# Patient Record
Sex: FEMALE | Race: WHITE | NOT HISPANIC OR LATINO | Employment: OTHER | URBAN - METROPOLITAN AREA
[De-identification: names, ages, dates, MRNs, and addresses within clinical notes are randomized per-mention and may not be internally consistent; named-entity substitution may affect disease eponyms.]

---

## 2017-01-18 RX ORDER — GLIMEPIRIDE 2 MG/1
TABLET ORAL
Qty: 90 TABLET | Refills: 0 | Status: SHIPPED | OUTPATIENT
Start: 2017-01-18 | End: 2017-04-21 | Stop reason: SDUPTHER

## 2017-01-19 ENCOUNTER — OFFICE VISIT (OUTPATIENT)
Dept: INTERNAL MEDICINE | Facility: CLINIC | Age: 67
End: 2017-01-19

## 2017-01-19 VITALS
OXYGEN SATURATION: 97 % | DIASTOLIC BLOOD PRESSURE: 72 MMHG | SYSTOLIC BLOOD PRESSURE: 122 MMHG | HEIGHT: 65 IN | HEART RATE: 95 BPM | BODY MASS INDEX: 35.65 KG/M2 | WEIGHT: 214 LBS

## 2017-01-19 DIAGNOSIS — G47.33 OBSTRUCTIVE SLEEP APNEA: Chronic | ICD-10-CM

## 2017-01-19 DIAGNOSIS — E11.9 TYPE 2 DIABETES MELLITUS WITHOUT COMPLICATION, WITHOUT LONG-TERM CURRENT USE OF INSULIN (HCC): Primary | Chronic | ICD-10-CM

## 2017-01-19 DIAGNOSIS — F41.8 DEPRESSION WITH ANXIETY: Chronic | ICD-10-CM

## 2017-01-19 DIAGNOSIS — Z87.39 HISTORY OF GOUT: Chronic | ICD-10-CM

## 2017-01-19 DIAGNOSIS — I10 BENIGN ESSENTIAL HYPERTENSION: Chronic | ICD-10-CM

## 2017-01-19 DIAGNOSIS — Z23 NEED FOR IMMUNIZATION AGAINST INFLUENZA: ICD-10-CM

## 2017-01-19 DIAGNOSIS — E55.9 VITAMIN D DEFICIENCY: Chronic | ICD-10-CM

## 2017-01-19 DIAGNOSIS — N26.1 RENAL ATROPHY, LEFT: Chronic | ICD-10-CM

## 2017-01-19 DIAGNOSIS — F41.1 GENERALIZED ANXIETY DISORDER: Chronic | ICD-10-CM

## 2017-01-19 DIAGNOSIS — E66.9 NON MORBID OBESITY, UNSPECIFIED OBESITY TYPE: ICD-10-CM

## 2017-01-19 DIAGNOSIS — K75.81 NASH (NONALCOHOLIC STEATOHEPATITIS): Chronic | ICD-10-CM

## 2017-01-19 DIAGNOSIS — Z51.81 THERAPEUTIC DRUG MONITORING: ICD-10-CM

## 2017-01-19 DIAGNOSIS — E78.5 HYPERLIPIDEMIA, UNSPECIFIED HYPERLIPIDEMIA TYPE: Chronic | ICD-10-CM

## 2017-01-19 DIAGNOSIS — F51.04 CHRONIC INSOMNIA: Chronic | ICD-10-CM

## 2017-01-19 DIAGNOSIS — R80.9 MICROALBUMINURIA: Chronic | ICD-10-CM

## 2017-01-19 DIAGNOSIS — Z80.41 FAMILY HISTORY OF OVARIAN CANCER: Chronic | ICD-10-CM

## 2017-01-19 DIAGNOSIS — R60.0 PEDAL EDEMA: Chronic | ICD-10-CM

## 2017-01-19 DIAGNOSIS — Z80.3 FAMILY HISTORY OF BREAST CANCER: Chronic | ICD-10-CM

## 2017-01-19 PROBLEM — Z01.419 ENCOUNTER FOR ROUTINE GYNECOLOGICAL EXAMINATION: Status: RESOLVED | Noted: 2017-01-19 | Resolved: 2017-01-19

## 2017-01-19 PROBLEM — Z01.419 ENCOUNTER FOR ROUTINE GYNECOLOGICAL EXAMINATION: Status: ACTIVE | Noted: 2017-01-19

## 2017-01-19 PROCEDURE — G0008 ADMIN INFLUENZA VIRUS VAC: HCPCS | Performed by: INTERNAL MEDICINE

## 2017-01-19 PROCEDURE — 90656 IIV3 VACC NO PRSV 0.5 ML IM: CPT | Performed by: INTERNAL MEDICINE

## 2017-01-19 PROCEDURE — 99214 OFFICE O/P EST MOD 30 MIN: CPT | Performed by: INTERNAL MEDICINE

## 2017-01-19 NOTE — MR AVS SNAPSHOT
Reema Easley   1/19/2017 11:35 AM   Office Visit    Dept Phone:  [phone]   Encounter #:  [id number]    Provider:  Marc Rasheed MD   Department:  [facility]                Your Full Care Plan              Your Updated Medication List          This list is accurate as of: 1/19/17  1:09 PM.  Always use your most recent med list.                allopurinol 300 MG tablet   Commonly known as:  ZYLOPRIM   One by mouth daily for gout       ALPRAZolam 0.5 MG tablet   Commonly known as:  XANAX   One by mouth twice a day when necessary anxiety.       amitriptyline 10 MG tablet   Commonly known as:  ELAVIL       amLODIPine 5 MG tablet   Commonly known as:  NORVASC   TAKE ONE TABLET BY MOUTH EVERY MORNING       aspirin 81 MG EC tablet       benazepril 40 MG tablet   Commonly known as:  LOTENSIN   TAKE ONE TABLET BY MOUTH DAILY       furosemide 20 MG tablet   Commonly known as:  LASIX   TAKE ONE TABLET BY MOUTH DAILY       glimepiride 2 MG tablet   Commonly known as:  AMARYL   TAKE ONE TABLET BY MOUTH DAILY FOR DIABETES       JANUMET XR  MG tablet sustained-release 24 hour   Generic drug:  SITagliptin-MetFORMIN HCl ER   TAKE TWO TABLETS BY MOUTH DAILY       OXcarbazepine 150 MG tablet   Commonly known as:  TRILEPTAL   TAKE ONE TABLET BY MOUTH EVERY MORNING AND TAKE TWO TABLETS BY MOUTH EVERY NIGHT AT BEDTIME       simvastatin 40 MG tablet   Commonly known as:  ZOCOR   TAKE ONE TABLET BY MOUTH EVERY NIGHT AT BEDTIME FOR CHOLESTEROL       vitamin D3 5000 UNITS capsule capsule               We Performed the Following     Flu Vaccine Tri (FLUVIRIN)       You Were Diagnosed With        Codes Comments    Type 2 diabetes mellitus without complication, without long-term current use of insulin    -  Primary ICD-10-CM: E11.9  ICD-9-CM: 250.00     Hyperlipidemia, unspecified hyperlipidemia type     ICD-10-CM: E78.5  ICD-9-CM: 272.4     History of gout      ICD-10-CM: Z87.39  ICD-9-CM: V12.29     Benign essential hypertension     ICD-10-CM: I10  ICD-9-CM: 401.1     Microalbuminuria     ICD-10-CM: R80.9  ICD-9-CM: 791.0     GEORGE (nonalcoholic steatohepatitis)     ICD-10-CM: K75.81  ICD-9-CM: 571.8     Obstructive sleep apnea     ICD-10-CM: G47.33  ICD-9-CM: 327.23     Pedal edema     ICD-10-CM: R60.0  ICD-9-CM: 782.3     Renal atrophy, left     ICD-10-CM: N26.1  ICD-9-CM: 587     Vitamin D deficiency     ICD-10-CM: E55.9  ICD-9-CM: 268.9     Depression with anxiety     ICD-10-CM: F41.8  ICD-9-CM: 300.4     Chronic insomnia     ICD-10-CM: F51.04  ICD-9-CM: 780.52     Generalized anxiety disorder     ICD-10-CM: F41.1  ICD-9-CM: 300.02     Non morbid obesity, unspecified obesity type     ICD-10-CM: E66.9  ICD-9-CM: 278.00     Therapeutic drug monitoring     ICD-10-CM: Z51.81  ICD-9-CM: V58.83     Need for immunization against influenza     ICD-10-CM: Z23  ICD-9-CM: V04.81     Family history of ovarian cancer     ICD-10-CM: Z80.41  ICD-9-CM: V16.41     Family history of breast cancer     ICD-10-CM: Z80.3  ICD-9-CM: V16.3       Instructions     None    Patient Instructions History      Upcoming Appointments     Visit Type Date Time Department    OFFICE VISIT 2017 11:35 AM [facility]      Vesti[employer] Signup     [facility] [employer] allows you to send messages to your doctor, view your test results, renew your prescriptions, schedule appointments, and more. To sign up, go to [email or web address] and click on the Sign Up Now link in the New User? box. Enter your [employer] Activation Code exactly as it appears below along with the last four digits of your Social Security Number and your Date of Birth () to complete the sign-up process. If you do not sign up before the expiration date, you must request a new code.    V[employer] Activation Code: [email or web address]  Expires: 2017  9:25 AM    If you have questions, you can email [email or web address] or call  "[phone] to talk to our MyChart staff. Remember, MyChart is NOT to be used for urgent needs. For medical emergencies, dial 911.               Other Info from Your Visit           Other Notes About Your Plan       Please DO NOT MOVE diagnosis from the problem list to past medical history!  As the primary care physician, I CAN NOT assess problems in the past.  Those problems are resolved, not active.  You are causing unnecessary work for me and my staff.  If you didn't author it, leave it alone.  Marc Rasheed M.D. Internal medicine.             Allergies     No Known Allergies      Vital Signs     Blood Pressure Pulse Height Weight Oxygen Saturation Body Mass Index    122/72 95 65\" (165.1 cm) 214 lb (97.1 kg) 97% 35.61 kg/m2    Smoking Status                   Never Smoker           Problems and Diagnoses Noted     Seasonal allergic rhinitis due to pollen    Benign essential hypertension    Chronic insomnia    Depression with anxiety    Diverticulosis of large intestine without hemorrhage    Family history of breast cancer    Family history of ovarian cancer    Generalized anxiety disorder    History of gout    High cholesterol or triglycerides    Microalbuminuria    GEORGE (nonalcoholic steatohepatitis)    Non morbid obesity    Sleep apnea    Osteoarthritis (arthritis due to wear and tear of joints)    Pedal edema    Renal atrophy, left    Encounter for therapeutic drug monitoring    Type 2 diabetes    Vitamin D deficiency    Needs flu shot          No Longer an Issue     Encounter for routine gynecological examination        "
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
## 2017-01-20 ENCOUNTER — RESULTS ENCOUNTER (OUTPATIENT)
Dept: INTERNAL MEDICINE | Facility: CLINIC | Age: 67
End: 2017-01-20

## 2017-01-20 DIAGNOSIS — E78.5 HYPERLIPIDEMIA, UNSPECIFIED HYPERLIPIDEMIA TYPE: Chronic | ICD-10-CM

## 2017-01-20 DIAGNOSIS — E11.9 TYPE 2 DIABETES MELLITUS WITHOUT COMPLICATION, WITHOUT LONG-TERM CURRENT USE OF INSULIN (HCC): Chronic | ICD-10-CM

## 2017-01-20 DIAGNOSIS — E55.9 VITAMIN D DEFICIENCY: Chronic | ICD-10-CM

## 2017-01-20 DIAGNOSIS — Z51.81 THERAPEUTIC DRUG MONITORING: ICD-10-CM

## 2017-01-24 RX ORDER — AMLODIPINE BESYLATE 5 MG/1
TABLET ORAL
Qty: 90 TABLET | Refills: 1 | Status: SHIPPED | OUTPATIENT
Start: 2017-01-24 | End: 2017-07-20 | Stop reason: SDUPTHER

## 2017-01-27 RX ORDER — SIMVASTATIN 40 MG
TABLET ORAL
Qty: 30 TABLET | Refills: 6 | Status: SHIPPED | OUTPATIENT
Start: 2017-01-27 | End: 2017-05-18 | Stop reason: SDUPTHER

## 2017-01-29 RX ORDER — SITAGLIPTIN AND METFORMIN HYDROCHLORIDE 1000; 50 MG/1; MG/1
TABLET, FILM COATED, EXTENDED RELEASE ORAL
Qty: 60 TABLET | Refills: 5 | Status: SHIPPED | OUTPATIENT
Start: 2017-01-29 | End: 2017-05-18 | Stop reason: SDUPTHER

## 2017-01-29 RX ORDER — BENAZEPRIL HYDROCHLORIDE 40 MG/1
TABLET, FILM COATED ORAL
Qty: 30 TABLET | Refills: 5 | Status: SHIPPED | OUTPATIENT
Start: 2017-01-29 | End: 2017-05-18 | Stop reason: SDUPTHER

## 2017-03-21 RX ORDER — FUROSEMIDE 20 MG/1
TABLET ORAL
Qty: 90 TABLET | Refills: 1 | Status: SHIPPED | OUTPATIENT
Start: 2017-03-21 | End: 2017-09-11 | Stop reason: SDUPTHER

## 2017-04-20 DIAGNOSIS — F41.1 GENERALIZED ANXIETY DISORDER: ICD-10-CM

## 2017-04-20 RX ORDER — ALPRAZOLAM 0.5 MG/1
TABLET ORAL
Qty: 60 TABLET | Refills: 3 | OUTPATIENT
Start: 2017-04-20 | End: 2017-08-22 | Stop reason: SDUPTHER

## 2017-04-21 RX ORDER — GLIMEPIRIDE 2 MG/1
TABLET ORAL
Qty: 90 TABLET | Refills: 0 | Status: SHIPPED | OUTPATIENT
Start: 2017-04-21 | End: 2017-07-20 | Stop reason: SDUPTHER

## 2017-05-18 RX ORDER — BENAZEPRIL HYDROCHLORIDE 40 MG/1
40 TABLET, FILM COATED ORAL DAILY
Qty: 90 TABLET | Refills: 0 | Status: SHIPPED | OUTPATIENT
Start: 2017-05-18 | End: 2017-09-19 | Stop reason: SDUPTHER

## 2017-05-18 RX ORDER — SIMVASTATIN 40 MG
40 TABLET ORAL NIGHTLY
Qty: 90 TABLET | Refills: 0 | Status: SHIPPED | OUTPATIENT
Start: 2017-05-18 | End: 2017-09-19 | Stop reason: SDUPTHER

## 2017-06-09 ENCOUNTER — APPOINTMENT (OUTPATIENT)
Dept: WOMENS IMAGING | Facility: HOSPITAL | Age: 67
End: 2017-06-09

## 2017-06-09 PROCEDURE — G0202 SCR MAMMO BI INCL CAD: HCPCS | Performed by: RADIOLOGY

## 2017-06-20 RX ORDER — OXCARBAZEPINE 150 MG/1
TABLET, FILM COATED ORAL
Qty: 30 TABLET | Refills: 0 | Status: SHIPPED | OUTPATIENT
Start: 2017-06-20 | End: 2017-06-21 | Stop reason: SDUPTHER

## 2017-06-21 RX ORDER — OXCARBAZEPINE 150 MG/1
TABLET, FILM COATED ORAL
Qty: 90 TABLET | Refills: 0 | Status: SHIPPED | OUTPATIENT
Start: 2017-06-21 | End: 2017-08-23 | Stop reason: SDUPTHER

## 2017-07-18 DIAGNOSIS — Z11.59 NEED FOR HEPATITIS C SCREENING TEST: Primary | ICD-10-CM

## 2017-07-20 LAB
25(OH)D3+25(OH)D2 SERPL-MCNC: 52.6 NG/ML (ref 30–100)
ALBUMIN SERPL-MCNC: 4.4 G/DL (ref 3.5–5.2)
ALBUMIN/GLOB SERPL: 1.7 G/DL
ALP SERPL-CCNC: 91 U/L (ref 39–117)
ALT SERPL-CCNC: 16 U/L (ref 1–33)
AST SERPL-CCNC: 17 U/L (ref 1–32)
BILIRUB SERPL-MCNC: 0.6 MG/DL (ref 0.1–1.2)
BUN SERPL-MCNC: 10 MG/DL (ref 8–23)
BUN/CREAT SERPL: 12 (ref 7–25)
CALCIUM SERPL-MCNC: 10.4 MG/DL (ref 8.6–10.5)
CHLORIDE SERPL-SCNC: 95 MMOL/L (ref 98–107)
CHOLEST SERPL-MCNC: 113 MG/DL (ref 100–199)
CK SERPL-CCNC: 113 U/L (ref 20–180)
CO2 SERPL-SCNC: 27 MMOL/L (ref 22–29)
CREAT SERPL-MCNC: 0.83 MG/DL (ref 0.57–1)
ERYTHROCYTE [DISTWIDTH] IN BLOOD BY AUTOMATED COUNT: 13.8 % (ref 11.7–13)
GLOBULIN SER CALC-MCNC: 2.6 GM/DL
GLUCOSE SERPL-MCNC: 129 MG/DL (ref 65–99)
HBA1C MFR BLD: 6.1 % (ref 4.8–5.6)
HCT VFR BLD AUTO: 42.7 % (ref 35.6–45.5)
HCV AB S/CO SERPL IA: <0.1 S/CO RATIO (ref 0–0.9)
HDL SERPL-SCNC: 40.3 UMOL/L
HDLC SERPL-MCNC: 57 MG/DL
HGB BLD-MCNC: 14 G/DL (ref 11.9–15.5)
LDL SERPL QN: 19.7 NM
LDL SERPL-SCNC: 642 NMOL/L
LDL SMALL SERPL-SCNC: 437 NMOL/L
LDLC SERPL CALC-MCNC: 41 MG/DL (ref 0–99)
MCH RBC QN AUTO: 30.2 PG (ref 26.9–32)
MCHC RBC AUTO-ENTMCNC: 32.8 G/DL (ref 32.4–36.3)
MCV RBC AUTO: 92 FL (ref 80.5–98.2)
PLATELET # BLD AUTO: 369 10*3/MM3 (ref 140–500)
POTASSIUM SERPL-SCNC: 4.8 MMOL/L (ref 3.5–5.2)
PROT SERPL-MCNC: 7 G/DL (ref 6–8.5)
RBC # BLD AUTO: 4.64 10*6/MM3 (ref 3.9–5.2)
SODIUM SERPL-SCNC: 138 MMOL/L (ref 136–145)
T3FREE SERPL-MCNC: 2.5 PG/ML (ref 2–4.4)
T4 FREE SERPL-MCNC: 1.27 NG/DL (ref 0.93–1.7)
TRIGL SERPL-MCNC: 73 MG/DL (ref 0–149)
TSH SERPL DL<=0.005 MIU/L-ACNC: 1.89 MIU/ML (ref 0.27–4.2)
WBC # BLD AUTO: 8.65 10*3/MM3 (ref 4.5–10.7)

## 2017-07-20 RX ORDER — GLIMEPIRIDE 2 MG/1
TABLET ORAL
Qty: 90 TABLET | Refills: 0 | Status: SHIPPED | OUTPATIENT
Start: 2017-07-20 | End: 2017-07-26

## 2017-07-20 RX ORDER — AMLODIPINE BESYLATE 5 MG/1
TABLET ORAL
Qty: 90 TABLET | Refills: 0 | Status: SHIPPED | OUTPATIENT
Start: 2017-07-20 | End: 2017-07-26

## 2017-07-26 ENCOUNTER — OFFICE VISIT (OUTPATIENT)
Dept: INTERNAL MEDICINE | Facility: CLINIC | Age: 67
End: 2017-07-26

## 2017-07-26 VITALS
BODY MASS INDEX: 32.15 KG/M2 | WEIGHT: 193 LBS | OXYGEN SATURATION: 98 % | DIASTOLIC BLOOD PRESSURE: 60 MMHG | SYSTOLIC BLOOD PRESSURE: 98 MMHG | HEIGHT: 65 IN | HEART RATE: 92 BPM

## 2017-07-26 DIAGNOSIS — R80.9 MICROALBUMINURIA: Chronic | ICD-10-CM

## 2017-07-26 DIAGNOSIS — F41.1 GENERALIZED ANXIETY DISORDER: Chronic | ICD-10-CM

## 2017-07-26 DIAGNOSIS — R60.0 PEDAL EDEMA: Chronic | ICD-10-CM

## 2017-07-26 DIAGNOSIS — E78.5 HYPERLIPIDEMIA, UNSPECIFIED HYPERLIPIDEMIA TYPE: Chronic | ICD-10-CM

## 2017-07-26 DIAGNOSIS — E66.9 NON MORBID OBESITY, UNSPECIFIED OBESITY TYPE: Chronic | ICD-10-CM

## 2017-07-26 DIAGNOSIS — K75.81 NASH (NONALCOHOLIC STEATOHEPATITIS): Chronic | ICD-10-CM

## 2017-07-26 DIAGNOSIS — Z51.81 THERAPEUTIC DRUG MONITORING: ICD-10-CM

## 2017-07-26 DIAGNOSIS — E55.9 VITAMIN D DEFICIENCY: Chronic | ICD-10-CM

## 2017-07-26 DIAGNOSIS — I10 BENIGN ESSENTIAL HYPERTENSION: Chronic | ICD-10-CM

## 2017-07-26 DIAGNOSIS — E11.9 TYPE 2 DIABETES MELLITUS WITHOUT COMPLICATION, WITHOUT LONG-TERM CURRENT USE OF INSULIN (HCC): Primary | Chronic | ICD-10-CM

## 2017-07-26 DIAGNOSIS — F41.8 DEPRESSION WITH ANXIETY: Chronic | ICD-10-CM

## 2017-07-26 DIAGNOSIS — Z87.39 HISTORY OF GOUT: Chronic | ICD-10-CM

## 2017-07-26 DIAGNOSIS — Z80.3 FAMILY HISTORY OF BREAST CANCER: Chronic | ICD-10-CM

## 2017-07-26 DIAGNOSIS — G47.33 OBSTRUCTIVE SLEEP APNEA: Chronic | ICD-10-CM

## 2017-07-26 DIAGNOSIS — E11.9 ENCOUNTER FOR DIABETIC FOOT EXAM (HCC): Chronic | ICD-10-CM

## 2017-07-26 DIAGNOSIS — Z23 NEED FOR PNEUMOCOCCAL VACCINATION: ICD-10-CM

## 2017-07-26 PROBLEM — Z01.00 DIABETIC EYE EXAM (HCC): Chronic | Status: ACTIVE | Noted: 2017-07-26

## 2017-07-26 PROBLEM — Z01.00 DIABETIC EYE EXAM (HCC): Status: ACTIVE | Noted: 2017-07-26

## 2017-07-26 PROCEDURE — 99214 OFFICE O/P EST MOD 30 MIN: CPT | Performed by: INTERNAL MEDICINE

## 2017-07-26 PROCEDURE — 90471 IMMUNIZATION ADMIN: CPT | Performed by: INTERNAL MEDICINE

## 2017-07-26 PROCEDURE — 90670 PCV13 VACCINE IM: CPT | Performed by: INTERNAL MEDICINE

## 2017-07-26 RX ORDER — LAMOTRIGINE 25 MG/1
25 TABLET ORAL 2 TIMES DAILY
COMMUNITY
Start: 2017-06-22 | End: 2017-09-06
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
## 2017-08-22 ENCOUNTER — OFFICE VISIT (OUTPATIENT)
Dept: INTERNAL MEDICINE | Facility: CLINIC | Age: 67
End: 2017-08-22

## 2017-08-22 VITALS
OXYGEN SATURATION: 97 % | SYSTOLIC BLOOD PRESSURE: 114 MMHG | BODY MASS INDEX: 31.99 KG/M2 | DIASTOLIC BLOOD PRESSURE: 76 MMHG | HEIGHT: 65 IN | WEIGHT: 192 LBS | HEART RATE: 81 BPM

## 2017-08-22 DIAGNOSIS — F41.1 GENERALIZED ANXIETY DISORDER: ICD-10-CM

## 2017-08-22 DIAGNOSIS — E11.9 TYPE 2 DIABETES MELLITUS WITHOUT COMPLICATION, WITHOUT LONG-TERM CURRENT USE OF INSULIN (HCC): Chronic | ICD-10-CM

## 2017-08-22 DIAGNOSIS — K75.81 NASH (NONALCOHOLIC STEATOHEPATITIS): Chronic | ICD-10-CM

## 2017-08-22 DIAGNOSIS — R10.11 RIGHT UPPER QUADRANT ABDOMINAL PAIN: Primary | ICD-10-CM

## 2017-08-22 DIAGNOSIS — I10 BENIGN ESSENTIAL HYPERTENSION: Chronic | ICD-10-CM

## 2017-08-22 DIAGNOSIS — E78.5 HYPERLIPIDEMIA, UNSPECIFIED HYPERLIPIDEMIA TYPE: Chronic | ICD-10-CM

## 2017-08-22 PROCEDURE — 99214 OFFICE O/P EST MOD 30 MIN: CPT | Performed by: INTERNAL MEDICINE

## 2017-08-22 RX ORDER — ALPRAZOLAM 0.5 MG/1
0.5 TABLET ORAL 2 TIMES DAILY
Qty: 60 TABLET | Refills: 3 | Status: SHIPPED | OUTPATIENT
Start: 2017-08-22 | End: 2018-03-16 | Stop reason: SDUPTHER
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
## 2017-08-23 ENCOUNTER — HOSPITAL ENCOUNTER (OUTPATIENT)
Dept: ULTRASOUND IMAGING | Facility: HOSPITAL | Age: 67
Discharge: HOME | End: 2017-08-23
Admitting: INTERNAL MEDICINE

## 2017-08-23 LAB
ALBUMIN SERPL-MCNC: 5 G/DL (ref 3.5–5.2)
ALBUMIN/GLOB SERPL: 2.5 G/DL
ALP SERPL-CCNC: 89 U/L (ref 39–117)
ALT SERPL-CCNC: 15 U/L (ref 1–33)
AMYLASE SERPL-CCNC: 44 U/L (ref 28–100)
AST SERPL-CCNC: 10 U/L (ref 1–32)
BASOPHILS # BLD AUTO: 0.04 10*3/MM3 (ref 0–0.2)
BASOPHILS NFR BLD AUTO: 0.4 % (ref 0–1.5)
BILIRUB SERPL-MCNC: 0.3 MG/DL (ref 0.1–1.2)
BUN SERPL-MCNC: 17 MG/DL (ref 8–23)
BUN/CREAT SERPL: 18.3 (ref 7–25)
CALCIUM SERPL-MCNC: 10.3 MG/DL (ref 8.6–10.5)
CHLORIDE SERPL-SCNC: 97 MMOL/L (ref 98–107)
CO2 SERPL-SCNC: 31 MMOL/L (ref 22–29)
CREAT SERPL-MCNC: 0.93 MG/DL (ref 0.57–1)
EOSINOPHIL # BLD AUTO: 0.76 10*3/MM3 (ref 0–0.7)
EOSINOPHIL NFR BLD AUTO: 7.6 % (ref 0.3–6.2)
ERYTHROCYTE [DISTWIDTH] IN BLOOD BY AUTOMATED COUNT: 13.8 % (ref 11.7–13)
GLOBULIN SER CALC-MCNC: 2 GM/DL
GLUCOSE SERPL-MCNC: 133 MG/DL (ref 65–99)
HCT VFR BLD AUTO: 42 % (ref 35.6–45.5)
HGB BLD-MCNC: 13.7 G/DL (ref 11.9–15.5)
IMM GRANULOCYTES # BLD: 0.02 10*3/MM3 (ref 0–0.03)
IMM GRANULOCYTES NFR BLD: 0.2 % (ref 0–0.5)
LIPASE SERPL-CCNC: 53 U/L (ref 13–60)
LYMPHOCYTES # BLD AUTO: 2.85 10*3/MM3 (ref 0.9–4.8)
LYMPHOCYTES NFR BLD AUTO: 28.4 % (ref 19.6–45.3)
MCH RBC QN AUTO: 30 PG (ref 26.9–32)
MCHC RBC AUTO-ENTMCNC: 32.6 G/DL (ref 32.4–36.3)
MCV RBC AUTO: 92.1 FL (ref 80.5–98.2)
MONOCYTES # BLD AUTO: 0.58 10*3/MM3 (ref 0.2–1.2)
MONOCYTES NFR BLD AUTO: 5.8 % (ref 5–12)
NEUTROPHILS # BLD AUTO: 5.79 10*3/MM3 (ref 1.9–8.1)
NEUTROPHILS NFR BLD AUTO: 57.6 % (ref 42.7–76)
PLATELET # BLD AUTO: 381 10*3/MM3 (ref 140–500)
POTASSIUM SERPL-SCNC: 4.8 MMOL/L (ref 3.5–5.2)
PROT SERPL-MCNC: 7 G/DL (ref 6–8.5)
RBC # BLD AUTO: 4.56 10*6/MM3 (ref 3.9–5.2)
SODIUM SERPL-SCNC: 138 MMOL/L (ref 136–145)
WBC # BLD AUTO: 10.04 10*3/MM3 (ref 4.5–10.7)

## 2017-08-23 PROCEDURE — 76705 ECHO EXAM OF ABDOMEN: CPT

## 2017-08-23 RX ORDER — OXCARBAZEPINE 150 MG/1
TABLET, FILM COATED ORAL
Qty: 90 TABLET | Refills: 0 | Status: SHIPPED | OUTPATIENT
Start: 2017-08-23 | End: 2017-09-11 | Stop reason: SDUPTHER

## 2017-08-25 ENCOUNTER — HOSPITAL ENCOUNTER (OUTPATIENT)
Dept: NUCLEAR MEDICINE | Facility: HOSPITAL | Age: 67
Discharge: HOME | End: 2017-08-25

## 2017-08-25 DIAGNOSIS — R10.11 RIGHT UPPER QUADRANT ABDOMINAL PAIN: ICD-10-CM

## 2017-08-25 PROCEDURE — 0 TECHNETIUM TC 99M MEBROFENIN KIT: Performed by: INTERNAL MEDICINE

## 2017-08-25 PROCEDURE — A9537 TC99M MEBROFENIN: HCPCS | Performed by: INTERNAL MEDICINE

## 2017-08-25 PROCEDURE — 25010000002 SINCALIDE PER 5 MCG: Performed by: INTERNAL MEDICINE

## 2017-08-25 PROCEDURE — 78227 HEPATOBIL SYST IMAGE W/DRUG: CPT

## 2017-08-25 RX ORDER — KIT FOR THE PREPARATION OF TECHNETIUM TC 99M MEBROFENIN 45 MG/10ML
1 INJECTION, POWDER, LYOPHILIZED, FOR SOLUTION INTRAVENOUS
Status: COMPLETED | OUTPATIENT
Start: 2017-08-25 | End: 2017-08-25

## 2017-08-25 RX ADMIN — MEBROFENIN 1 DOSE: 45 INJECTION, POWDER, LYOPHILIZED, FOR SOLUTION INTRAVENOUS at 11:45

## 2017-08-25 RX ADMIN — SINCALIDE 1.7 MCG: 5 INJECTION, POWDER, LYOPHILIZED, FOR SOLUTION INTRAVENOUS at 14:00

## 2017-08-31 ENCOUNTER — TELEPHONE (OUTPATIENT)
Dept: INTERNAL MEDICINE | Facility: CLINIC | Age: 67
End: 2017-08-31

## 2017-09-01 DIAGNOSIS — R10.11 RIGHT UPPER QUADRANT ABDOMINAL PAIN: Primary | ICD-10-CM

## 2017-09-06 ENCOUNTER — OFFICE VISIT (OUTPATIENT)
Dept: SURGERY | Facility: CLINIC | Age: 67
End: 2017-09-06

## 2017-09-06 VITALS — BODY MASS INDEX: 31.82 KG/M2 | HEART RATE: 76 BPM | HEIGHT: 65 IN | OXYGEN SATURATION: 100 % | WEIGHT: 191 LBS

## 2017-09-06 DIAGNOSIS — K82.9 GALLBLADDER DISORDER: Primary | ICD-10-CM

## 2017-09-06 DIAGNOSIS — IMO0001 REGURGITATION: ICD-10-CM

## 2017-09-06 PROCEDURE — 99204 OFFICE O/P NEW MOD 45 MIN: CPT | Performed by: SURGERY

## 2017-09-06 RX ORDER — CEFAZOLIN SODIUM 2 G/100ML
2 INJECTION, SOLUTION INTRAVENOUS ONCE
Status: CANCELLED | OUTPATIENT
Start: 2017-09-14 | End: 2017-09-06

## 2017-09-06 RX ORDER — SODIUM CHLORIDE 0.9 % (FLUSH) 0.9 %
1-10 SYRINGE (ML) INJECTION AS NEEDED
Status: CANCELLED | OUTPATIENT
Start: 2017-09-14
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
## 2017-09-11 ENCOUNTER — APPOINTMENT (OUTPATIENT)
Dept: PREADMISSION TESTING | Facility: HOSPITAL | Age: 67
End: 2017-09-11

## 2017-09-11 VITALS
TEMPERATURE: 97.2 F | HEART RATE: 65 BPM | BODY MASS INDEX: 32.11 KG/M2 | SYSTOLIC BLOOD PRESSURE: 123 MMHG | HEIGHT: 65 IN | OXYGEN SATURATION: 100 % | WEIGHT: 192.7 LBS | DIASTOLIC BLOOD PRESSURE: 78 MMHG | RESPIRATION RATE: 20 BRPM

## 2017-09-11 LAB
ALBUMIN SERPL-MCNC: 3.8 G/DL (ref 3.5–5.2)
ALBUMIN/GLOB SERPL: 1.3 G/DL
ALP SERPL-CCNC: 82 U/L (ref 39–117)
ALT SERPL W P-5'-P-CCNC: 15 U/L (ref 1–33)
ANION GAP SERPL CALCULATED.3IONS-SCNC: 10.5 MMOL/L
AST SERPL-CCNC: 13 U/L (ref 1–32)
BILIRUB SERPL-MCNC: 0.6 MG/DL (ref 0.1–1.2)
BUN BLD-MCNC: 12 MG/DL (ref 8–23)
BUN/CREAT SERPL: 16.4 (ref 7–25)
CALCIUM SPEC-SCNC: 9.5 MG/DL (ref 8.6–10.5)
CHLORIDE SERPL-SCNC: 96 MMOL/L (ref 98–107)
CO2 SERPL-SCNC: 27.5 MMOL/L (ref 22–29)
CREAT BLD-MCNC: 0.73 MG/DL (ref 0.57–1)
DEPRECATED RDW RBC AUTO: 44.1 FL (ref 37–54)
ERYTHROCYTE [DISTWIDTH] IN BLOOD BY AUTOMATED COUNT: 13.2 % (ref 11.7–13)
GFR SERPL CREATININE-BSD FRML MDRD: 80 ML/MIN/1.73
GLOBULIN UR ELPH-MCNC: 2.9 GM/DL
GLUCOSE BLD-MCNC: 143 MG/DL (ref 65–99)
HCT VFR BLD AUTO: 40.6 % (ref 35.6–45.5)
HGB BLD-MCNC: 13.5 G/DL (ref 11.9–15.5)
MCH RBC QN AUTO: 30.3 PG (ref 26.9–32)
MCHC RBC AUTO-ENTMCNC: 33.3 G/DL (ref 32.4–36.3)
MCV RBC AUTO: 91 FL (ref 80.5–98.2)
PLATELET # BLD AUTO: 317 10*3/MM3 (ref 140–500)
PMV BLD AUTO: 10.3 FL (ref 6–12)
POTASSIUM BLD-SCNC: 4.7 MMOL/L (ref 3.5–5.2)
PROT SERPL-MCNC: 6.7 G/DL (ref 6–8.5)
RBC # BLD AUTO: 4.46 10*6/MM3 (ref 3.9–5.2)
SODIUM BLD-SCNC: 134 MMOL/L (ref 136–145)
WBC NRBC COR # BLD: 9.24 10*3/MM3 (ref 4.5–10.7)

## 2017-09-11 PROCEDURE — 93010 ELECTROCARDIOGRAM REPORT: CPT | Performed by: INTERNAL MEDICINE

## 2017-09-11 PROCEDURE — 93005 ELECTROCARDIOGRAM TRACING: CPT

## 2017-09-11 PROCEDURE — 36415 COLL VENOUS BLD VENIPUNCTURE: CPT | Performed by: SURGERY

## 2017-09-11 PROCEDURE — 85027 COMPLETE CBC AUTOMATED: CPT | Performed by: SURGERY

## 2017-09-11 PROCEDURE — 80053 COMPREHEN METABOLIC PANEL: CPT | Performed by: SURGERY

## 2017-09-11 RX ORDER — FUROSEMIDE 20 MG/1
20 TABLET ORAL DAILY
COMMUNITY
End: 2017-09-18 | Stop reason: SDUPTHER

## 2017-09-11 RX ORDER — SITAGLIPTIN AND METFORMIN HYDROCHLORIDE 1000; 50 MG/1; MG/1
2 TABLET, FILM COATED, EXTENDED RELEASE ORAL DAILY
COMMUNITY
Start: 2017-09-04 | End: 2018-01-25 | Stop reason: SDUPTHER

## 2017-09-11 RX ORDER — OXCARBAZEPINE 150 MG/1
150 TABLET, FILM COATED ORAL 2 TIMES DAILY
COMMUNITY
End: 2017-09-18 | Stop reason: SDUPTHER

## 2017-09-11 NOTE — DISCHARGE INSTRUCTIONS
Take the following medications the morning of surgery with a small sip of water:  NONE    Arrive to hospital on your day of surgery at 10:30 AM.      General Instructions:  • Do not eat solid food after midnight the night before surgery.  • You may drink clear liquids day of surgery but must stop at least one hour before your hospital arrival time 9:30 AM.  • It is beneficial for you to have a clear drink that contains carbohydrates the day of surgery.  We suggest a 20 ounce bottle of Gatorade or Powerade for non-diabetic patients or a 20 ounce bottle of G2 or Powerade Zero for diabetic patients. (Pediatric patients, are not advised to drink a 20 ounce carbohydrate drink)    Clear liquids are liquids you can see through.  Nothing red in color.     Plain water                               Sports drinks  Sodas                                   Gelatin (Jell-O)  Fruit juices without pulp such as white grape juice and apple juice  Popsicles that contain no fruit or yogurt  Tea or coffee (no cream or milk added)  Gatorade / Powerade  G2 / Powerade Zero    • Infants may have breast milk up to four hours before surgery.  • Infants drinking formula may drink formula up to six hours before surgery.   • Patients who avoid smoking, chewing tobacco and alcohol for 4 weeks prior to surgery have a reduced risk of post-operative complications.  Quit smoking as many days before surgery as you can.  • Do not smoke, use chewing tobacco or drink alcohol the day of surgery.   • If applicable bring your C-PAP/ BI-PAP machine.  • Bring any papers given to you in the doctor’s office.  • Wear clean comfortable clothes and socks.  • Do not wear contact lenses or make-up.  Bring a case for your glasses.   • Bring crutches or walker if applicable.  • Leave all other valuables and jewelry at home.  • The Pre-Admission Testing nurse will instruct you to bring medications if unable to obtain an accurate list in Pre-Admission Testing.        If  you were given a blood bank ID arm band remember to bring it with you the day of surgery.    Preventing a Surgical Site Infection:  • For 2 to 3 days before surgery, avoid shaving with a razor because the razor can irritate skin and make it easier to develop an infection.  • The night prior to surgery sleep in a clean bed with clean clothing.  Do not allow pets to sleep with you.  • Shower on the morning of surgery using a fresh bar of anti-bacterial soap (such as Dial) and clean washcloth.  Dry with a clean towel and dress in clean clothing.  • Ask your surgeon if you will be receiving antibiotics prior to surgery.  • Make sure you, your family, and all healthcare providers clean their hands with soap and water or an alcohol based hand  before caring for you or your wound.    Day of surgery:  Upon arrival, a Pre-op nurse and Anesthesiologist will review your health history, obtain vital signs, and answer questions you may have.  The only belongings needed at this time will be your home medications and if applicable your C-PAP/BI-PAP machine.  If you are staying overnight your family can leave the rest of your belongings in the car and bring them to your room later.  A Pre-op nurse will start an IV and you may receive medication in preparation for surgery, including something to help you relax.  Your family will be able to see you in the Pre-op area.  While you are in surgery your family should notify the waiting room  if they leave the waiting room area and provide a contact phone number.    Please be aware that surgery does come with discomfort.  We want to make every effort to control your discomfort so please discuss any uncontrolled symptoms with your nurse.   Your doctor will most likely have prescribed pain medications.      If you are going home after surgery you will receive individualized written care instructions before being discharged.  A responsible adult must drive you to and from  the hospital on the day of your surgery and stay with you for 24 hours.    If you are staying overnight following surgery, you will be transported to your hospital room following the recovery period.  [facility] has all private rooms.    If you have any questions please call Pre-Admission Testing at [phone].  Deductibles and co-payments are collected on the day of service. Please be prepared to pay the required co-pay, deductible or deposit on the day of service as defined by your plan.

## 2017-09-14 ENCOUNTER — HOSPITAL ENCOUNTER (OUTPATIENT)
Facility: HOSPITAL | Age: 67
Setting detail: HOSPITAL OUTPATIENT SURGERY
Discharge: HOME | End: 2017-09-14
Attending: SURGERY | Admitting: SURGERY

## 2017-09-14 ENCOUNTER — ANESTHESIA (OUTPATIENT)
Dept: PERIOP | Facility: HOSPITAL | Age: 67
End: 2017-09-14

## 2017-09-14 ENCOUNTER — APPOINTMENT (OUTPATIENT)
Dept: GENERAL RADIOLOGY | Facility: HOSPITAL | Age: 67
End: 2017-09-14

## 2017-09-14 ENCOUNTER — ANESTHESIA EVENT (OUTPATIENT)
Dept: PERIOP | Facility: HOSPITAL | Age: 67
End: 2017-09-14

## 2017-09-14 VITALS
RESPIRATION RATE: 16 BRPM | SYSTOLIC BLOOD PRESSURE: 131 MMHG | TEMPERATURE: 97.6 F | WEIGHT: 191 LBS | HEART RATE: 51 BPM | OXYGEN SATURATION: 96 % | HEIGHT: 64 IN | BODY MASS INDEX: 32.61 KG/M2 | DIASTOLIC BLOOD PRESSURE: 66 MMHG

## 2017-09-14 DIAGNOSIS — IMO0001 REGURGITATION: ICD-10-CM

## 2017-09-14 DIAGNOSIS — K82.9 GALLBLADDER DISORDER: ICD-10-CM

## 2017-09-14 LAB — GLUCOSE BLDC GLUCOMTR-MCNC: 119 MG/DL (ref 70–130)

## 2017-09-14 PROCEDURE — 25010000002 NEOSTIGMINE PER 0.5 MG: Performed by: NURSE ANESTHETIST, CERTIFIED REGISTERED

## 2017-09-14 PROCEDURE — 88304 TISSUE EXAM BY PATHOLOGIST: CPT | Performed by: SURGERY

## 2017-09-14 PROCEDURE — 25010000003 CEFAZOLIN IN DEXTROSE 2-4 GM/100ML-% SOLUTION: Performed by: SURGERY

## 2017-09-14 PROCEDURE — 25010000002 ONDANSETRON PER 1 MG: Performed by: NURSE ANESTHETIST, CERTIFIED REGISTERED

## 2017-09-14 PROCEDURE — 25010000002 HYDROMORPHONE PER 4 MG: Performed by: NURSE ANESTHETIST, CERTIFIED REGISTERED

## 2017-09-14 PROCEDURE — 0 IOPAMIDOL PER 1 ML: Performed by: SURGERY

## 2017-09-14 PROCEDURE — 88305 TISSUE EXAM BY PATHOLOGIST: CPT | Performed by: SURGERY

## 2017-09-14 PROCEDURE — 25010000002 MIDAZOLAM PER 1 MG: Performed by: ANESTHESIOLOGY

## 2017-09-14 PROCEDURE — 25010000002 FENTANYL CITRATE (PF) 100 MCG/2ML SOLUTION: Performed by: NURSE ANESTHETIST, CERTIFIED REGISTERED

## 2017-09-14 PROCEDURE — 82962 GLUCOSE BLOOD TEST: CPT

## 2017-09-14 PROCEDURE — 25010000002 PROPOFOL 10 MG/ML EMULSION: Performed by: NURSE ANESTHETIST, CERTIFIED REGISTERED

## 2017-09-14 PROCEDURE — 25010000002 PROMETHAZINE PER 50 MG: Performed by: NURSE ANESTHETIST, CERTIFIED REGISTERED

## 2017-09-14 PROCEDURE — 47563 LAPARO CHOLECYSTECTOMY/GRAPH: CPT | Performed by: SURGERY

## 2017-09-14 PROCEDURE — 43239 EGD BIOPSY SINGLE/MULTIPLE: CPT | Performed by: SURGERY

## 2017-09-14 PROCEDURE — 88312 SPECIAL STAINS GROUP 1: CPT | Performed by: SURGERY

## 2017-09-14 PROCEDURE — 74300 X-RAY BILE DUCTS/PANCREAS: CPT

## 2017-09-14 RX ORDER — CEFAZOLIN SODIUM 2 G/100ML
2 INJECTION, SOLUTION INTRAVENOUS ONCE
Status: COMPLETED | OUTPATIENT
Start: 2017-09-14 | End: 2017-09-14

## 2017-09-14 RX ORDER — PROMETHAZINE HYDROCHLORIDE 25 MG/ML
12.5 INJECTION, SOLUTION INTRAMUSCULAR; INTRAVENOUS ONCE AS NEEDED
Status: COMPLETED | OUTPATIENT
Start: 2017-09-14 | End: 2017-09-14

## 2017-09-14 RX ORDER — HYDRALAZINE HYDROCHLORIDE 20 MG/ML
5 INJECTION INTRAMUSCULAR; INTRAVENOUS
Status: DISCONTINUED | OUTPATIENT
Start: 2017-09-14 | End: 2017-09-14 | Stop reason: HOSPADM

## 2017-09-14 RX ORDER — DOCUSATE SODIUM 100 MG/1
100 CAPSULE, LIQUID FILLED ORAL 2 TIMES DAILY
Qty: 60 CAPSULE | Refills: 1 | Status: SHIPPED | OUTPATIENT
Start: 2017-09-14 | End: 2017-12-26 | Stop reason: SDUPTHER

## 2017-09-14 RX ORDER — DIPHENHYDRAMINE HYDROCHLORIDE 50 MG/ML
12.5 INJECTION INTRAMUSCULAR; INTRAVENOUS
Status: DISCONTINUED | OUTPATIENT
Start: 2017-09-14 | End: 2017-09-14 | Stop reason: HOSPADM

## 2017-09-14 RX ORDER — PROMETHAZINE HYDROCHLORIDE 12.5 MG/1
12.5 TABLET ORAL EVERY 8 HOURS PRN
Qty: 10 TABLET | Refills: 0 | Status: SHIPPED | OUTPATIENT
Start: 2017-09-14 | End: 2017-10-04

## 2017-09-14 RX ORDER — PROPOFOL 10 MG/ML
VIAL (ML) INTRAVENOUS AS NEEDED
Status: DISCONTINUED | OUTPATIENT
Start: 2017-09-14 | End: 2017-09-14 | Stop reason: SURG

## 2017-09-14 RX ORDER — FLUMAZENIL 0.1 MG/ML
0.2 INJECTION INTRAVENOUS AS NEEDED
Status: DISCONTINUED | OUTPATIENT
Start: 2017-09-14 | End: 2017-09-14 | Stop reason: HOSPADM

## 2017-09-14 RX ORDER — LIDOCAINE HYDROCHLORIDE 20 MG/ML
INJECTION, SOLUTION INFILTRATION; PERINEURAL AS NEEDED
Status: DISCONTINUED | OUTPATIENT
Start: 2017-09-14 | End: 2017-09-14 | Stop reason: SURG

## 2017-09-14 RX ORDER — ONDANSETRON 2 MG/ML
4 INJECTION INTRAMUSCULAR; INTRAVENOUS ONCE AS NEEDED
Status: COMPLETED | OUTPATIENT
Start: 2017-09-14 | End: 2017-09-14

## 2017-09-14 RX ORDER — HYDROCODONE BITARTRATE AND ACETAMINOPHEN 5; 325 MG/1; MG/1
1 TABLET ORAL EVERY 4 HOURS PRN
Qty: 30 TABLET | Refills: 0 | Status: SHIPPED | OUTPATIENT
Start: 2017-09-14 | End: 2017-10-04

## 2017-09-14 RX ORDER — MIDAZOLAM HYDROCHLORIDE 1 MG/ML
2 INJECTION INTRAMUSCULAR; INTRAVENOUS
Status: DISCONTINUED | OUTPATIENT
Start: 2017-09-14 | End: 2017-09-14 | Stop reason: HOSPADM

## 2017-09-14 RX ORDER — SODIUM CHLORIDE 0.9 % (FLUSH) 0.9 %
1-10 SYRINGE (ML) INJECTION AS NEEDED
Status: DISCONTINUED | OUTPATIENT
Start: 2017-09-14 | End: 2017-09-14 | Stop reason: HOSPADM

## 2017-09-14 RX ORDER — ROCURONIUM BROMIDE 10 MG/ML
INJECTION, SOLUTION INTRAVENOUS AS NEEDED
Status: DISCONTINUED | OUTPATIENT
Start: 2017-09-14 | End: 2017-09-14 | Stop reason: SURG

## 2017-09-14 RX ORDER — GLYCOPYRROLATE 0.2 MG/ML
INJECTION INTRAMUSCULAR; INTRAVENOUS AS NEEDED
Status: DISCONTINUED | OUTPATIENT
Start: 2017-09-14 | End: 2017-09-14 | Stop reason: SURG

## 2017-09-14 RX ORDER — HYDROMORPHONE HYDROCHLORIDE 1 MG/ML
0.5 INJECTION, SOLUTION INTRAMUSCULAR; INTRAVENOUS; SUBCUTANEOUS
Status: DISCONTINUED | OUTPATIENT
Start: 2017-09-14 | End: 2017-09-14 | Stop reason: HOSPADM

## 2017-09-14 RX ORDER — ONDANSETRON 2 MG/ML
INJECTION INTRAMUSCULAR; INTRAVENOUS AS NEEDED
Status: DISCONTINUED | OUTPATIENT
Start: 2017-09-14 | End: 2017-09-14 | Stop reason: SURG

## 2017-09-14 RX ORDER — EPHEDRINE SULFATE 50 MG/ML
5 INJECTION, SOLUTION INTRAVENOUS ONCE AS NEEDED
Status: DISCONTINUED | OUTPATIENT
Start: 2017-09-14 | End: 2017-09-14 | Stop reason: HOSPADM

## 2017-09-14 RX ORDER — FENTANYL CITRATE 50 UG/ML
50 INJECTION, SOLUTION INTRAMUSCULAR; INTRAVENOUS
Status: DISCONTINUED | OUTPATIENT
Start: 2017-09-14 | End: 2017-09-14 | Stop reason: HOSPADM

## 2017-09-14 RX ORDER — SODIUM CHLORIDE, SODIUM LACTATE, POTASSIUM CHLORIDE, CALCIUM CHLORIDE 600; 310; 30; 20 MG/100ML; MG/100ML; MG/100ML; MG/100ML
9 INJECTION, SOLUTION INTRAVENOUS CONTINUOUS
Status: DISCONTINUED | OUTPATIENT
Start: 2017-09-14 | End: 2017-09-14 | Stop reason: HOSPADM

## 2017-09-14 RX ORDER — HYDROCODONE BITARTRATE AND ACETAMINOPHEN 7.5; 325 MG/1; MG/1
1 TABLET ORAL ONCE AS NEEDED
Status: DISCONTINUED | OUTPATIENT
Start: 2017-09-14 | End: 2017-09-14 | Stop reason: HOSPADM

## 2017-09-14 RX ORDER — PROMETHAZINE HYDROCHLORIDE 25 MG/1
25 TABLET ORAL ONCE AS NEEDED
Status: COMPLETED | OUTPATIENT
Start: 2017-09-14 | End: 2017-09-14

## 2017-09-14 RX ORDER — PROMETHAZINE HYDROCHLORIDE 25 MG/1
12.5 TABLET ORAL ONCE AS NEEDED
Status: DISCONTINUED | OUTPATIENT
Start: 2017-09-14 | End: 2017-09-14 | Stop reason: HOSPADM

## 2017-09-14 RX ORDER — OMEPRAZOLE 20 MG/1
20 CAPSULE, DELAYED RELEASE ORAL 2 TIMES DAILY
Qty: 60 CAPSULE | Refills: 1 | Status: SHIPPED | OUTPATIENT
Start: 2017-09-14 | End: 2017-11-06

## 2017-09-14 RX ORDER — NALOXONE HCL 0.4 MG/ML
0.2 VIAL (ML) INJECTION AS NEEDED
Status: DISCONTINUED | OUTPATIENT
Start: 2017-09-14 | End: 2017-09-14 | Stop reason: HOSPADM

## 2017-09-14 RX ORDER — FAMOTIDINE 10 MG/ML
20 INJECTION, SOLUTION INTRAVENOUS ONCE
Status: COMPLETED | OUTPATIENT
Start: 2017-09-14 | End: 2017-09-14

## 2017-09-14 RX ORDER — DOCUSATE SODIUM 100 MG/1
100 CAPSULE, LIQUID FILLED ORAL 2 TIMES DAILY PRN
Status: DISCONTINUED | OUTPATIENT
Start: 2017-09-14 | End: 2017-09-14 | Stop reason: HOSPADM

## 2017-09-14 RX ORDER — LABETALOL HYDROCHLORIDE 5 MG/ML
5 INJECTION, SOLUTION INTRAVENOUS
Status: DISCONTINUED | OUTPATIENT
Start: 2017-09-14 | End: 2017-09-14 | Stop reason: HOSPADM

## 2017-09-14 RX ORDER — FENTANYL CITRATE 50 UG/ML
INJECTION, SOLUTION INTRAMUSCULAR; INTRAVENOUS AS NEEDED
Status: DISCONTINUED | OUTPATIENT
Start: 2017-09-14 | End: 2017-09-14 | Stop reason: SURG

## 2017-09-14 RX ORDER — BUPIVACAINE HYDROCHLORIDE AND EPINEPHRINE 5; 5 MG/ML; UG/ML
INJECTION, SOLUTION PERINEURAL AS NEEDED
Status: DISCONTINUED | OUTPATIENT
Start: 2017-09-14 | End: 2017-09-14 | Stop reason: HOSPADM

## 2017-09-14 RX ORDER — MIDAZOLAM HYDROCHLORIDE 1 MG/ML
1 INJECTION INTRAMUSCULAR; INTRAVENOUS
Status: DISCONTINUED | OUTPATIENT
Start: 2017-09-14 | End: 2017-09-14 | Stop reason: HOSPADM

## 2017-09-14 RX ORDER — PROMETHAZINE HYDROCHLORIDE 25 MG/1
25 SUPPOSITORY RECTAL ONCE AS NEEDED
Status: COMPLETED | OUTPATIENT
Start: 2017-09-14 | End: 2017-09-14

## 2017-09-14 RX ADMIN — GLYCOPYRROLATE 0.4 MG: 0.2 INJECTION INTRAMUSCULAR; INTRAVENOUS at 14:01

## 2017-09-14 RX ADMIN — FENTANYL CITRATE 100 MCG: 50 INJECTION INTRAMUSCULAR; INTRAVENOUS at 12:46

## 2017-09-14 RX ADMIN — ONDANSETRON 4 MG: 2 INJECTION INTRAMUSCULAR; INTRAVENOUS at 16:15

## 2017-09-14 RX ADMIN — LIDOCAINE HYDROCHLORIDE 100 MG: 20 INJECTION, SOLUTION INFILTRATION; PERINEURAL at 12:46

## 2017-09-14 RX ADMIN — ROCURONIUM BROMIDE 40 MG: 10 INJECTION INTRAVENOUS at 12:46

## 2017-09-14 RX ADMIN — ONDANSETRON 4 MG: 2 INJECTION INTRAMUSCULAR; INTRAVENOUS at 13:43

## 2017-09-14 RX ADMIN — MIDAZOLAM 2 MG: 1 INJECTION INTRAMUSCULAR; INTRAVENOUS at 11:23

## 2017-09-14 RX ADMIN — FENTANYL CITRATE 50 MCG: 50 INJECTION INTRAMUSCULAR; INTRAVENOUS at 14:41

## 2017-09-14 RX ADMIN — PROMETHAZINE HYDROCHLORIDE 12.5 MG: 25 INJECTION INTRAMUSCULAR; INTRAVENOUS at 17:30

## 2017-09-14 RX ADMIN — PROPOFOL 150 MG: 10 INJECTION, EMULSION INTRAVENOUS at 12:46

## 2017-09-14 RX ADMIN — SODIUM CHLORIDE, POTASSIUM CHLORIDE, SODIUM LACTATE AND CALCIUM CHLORIDE: 600; 310; 30; 20 INJECTION, SOLUTION INTRAVENOUS at 14:01

## 2017-09-14 RX ADMIN — HYDROMORPHONE HYDROCHLORIDE 0.5 MG: 1 INJECTION, SOLUTION INTRAMUSCULAR; INTRAVENOUS; SUBCUTANEOUS at 16:02

## 2017-09-14 RX ADMIN — FAMOTIDINE 20 MG: 10 INJECTION, SOLUTION INTRAVENOUS at 11:23

## 2017-09-14 RX ADMIN — NEOSTIGMINE METHYLSULFATE 2.5 MG: 1 INJECTION INTRAMUSCULAR; INTRAVENOUS; SUBCUTANEOUS at 14:01

## 2017-09-14 RX ADMIN — CEFAZOLIN SODIUM 2 G: 2 INJECTION, SOLUTION INTRAVENOUS at 12:54

## 2017-09-14 RX ADMIN — HYDROMORPHONE HYDROCHLORIDE 0.5 MG: 1 INJECTION, SOLUTION INTRAMUSCULAR; INTRAVENOUS; SUBCUTANEOUS at 15:40

## 2017-09-14 RX ADMIN — SODIUM CHLORIDE, POTASSIUM CHLORIDE, SODIUM LACTATE AND CALCIUM CHLORIDE 9 ML/HR: 600; 310; 30; 20 INJECTION, SOLUTION INTRAVENOUS at 11:23

## 2017-09-14 RX ADMIN — HYDROCODONE BITARTRATE AND ACETAMINOPHEN 1 TABLET: 7.5; 325 TABLET ORAL at 14:40

## 2017-09-14 RX ADMIN — FENTANYL CITRATE 25 MCG: 50 INJECTION INTRAMUSCULAR; INTRAVENOUS at 14:04

## 2017-09-14 NOTE — PLAN OF CARE
Problem: Patient Care Overview (Adult)  Goal: Adult Individualization and Mutuality  Outcome: Outcome(s) achieved Date Met:  09/14/17

## 2017-09-14 NOTE — ANESTHESIA PROCEDURE NOTES
Airway  Urgency: elective    Date/Time: 9/14/2017 12:51 PM  Airway not difficult    General Information and Staff    Patient location during procedure: OR  Anesthesiologist: BONNIE PEACOCK  CRNA: JACKI BERMAN    Indications and Patient Condition  Indications for airway management: airway protection    Preoxygenated: yes  MILS maintained throughout  Mask difficulty assessment: 1 - vent by mask    Final Airway Details  Final airway type: endotracheal airway      Successful airway: ETT  Cuffed: yes   Successful intubation technique: direct laryngoscopy  Endotracheal tube insertion site: oral  Blade: Pineda  Blade size: #2  ETT size: 7.0 mm  Cormack-Lehane Classification: grade I - full view of glottis  Placement verified by: chest auscultation and capnometry   Measured from: lips  ETT to lips (cm): 22  Number of attempts at approach: 1

## 2017-09-14 NOTE — ANESTHESIA PREPROCEDURE EVALUATION
Anesthesia Evaluation     Patient summary reviewed and Nursing notes reviewed   history of anesthetic complications: prolonged sedation  NPO Solid Status: > 8 hours       Airway   Mallampati: II  TM distance: >3 FB  Neck ROM: full  Dental - normal exam     Pulmonary - normal exam    breath sounds clear to auscultation  (+) sleep apnea,   Cardiovascular - normal exam    ECG reviewed  Rhythm: regular  Rate: normal    (+) hypertension, hyperlipidemia  (-) angina, orthopnea, PND, VIVAS      Neuro/Psych  (+) psychiatric history Anxiety and Depression,    GI/Hepatic/Renal/Endo    (+) obesity,  hepatitis (GEORGE (nonalcoholic steatohepatitis)), liver disease, renal disease (Renal atrophy, left), diabetes mellitus type 2 well controlled,     Musculoskeletal (-) negative ROS    Abdominal    Substance History - negative use     OB/GYN negative ob/gyn ROS         Other - negative ROS                                     Anesthesia Plan    ASA 3     general     intravenous induction   Anesthetic plan and risks discussed with patient.
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
## 2017-09-14 NOTE — PLAN OF CARE
Problem: Patient Care Overview (Adult)  Goal: Discharge Needs Assessment  Outcome: Outcome(s) achieved Date Met:  09/14/17 09/14/17 1829   Discharge Needs Assessment   Concerns To Be Addressed no discharge needs identified

## 2017-09-14 NOTE — OP NOTE
PREOPERATIVE DIAGNOSIS: Gallbladder dyskinesia, abdominal pain and vomiting  POSTOPERATIVE DIAGNOSIS: Same + gastritis + duodenitis   PROCEDURE: Laparoscopic cholecystectomy with Intraoperative cholangiogram, upper endoscopy with bipsies  SURGEON/STAFF: KURTIS Carranza    ASST: RAHUL Tipton APC  SPECIMENS: Gallbladder , stomach and duodenum biopsies  INTRAOPERATIVE COMPLICATIONS: None.  ANESTHESIA: General.  BLOOD LOSS:  Minimal  COUNTS: Needle and sponge counts correct.   FINDINGS: adhesions to the midline with colon adhered to the abdominal wall at the epigastric area. Otherwise normal biliary anatomy. Gastritis and duodenitis    INDICATIONS: This is a pleasant patient who presented with nausea and vomiting, she was found to have gallbladder dyskinesia..I offered her lap jelly with IOC and upper endoscopy with biopsies  Risks and benefits of laparoscopic, open, and conservative management were discussed at length and in detail with the patient. This included detail drawings of the anatomy and possible postoperative complications including but not limited to bile leak, retained stone, bleeding and common bile duct injury. She agreed was was consented for operative management without duress. She also agreed to upper endoscopy with biopsies.     PROCEDURE: The patient was brought to the operating room in stable condition. Perioperative antibiotics were given and sequential compression devices applied. She was then laid supine on the operating room table. General anesthesia was induced by the Anesthesia Service without difficulty.     At this time, the patient's abdomen was accessed at the level of the umbilicus in open cutdown technique and insertion of a 5-mm trocar. The abdomen was then insufflated. Brief survey of the abdominal cavity revealed no evidence of injury from insertion of the trocar, or no other intraabdominal pathology.  She had large amount of adhesions from the omentum to the anterior abdominal wall.  I  continued procedure by placing another 5 mm trocar in the right lower quadrant under direct laparoscopic vision.  After this adhesions were taken down with scissors from the supraumbilical area to the epigastric area.  The level of the pediatric area there was evidence of adhesions from the omentum connected to the transverse colon to the abdominal wall.  These were taken down preserving the colon.  At this time the patient was placed in steep reverse Trendelenburg and a slightly left-side down position. Three additional 5-mm trocars were placed, all in the standard position. This was under direct vision.  The initial 5 mm trocar was switched by a 12 mm trocar in the umbilical position      The peritoneal attachment of the gallbladder at the level of the infundibulum was scored from laterally to medially, terminating at the level of the hepatic edge. The peritoneum was gently stripped down, exposing the underlying cystic artery and cystic duct. This was also done on the lateral side of the gallbladder by reflecting the gallbladder medially. The peritoneal attachment here was again gently dissected inferiorly. After completely exposing the cystic duct as well as cystic artery, a retro-cystic window was created. These 2 structures, the cystic duct and cystic artery, were further delineated. A firm critical view was obtained, visualizing healthy-appearing hepatic tissue behind the 2 structures, with no evidence of looping, bridging or tenting of the common bile duct.     A Hemo-Lock clip was placed distally at the cystic duct and a cystic duct incision with laparoscopic scissors was performed. A 16 Fr sheet was placed through the patient abdominal wall and a Cholangio-catheter was inserted into the patient abdomen. The catheter was secured inside the cystic duct with a metallic clip. Cholangiogram was performed that showed filling of the cystic duct as well as the common bile duct and the right and left hepatic ducts.  There’s prompt emptying of the contrast into the duodenum and there’s no evidence of filling defects. Next, the metallic clip was removed and the cystic duct was once again visualized and after confirming that it was indeed the cystic duct, it was clipped twice proximally. It was then transected. Next, the cystic artery was clipped twice proximally and once distally. It was inspected and seen to be in intact. The gallbladder was then carefully dissected off the hepatic bed using hook electrocautery. It was firmly adherent to the underlying bed, and an effort careful and meticulous hemostasis was undertaken. The gallbladder, after being removed from the hepatic bed, was placed in an EndoCatch bag. It was removed through the umbilical trocar without difficulty.    A final complete survey of the abdominal cavity was undertaken, and there was no evidence of bleeding or intrabdominal injury.  At this time all 5-mm trocars in the upper abdomen were then removed under direct vision while desufflating the abdomen.Next, the umbilical trocar was removed. The umbilical fascia was closed with interrupted 0 Vicryl suture.  The skin incisions were closed with 4-0 Monocryl and surgical glue. At the end of the case, all needle and sponge counts were correct. I, the attending surgeon, was scrubbed, present and persisted in the entire operation.    I then perform an upper endoscopy.  The gastroscope was inserted through the patient mouth and advanced under direct vision down to the esophagus, stomach and second portion of the duodenum.  Biopsies were taken in the second portion of the duodenum.  Mucosa at the level was normal.  There was evidence of duodenitis at the duodenal bulb.  Biopsies were taken with cold biopsy forceps.  I then explored the prepyloric area of the stomach were there was evidence of gastritis.  This was biopsied with cold forceps biopsy.  Retroflexion was performed.  There was evidence of complete and well  positioned Nissen fundoplication wrap.  There was no evidence of hiatal hernia.  The scope was slowly withdrawn into the esophagus.  There was no evidence of esophagitis.  Biopsies were taken with cold forceps biopsy at the GE junction.  The remaining of the esophagus was normal.  Oropharynx was normal.  Scope was completely withdrawn.      The patient was extubated and taken to the recovery room in stable condition.    Benoit Carranza MD

## 2017-09-14 NOTE — PLAN OF CARE
Problem: Patient Care Overview (Adult)  Goal: Plan of Care Review  Outcome: Ongoing (interventions implemented as appropriate)    09/14/17 1102   Coping/Psychosocial Response Interventions   Plan Of Care Reviewed With patient   Patient Care Overview   Progress no change       Goal: Adult Individualization and Mutuality  Outcome: Ongoing (interventions implemented as appropriate)    09/14/17 1102   Individualization   Patient Specific Preferences none       Goal: Discharge Needs Assessment  Outcome: Ongoing (interventions implemented as appropriate)    09/14/17 1102   Discharge Needs Assessment   Concerns To Be Addressed no discharge needs identified;denies needs/concerns at this time   Equipment Needed After Discharge none   Self-Care   Equipment Currently Used at Home none         Problem: Perioperative Period (Adult)  Goal: Signs and Symptoms of Listed Potential Problems Will be Absent or Manageable (Perioperative Period)  Outcome: Ongoing (interventions implemented as appropriate)    09/14/17 1102   Perioperative Period   Problems Assessed (Perioperative Period) pain;infection;physiologic stress response   Problems Present (Perioperative Period) physiologic stress response

## 2017-09-14 NOTE — PLAN OF CARE
Problem: Patient Care Overview (Adult)  Goal: Plan of Care Review  Outcome: Ongoing (interventions implemented as appropriate)    09/14/17 7137   Coping/Psychosocial Response Interventions   Plan Of Care Reviewed With patient   Patient Care Overview   Progress progress toward functional goals as expected   Outcome Evaluation   Outcome Summary/Follow up Plan patient vitals WNL. pain level tolerable per patient. uneventful stay in pacu

## 2017-09-14 NOTE — PLAN OF CARE
Problem: Patient Care Overview (Adult)  Goal: Plan of Care Review  Outcome: Outcome(s) achieved Date Met:  09/14/17 09/14/17 5978   Coping/Psychosocial Response Interventions   Plan Of Care Reviewed With patient;daughter   Patient Care Overview   Progress improving   Outcome Evaluation   Outcome Summary/Follow up Plan ready for d/c

## 2017-09-14 NOTE — BRIEF OP NOTE
CHOLECYSTECTOMY LAPAROSCOPIC INTRAOPERATIVE CHOLANGIOGRAM, ESOPHAGOGASTRODUODENOSCOPY  Procedure Note    Reema Easley  9/14/2017    Pre-op Diagnosis:   Gallbladder disorder [K82.9]  Regurgitation [R11.10]    Post-op Diagnosis:     Post-Op Diagnosis Codes:     * Gallbladder disorder [K82.9]     * Regurgitation [R11.10]    Procedure/CPT® Codes:      Procedure(s):  LAPAROSCOPIC CHOLECYSTECTOMY WITH  INTRAOPERATIVE CHOLANGIOGRAM  ESOPHAGOGASTRODUODENOSCOPY WITH BIOPSIES    Surgeon(s):  Benoit Carranza MD    Anesthesia: General    Staff:   Circulator: Emily Brown RN; Chana Cook RN  Scrub Person: Aminta Sher  Endo Technician: Caro Urrutia  Assistant: Bibi Tipton CSA  Endo Nurse: Nina Tariq RN    Estimated Blood Loss: Minimal  Urine Voided: * No values recorded between 9/14/2017 12:34 PM and 9/14/2017  1:59 PM *    Specimens:                  ID Type Source Tests Collected by Time Destination   A : gallbladder Tissue Gallbladder TISSUE EXAM Benoit Carranza MD 9/14/2017 1337    B : DUODENUM BX Tissue Small Intestine TISSUE EXAM Benoit Carranza MD 9/14/2017 1353    C : DUODENAL BULB Tissue Small Intestine TISSUE EXAM Benoit Carranza MD 9/14/2017 1355    D : P PYLORI BX Tissue Stomach TISSUE EXAM Benoit Carranza MD 9/14/2017 1356    E : GE JUNCTION BX Tissue Esophagus TISSUE EXAM Benoit Carranza MD 9/14/2017 1358          Drains: none          Findings: Intraabdominal ahdesions, gastritis, duodenitis, evidence of prior nissen fundoplication    Complications: None      Benoit Carranza MD     Date: 9/14/2017  Time: 2:04 PM

## 2017-09-14 NOTE — ANESTHESIA POSTPROCEDURE EVALUATION
"Patient: Reema Easley    Procedure Summary     Date Anesthesia Start Anesthesia Stop Room / Location    09/14/17 1244 1416  [facility] / [facility]       Procedure Diagnosis Surgeon Provider    LAPAROSCOPIC CHOLECYSTECTOMY WITH  INTRAOPERATIVE CHOLANGIOGRAM (N/A Abdomen); ESOPHAGOGASTRODUODENOSCOPY WITH BIOPSIES (N/A Esophagus) Gallbladder disorder; Regurgitation  (Gallbladder disorder [K82.9]; Regurgitation [R11.10]) MD Jeremi Olson MD          Anesthesia Type: general  Last vitals  BP   132/80 (09/14/17 1636)    Temp        Pulse   (!) 46 (09/14/17 1636)   Resp   14 (09/14/17 1636)    SpO2   92 % (09/14/17 1636)      Post Anesthesia Care and Evaluation    Patient location during evaluation: bedside  Patient participation: complete - patient participated  Level of consciousness: awake and alert  Pain management: adequate  Airway patency: patent  Anesthetic complications: No anesthetic complications    Cardiovascular status: acceptable  Respiratory status: acceptable  Hydration status: acceptable    Comments: /80 (BP Location: Left arm, Patient Position: Lying)  Pulse (!) 46  Temp 36.4 °C (97.6 °F) (Oral)   Resp 14  Ht 64\" (162.6 cm)  Wt 191 lb (86.6 kg)  SpO2 92%  BMI 32.79 kg/m2      "

## 2017-09-15 LAB
CYTO UR: NORMAL
LAB AP CASE REPORT: NORMAL
Lab: NORMAL
PATH REPORT.FINAL DX SPEC: NORMAL
PATH REPORT.GROSS SPEC: NORMAL

## 2017-09-18 RX ORDER — FUROSEMIDE 20 MG/1
TABLET ORAL
Qty: 90 TABLET | Refills: 0 | Status: SHIPPED | OUTPATIENT
Start: 2017-09-18 | End: 2017-12-17 | Stop reason: SDUPTHER

## 2017-09-18 RX ORDER — OXCARBAZEPINE 150 MG/1
TABLET, FILM COATED ORAL
Qty: 90 TABLET | Refills: 0 | Status: SHIPPED | OUTPATIENT
Start: 2017-09-18 | End: 2017-10-22 | Stop reason: SDUPTHER

## 2017-09-19 RX ORDER — SIMVASTATIN 40 MG
40 TABLET ORAL NIGHTLY
Qty: 90 TABLET | Refills: 0 | Status: SHIPPED | OUTPATIENT
Start: 2017-09-19 | End: 2018-03-01 | Stop reason: SDUPTHER

## 2017-09-19 RX ORDER — BENAZEPRIL HYDROCHLORIDE 40 MG/1
40 TABLET, FILM COATED ORAL DAILY
Qty: 90 TABLET | Refills: 0 | Status: SHIPPED | OUTPATIENT
Start: 2017-09-19 | End: 2018-01-16 | Stop reason: SDUPTHER

## 2017-10-04 ENCOUNTER — OFFICE VISIT (OUTPATIENT)
Dept: SURGERY | Facility: CLINIC | Age: 67
End: 2017-10-04

## 2017-10-04 DIAGNOSIS — Z09 POSTOP CHECK: Primary | ICD-10-CM

## 2017-10-04 PROCEDURE — 99024 POSTOP FOLLOW-UP VISIT: CPT | Performed by: SURGERY

## 2017-10-23 RX ORDER — OXCARBAZEPINE 150 MG/1
TABLET, FILM COATED ORAL
Qty: 90 TABLET | Refills: 5 | Status: SHIPPED | OUTPATIENT
Start: 2017-10-23 | End: 2019-09-30

## 2017-10-30 DIAGNOSIS — R80.9 MICROALBUMINURIA: Chronic | ICD-10-CM

## 2017-10-30 DIAGNOSIS — E78.5 HYPERLIPIDEMIA, UNSPECIFIED HYPERLIPIDEMIA TYPE: Chronic | ICD-10-CM

## 2017-10-30 DIAGNOSIS — Z87.39 HISTORY OF GOUT: Chronic | ICD-10-CM

## 2017-10-30 DIAGNOSIS — E11.9 TYPE 2 DIABETES MELLITUS WITHOUT COMPLICATION, WITHOUT LONG-TERM CURRENT USE OF INSULIN (HCC): Chronic | ICD-10-CM

## 2017-10-30 DIAGNOSIS — Z51.81 THERAPEUTIC DRUG MONITORING: ICD-10-CM

## 2017-10-30 DIAGNOSIS — E55.9 VITAMIN D DEFICIENCY: Chronic | ICD-10-CM

## 2017-10-31 LAB
25(OH)D3+25(OH)D2 SERPL-MCNC: 48.7 NG/ML (ref 30–100)
ALBUMIN SERPL-MCNC: 4.5 G/DL (ref 3.5–5.2)
ALBUMIN/CREAT UR: <12.7 MG/G CREAT (ref 0–30)
ALBUMIN/GLOB SERPL: 2 G/DL
ALP SERPL-CCNC: 95 U/L (ref 39–117)
ALT SERPL-CCNC: 14 U/L (ref 1–33)
AST SERPL-CCNC: 16 U/L (ref 1–32)
BILIRUB SERPL-MCNC: 0.5 MG/DL (ref 0.1–1.2)
BUN SERPL-MCNC: 12 MG/DL (ref 8–23)
BUN/CREAT SERPL: 18.8 (ref 7–25)
CALCIUM SERPL-MCNC: 9.8 MG/DL (ref 8.6–10.5)
CHLORIDE SERPL-SCNC: 95 MMOL/L (ref 98–107)
CHOLEST SERPL-MCNC: 123 MG/DL (ref 100–199)
CK SERPL-CCNC: 96 U/L (ref 20–180)
CO2 SERPL-SCNC: 28.2 MMOL/L (ref 22–29)
CREAT SERPL-MCNC: 0.64 MG/DL (ref 0.57–1)
CREAT UR-MCNC: 23.6 MG/DL
ERYTHROCYTE [DISTWIDTH] IN BLOOD BY AUTOMATED COUNT: 13.4 % (ref 11.7–13)
GFR SERPLBLD CREATININE-BSD FMLA CKD-EPI: 113 ML/MIN/1.73
GFR SERPLBLD CREATININE-BSD FMLA CKD-EPI: 93 ML/MIN/1.73
GLOBULIN SER CALC-MCNC: 2.3 GM/DL
GLUCOSE SERPL-MCNC: 119 MG/DL (ref 65–99)
HBA1C MFR BLD: 6.39 % (ref 4.8–5.6)
HCT VFR BLD AUTO: 41.1 % (ref 35.6–45.5)
HDL SERPL-SCNC: 35 UMOL/L
HDLC SERPL-MCNC: 52 MG/DL
HGB BLD-MCNC: 13.4 G/DL (ref 11.9–15.5)
LDL SERPL QN: 19.8 NM
LDL SERPL-SCNC: 793 NMOL/L
LDL SMALL SERPL-SCNC: 512 NMOL/L
LDLC SERPL CALC-MCNC: 53 MG/DL (ref 0–99)
MCH RBC QN AUTO: 29.5 PG (ref 26.9–32)
MCHC RBC AUTO-ENTMCNC: 32.6 G/DL (ref 32.4–36.3)
MCV RBC AUTO: 90.3 FL (ref 80.5–98.2)
MICROALBUMIN UR-MCNC: <3 UG/ML
PLATELET # BLD AUTO: 373 10*3/MM3 (ref 140–500)
POTASSIUM SERPL-SCNC: 4.9 MMOL/L (ref 3.5–5.2)
PROT SERPL-MCNC: 6.8 G/DL (ref 6–8.5)
RBC # BLD AUTO: 4.55 10*6/MM3 (ref 3.9–5.2)
SODIUM SERPL-SCNC: 136 MMOL/L (ref 136–145)
T3FREE SERPL-MCNC: 2.3 PG/ML (ref 2–4.4)
T4 FREE SERPL-MCNC: 1.17 NG/DL (ref 0.93–1.7)
TRIGL SERPL-MCNC: 92 MG/DL (ref 0–149)
TSH SERPL DL<=0.005 MIU/L-ACNC: 1.32 MIU/ML (ref 0.27–4.2)
URATE SERPL-MCNC: 5.8 MG/DL (ref 2.4–5.7)
WBC # BLD AUTO: 9.38 10*3/MM3 (ref 4.5–10.7)

## 2017-11-06 ENCOUNTER — OFFICE VISIT (OUTPATIENT)
Dept: INTERNAL MEDICINE | Facility: CLINIC | Age: 67
End: 2017-11-06

## 2017-11-06 VITALS
SYSTOLIC BLOOD PRESSURE: 116 MMHG | HEIGHT: 64 IN | BODY MASS INDEX: 31.76 KG/M2 | HEART RATE: 75 BPM | OXYGEN SATURATION: 98 % | DIASTOLIC BLOOD PRESSURE: 70 MMHG | WEIGHT: 186 LBS

## 2017-11-06 DIAGNOSIS — R80.9 MICROALBUMINURIA: Chronic | ICD-10-CM

## 2017-11-06 DIAGNOSIS — E11.9 TYPE 2 DIABETES MELLITUS WITHOUT COMPLICATION, WITHOUT LONG-TERM CURRENT USE OF INSULIN (HCC): Primary | Chronic | ICD-10-CM

## 2017-11-06 DIAGNOSIS — Z80.3 FAMILY HISTORY OF BREAST CANCER: Chronic | ICD-10-CM

## 2017-11-06 DIAGNOSIS — Z87.39 HISTORY OF GOUT: Chronic | ICD-10-CM

## 2017-11-06 DIAGNOSIS — R60.0 PEDAL EDEMA: Chronic | ICD-10-CM

## 2017-11-06 DIAGNOSIS — E66.9 NON MORBID OBESITY: Chronic | ICD-10-CM

## 2017-11-06 DIAGNOSIS — K75.81 NASH (NONALCOHOLIC STEATOHEPATITIS): Chronic | ICD-10-CM

## 2017-11-06 DIAGNOSIS — E55.9 VITAMIN D DEFICIENCY: Chronic | ICD-10-CM

## 2017-11-06 DIAGNOSIS — Z51.81 THERAPEUTIC DRUG MONITORING: ICD-10-CM

## 2017-11-06 DIAGNOSIS — G47.33 OBSTRUCTIVE SLEEP APNEA: Chronic | ICD-10-CM

## 2017-11-06 DIAGNOSIS — E78.5 HYPERLIPIDEMIA, UNSPECIFIED HYPERLIPIDEMIA TYPE: Chronic | ICD-10-CM

## 2017-11-06 DIAGNOSIS — I10 BENIGN ESSENTIAL HYPERTENSION: Chronic | ICD-10-CM

## 2017-11-06 PROBLEM — R10.11 RIGHT UPPER QUADRANT ABDOMINAL PAIN: Status: RESOLVED | Noted: 2017-08-22 | Resolved: 2017-11-06

## 2017-11-06 PROBLEM — IMO0001 REGURGITATION: Status: RESOLVED | Noted: 2017-09-06 | Resolved: 2017-11-06

## 2017-11-06 PROCEDURE — 99214 OFFICE O/P EST MOD 30 MIN: CPT | Performed by: INTERNAL MEDICINE
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
## 2017-11-09 RX ORDER — OMEPRAZOLE 20 MG/1
CAPSULE, DELAYED RELEASE ORAL
Qty: 60 CAPSULE | Refills: 0 | Status: SHIPPED | OUTPATIENT
Start: 2017-11-09 | End: 2017-12-18 | Stop reason: SDUPTHER

## 2017-12-06 ENCOUNTER — OFFICE VISIT (OUTPATIENT)
Dept: INTERNAL MEDICINE | Facility: CLINIC | Age: 67
End: 2017-12-06

## 2017-12-06 VITALS
HEART RATE: 70 BPM | DIASTOLIC BLOOD PRESSURE: 72 MMHG | WEIGHT: 183 LBS | HEIGHT: 64 IN | OXYGEN SATURATION: 98 % | BODY MASS INDEX: 31.24 KG/M2 | SYSTOLIC BLOOD PRESSURE: 120 MMHG

## 2017-12-06 DIAGNOSIS — E66.9 NON MORBID OBESITY: Chronic | ICD-10-CM

## 2017-12-06 DIAGNOSIS — G47.33 OBSTRUCTIVE SLEEP APNEA: Chronic | ICD-10-CM

## 2017-12-06 DIAGNOSIS — R80.9 MICROALBUMINURIA: Chronic | ICD-10-CM

## 2017-12-06 DIAGNOSIS — E11.9 ENCOUNTER FOR DIABETIC FOOT EXAM (HCC): Chronic | ICD-10-CM

## 2017-12-06 DIAGNOSIS — E78.5 HYPERLIPIDEMIA, UNSPECIFIED HYPERLIPIDEMIA TYPE: Chronic | ICD-10-CM

## 2017-12-06 DIAGNOSIS — Z00.00 ROUTINE PHYSICAL EXAMINATION: Primary | ICD-10-CM

## 2017-12-06 DIAGNOSIS — I10 BENIGN ESSENTIAL HYPERTENSION: Chronic | ICD-10-CM

## 2017-12-06 DIAGNOSIS — N26.1 RENAL ATROPHY, LEFT: Chronic | ICD-10-CM

## 2017-12-06 DIAGNOSIS — F41.1 GENERALIZED ANXIETY DISORDER: Chronic | ICD-10-CM

## 2017-12-06 DIAGNOSIS — F51.04 CHRONIC INSOMNIA: Chronic | ICD-10-CM

## 2017-12-06 DIAGNOSIS — F41.8 DEPRESSION WITH ANXIETY: Chronic | ICD-10-CM

## 2017-12-06 DIAGNOSIS — E11.9 TYPE 2 DIABETES MELLITUS WITHOUT COMPLICATION, WITHOUT LONG-TERM CURRENT USE OF INSULIN (HCC): Chronic | ICD-10-CM

## 2017-12-06 DIAGNOSIS — E55.9 VITAMIN D DEFICIENCY: Chronic | ICD-10-CM

## 2017-12-06 DIAGNOSIS — K75.81 NASH (NONALCOHOLIC STEATOHEPATITIS): Chronic | ICD-10-CM

## 2017-12-06 DIAGNOSIS — Z80.3 FAMILY HISTORY OF BREAST CANCER: Chronic | ICD-10-CM

## 2017-12-06 DIAGNOSIS — K29.80 DUODENITIS: ICD-10-CM

## 2017-12-06 DIAGNOSIS — Z51.81 THERAPEUTIC DRUG MONITORING: ICD-10-CM

## 2017-12-06 DIAGNOSIS — R60.0 PEDAL EDEMA: Chronic | ICD-10-CM

## 2017-12-06 DIAGNOSIS — Z87.39 HISTORY OF GOUT: Chronic | ICD-10-CM

## 2017-12-06 DIAGNOSIS — K29.50 CHRONIC GASTRITIS WITHOUT BLEEDING, UNSPECIFIED GASTRITIS TYPE: ICD-10-CM

## 2017-12-06 DIAGNOSIS — Z00.00 WELCOME TO MEDICARE PREVENTIVE VISIT: ICD-10-CM

## 2017-12-06 PROCEDURE — 99214 OFFICE O/P EST MOD 30 MIN: CPT | Performed by: INTERNAL MEDICINE

## 2017-12-06 PROCEDURE — G0438 PPPS, INITIAL VISIT: HCPCS | Performed by: INTERNAL MEDICINE

## 2017-12-06 PROCEDURE — 96160 PT-FOCUSED HLTH RISK ASSMT: CPT | Performed by: INTERNAL MEDICINE

## 2017-12-06 RX ORDER — LAMOTRIGINE 25 MG/1
1 TABLET ORAL DAILY
COMMUNITY
Start: 2017-11-06 | End: 2018-12-26

## 2017-12-06 NOTE — PROGRESS NOTES
QUICK REFERENCE INFORMATION:  The ABCs of the Annual Wellness Visit    Welcome to Medicare Visit    HEALTH RISK ASSESSMENT    1950    Recent Hospitalizations:  Recently treated at the following:  [facility].      Current Medical Providers:  Patient Care Team:  Marc Rasheed MD as PCP - General (Internal Medicine)  Valarie Limon MD as Consulting Physician (Obstetrics and Gynecology)      Smoking Status:  History   Smoking Status   • Never Smoker   Smokeless Tobacco   • Never Used       Alcohol Consumption:  History   Alcohol Use   • Yes     Comment: SOCIALLY IN PAST, NONE SINCE JANUARY       Depression Screen:   PHQ-2/PHQ-9 Depression Screening 12/6/2017   Little interest or pleasure in doing things 0   Feeling down, depressed, or hopeless 0   Total Score 0       Health Habits and Functional and Cognitive Screening:  Functional & Cognitive Status 12/6/2017   Do you have difficulty preparing food and eating? No   Do you have difficulty bathing yourself, getting dressed or grooming yourself? No   Do you have difficulty using the toilet? No   Do you have difficulty moving around from place to place? No   Do you have trouble with steps or getting out of a bed or a chair? No   In the past year have you fallen or experienced a near fall? No   Current Diet Well Balanced Diet   Dental Exam Not up to date   Eye Exam Not up to date   Exercise (times per week) 4 times per week   Current Exercise Activities Include Walking   Do you need help using the phone?  No   Are you deaf or do you have serious difficulty hearing?  Yes   Do you need help with transportation? No   Do you need help shopping? No   Do you need help preparing meals?  No   Do you need help with housework?  No   Do you need help with laundry? No   Do you need help taking your medications? No   Do you need help managing money? Yes   Have you felt unusual stress, anger or loneliness in the last month? No   Who do you live with? Other    If you need help, do you have trouble finding someone available to you? No   Have you been bothered in the last four weeks by sexual problems? No   Do you have difficulty concentrating, remembering or making decisions? No           Does the patient have evidence of cognitive impairment? No    Aspirin use counseling? Taking ASA appropriately as indicated      Recent Lab Results:  CMP:  Lab Results   Component Value Date     (H) 10/30/2017    BUN 12 10/30/2017    CREATININE 0.64 10/30/2017    EGFRIFNONA 93 10/30/2017    EGFRIFAFRI 113 10/30/2017    BCR 18.8 10/30/2017     10/30/2017    K 4.9 10/30/2017    CO2 28.2 10/30/2017    CALCIUM 9.8 10/30/2017    PROTENTOTREF 6.8 10/30/2017    ALBUMIN 4.50 10/30/2017    LABGLOBREF 2.3 10/30/2017    LABIL2 2.0 10/30/2017    BILITOT 0.5 10/30/2017    ALKPHOS 95 10/30/2017    AST 16 10/30/2017    ALT 14 10/30/2017     Lipid Panel:  Lab Results   Component Value Date    TRIG 92 10/30/2017     HbA1c:  Lab Results   Component Value Date    HGBA1C 6.39 (H) 10/30/2017       Visual Acuity:   Visual Acuity Screening    Right eye Left eye Both eyes   Without correction: 20/25 20/25 20/25   With correction: 20/15 20/15 20/15       Age-appropriate Screening Schedule:  Refer to the list below for future screening recommendations based on patient's age, sex and/or medical conditions. Orders for these recommended tests are listed in the plan section. The patient has been provided with a written plan.    Health Maintenance   Topic Date Due   • DIABETIC EYE EXAM  04/07/2016   • HEMOGLOBIN A1C  04/30/2018   • PNEUMOCOCCAL VACCINES (65+ LOW/MEDIUM RISK) (2 of 2 - PPSV23) 07/26/2018   • DIABETIC FOOT EXAM  07/26/2018   • LIPID PANEL  10/30/2018   • URINE MICROALBUMIN  10/30/2018   • MAMMOGRAM  06/09/2019   • PAP SMEAR  06/01/2020   • COLONOSCOPY  12/22/2024   • TDAP/TD VACCINES (2 - Td) 10/18/2025   • INFLUENZA VACCINE  Completed   • ZOSTER VACCINE  Completed        Subjective    History of Present Illness    Reema Easley is a 66 y.o. female an established patient presenting for a Welcome to Medicare Visit.     The following portions of the patient's history were reviewed and updated as appropriate: allergies, current medications, past family history, past medical history, past social history, past surgical history and problem list.    Outpatient Medications Prior to Visit   Medication Sig Dispense Refill   • ALPRAZolam (XANAX) 0.5 MG tablet Take 1 tablet by mouth 2 (Two) Times a Day. 60 tablet 3   • aspirin 81 MG EC tablet Take 1 tablet by mouth Daily. PT TO HOLD MED PRIOR TO OR     • benazepril (LOTENSIN) 40 MG tablet Take 1 tablet by mouth Daily. 90 tablet 0   • Cholecalciferol (VITAMIN D3) 5000 UNITS capsule capsule Take 5,000 Units by mouth Daily.     • docusate sodium (COLACE) 100 MG capsule Take 1 capsule by mouth 2 (Two) Times a Day. 60 capsule 1   • furosemide (LASIX) 20 MG tablet TAKE ONE TABLET BY MOUTH DAILY 90 tablet 0   • JANUMET XR  MG tablet sustained-release 24 hour Take 2 tablets by mouth Daily.     • omeprazole (priLOSEC) 20 MG capsule TAKE ONE CAPSULE BY MOUTH TWICE A DAY 60 capsule 0   • OXcarbazepine (TRILEPTAL) 150 MG tablet TAKE ONE TABLET BY MOUTH EVERY MORNING AND TAKE TWO TABLETS BY MOUTH EVERY NIGHT AT BEDTIME 90 tablet 5   • simvastatin (ZOCOR) 40 MG tablet Take 1 tablet by mouth Every Night. 90 tablet 0     No facility-administered medications prior to visit.        Patient Active Problem List   Diagnosis   • Benign essential hypertension   • Chronic insomnia   • Depression with anxiety   • Diverticulosis of colon   • Generalized anxiety disorder   • Gout   • Hyperlipidemia   • Microalbuminuria   • GEORGE (nonalcoholic steatohepatitis)   • Obstructive sleep apnea, 12/17/2015--AHI 14.6.  RDI 48.9 with REM sleep.  O2 sat 77%.  Cannot tolerate CPAP.   • Primary osteoarthritis of right knee   • Pedal edema   • Renal atrophy, left   • Type 2 diabetes  mellitus   • Vitamin D deficiency   • Family history of ovarian cancer   • Family history of breast cancer   • Therapeutic drug monitoring   • Allergic rhinitis   • [employer] Medicare replacement physical exam   • Non morbid obesity   • Diabetic foot exam   • Diabetic eye exam   • Chronic duodenitis   • Chronic gastritis       Advance Care Planning:  has NO advance directive - information provided to the patient today    Identification of Risk Factors:  Risk factors include: weight , cardiovascular risk and depression.    Review of Systems   Constitutional:        Weight gain   All other systems reviewed and are negative.      Compared to one year ago, the patient feels her physical health is better.  Compared to one year ago, the patient feels her mental health is better.    Objective      Physical Exam    General: Alert and oriented x 3, with appropriate affect; no acute distress. Obese.  HEENT: pupils equal, round, and reactive to light; extraocular movements intact; sclera nonicteric; nasal mucosa normal; pharynx normal; tympanic membranes and ear canals normal.  Neck: without JVD, thyromegaly, bruit, or adenopathy.  Lungs: clear to auscultation in all fields.  Heart: auscultation reveals regular rate and rhythm without murmur, rub, gallop, or click.  Abdomen: is soft and nontender, without hepatosplenomegaly, mass or hernia. Normal bowel sounds.  GYN, Digital rectal exam, Breast; deferred to gynecologist.  Extremities: are without clubbing, cyanosis, or edema.  Vascular: no signs of peripheral arterial disease or venous insufficiency/varicosities.  Neurological: intact without focal deficit, including cranial and peripheral nerves.  Station and gait observed to be normal during ingress and egress from the examination area.  Sensation and deep tendon reflexes tested if clinically indicated and are normal.  Musculoskeletal: exam is normal, without signs of synovitis, significant degeneration or deformity. Skin  "examination: without rash or significant lesions.  Vitals:    12/06/17 1034   BP: 120/72   BP Location: Right arm   Patient Position: Sitting   Cuff Size: Large Adult   Pulse: 70   SpO2: 98%   Weight: 83 kg (183 lb)   Height: 162.6 cm (64.02\")   PainSc: 0-No pain       Body mass index is 31.4 kg/(m^2).  Discussed the patient's BMI with her. BMI is above normal parameters. Follow-up plan includes:  exercise counseling and nutrition counseling.    Procedure   Procedures       Assessment/Plan   Patient Self-Management and Personalized Health Advice  The patient has been provided with information about: diet, exercise, weight management, fall prevention and designing advance directives and preventive services including:   · Advance directive, Diabetes screening, see lab orders, Exercise counseling provided, Fall Risk assessment done, Glaucoma screening recommended, Nutrition counseling provided.    Visit Diagnoses:    ICD-10-CM ICD-9-CM   1. [employer] Medicare replacement physical exam Z00.00 V70.0   2. Welcome to Medicare preventive visit Z00.00 V70.0   3. Type 2 diabetes mellitus without complication, without long-term current use of insulin E11.9 250.00   4. Hyperlipidemia, unspecified hyperlipidemia type E78.5 272.4   5. Gout Z87.39 V12.29   6. Microalbuminuria R80.9 791.0   7. GEORGE (nonalcoholic steatohepatitis) K75.81 571.8   8. Obstructive sleep apnea, 12/17/2015--AHI 14.6.  RDI 48.9 with REM sleep.  O2 sat 77%.  Cannot tolerate CPAP. G47.33 327.23   9. Pedal edema R60.0 782.3   10. Benign essential hypertension I10 401.1   11. Chronic insomnia F51.04 780.52   12. Depression with anxiety F41.8 300.4   13. Generalized anxiety disorder F41.1 300.02   14. Renal atrophy, left N26.1 587   15. Vitamin D deficiency E55.9 268.9   16. Family history of breast cancer Z80.3 V16.3   17. Non morbid obesity E66.9 278.00   18. Diabetic foot exam E11.9 250.00   19. Chronic duodenitis K29.80 535.60   20. Chronic gastritis without " bleeding, unspecified gastritis type K29.50 535.10   21. Therapeutic drug monitoring Z51.81 V58.83       Orders Placed This Encounter   Procedures   • CBC (No Diff)     Standing Status:   Future     Standing Expiration Date:   12/7/2019   • CK     Standing Status:   Future     Standing Expiration Date:   12/7/2019   • Comprehensive Metabolic Panel     Standing Status:   Future     Standing Expiration Date:   12/7/2019   • Hemoglobin A1c     Standing Status:   Future     Standing Expiration Date:   12/7/2019   • NMR LipoProfile     Standing Status:   Future     Standing Expiration Date:   12/7/2019   • Vitamin D 25 Hydroxy     Standing Status:   Future     Standing Expiration Date:   12/7/2019   • TSH     Standing Status:   Future     Standing Expiration Date:   12/7/2019   • T4, Free     Standing Status:   Future     Standing Expiration Date:   12/7/2019   • T3, Free     Standing Status:   Future     Standing Expiration Date:   12/7/2019   • Microalbumin / Creatinine Urine Ratio - Urine, Clean Catch     Standing Status:   Future     Standing Expiration Date:   12/7/2019       Outpatient Encounter Prescriptions as of 12/6/2017   Medication Sig Dispense Refill   • ALPRAZolam (XANAX) 0.5 MG tablet Take 1 tablet by mouth 2 (Two) Times a Day. 60 tablet 3   • aspirin 81 MG EC tablet Take 1 tablet by mouth Daily. PT TO HOLD MED PRIOR TO OR     • benazepril (LOTENSIN) 40 MG tablet Take 1 tablet by mouth Daily. 90 tablet 0   • Cholecalciferol (VITAMIN D3) 5000 UNITS capsule capsule Take 5,000 Units by mouth Daily.     • docusate sodium (COLACE) 100 MG capsule Take 1 capsule by mouth 2 (Two) Times a Day. 60 capsule 1   • furosemide (LASIX) 20 MG tablet TAKE ONE TABLET BY MOUTH DAILY 90 tablet 0   • JANUMET XR  MG tablet sustained-release 24 hour Take 2 tablets by mouth Daily.     • lamoTRIgine (LaMICtal) 25 MG tablet 1 tablet Daily.     • omeprazole (priLOSEC) 20 MG capsule TAKE ONE CAPSULE BY MOUTH TWICE A DAY 60  capsule 0   • OXcarbazepine (TRILEPTAL) 150 MG tablet TAKE ONE TABLET BY MOUTH EVERY MORNING AND TAKE TWO TABLETS BY MOUTH EVERY NIGHT AT BEDTIME 90 tablet 5   • simvastatin (ZOCOR) 40 MG tablet Take 1 tablet by mouth Every Night. 90 tablet 0     No facility-administered encounter medications on file as of 12/6/2017.        Reviewed use of high risk medication in the elderly: yes  Reviewed for potential of harmful drug interactions in the elderly: yes    Follow Up:  Return in about 6 months (around 6/6/2018) for Next scheduled follow up with lab prior.     An After Visit Summary and PPPS with all of these plans were given to the patient.
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
## 2017-12-18 RX ORDER — OMEPRAZOLE 20 MG/1
CAPSULE, DELAYED RELEASE ORAL
Qty: 60 CAPSULE | Refills: 0 | Status: SHIPPED | OUTPATIENT
Start: 2017-12-18 | End: 2018-07-05 | Stop reason: HOSPADM

## 2017-12-18 RX ORDER — FUROSEMIDE 20 MG/1
TABLET ORAL
Qty: 90 TABLET | Refills: 1 | Status: SHIPPED | OUTPATIENT
Start: 2017-12-18 | End: 2018-06-17 | Stop reason: SDUPTHER

## 2017-12-26 RX ORDER — DOCUSATE SODIUM 100 MG/1
CAPSULE, LIQUID FILLED ORAL
Qty: 60 CAPSULE | Refills: 0 | Status: SHIPPED | OUTPATIENT
Start: 2017-12-26 | End: 2018-02-16 | Stop reason: SDUPTHER

## 2018-01-25 RX ORDER — SITAGLIPTIN AND METFORMIN HYDROCHLORIDE 1000; 50 MG/1; MG/1
2 TABLET, FILM COATED, EXTENDED RELEASE ORAL DAILY
Qty: 30 TABLET | Refills: 5 | Status: SHIPPED | OUTPATIENT
Start: 2018-01-25 | End: 2018-07-05

## 2018-02-19 RX ORDER — DOCUSATE SODIUM 100 MG
CAPSULE ORAL
Qty: 60 CAPSULE | Refills: 0 | Status: SHIPPED | OUTPATIENT
Start: 2018-02-19 | End: 2021-02-03

## 2018-03-01 RX ORDER — SIMVASTATIN 40 MG
TABLET ORAL
Qty: 90 TABLET | Refills: 0 | Status: SHIPPED | OUTPATIENT
Start: 2018-03-01 | End: 2018-05-30 | Stop reason: SDUPTHER

## 2018-03-16 DIAGNOSIS — F41.1 GENERALIZED ANXIETY DISORDER: ICD-10-CM

## 2018-03-16 RX ORDER — ALPRAZOLAM 0.5 MG/1
TABLET ORAL
Qty: 60 TABLET | Refills: 4 | Status: SHIPPED | OUTPATIENT
Start: 2018-03-16 | End: 2018-06-18 | Stop reason: SDUPTHER

## 2018-03-25 ENCOUNTER — HOSPITAL ENCOUNTER (EMERGENCY)
Facility: HOSPITAL | Age: 68
Discharge: HOME | End: 2018-03-25
Attending: EMERGENCY MEDICINE | Admitting: EMERGENCY MEDICINE

## 2018-03-25 ENCOUNTER — APPOINTMENT (OUTPATIENT)
Dept: GENERAL RADIOLOGY | Facility: HOSPITAL | Age: 68
End: 2018-03-25

## 2018-03-25 VITALS
RESPIRATION RATE: 18 BRPM | BODY MASS INDEX: 31.58 KG/M2 | WEIGHT: 185 LBS | HEART RATE: 69 BPM | TEMPERATURE: 98.2 F | SYSTOLIC BLOOD PRESSURE: 131 MMHG | HEIGHT: 64 IN | DIASTOLIC BLOOD PRESSURE: 81 MMHG | OXYGEN SATURATION: 100 %

## 2018-03-25 DIAGNOSIS — M25.461 EFFUSION OF RIGHT KNEE: Primary | ICD-10-CM

## 2018-03-25 PROCEDURE — 99283 EMERGENCY DEPT VISIT LOW MDM: CPT

## 2018-03-25 PROCEDURE — 73562 X-RAY EXAM OF KNEE 3: CPT

## 2018-03-25 RX ORDER — HYDROCODONE BITARTRATE AND ACETAMINOPHEN 7.5; 325 MG/1; MG/1
1 TABLET ORAL EVERY 4 HOURS PRN
Qty: 20 TABLET | Refills: 0 | Status: SHIPPED | OUTPATIENT
Start: 2018-03-25 | End: 2018-07-05

## 2018-03-25 RX ORDER — HYDROCODONE BITARTRATE AND ACETAMINOPHEN 7.5; 325 MG/1; MG/1
1 TABLET ORAL ONCE
Status: COMPLETED | OUTPATIENT
Start: 2018-03-25 | End: 2018-03-25

## 2018-03-25 RX ADMIN — HYDROCODONE BITARTRATE AND ACETAMINOPHEN 1 TABLET: 7.5; 325 TABLET ORAL at 18:04

## 2018-03-25 NOTE — ED TRIAGE NOTES
"Patient fell today on right knee, patient states, \"I have a torn meniscus in that knee and my knee was not hurting until I fell on it today.\"  "
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
## 2018-03-26 ENCOUNTER — TELEPHONE (OUTPATIENT)
Dept: SOCIAL WORK | Facility: HOSPITAL | Age: 68
End: 2018-03-26

## 2018-03-26 NOTE — TELEPHONE ENCOUNTER
ED f/u phone call: states she is wearing brace and getting fairly good relief w/ prescribed pain med. [place] f/[place]/ [place]opedist on 3/30. No questions/concerns

## 2018-04-08 DIAGNOSIS — Z87.39 HISTORY OF GOUT: ICD-10-CM

## 2018-04-09 RX ORDER — ALLOPURINOL 300 MG/1
TABLET ORAL
Qty: 90 TABLET | Refills: 2 | OUTPATIENT
Start: 2018-04-09

## 2018-04-25 ENCOUNTER — TRANSCRIBE ORDERS (OUTPATIENT)
Dept: PHYSICAL THERAPY | Facility: HOSPITAL | Age: 68
End: 2018-04-25

## 2018-04-25 DIAGNOSIS — Z87.81 HISTORY OF FRACTURE OF PATELLA: Primary | ICD-10-CM

## 2018-05-01 ENCOUNTER — HOSPITAL ENCOUNTER (OUTPATIENT)
Dept: PHYSICAL THERAPY | Facility: HOSPITAL | Age: 68
Setting detail: THERAPIES SERIES
Discharge: HOME | End: 2018-05-01

## 2018-05-01 DIAGNOSIS — Z87.81 HISTORY OF PATELLAR FRACTURE: ICD-10-CM

## 2018-05-01 DIAGNOSIS — M25.561 ACUTE PAIN OF RIGHT KNEE: Primary | ICD-10-CM

## 2018-05-01 PROCEDURE — 97110 THERAPEUTIC EXERCISES: CPT | Performed by: PHYSICAL THERAPIST

## 2018-05-01 PROCEDURE — 97161 PT EVAL LOW COMPLEX 20 MIN: CPT | Performed by: PHYSICAL THERAPIST

## 2018-05-01 PROCEDURE — G8978 MOBILITY CURRENT STATUS: HCPCS | Performed by: PHYSICAL THERAPIST

## 2018-05-01 PROCEDURE — G8979 MOBILITY GOAL STATUS: HCPCS | Performed by: PHYSICAL THERAPIST
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
## 2018-05-04 ENCOUNTER — HOSPITAL ENCOUNTER (OUTPATIENT)
Dept: PHYSICAL THERAPY | Facility: HOSPITAL | Age: 68
Setting detail: THERAPIES SERIES
End: 2018-05-04

## 2018-05-07 ENCOUNTER — HOSPITAL ENCOUNTER (OUTPATIENT)
Dept: PHYSICAL THERAPY | Facility: HOSPITAL | Age: 68
Setting detail: THERAPIES SERIES
Discharge: HOME | End: 2018-05-07

## 2018-05-07 DIAGNOSIS — M25.561 ACUTE PAIN OF RIGHT KNEE: Primary | ICD-10-CM

## 2018-05-07 PROCEDURE — 97110 THERAPEUTIC EXERCISES: CPT | Performed by: PHYSICAL THERAPIST
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
## 2018-05-09 ENCOUNTER — HOSPITAL ENCOUNTER (OUTPATIENT)
Dept: PHYSICAL THERAPY | Facility: HOSPITAL | Age: 68
Setting detail: THERAPIES SERIES
Discharge: HOME | End: 2018-05-09

## 2018-05-09 DIAGNOSIS — M25.561 ACUTE PAIN OF RIGHT KNEE: Primary | ICD-10-CM

## 2018-05-09 DIAGNOSIS — Z87.81 HISTORY OF PATELLAR FRACTURE: ICD-10-CM

## 2018-05-09 PROCEDURE — 97110 THERAPEUTIC EXERCISES: CPT
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
## 2018-05-16 ENCOUNTER — APPOINTMENT (OUTPATIENT)
Dept: PHYSICAL THERAPY | Facility: HOSPITAL | Age: 68
End: 2018-05-16

## 2018-05-18 ENCOUNTER — HOSPITAL ENCOUNTER (OUTPATIENT)
Dept: PHYSICAL THERAPY | Facility: HOSPITAL | Age: 68
Setting detail: THERAPIES SERIES
Discharge: HOME | End: 2018-05-18

## 2018-05-18 DIAGNOSIS — M25.561 ACUTE PAIN OF RIGHT KNEE: Primary | ICD-10-CM

## 2018-05-18 DIAGNOSIS — Z87.81 HISTORY OF PATELLAR FRACTURE: ICD-10-CM

## 2018-05-18 PROCEDURE — 97110 THERAPEUTIC EXERCISES: CPT
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
## 2018-05-30 ENCOUNTER — APPOINTMENT (OUTPATIENT)
Dept: PHYSICAL THERAPY | Facility: HOSPITAL | Age: 68
End: 2018-05-30

## 2018-05-30 RX ORDER — SIMVASTATIN 40 MG
40 TABLET ORAL NIGHTLY
Qty: 90 TABLET | Refills: 0 | Status: SHIPPED | OUTPATIENT
Start: 2018-05-30 | End: 2018-09-04 | Stop reason: SDUPTHER

## 2018-06-12 ENCOUNTER — APPOINTMENT (OUTPATIENT)
Dept: WOMENS IMAGING | Facility: HOSPITAL | Age: 68
End: 2018-06-12

## 2018-06-12 PROCEDURE — 77067 SCR MAMMO BI INCL CAD: CPT | Performed by: RADIOLOGY

## 2018-06-12 PROCEDURE — 77063 BREAST TOMOSYNTHESIS BI: CPT | Performed by: RADIOLOGY

## 2018-06-15 DIAGNOSIS — Z51.81 THERAPEUTIC DRUG MONITORING: ICD-10-CM

## 2018-06-15 DIAGNOSIS — E11.9 TYPE 2 DIABETES MELLITUS WITHOUT COMPLICATION, WITHOUT LONG-TERM CURRENT USE OF INSULIN (HCC): Chronic | ICD-10-CM

## 2018-06-15 DIAGNOSIS — E78.5 HYPERLIPIDEMIA, UNSPECIFIED HYPERLIPIDEMIA TYPE: Chronic | ICD-10-CM

## 2018-06-15 DIAGNOSIS — E55.9 VITAMIN D DEFICIENCY: Chronic | ICD-10-CM

## 2018-06-18 DIAGNOSIS — F41.1 GENERALIZED ANXIETY DISORDER: ICD-10-CM

## 2018-06-18 RX ORDER — ALPRAZOLAM 0.5 MG/1
TABLET ORAL
Qty: 60 TABLET | Refills: 2 | OUTPATIENT
Start: 2018-06-18 | End: 2018-09-19 | Stop reason: SDUPTHER

## 2018-06-18 RX ORDER — FUROSEMIDE 20 MG/1
TABLET ORAL
Qty: 90 TABLET | Refills: 0 | Status: SHIPPED | OUTPATIENT
Start: 2018-06-18 | End: 2018-09-13 | Stop reason: SDUPTHER

## 2018-06-20 LAB
25(OH)D3+25(OH)D2 SERPL-MCNC: 63.8 NG/ML (ref 30–100)
ALBUMIN SERPL-MCNC: 4.7 G/DL (ref 3.5–5.2)
ALBUMIN/GLOB SERPL: 1.9 G/DL
ALP SERPL-CCNC: 100 U/L (ref 39–117)
ALT SERPL-CCNC: 17 U/L (ref 1–33)
AST SERPL-CCNC: 22 U/L (ref 1–32)
BILIRUB SERPL-MCNC: 0.6 MG/DL (ref 0.1–1.2)
BUN SERPL-MCNC: 13 MG/DL (ref 8–23)
BUN/CREAT SERPL: 17.6 (ref 7–25)
CALCIUM SERPL-MCNC: 10.7 MG/DL (ref 8.6–10.5)
CHLORIDE SERPL-SCNC: 94 MMOL/L (ref 98–107)
CHOLEST SERPL-MCNC: 140 MG/DL (ref 100–199)
CK SERPL-CCNC: 120 U/L (ref 20–180)
CO2 SERPL-SCNC: 29 MMOL/L (ref 22–29)
CREAT SERPL-MCNC: 0.74 MG/DL (ref 0.57–1)
ERYTHROCYTE [DISTWIDTH] IN BLOOD BY AUTOMATED COUNT: 14.4 % (ref 11.7–13)
GFR SERPLBLD CREATININE-BSD FMLA CKD-EPI: 79 ML/MIN/1.73
GFR SERPLBLD CREATININE-BSD FMLA CKD-EPI: 95 ML/MIN/1.73
GLOBULIN SER CALC-MCNC: 2.5 GM/DL
GLUCOSE SERPL-MCNC: 128 MG/DL (ref 65–99)
HBA1C MFR BLD: 6.4 % (ref 4.8–5.6)
HCT VFR BLD AUTO: 44.3 % (ref 35.6–45.5)
HDL SERPL-SCNC: 45 UMOL/L
HDLC SERPL-MCNC: 63 MG/DL
HGB BLD-MCNC: 14.8 G/DL (ref 11.9–15.5)
LDL SERPL QN: 20.5 NM
LDL SERPL-SCNC: 884 NMOL/L
LDL SMALL SERPL-SCNC: 591 NMOL/L
LDLC SERPL CALC-MCNC: 56 MG/DL (ref 0–99)
MCH RBC QN AUTO: 30.1 PG (ref 26.9–32)
MCHC RBC AUTO-ENTMCNC: 33.4 G/DL (ref 32.4–36.3)
MCV RBC AUTO: 90 FL (ref 80.5–98.2)
PLATELET # BLD AUTO: 361 10*3/MM3 (ref 140–500)
POTASSIUM SERPL-SCNC: 4.7 MMOL/L (ref 3.5–5.2)
PROT SERPL-MCNC: 7.2 G/DL (ref 6–8.5)
RBC # BLD AUTO: 4.92 10*6/MM3 (ref 3.9–5.2)
SODIUM SERPL-SCNC: 136 MMOL/L (ref 136–145)
T3FREE SERPL-MCNC: 2.6 PG/ML (ref 2–4.4)
T4 FREE SERPL-MCNC: 1.27 NG/DL (ref 0.93–1.7)
TRIGL SERPL-MCNC: 103 MG/DL (ref 0–149)
TSH SERPL DL<=0.005 MIU/L-ACNC: 2.07 MIU/ML (ref 0.27–4.2)
WBC # BLD AUTO: 8 10*3/MM3 (ref 4.5–10.7)

## 2018-07-03 ENCOUNTER — DOCUMENTATION (OUTPATIENT)
Dept: PHYSICAL THERAPY | Facility: HOSPITAL | Age: 68
End: 2018-07-03

## 2018-07-03 DIAGNOSIS — M25.561 ACUTE PAIN OF RIGHT KNEE: Primary | ICD-10-CM

## 2018-07-03 DIAGNOSIS — Z87.81 HISTORY OF PATELLAR FRACTURE: ICD-10-CM

## 2018-07-03 NOTE — THERAPY DISCHARGE NOTE
Outpatient Physical Therapy Discharge Summary         Patient Name: Reema Easley  : 1950  MRN: [id number]    Today's Date: 7/3/2018    Visit Dx:    ICD-10-CM ICD-9-CM   1. Acute pain of right knee M25.561 719.46   2. History of patellar fracture Z87.81 V15.51             PT OP Goals     Row Name 18 1500          PT Short Term Goals    STG 1 Patient will be independent with education for symptom management, joint protection and strategies to minimize stress on affected tissues  -SI     STG 1 Progress Met  -SI     STG 2 Pt able to get in/out of the car without having to assist the R LE  -SI     STG 2 Progress Met  -SI     STG 3 Pt to improve knee ROM to 5-100 deg for improved gait pattern  -SI     STG 3 Progress Met  -SI        Long Term Goals    LTG 1 Pt to improve knee ROM to 0-110 deg for improved gait pattern  -SI     LTG 1 Progress Met  -SI     LTG 2 Patient will increase knee strength to 4+/5 to improve functional mobility  -SI     LTG 2 Progress Partially Met  -SI     LTG 3 Pt to improve score on Knee Outcome Survey by 50% for overall functional improvement  -SI     LTG 3 Progress Not Met   not repeated as pt did not return for tx after 18 appt  -SI     LTG 4 Patient will ambulate without assistive device community distances with near normal gait  -SI     LTG 4 Progress Partially Met   normal level surfaces  -SI     LTG 5 Patient will demonstrate an independent HEP for core and knee strength and flexibility/ROM.  -SI     LTG 5 Progress Met  -SI       User Key  (r) = Recorded By, (t) = Taken By, (c) = Cosigned By    Initials Name Provider Type    PJ Mckinney PTA Physical Therapy Assistant          OP PT Discharge Summary  Date of Discharge: 18  Reason for Discharge: Independent  Discharge Destination: Home with home program  Discharge Instructions/Additional Comments: HEP for ROM and strength.  Follow MD instructions      Time Calculation:        Therapy Suggested Charges      Code   Minutes Charges    None                       Valeria Mckinney, PTA  7/3/2018

## 2018-07-05 ENCOUNTER — OFFICE VISIT (OUTPATIENT)
Dept: INTERNAL MEDICINE | Facility: CLINIC | Age: 68
End: 2018-07-05

## 2018-07-05 VITALS
BODY MASS INDEX: 32.1 KG/M2 | WEIGHT: 188 LBS | HEIGHT: 64 IN | OXYGEN SATURATION: 98 % | DIASTOLIC BLOOD PRESSURE: 74 MMHG | SYSTOLIC BLOOD PRESSURE: 122 MMHG | HEART RATE: 82 BPM

## 2018-07-05 DIAGNOSIS — F41.8 DEPRESSION WITH ANXIETY: Chronic | ICD-10-CM

## 2018-07-05 DIAGNOSIS — E83.52 HYPERCALCEMIA: ICD-10-CM

## 2018-07-05 DIAGNOSIS — K75.81 NASH (NONALCOHOLIC STEATOHEPATITIS): Chronic | ICD-10-CM

## 2018-07-05 DIAGNOSIS — E11.9 TYPE 2 DIABETES MELLITUS WITHOUT COMPLICATION, WITHOUT LONG-TERM CURRENT USE OF INSULIN (HCC): Primary | Chronic | ICD-10-CM

## 2018-07-05 DIAGNOSIS — R80.9 MICROALBUMINURIA: Chronic | ICD-10-CM

## 2018-07-05 DIAGNOSIS — F41.1 GENERALIZED ANXIETY DISORDER: Chronic | ICD-10-CM

## 2018-07-05 DIAGNOSIS — L23.9 ALLERGIC DERMATITIS: ICD-10-CM

## 2018-07-05 DIAGNOSIS — E78.5 HYPERLIPIDEMIA, UNSPECIFIED HYPERLIPIDEMIA TYPE: Chronic | ICD-10-CM

## 2018-07-05 DIAGNOSIS — Z00.00 ROUTINE PHYSICAL EXAMINATION: ICD-10-CM

## 2018-07-05 DIAGNOSIS — Z87.39 HISTORY OF GOUT: Chronic | ICD-10-CM

## 2018-07-05 DIAGNOSIS — S82.014D: ICD-10-CM

## 2018-07-05 DIAGNOSIS — Z01.00 DIABETIC EYE EXAM (HCC): Chronic | ICD-10-CM

## 2018-07-05 DIAGNOSIS — E11.9 DIABETIC EYE EXAM (HCC): Chronic | ICD-10-CM

## 2018-07-05 DIAGNOSIS — Z51.81 THERAPEUTIC DRUG MONITORING: ICD-10-CM

## 2018-07-05 DIAGNOSIS — G47.33 OBSTRUCTIVE SLEEP APNEA: Chronic | ICD-10-CM

## 2018-07-05 DIAGNOSIS — I10 BENIGN ESSENTIAL HYPERTENSION: Chronic | ICD-10-CM

## 2018-07-05 DIAGNOSIS — F51.04 CHRONIC INSOMNIA: Chronic | ICD-10-CM

## 2018-07-05 DIAGNOSIS — E11.9 ENCOUNTER FOR DIABETIC FOOT EXAM (HCC): Chronic | ICD-10-CM

## 2018-07-05 DIAGNOSIS — E55.9 VITAMIN D DEFICIENCY: Chronic | ICD-10-CM

## 2018-07-05 DIAGNOSIS — R60.0 PEDAL EDEMA: Chronic | ICD-10-CM

## 2018-07-05 PROBLEM — K29.50 CHRONIC GASTRITIS: Status: RESOLVED | Noted: 2017-12-06 | Resolved: 2018-07-05

## 2018-07-05 PROBLEM — K29.80 DUODENITIS: Status: RESOLVED | Noted: 2017-12-06 | Resolved: 2018-07-05

## 2018-07-05 PROCEDURE — 99214 OFFICE O/P EST MOD 30 MIN: CPT | Performed by: INTERNAL MEDICINE
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
## 2018-07-06 LAB
CA-I SERPL ISE-MCNC: 5.6 MG/DL (ref 4.5–5.6)
PTH-INTACT SERPL-MCNC: 29 PG/ML (ref 15–65)

## 2018-07-12 RX ORDER — BENAZEPRIL HYDROCHLORIDE 40 MG/1
TABLET, FILM COATED ORAL
Qty: 90 TABLET | Refills: 1 | Status: SHIPPED | OUTPATIENT
Start: 2018-07-12 | End: 2019-01-07 | Stop reason: SDUPTHER

## 2018-09-04 RX ORDER — SIMVASTATIN 40 MG
40 TABLET ORAL NIGHTLY
Qty: 90 TABLET | Refills: 0 | Status: SHIPPED | OUTPATIENT
Start: 2018-09-04 | End: 2018-12-06 | Stop reason: SDUPTHER

## 2018-09-13 RX ORDER — FUROSEMIDE 20 MG/1
20 TABLET ORAL DAILY
Qty: 90 TABLET | Refills: 0 | Status: SHIPPED | OUTPATIENT
Start: 2018-09-13 | End: 2018-12-10 | Stop reason: SDUPTHER

## 2018-09-19 DIAGNOSIS — F41.1 GENERALIZED ANXIETY DISORDER: ICD-10-CM

## 2018-09-24 RX ORDER — ALPRAZOLAM 0.5 MG/1
TABLET ORAL
Qty: 60 TABLET | Refills: 1 | OUTPATIENT
Start: 2018-09-24 | End: 2018-11-27 | Stop reason: SDUPTHER

## 2018-11-27 DIAGNOSIS — F41.1 GENERALIZED ANXIETY DISORDER: ICD-10-CM

## 2018-11-27 RX ORDER — ALPRAZOLAM 0.5 MG/1
TABLET ORAL
Qty: 60 TABLET | Refills: 0 | OUTPATIENT
Start: 2018-11-27 | End: 2018-12-26 | Stop reason: SDUPTHER

## 2018-11-29 DIAGNOSIS — E78.5 HYPERLIPIDEMIA, UNSPECIFIED HYPERLIPIDEMIA TYPE: Chronic | ICD-10-CM

## 2018-11-29 DIAGNOSIS — E55.9 VITAMIN D DEFICIENCY: Chronic | ICD-10-CM

## 2018-11-29 DIAGNOSIS — E83.52 HYPERCALCEMIA: ICD-10-CM

## 2018-11-29 DIAGNOSIS — Z87.39 HISTORY OF GOUT: Chronic | ICD-10-CM

## 2018-11-29 DIAGNOSIS — R80.9 MICROALBUMINURIA: Chronic | ICD-10-CM

## 2018-11-29 DIAGNOSIS — E11.9 TYPE 2 DIABETES MELLITUS WITHOUT COMPLICATION, WITHOUT LONG-TERM CURRENT USE OF INSULIN (HCC): Chronic | ICD-10-CM

## 2018-11-29 DIAGNOSIS — Z51.81 THERAPEUTIC DRUG MONITORING: ICD-10-CM

## 2018-12-02 LAB
25(OH)D3+25(OH)D2 SERPL-MCNC: 54.3 NG/ML (ref 30–100)
ALBUMIN SERPL-MCNC: 4.6 G/DL (ref 3.5–5.2)
ALBUMIN/CREAT UR: 10.6 MG/G CREAT (ref 0–30)
ALBUMIN/GLOB SERPL: 1.8 G/DL
ALP SERPL-CCNC: 111 U/L (ref 39–117)
ALT SERPL-CCNC: 18 U/L (ref 1–33)
AST SERPL-CCNC: 20 U/L (ref 1–32)
BILIRUB SERPL-MCNC: 0.6 MG/DL (ref 0.1–1.2)
BUN SERPL-MCNC: 17 MG/DL (ref 8–23)
BUN/CREAT SERPL: 22.7 (ref 7–25)
CA-I SERPL ISE-MCNC: 5.6 MG/DL (ref 4.5–5.6)
CALCIUM SERPL-MCNC: 10.6 MG/DL (ref 8.6–10.5)
CHLORIDE SERPL-SCNC: 100 MMOL/L (ref 98–107)
CHOLEST SERPL-MCNC: 145 MG/DL (ref 100–199)
CK SERPL-CCNC: 105 U/L (ref 20–180)
CO2 SERPL-SCNC: 26.5 MMOL/L (ref 22–29)
CREAT SERPL-MCNC: 0.75 MG/DL (ref 0.57–1)
CREAT UR-MCNC: 43.4 MG/DL
ERYTHROCYTE [DISTWIDTH] IN BLOOD BY AUTOMATED COUNT: 13.6 % (ref 11.7–13)
GLOBULIN SER CALC-MCNC: 2.5 GM/DL
GLUCOSE SERPL-MCNC: 160 MG/DL (ref 65–99)
HBA1C MFR BLD: 7.51 % (ref 4.8–5.6)
HCT VFR BLD AUTO: 41 % (ref 35.6–45.5)
HDL SERPL-SCNC: 43.7 UMOL/L
HDLC SERPL-MCNC: 60 MG/DL
HGB BLD-MCNC: 13.5 G/DL (ref 11.9–15.5)
LDL SERPL QN: 20.3 NM
LDL SERPL-SCNC: 915 NMOL/L
LDL SMALL SERPL-SCNC: 680 NMOL/L
LDLC SERPL CALC-MCNC: 57 MG/DL (ref 0–99)
MCH RBC QN AUTO: 29.8 PG (ref 26.9–32)
MCHC RBC AUTO-ENTMCNC: 32.9 G/DL (ref 32.4–36.3)
MCV RBC AUTO: 90.5 FL (ref 80.5–98.2)
MICROALBUMIN UR-MCNC: 4.6 UG/ML
PLATELET # BLD AUTO: 342 10*3/MM3 (ref 140–500)
POTASSIUM SERPL-SCNC: 4 MMOL/L (ref 3.5–5.2)
PROT SERPL-MCNC: 7.1 G/DL (ref 6–8.5)
PTH-INTACT SERPL-MCNC: 29 PG/ML (ref 15–65)
RBC # BLD AUTO: 4.53 10*6/MM3 (ref 3.9–5.2)
SODIUM SERPL-SCNC: 142 MMOL/L (ref 136–145)
T3FREE SERPL-MCNC: 2.3 PG/ML (ref 2–4.4)
T4 FREE SERPL-MCNC: 1.22 NG/DL (ref 0.93–1.7)
TRIGL SERPL-MCNC: 142 MG/DL (ref 0–149)
TSH SERPL DL<=0.005 MIU/L-ACNC: 1.68 MIU/ML (ref 0.27–4.2)
URATE SERPL-MCNC: 6.7 MG/DL (ref 2.4–5.7)
WBC # BLD AUTO: 10.39 10*3/MM3 (ref 4.5–10.7)

## 2018-12-06 ENCOUNTER — RESULTS ENCOUNTER (OUTPATIENT)
Dept: INTERNAL MEDICINE | Facility: CLINIC | Age: 68
End: 2018-12-06

## 2018-12-06 DIAGNOSIS — E11.9 TYPE 2 DIABETES MELLITUS WITHOUT COMPLICATION, WITHOUT LONG-TERM CURRENT USE OF INSULIN (HCC): Chronic | ICD-10-CM

## 2018-12-06 DIAGNOSIS — E55.9 VITAMIN D DEFICIENCY: Chronic | ICD-10-CM

## 2018-12-06 DIAGNOSIS — E78.5 HYPERLIPIDEMIA, UNSPECIFIED HYPERLIPIDEMIA TYPE: Chronic | ICD-10-CM

## 2018-12-06 DIAGNOSIS — Z51.81 THERAPEUTIC DRUG MONITORING: ICD-10-CM

## 2018-12-06 DIAGNOSIS — R80.9 MICROALBUMINURIA: Chronic | ICD-10-CM

## 2018-12-06 RX ORDER — SIMVASTATIN 40 MG
40 TABLET ORAL NIGHTLY
Qty: 90 TABLET | Refills: 0 | Status: SHIPPED | OUTPATIENT
Start: 2018-12-06 | End: 2019-03-03 | Stop reason: SDUPTHER

## 2018-12-10 RX ORDER — FUROSEMIDE 20 MG/1
20 TABLET ORAL DAILY
Qty: 90 TABLET | Refills: 0 | Status: SHIPPED | OUTPATIENT
Start: 2018-12-10 | End: 2019-03-14 | Stop reason: SDUPTHER

## 2018-12-26 ENCOUNTER — OFFICE VISIT (OUTPATIENT)
Dept: INTERNAL MEDICINE | Facility: CLINIC | Age: 68
End: 2018-12-26

## 2018-12-26 VITALS
DIASTOLIC BLOOD PRESSURE: 68 MMHG | BODY MASS INDEX: 31.41 KG/M2 | HEIGHT: 64 IN | OXYGEN SATURATION: 99 % | HEART RATE: 69 BPM | WEIGHT: 184 LBS | SYSTOLIC BLOOD PRESSURE: 116 MMHG

## 2018-12-26 DIAGNOSIS — Z00.00 ROUTINE PHYSICAL EXAMINATION: ICD-10-CM

## 2018-12-26 DIAGNOSIS — J30.1 CHRONIC SEASONAL ALLERGIC RHINITIS DUE TO POLLEN: Chronic | ICD-10-CM

## 2018-12-26 DIAGNOSIS — Z87.39 HISTORY OF GOUT: Chronic | ICD-10-CM

## 2018-12-26 DIAGNOSIS — F51.04 CHRONIC INSOMNIA: Chronic | ICD-10-CM

## 2018-12-26 DIAGNOSIS — F41.1 GENERALIZED ANXIETY DISORDER: Chronic | ICD-10-CM

## 2018-12-26 DIAGNOSIS — E83.52 HYPERCALCEMIA: ICD-10-CM

## 2018-12-26 DIAGNOSIS — E11.9 DIABETIC EYE EXAM (HCC): Chronic | ICD-10-CM

## 2018-12-26 DIAGNOSIS — N26.1 RENAL ATROPHY, LEFT: Chronic | ICD-10-CM

## 2018-12-26 DIAGNOSIS — F41.8 DEPRESSION WITH ANXIETY: Chronic | ICD-10-CM

## 2018-12-26 DIAGNOSIS — R60.0 PEDAL EDEMA: Chronic | ICD-10-CM

## 2018-12-26 DIAGNOSIS — Z80.3 FAMILY HISTORY OF BREAST CANCER: Chronic | ICD-10-CM

## 2018-12-26 DIAGNOSIS — Z01.00 DIABETIC EYE EXAM (HCC): Chronic | ICD-10-CM

## 2018-12-26 DIAGNOSIS — Z00.00 MEDICARE ANNUAL WELLNESS VISIT, SUBSEQUENT: Primary | ICD-10-CM

## 2018-12-26 DIAGNOSIS — G47.33 OBSTRUCTIVE SLEEP APNEA: Chronic | ICD-10-CM

## 2018-12-26 DIAGNOSIS — E11.9 ENCOUNTER FOR DIABETIC FOOT EXAM (HCC): Chronic | ICD-10-CM

## 2018-12-26 DIAGNOSIS — R80.9 MICROALBUMINURIA: Chronic | ICD-10-CM

## 2018-12-26 DIAGNOSIS — E55.9 VITAMIN D DEFICIENCY: Chronic | ICD-10-CM

## 2018-12-26 DIAGNOSIS — E78.5 HYPERLIPIDEMIA, UNSPECIFIED HYPERLIPIDEMIA TYPE: Chronic | ICD-10-CM

## 2018-12-26 DIAGNOSIS — K75.81 NASH (NONALCOHOLIC STEATOHEPATITIS): Chronic | ICD-10-CM

## 2018-12-26 DIAGNOSIS — I10 BENIGN ESSENTIAL HYPERTENSION: Chronic | ICD-10-CM

## 2018-12-26 DIAGNOSIS — E11.9 TYPE 2 DIABETES MELLITUS WITHOUT COMPLICATION, WITHOUT LONG-TERM CURRENT USE OF INSULIN (HCC): Chronic | ICD-10-CM

## 2018-12-26 PROBLEM — L23.9 ALLERGIC DERMATITIS: Status: RESOLVED | Noted: 2018-07-05 | Resolved: 2018-12-26

## 2018-12-26 PROCEDURE — G0439 PPPS, SUBSEQ VISIT: HCPCS | Performed by: INTERNAL MEDICINE

## 2018-12-26 PROCEDURE — 96160 PT-FOCUSED HLTH RISK ASSMT: CPT | Performed by: INTERNAL MEDICINE

## 2018-12-26 PROCEDURE — 99214 OFFICE O/P EST MOD 30 MIN: CPT | Performed by: INTERNAL MEDICINE

## 2018-12-26 RX ORDER — ALPRAZOLAM 0.5 MG/1
TABLET ORAL
Qty: 60 TABLET | Refills: 5 | Status: SHIPPED | OUTPATIENT
Start: 2018-12-26 | End: 2019-04-08 | Stop reason: SDUPTHER
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
## 2018-12-26 NOTE — PROGRESS NOTES
QUICK REFERENCE INFORMATION:  The ABCs of the Annual Wellness Visit    Subsequent Medicare Wellness Visit    HEALTH RISK ASSESSMENT    1950    Recent Hospitalizations:  No hospitalization(s) within the last year..        Current Medical Providers:  Patient Care Team:  Marc Rasheed MD as PCP - General (Internal Medicine)  Valarie Limon MD as Consulting Physician (Obstetrics and Gynecology)        Smoking Status:  Social History     Tobacco Use   Smoking Status Never Smoker   Smokeless Tobacco Never Used       Alcohol Consumption:  Social History     Substance and Sexual Activity   Alcohol Use No   • Frequency: Never    Comment: SOCIALLY IN PAST, NONE SINCE JANUARY       Depression Screen:   PHQ-2/PHQ-9 Depression Screening 12/26/2018   Little interest or pleasure in doing things 0   Feeling down, depressed, or hopeless 0   Total Score 0       Health Habits and Functional and Cognitive Screening:  Functional & Cognitive Status 12/26/2018   Do you have difficulty preparing food and eating? No   Do you have difficulty bathing yourself, getting dressed or grooming yourself? No   Do you have difficulty using the toilet? No   Do you have difficulty moving around from place to place? No   Do you have trouble with steps or getting out of a bed or a chair? No   In the past year have you fallen or experienced a near fall? No   Current Diet Well Balanced Diet   Dental Exam Not up to date   Eye Exam Not up to date   Exercise (times per week) 1 times per week   Current Exercise Activities Include Walking   Do you need help using the phone?  No   Are you deaf or do you have serious difficulty hearing?  Yes   Do you need help with transportation? No   Do you need help shopping? No   Do you need help preparing meals?  No   Do you need help with housework?  No   Do you need help with laundry? No   Do you need help taking your medications? No   Do you need help managing money? Yes   Do you ever drive or ride in a car  without wearing a seat belt? No   Have you felt unusual stress, anger or loneliness in the last month? No   Who do you live with? Other   If you need help, do you have trouble finding someone available to you? No   Have you been bothered in the last four weeks by sexual problems? No   Do you have difficulty concentrating, remembering or making decisions? Yes           Does the patient have evidence of cognitive impairment? No    Aspirin use counseling: Taking ASA appropriately as indicated      Recent Lab Results:  CMP:  Lab Results   Component Value Date     (H) 11/30/2018    BUN 17 11/30/2018    CREATININE 0.75 11/30/2018    EGFRIFNONA 77 11/30/2018    EGFRIFAFRI 94 11/30/2018    BCR 22.7 11/30/2018     11/30/2018    K 4.0 11/30/2018    CO2 26.5 11/30/2018    CALCIUM 10.6 (H) 11/30/2018    PROTENTOTREF 7.1 11/30/2018    ALBUMIN 4.60 11/30/2018    LABGLOBREF 2.5 11/30/2018    LABIL2 1.8 11/30/2018    BILITOT 0.6 11/30/2018    ALKPHOS 111 11/30/2018    AST 20 11/30/2018    ALT 18 11/30/2018     Lipid Panel:  Lab Results   Component Value Date    TRIG 142 11/30/2018     HbA1c:  Lab Results   Component Value Date    HGBA1C 7.51 (H) 11/30/2018       Visual Acuity:  No exam data present    Age-appropriate Screening Schedule:  Refer to the list below for future screening recommendations based on patient's age, sex and/or medical conditions. Orders for these recommended tests are listed in the plan section. The patient has been provided with a written plan.    Health Maintenance   Topic Date Due   • DIABETIC EYE EXAM  06/23/2016   • HEMOGLOBIN A1C  05/30/2019   • DIABETIC FOOT EXAM  07/05/2019   • LIPID PANEL  11/30/2019   • URINE MICROALBUMIN  11/30/2019   • MAMMOGRAM  06/12/2020   • PNEUMOCOCCAL VACCINES (65+ LOW/MEDIUM RISK) (2 of 2 - PPSV23) 07/21/2020   • COLONOSCOPY  12/22/2024   • TDAP/TD VACCINES (2 - Td) 10/18/2025   • INFLUENZA VACCINE  Completed   • ZOSTER VACCINE  Discontinued        Subjective    History of Present Illness    Reema Easley is a 68 y.o. female who presents for an Subsequent Wellness Visit.    The following portions of the patient's history were reviewed and updated as appropriate: allergies, current medications, past family history, past medical history, past social history, past surgical history and problem list.    Outpatient Medications Prior to Visit   Medication Sig Dispense Refill   • aspirin 81 MG EC tablet Take 1 tablet by mouth Daily. PT TO HOLD MED PRIOR TO OR     • benazepril (LOTENSIN) 40 MG tablet TAKE ONE TABLET BY MOUTH DAILY 90 tablet 1   • Cholecalciferol (VITAMIN D3) 5000 UNITS capsule capsule Take 5,000 Units by mouth Daily.     • furosemide (LASIX) 20 MG tablet Take 1 tablet by mouth Daily. 90 tablet 0   • metFORMIN (GLUCOPHAGE) 1000 MG tablet Take 1 by mouth twice a day with food 180 tablet 3   • nystatin-triamcinolone (MYCOLOG II) 677408-1.1 UNIT/GM-% cream Apply  topically 2 (Two) Times a Day. 30 g 0   • OXcarbazepine (TRILEPTAL) 150 MG tablet TAKE ONE TABLET BY MOUTH EVERY MORNING AND TAKE TWO TABLETS BY MOUTH EVERY NIGHT AT BEDTIME 90 tablet 5   • simvastatin (ZOCOR) 40 MG tablet Take 1 tablet by mouth Every Night. 90 tablet 0   • STOOL SOFTENER 100 MG capsule TAKE ONE CAPSULE BY MOUTH TWICE A DAY 60 capsule 0   • ALPRAZolam (XANAX) 0.5 MG tablet TAKE ONE TABLET BY MOUTH TWICE A DAY 60 tablet 0   • lamoTRIgine (LaMICtal) 25 MG tablet 1 tablet Daily.       No facility-administered medications prior to visit.        Patient Active Problem List   Diagnosis   • Benign essential hypertension   • Chronic insomnia   • Depression with anxiety   • Diverticulosis of colon   • Generalized anxiety disorder   • Gout   • Hyperlipidemia   • Microalbuminuria   • GEORGE (nonalcoholic steatohepatitis)   • Obstructive sleep apnea, 12/17/2015--AHI 14.6.  RDI 48.9 with REM sleep.  O2 sat 77%.  Cannot tolerate CPAP.   • Primary osteoarthritis of right knee   • Pedal edema   • Renal  "atrophy, left   • Type 2 diabetes mellitus (CMS/[facility])   • Vitamin D deficiency   • Family history of ovarian cancer   • Family history of breast cancer   • Therapeutic drug monitoring   • Allergic rhinitis   • [employer] Medicare replacement physical exam   • Non morbid obesity   • Diabetic foot exam   • Diabetic eye exam (CMS/[facility])   • Hypercalcemia       Advance Care Planning:  has NO advance directive - information provided to the patient today    Identification of Risk Factors:  Risk factors include: weight  and cardiovascular risk.    Review of Systems   Musculoskeletal: Positive for arthralgias.   All other systems reviewed and are negative.      Compared to one year ago, the patient feels her physical health is better.  Compared to one year ago, the patient feels her mental health is better.    Objective       Physical Exam     General: Alert and oriented x 3.  No acute distress.  Normal affect.  HEENT: Pupils equal, round, reactive to light; extraocular movements intact; sclerae nonicteric; pharynx, ear canals and TMs normal.  Neck: Without JVD, thyromegaly, bruit, or adenopathy.  Lungs: Clear to auscultation in all fields.  Heart: Regular rate and rhythm without murmur, rub, gallop, or click.  Abdomen: Soft, nontender, without hepatosplenomegaly or hernia.  Bowel sounds normal.  : Deferred.  Rectal: Deferred.  Extremities: Without clubbing, cyanosis, edema, or pulse deficit.  Neurologic: Intact without focal deficit.  Normal station and gait observed during ingress and egress from the examination room.  Skin: Without significant lesion.  Musculoskeletal: Unremarkable.    12/26/2018--routine diabetic foot exam reveals no evidence of diabetic foot ulcer or pre-ulcerative callus.  Pulses are palpable and sensation intact.  Vitals:    12/26/18 1025   BP: 116/68   Pulse: 69   SpO2: 99%   Weight: 83.5 kg (184 lb)   Height: 162.6 cm (64.02\")       Patient's Body mass index is 31.57 kg/m². BMI is above normal " parameters. Recommendations include: exercise counseling, nutrition counseling and referral to primary care.      Assessment/Plan   Patient Self-Management and Personalized Health Advice  The patient has been provided with information about: diet, exercise, weight management, prevention of cardiac or vascular disease and fall prevention and preventive services including:   · Counseling for cardiovascular disease risk reduction, Diabetes screening, see lab orders, Exercise counseling provided, Fall Risk assessment done, Glaucoma screening recommended, Nutrition counseling provided, Zostavax vaccine (Herpes Zoster).    Visit Diagnoses:    ICD-10-CM ICD-9-CM   1. Medicare annual wellness visit, subsequent Z00.00 V70.0   2. [employer] Medicare replacement physical exam Z00.00 V70.0   3. Type 2 diabetes mellitus without complication, without long-term current use of insulin (CMS/[facility]) E11.9 250.00   4. Gout Z87.39 V12.29   5. Generalized anxiety disorder F41.1 300.02   6. Microalbuminuria R80.9 791.0   7. Hyperlipidemia, unspecified hyperlipidemia type E78.5 272.4   8. GEORGE (nonalcoholic steatohepatitis) K75.81 571.8   9. Obstructive sleep apnea, 12/17/2015--AHI 14.6.  RDI 48.9 with REM sleep.  O2 sat 77%.  Cannot tolerate CPAP. G47.33 327.23   10. Depression with anxiety F41.8 300.4   11. Benign essential hypertension I10 401.1   12. Chronic insomnia F51.04 780.52   13. Pedal edema R60.0 782.3   14. Renal atrophy, left N26.1 587   15. Vitamin D deficiency E55.9 268.9   16. Allergic rhinitis J30.1 477.0   17. Diabetic foot exam E11.9 250.00   18. Diabetic eye exam (CMS/[facility]) Z01.00 V72.0    E11.9 250.00   19. Family history of breast cancer Z80.3 V16.3   20. Hypercalcemia E83.52 275.42       Orders Placed This Encounter   Procedures   • CK     Standing Status:   Future     Standing Expiration Date:   12/26/2020   • Comprehensive Metabolic Panel     Standing Status:   Future     Standing Expiration Date:   12/26/2020   •  Hemoglobin A1c     Standing Status:   Future     Standing Expiration Date:   12/26/2020   • NMR LipoProfile     Standing Status:   Future     Standing Expiration Date:   12/26/2020   • Vitamin D 25 Hydroxy     Standing Status:   Future     Standing Expiration Date:   12/26/2020   • Uric Acid     Standing Status:   Future     Standing Expiration Date:   12/26/2020   • TSH     Standing Status:   Future     Standing Expiration Date:   12/26/2020   • T4, Free     Standing Status:   Future     Standing Expiration Date:   12/26/2020   • T3, Free     Standing Status:   Future     Standing Expiration Date:   12/26/2020       Outpatient Encounter Medications as of 12/26/2018   Medication Sig Dispense Refill   • ALPRAZolam (XANAX) 0.5 MG tablet 1 by mouth twice a day when necessary anxiety 60 tablet 5   • aspirin 81 MG EC tablet Take 1 tablet by mouth Daily. PT TO HOLD MED PRIOR TO OR     • benazepril (LOTENSIN) 40 MG tablet TAKE ONE TABLET BY MOUTH DAILY 90 tablet 1   • Cholecalciferol (VITAMIN D3) 5000 UNITS capsule capsule Take 5,000 Units by mouth Daily.     • furosemide (LASIX) 20 MG tablet Take 1 tablet by mouth Daily. 90 tablet 0   • metFORMIN (GLUCOPHAGE) 1000 MG tablet Take 1 by mouth twice a day with food 180 tablet 3   • nystatin-triamcinolone (MYCOLOG II) 323917-1.1 UNIT/GM-% cream Apply  topically 2 (Two) Times a Day. 30 g 0   • OXcarbazepine (TRILEPTAL) 150 MG tablet TAKE ONE TABLET BY MOUTH EVERY MORNING AND TAKE TWO TABLETS BY MOUTH EVERY NIGHT AT BEDTIME 90 tablet 5   • simvastatin (ZOCOR) 40 MG tablet Take 1 tablet by mouth Every Night. 90 tablet 0   • STOOL SOFTENER 100 MG capsule TAKE ONE CAPSULE BY MOUTH TWICE A DAY 60 capsule 0   • [DISCONTINUED] ALPRAZolam (XANAX) 0.5 MG tablet TAKE ONE TABLET BY MOUTH TWICE A DAY 60 tablet 0   • [DISCONTINUED] lamoTRIgine (LaMICtal) 25 MG tablet 1 tablet Daily.       No facility-administered encounter medications on file as of 12/26/2018.        Reviewed use of high  risk medication in the elderly: yes  Reviewed for potential of harmful drug interactions in the elderly: yes    Follow Up:  Return in about 6 months (around 6/26/2019) for Next scheduled follow up with lab prior.     An After Visit Summary and PPPS with all of these plans were given to the patient.

## 2019-01-07 RX ORDER — BENAZEPRIL HYDROCHLORIDE 40 MG/1
40 TABLET, FILM COATED ORAL DAILY
Qty: 90 TABLET | Refills: 1 | Status: SHIPPED | OUTPATIENT
Start: 2019-01-07 | End: 2019-07-07 | Stop reason: SDUPTHER

## 2019-03-04 RX ORDER — SIMVASTATIN 40 MG
TABLET ORAL
Qty: 90 TABLET | Refills: 0 | Status: SHIPPED | OUTPATIENT
Start: 2019-03-04 | End: 2019-06-06 | Stop reason: SDUPTHER

## 2019-03-14 RX ORDER — FUROSEMIDE 20 MG/1
TABLET ORAL
Qty: 90 TABLET | Refills: 0 | Status: SHIPPED | OUTPATIENT
Start: 2019-03-14 | End: 2019-06-15 | Stop reason: SDUPTHER

## 2019-04-08 DIAGNOSIS — F41.1 GENERALIZED ANXIETY DISORDER: Chronic | ICD-10-CM

## 2019-04-08 RX ORDER — ALPRAZOLAM 0.5 MG/1
TABLET ORAL
Qty: 60 TABLET | Refills: 2 | OUTPATIENT
Start: 2019-04-08 | End: 2019-08-29 | Stop reason: SDUPTHER

## 2019-06-02 DIAGNOSIS — Z87.39 HISTORY OF GOUT: ICD-10-CM

## 2019-06-03 ENCOUNTER — TELEPHONE (OUTPATIENT)
Dept: INTERNAL MEDICINE | Facility: CLINIC | Age: 69
End: 2019-06-03

## 2019-06-03 RX ORDER — ALLOPURINOL 300 MG/1
TABLET ORAL
Qty: 90 TABLET | Refills: 2 | OUTPATIENT
Start: 2019-06-03

## 2019-06-03 NOTE — TELEPHONE ENCOUNTER
Pt is requesting a rf on her allopurinol for gout.  She is having a flare up.  It is not on her med list anymore.  Last rf was 8/2016

## 2019-06-04 NOTE — TELEPHONE ENCOUNTER
Allopurinol is not a medication that you take for a flareup of gout.  That is a medication that you take regularly to prevent gout.  If patient is having a flareup, I need to see her to assess the situation.

## 2019-06-06 RX ORDER — SIMVASTATIN 40 MG
TABLET ORAL
Qty: 30 TABLET | Refills: 0 | Status: SHIPPED | OUTPATIENT
Start: 2019-06-06 | End: 2019-07-03 | Stop reason: SDUPTHER

## 2019-06-10 DIAGNOSIS — E11.9 TYPE 2 DIABETES MELLITUS WITHOUT COMPLICATION, WITHOUT LONG-TERM CURRENT USE OF INSULIN (HCC): Chronic | ICD-10-CM

## 2019-06-10 DIAGNOSIS — E78.5 HYPERLIPIDEMIA, UNSPECIFIED HYPERLIPIDEMIA TYPE: Chronic | ICD-10-CM

## 2019-06-10 DIAGNOSIS — E55.9 VITAMIN D DEFICIENCY: Chronic | ICD-10-CM

## 2019-06-10 DIAGNOSIS — Z87.39 HISTORY OF GOUT: Chronic | ICD-10-CM

## 2019-06-12 LAB
25(OH)D3+25(OH)D2 SERPL-MCNC: 44.5 NG/ML (ref 30–100)
ALBUMIN SERPL-MCNC: 4.1 G/DL (ref 3.5–5.2)
ALBUMIN/GLOB SERPL: 1.5 G/DL
ALP SERPL-CCNC: 92 U/L (ref 39–117)
ALT SERPL-CCNC: 15 U/L (ref 1–33)
AST SERPL-CCNC: 17 U/L (ref 1–32)
BILIRUB SERPL-MCNC: 0.5 MG/DL (ref 0.2–1.2)
BUN SERPL-MCNC: 15 MG/DL (ref 8–23)
BUN/CREAT SERPL: 21.7 (ref 7–25)
CALCIUM SERPL-MCNC: 9.9 MG/DL (ref 8.6–10.5)
CHLORIDE SERPL-SCNC: 101 MMOL/L (ref 98–107)
CHOLEST SERPL-MCNC: 166 MG/DL (ref 100–199)
CK SERPL-CCNC: 106 U/L (ref 20–180)
CO2 SERPL-SCNC: 27.7 MMOL/L (ref 22–29)
CREAT SERPL-MCNC: 0.69 MG/DL (ref 0.57–1)
GLOBULIN SER CALC-MCNC: 2.7 GM/DL
GLUCOSE SERPL-MCNC: 145 MG/DL (ref 65–99)
HBA1C MFR BLD: 8.4 % (ref 4.8–5.6)
HDL SERPL-SCNC: 41.9 UMOL/L
HDLC SERPL-MCNC: 60 MG/DL
LDL SERPL QN: 20.6 NM
LDL SERPL-SCNC: 1192 NMOL/L
LDL SMALL SERPL-SCNC: 668 NMOL/L
LDLC SERPL CALC-MCNC: 79 MG/DL (ref 0–99)
POTASSIUM SERPL-SCNC: 4.6 MMOL/L (ref 3.5–5.2)
PROT SERPL-MCNC: 6.8 G/DL (ref 6–8.5)
SODIUM SERPL-SCNC: 141 MMOL/L (ref 136–145)
T3FREE SERPL-MCNC: 2.4 PG/ML (ref 2–4.4)
T4 FREE SERPL-MCNC: 1.19 NG/DL (ref 0.93–1.7)
TRIGL SERPL-MCNC: 134 MG/DL (ref 0–149)
TSH SERPL DL<=0.005 MIU/L-ACNC: 2.29 MIU/ML (ref 0.27–4.2)
URATE SERPL-MCNC: 6.5 MG/DL (ref 2.4–5.7)

## 2019-06-17 RX ORDER — FUROSEMIDE 20 MG/1
TABLET ORAL
Qty: 90 TABLET | Refills: 0 | Status: SHIPPED | OUTPATIENT
Start: 2019-06-17 | End: 2020-03-13

## 2019-06-18 ENCOUNTER — OFFICE VISIT (OUTPATIENT)
Dept: INTERNAL MEDICINE | Facility: CLINIC | Age: 69
End: 2019-06-18

## 2019-06-18 ENCOUNTER — APPOINTMENT (OUTPATIENT)
Dept: WOMENS IMAGING | Facility: HOSPITAL | Age: 69
End: 2019-06-18

## 2019-06-18 VITALS
SYSTOLIC BLOOD PRESSURE: 120 MMHG | OXYGEN SATURATION: 100 % | BODY MASS INDEX: 33.05 KG/M2 | HEART RATE: 79 BPM | DIASTOLIC BLOOD PRESSURE: 70 MMHG | WEIGHT: 193.6 LBS | HEIGHT: 64 IN

## 2019-06-18 DIAGNOSIS — E83.52 HYPERCALCEMIA: ICD-10-CM

## 2019-06-18 DIAGNOSIS — E11.9 TYPE 2 DIABETES MELLITUS WITHOUT COMPLICATION, WITHOUT LONG-TERM CURRENT USE OF INSULIN (HCC): Chronic | ICD-10-CM

## 2019-06-18 DIAGNOSIS — Z51.81 THERAPEUTIC DRUG MONITORING: ICD-10-CM

## 2019-06-18 DIAGNOSIS — Z23 NEED FOR PNEUMOCOCCAL VACCINATION: ICD-10-CM

## 2019-06-18 DIAGNOSIS — Z00.00 ROUTINE PHYSICAL EXAMINATION: Primary | ICD-10-CM

## 2019-06-18 DIAGNOSIS — G47.33 OBSTRUCTIVE SLEEP APNEA: Chronic | ICD-10-CM

## 2019-06-18 DIAGNOSIS — F41.8 DEPRESSION WITH ANXIETY: Chronic | ICD-10-CM

## 2019-06-18 DIAGNOSIS — Z87.39 HISTORY OF GOUT: Chronic | ICD-10-CM

## 2019-06-18 DIAGNOSIS — E78.5 HYPERLIPIDEMIA, UNSPECIFIED HYPERLIPIDEMIA TYPE: Chronic | ICD-10-CM

## 2019-06-18 DIAGNOSIS — R80.9 MICROALBUMINURIA: Chronic | ICD-10-CM

## 2019-06-18 DIAGNOSIS — E66.9 NON MORBID OBESITY: Chronic | ICD-10-CM

## 2019-06-18 DIAGNOSIS — F51.04 CHRONIC INSOMNIA: Chronic | ICD-10-CM

## 2019-06-18 DIAGNOSIS — N26.1 RENAL ATROPHY, LEFT: Chronic | ICD-10-CM

## 2019-06-18 DIAGNOSIS — R60.0 PEDAL EDEMA: Chronic | ICD-10-CM

## 2019-06-18 DIAGNOSIS — I10 BENIGN ESSENTIAL HYPERTENSION: Chronic | ICD-10-CM

## 2019-06-18 DIAGNOSIS — Z80.3 FAMILY HISTORY OF BREAST CANCER: Chronic | ICD-10-CM

## 2019-06-18 DIAGNOSIS — F41.1 GENERALIZED ANXIETY DISORDER: Chronic | ICD-10-CM

## 2019-06-18 DIAGNOSIS — K75.81 NASH (NONALCOHOLIC STEATOHEPATITIS): Chronic | ICD-10-CM

## 2019-06-18 DIAGNOSIS — E55.9 VITAMIN D DEFICIENCY: Chronic | ICD-10-CM

## 2019-06-18 DIAGNOSIS — E11.9 ENCOUNTER FOR DIABETIC FOOT EXAM (HCC): Chronic | ICD-10-CM

## 2019-06-18 PROCEDURE — 77067 SCR MAMMO BI INCL CAD: CPT | Performed by: RADIOLOGY

## 2019-06-18 PROCEDURE — G0009 ADMIN PNEUMOCOCCAL VACCINE: HCPCS | Performed by: INTERNAL MEDICINE

## 2019-06-18 PROCEDURE — 90732 PPSV23 VACC 2 YRS+ SUBQ/IM: CPT | Performed by: INTERNAL MEDICINE

## 2019-06-18 PROCEDURE — 99397 PER PM REEVAL EST PAT 65+ YR: CPT | Performed by: INTERNAL MEDICINE

## 2019-06-18 PROCEDURE — 77063 BREAST TOMOSYNTHESIS BI: CPT | Performed by: RADIOLOGY

## 2019-06-18 RX ORDER — ALLOPURINOL 300 MG/1
300 TABLET ORAL DAILY
Qty: 90 TABLET | Refills: 3 | Status: SHIPPED | OUTPATIENT
Start: 2019-06-18 | End: 2020-06-23

## 2019-06-18 RX ORDER — ALLOPURINOL 100 MG/1
TABLET ORAL
Qty: 30 TABLET | Refills: 0 | Status: SHIPPED | OUTPATIENT
Start: 2019-06-18 | End: 2019-06-18
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
## 2019-06-19 RX ORDER — FUROSEMIDE 20 MG/1
TABLET ORAL
Qty: 90 TABLET | Refills: 1 | Status: SHIPPED | OUTPATIENT
Start: 2019-06-19 | End: 2019-09-30 | Stop reason: SDUPTHER

## 2019-07-03 RX ORDER — SIMVASTATIN 40 MG
TABLET ORAL
Qty: 30 TABLET | Refills: 5 | Status: SHIPPED | OUTPATIENT
Start: 2019-07-03 | End: 2019-10-06 | Stop reason: SDUPTHER

## 2019-07-08 RX ORDER — BENAZEPRIL HYDROCHLORIDE 40 MG/1
TABLET, FILM COATED ORAL
Qty: 90 TABLET | Refills: 1 | Status: SHIPPED | OUTPATIENT
Start: 2019-07-08 | End: 2020-01-20

## 2019-08-29 DIAGNOSIS — F41.1 GENERALIZED ANXIETY DISORDER: Chronic | ICD-10-CM

## 2019-08-30 RX ORDER — ALPRAZOLAM 0.5 MG/1
TABLET ORAL
Qty: 60 TABLET | Refills: 0 | Status: SHIPPED | OUTPATIENT
Start: 2019-08-30 | End: 2019-09-03 | Stop reason: SDUPTHER

## 2019-09-03 DIAGNOSIS — F41.1 GENERALIZED ANXIETY DISORDER: Chronic | ICD-10-CM

## 2019-09-03 RX ORDER — ALPRAZOLAM 0.5 MG/1
TABLET ORAL
Qty: 60 TABLET | Refills: 0 | Status: SHIPPED | OUTPATIENT
Start: 2019-09-03 | End: 2019-09-30 | Stop reason: SDUPTHER

## 2019-09-11 DIAGNOSIS — E11.9 TYPE 2 DIABETES MELLITUS WITHOUT COMPLICATION, WITHOUT LONG-TERM CURRENT USE OF INSULIN (HCC): Chronic | ICD-10-CM

## 2019-09-12 LAB
ALBUMIN SERPL-MCNC: 4.4 G/DL (ref 3.5–5.2)
ALBUMIN/CREAT UR: 11.2 MG/G CREAT (ref 0–30)
ALBUMIN/GLOB SERPL: 1.7 G/DL
ALP SERPL-CCNC: 95 U/L (ref 39–117)
ALT SERPL-CCNC: 18 U/L (ref 1–33)
AST SERPL-CCNC: 20 U/L (ref 1–32)
BILIRUB SERPL-MCNC: 0.5 MG/DL (ref 0.2–1.2)
BUN SERPL-MCNC: 16 MG/DL (ref 8–23)
BUN/CREAT SERPL: 18.8 (ref 7–25)
CALCIUM SERPL-MCNC: 10.2 MG/DL (ref 8.6–10.5)
CHLORIDE SERPL-SCNC: 99 MMOL/L (ref 98–107)
CO2 SERPL-SCNC: 29.3 MMOL/L (ref 22–29)
CREAT SERPL-MCNC: 0.85 MG/DL (ref 0.57–1)
CREAT UR-MCNC: 32.1 MG/DL
GLOBULIN SER CALC-MCNC: 2.6 GM/DL
GLUCOSE SERPL-MCNC: 160 MG/DL (ref 65–99)
HBA1C MFR BLD: 8 % (ref 4.8–5.6)
MICROALBUMIN UR-MCNC: 3.6 UG/ML
POTASSIUM SERPL-SCNC: 4.7 MMOL/L (ref 3.5–5.2)
PROT SERPL-MCNC: 7 G/DL (ref 6–8.5)
SODIUM SERPL-SCNC: 142 MMOL/L (ref 136–145)

## 2019-09-30 ENCOUNTER — OFFICE VISIT (OUTPATIENT)
Dept: INTERNAL MEDICINE | Facility: CLINIC | Age: 69
End: 2019-09-30

## 2019-09-30 VITALS
WEIGHT: 199 LBS | OXYGEN SATURATION: 98 % | HEART RATE: 85 BPM | DIASTOLIC BLOOD PRESSURE: 70 MMHG | SYSTOLIC BLOOD PRESSURE: 100 MMHG | BODY MASS INDEX: 33.97 KG/M2 | HEIGHT: 64 IN

## 2019-09-30 DIAGNOSIS — R60.0 PEDAL EDEMA: Chronic | ICD-10-CM

## 2019-09-30 DIAGNOSIS — E83.52 HYPERCALCEMIA: ICD-10-CM

## 2019-09-30 DIAGNOSIS — G47.33 OBSTRUCTIVE SLEEP APNEA: Chronic | ICD-10-CM

## 2019-09-30 DIAGNOSIS — E55.9 VITAMIN D DEFICIENCY: Chronic | ICD-10-CM

## 2019-09-30 DIAGNOSIS — K75.81 NASH (NONALCOHOLIC STEATOHEPATITIS): Chronic | ICD-10-CM

## 2019-09-30 DIAGNOSIS — Z51.81 THERAPEUTIC DRUG MONITORING: ICD-10-CM

## 2019-09-30 DIAGNOSIS — E11.9 TYPE 2 DIABETES MELLITUS WITHOUT COMPLICATION, WITHOUT LONG-TERM CURRENT USE OF INSULIN (HCC): Primary | Chronic | ICD-10-CM

## 2019-09-30 DIAGNOSIS — Z87.39 HISTORY OF GOUT: Chronic | ICD-10-CM

## 2019-09-30 DIAGNOSIS — E78.5 HYPERLIPIDEMIA, UNSPECIFIED HYPERLIPIDEMIA TYPE: Chronic | ICD-10-CM

## 2019-09-30 DIAGNOSIS — F41.1 GENERALIZED ANXIETY DISORDER: Chronic | ICD-10-CM

## 2019-09-30 DIAGNOSIS — I10 BENIGN ESSENTIAL HYPERTENSION: Chronic | ICD-10-CM

## 2019-09-30 DIAGNOSIS — R80.9 MICROALBUMINURIA: Chronic | ICD-10-CM

## 2019-09-30 PROCEDURE — 99214 OFFICE O/P EST MOD 30 MIN: CPT | Performed by: INTERNAL MEDICINE

## 2019-09-30 RX ORDER — ALPRAZOLAM 0.5 MG/1
0.5 TABLET ORAL 2 TIMES DAILY
Qty: 60 TABLET | Refills: 0 | Status: SHIPPED | OUTPATIENT
Start: 2019-09-30 | End: 2019-12-12 | Stop reason: SDUPTHER
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
## 2019-10-07 RX ORDER — SIMVASTATIN 40 MG
TABLET ORAL
Qty: 30 TABLET | Refills: 4 | Status: SHIPPED | OUTPATIENT
Start: 2019-10-07 | End: 2020-03-11

## 2019-12-12 DIAGNOSIS — F41.1 GENERALIZED ANXIETY DISORDER: Chronic | ICD-10-CM

## 2019-12-13 ENCOUNTER — TELEPHONE (OUTPATIENT)
Dept: INTERNAL MEDICINE | Facility: CLINIC | Age: 69
End: 2019-12-13

## 2019-12-13 RX ORDER — ALPRAZOLAM 0.5 MG/1
TABLET ORAL
Qty: 60 TABLET | Refills: 0 | Status: SHIPPED | OUTPATIENT
Start: 2019-12-13 | End: 2020-06-09

## 2019-12-18 DIAGNOSIS — E11.9 TYPE 2 DIABETES MELLITUS WITHOUT COMPLICATION, WITHOUT LONG-TERM CURRENT USE OF INSULIN (HCC): Chronic | ICD-10-CM

## 2019-12-18 DIAGNOSIS — Z51.81 THERAPEUTIC DRUG MONITORING: ICD-10-CM

## 2019-12-18 DIAGNOSIS — E55.9 VITAMIN D DEFICIENCY: Chronic | ICD-10-CM

## 2019-12-18 DIAGNOSIS — Z87.39 HISTORY OF GOUT: Chronic | ICD-10-CM

## 2019-12-18 DIAGNOSIS — E78.5 HYPERLIPIDEMIA, UNSPECIFIED HYPERLIPIDEMIA TYPE: Chronic | ICD-10-CM

## 2019-12-20 LAB
25(OH)D3+25(OH)D2 SERPL-MCNC: 31.6 NG/ML (ref 30–100)
ALBUMIN SERPL-MCNC: 4.4 G/DL (ref 3.5–5.2)
ALBUMIN/GLOB SERPL: 1.9 G/DL
ALP SERPL-CCNC: 92 U/L (ref 39–117)
ALT SERPL-CCNC: 16 U/L (ref 1–33)
AST SERPL-CCNC: 17 U/L (ref 1–32)
BILIRUB SERPL-MCNC: 0.5 MG/DL (ref 0.2–1.2)
BUN SERPL-MCNC: 17 MG/DL (ref 8–23)
BUN/CREAT SERPL: 22.4 (ref 7–25)
CALCIUM SERPL-MCNC: 9.9 MG/DL (ref 8.6–10.5)
CHLORIDE SERPL-SCNC: 101 MMOL/L (ref 98–107)
CHOLEST SERPL-MCNC: 178 MG/DL (ref 100–199)
CK SERPL-CCNC: 115 U/L (ref 20–180)
CO2 SERPL-SCNC: 27.6 MMOL/L (ref 22–29)
CREAT SERPL-MCNC: 0.76 MG/DL (ref 0.57–1)
ERYTHROCYTE [DISTWIDTH] IN BLOOD BY AUTOMATED COUNT: 12.6 % (ref 12.3–15.4)
GLOBULIN SER CALC-MCNC: 2.3 GM/DL
GLUCOSE SERPL-MCNC: 220 MG/DL (ref 65–99)
HBA1C MFR BLD: 9.2 % (ref 4.8–5.6)
HCT VFR BLD AUTO: 41.4 % (ref 34–46.6)
HDL SERPL-SCNC: 41.9 UMOL/L
HDLC SERPL-MCNC: 55 MG/DL
HGB BLD-MCNC: 13.9 G/DL (ref 12–15.9)
LDL SERPL QN: 20.5 NM
LDL SERPL-SCNC: 1185 NMOL/L
LDL SMALL SERPL-SCNC: 762 NMOL/L
LDLC SERPL CALC-MCNC: 85 MG/DL (ref 0–99)
MCH RBC QN AUTO: 30.3 PG (ref 26.6–33)
MCHC RBC AUTO-ENTMCNC: 33.6 G/DL (ref 31.5–35.7)
MCV RBC AUTO: 90.2 FL (ref 79–97)
PLATELET # BLD AUTO: 277 10*3/MM3 (ref 140–450)
POTASSIUM SERPL-SCNC: 4.6 MMOL/L (ref 3.5–5.2)
PROT SERPL-MCNC: 6.7 G/DL (ref 6–8.5)
RBC # BLD AUTO: 4.59 10*6/MM3 (ref 3.77–5.28)
SODIUM SERPL-SCNC: 141 MMOL/L (ref 136–145)
T3FREE SERPL-MCNC: 3 PG/ML (ref 2–4.4)
T4 FREE SERPL-MCNC: 1.26 NG/DL (ref 0.93–1.7)
TRIGL SERPL-MCNC: 190 MG/DL (ref 0–149)
TSH SERPL DL<=0.005 MIU/L-ACNC: 2.29 UIU/ML (ref 0.27–4.2)
URATE SERPL-MCNC: 4.6 MG/DL (ref 2.4–5.7)
WBC # BLD AUTO: 7.13 10*3/MM3 (ref 3.4–10.8)

## 2020-01-08 ENCOUNTER — RESULTS ENCOUNTER (OUTPATIENT)
Dept: INTERNAL MEDICINE | Facility: CLINIC | Age: 70
End: 2020-01-08

## 2020-01-08 ENCOUNTER — OFFICE VISIT (OUTPATIENT)
Dept: INTERNAL MEDICINE | Facility: CLINIC | Age: 70
End: 2020-01-08

## 2020-01-08 VITALS
WEIGHT: 204 LBS | HEART RATE: 97 BPM | BODY MASS INDEX: 34.83 KG/M2 | SYSTOLIC BLOOD PRESSURE: 122 MMHG | DIASTOLIC BLOOD PRESSURE: 78 MMHG | OXYGEN SATURATION: 98 % | HEIGHT: 64 IN

## 2020-01-08 DIAGNOSIS — R80.9 MICROALBUMINURIA: Chronic | ICD-10-CM

## 2020-01-08 DIAGNOSIS — Z00.00 MEDICARE ANNUAL WELLNESS VISIT, SUBSEQUENT: Primary | ICD-10-CM

## 2020-01-08 DIAGNOSIS — E55.9 VITAMIN D DEFICIENCY: Chronic | ICD-10-CM

## 2020-01-08 DIAGNOSIS — Z80.3 FAMILY HISTORY OF BREAST CANCER: Chronic | ICD-10-CM

## 2020-01-08 DIAGNOSIS — G47.33 OBSTRUCTIVE SLEEP APNEA: Chronic | ICD-10-CM

## 2020-01-08 DIAGNOSIS — R80.9 TYPE 2 DIABETES MELLITUS WITH MICROALBUMINURIA, WITHOUT LONG-TERM CURRENT USE OF INSULIN (HCC): ICD-10-CM

## 2020-01-08 DIAGNOSIS — Z12.11 COLON CANCER SCREENING: ICD-10-CM

## 2020-01-08 DIAGNOSIS — Z51.81 THERAPEUTIC DRUG MONITORING: ICD-10-CM

## 2020-01-08 DIAGNOSIS — E78.2 MIXED HYPERLIPIDEMIA: ICD-10-CM

## 2020-01-08 DIAGNOSIS — K75.81 NASH (NONALCOHOLIC STEATOHEPATITIS): Chronic | ICD-10-CM

## 2020-01-08 DIAGNOSIS — E11.29 TYPE 2 DIABETES MELLITUS WITH MICROALBUMINURIA, WITHOUT LONG-TERM CURRENT USE OF INSULIN (HCC): ICD-10-CM

## 2020-01-08 DIAGNOSIS — F41.1 GENERALIZED ANXIETY DISORDER: Chronic | ICD-10-CM

## 2020-01-08 DIAGNOSIS — Z87.39 HISTORY OF GOUT: Chronic | ICD-10-CM

## 2020-01-08 DIAGNOSIS — F41.8 DEPRESSION WITH ANXIETY: Chronic | ICD-10-CM

## 2020-01-08 DIAGNOSIS — E11.9 ENCOUNTER FOR DIABETIC FOOT EXAM (HCC): Chronic | ICD-10-CM

## 2020-01-08 DIAGNOSIS — R60.0 PEDAL EDEMA: Chronic | ICD-10-CM

## 2020-01-08 DIAGNOSIS — I10 BENIGN ESSENTIAL HYPERTENSION: Chronic | ICD-10-CM

## 2020-01-08 DIAGNOSIS — E66.9 NON MORBID OBESITY: Chronic | ICD-10-CM

## 2020-01-08 PROBLEM — E83.52 HYPERCALCEMIA: Status: RESOLVED | Noted: 2018-07-05 | Resolved: 2020-01-08

## 2020-01-08 PROCEDURE — 99214 OFFICE O/P EST MOD 30 MIN: CPT | Performed by: INTERNAL MEDICINE

## 2020-01-08 PROCEDURE — G0439 PPPS, SUBSEQ VISIT: HCPCS | Performed by: INTERNAL MEDICINE

## 2020-01-08 PROCEDURE — 96160 PT-FOCUSED HLTH RISK ASSMT: CPT | Performed by: INTERNAL MEDICINE
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
## 2020-01-08 NOTE — PROGRESS NOTES
The ABCs of the Annual Wellness Visit  Subsequent Medicare Wellness Visit    No chief complaint on file.      Subjective   History of Present Illness:  Reema Easley is a 69 y.o. female who presents for a Subsequent Medicare Wellness Visit.    HEALTH RISK ASSESSMENT    Recent Hospitalizations:  No hospitalization(s) within the last year.    Current Medical Providers:  Patient Care Team:  Marc Rasheed MD as PCP - General (Internal Medicine)  Valarie Limon MD as Consulting Physician (Obstetrics and Gynecology)    Smoking Status:  Social History     Tobacco Use   Smoking Status Never Smoker   Smokeless Tobacco Never Used       Alcohol Consumption:  Social History     Substance and Sexual Activity   Alcohol Use No   • Frequency: Never   • Binge frequency: Never       Depression Screen:   PHQ-2/PHQ-9 Depression Screening 1/8/2020   Little interest or pleasure in doing things 0   Feeling down, depressed, or hopeless 0   Total Score 0       Fall Risk Screen:  CITLALI Fall Risk Assessment was completed, and patient is at LOW risk for falls.Assessment completed on:1/8/2020    Health Habits and Functional and Cognitive Screening:  Functional & Cognitive Status 1/8/2020   Do you have difficulty preparing food and eating? No   Do you have difficulty bathing yourself, getting dressed or grooming yourself? No   Do you have difficulty using the toilet? No   Do you have difficulty moving around from place to place? No   Do you have trouble with steps or getting out of a bed or a chair? No   Current Diet Well Balanced Diet   Dental Exam Up to date   Eye Exam Up to date   Exercise (times per week) 0 times per week   Current Exercise Activities Include None   Do you need help using the phone?  No   Are you deaf or do you have serious difficulty hearing?  No   Do you need help with transportation? No   Do you need help shopping? No   Do you need help preparing meals?  No   Do you need help with housework?  No   Do you need  help with laundry? No   Do you need help taking your medications? No   Do you need help managing money? No   Do you ever drive or ride in a car without wearing a seat belt? No   Have you felt unusual stress, anger or loneliness in the last month? No   Who do you live with? -   If you need help, do you have trouble finding someone available to you? No   Have you been bothered in the last four weeks by sexual problems? No   Do you have difficulty concentrating, remembering or making decisions? No         Does the patient have evidence of cognitive impairment? No    Asprin use counseling:Taking ASA appropriately as indicated    Age-appropriate Screening Schedule:  Refer to the list below for future screening recommendations based on patient's age, sex and/or medical conditions. Orders for these recommended tests are listed in the plan section. The patient has been provided with a written plan.    Health Maintenance   Topic Date Due   • DIABETIC FOOT EXAM  06/18/2020   • HEMOGLOBIN A1C  06/19/2020   • DIABETIC EYE EXAM  08/14/2020   • URINE MICROALBUMIN  09/11/2020   • LIPID PANEL  12/19/2020   • MAMMOGRAM  06/21/2021   • TDAP/TD VACCINES (2 - Td) 10/18/2025   • PNEUMOCOCCAL VACCINE (65+ HIGH RISK)  Completed   • INFLUENZA VACCINE  Completed   • ZOSTER VACCINE  Discontinued          The following portions of the patient's history were reviewed and updated as appropriate: allergies, current medications, past family history, past medical history, past social history, past surgical history and problem list.    Outpatient Medications Prior to Visit   Medication Sig Dispense Refill   • allopurinol (ZYLOPRIM) 300 MG tablet Take 1 tablet by mouth Daily. 90 tablet 3   • ALPRAZolam (XANAX) 0.5 MG tablet TAKE ONE TABLET BY MOUTH TWICE A DAY 60 tablet 0   • benazepril (LOTENSIN) 40 MG tablet TAKE ONE TABLET BY MOUTH DAILY 90 tablet 1   • Cholecalciferol (VITAMIN D3) 5000 UNITS capsule capsule Take 5,000 Units by mouth Daily.     •  furosemide (LASIX) 20 MG tablet TAKE ONE TABLET BY MOUTH DAILY 90 tablet 0   • simvastatin (ZOCOR) 40 MG tablet TAKE ONE TABLET BY MOUTH ONCE NIGHTLY 30 tablet 4   • STOOL SOFTENER 100 MG capsule TAKE ONE CAPSULE BY MOUTH TWICE A DAY 60 capsule 0   • metFORMIN (GLUCOPHAGE) 1000 MG tablet Take 1 p.o. every morning for diabetes.  Take with Januvia. 90 tablet 3   • aspirin 81 MG EC tablet Take 1 tablet by mouth Daily. PT TO HOLD MED PRIOR TO OR     • SITagliptin (JANUVIA) 100 MG tablet Take 1 p.o. daily for diabetes 30 tablet 6     No facility-administered medications prior to visit.        Patient Active Problem List   Diagnosis   • Benign essential hypertension   • Chronic insomnia   • Depression with anxiety   • Diverticulosis of colon   • Generalized anxiety disorder   • Gout   • Hyperlipidemia   • Microalbuminuria   • GEORGE (nonalcoholic steatohepatitis)   • Obstructive sleep apnea, 12/17/2015--AHI 14.6.  RDI 48.9 with REM sleep.  O2 sat 77%.  Cannot tolerate CPAP.   • Primary osteoarthritis of right knee   • Pedal edema   • Renal atrophy, left   • Type 2 diabetes mellitus with microalbuminuria, without long-term current use of insulin (CMS/[facility])   • Vitamin D deficiency   • Family history of ovarian cancer   • Family history of breast cancer   • Therapeutic drug monitoring   • Allergic rhinitis   • Non morbid obesity   • Diabetic foot exam   • Diabetic eye exam (CMS/[facility])       Advanced Care Planning:  Patient does not have an advance directive - information provided to the patient today    Review of Systems   Musculoskeletal: Positive for arthralgias.   All other systems reviewed and are negative.      Compared to one year ago, the patient feels her physical health is worse.  Compared to one year ago, the patient feels her mental health is the same.    Reviewed chart for potential of high risk medication in the elderly: yes  Reviewed chart for potential of harmful drug interactions in the elderly:yes    Objective   "       Vitals:    01/08/20 1238   BP: 122/78   BP Location: Left arm   Pulse: 97   SpO2: 98%   Weight: 92.5 kg (204 lb)   Height: 162.6 cm (64.02\")       Body mass index is 35 kg/m².  Discussed the patient's BMI with her. The BMI is above average; BMI management plan is completed.    Physical Exam    General: Alert and oriented x 3.  No acute distress.  Normal affect. Obese. HEENT: Pupils equal, round, reactive to light; extraocular movements intact; sclerae nonicteric; pharynx, ear canals and TMs normal.  Neck: Without JVD, thyromegaly, bruit, or adenopathy.  Lungs: Clear to auscultation in all fields.  Heart: Regular rate and rhythm without murmur, rub, gallop, or click.  Abdomen: Soft, nontender, without hepatosplenomegaly or hernia.  Bowel sounds normal.  : Deferred.  Rectal: Deferred.  Extremities: Without clubbing, cyanosis, edema, or pulse deficit.  Neurologic: Intact without focal deficit.  Normal station and gait observed during ingress and egress from the examination room.  Skin: Without significant lesion.  Musculoskeletal: Unremarkable.    January 8, 2020--routine diabetic foot exam reveals no evidence of diabetic foot ulcer or pre-ulcerative callus.  Distal pulses palpable and sensation intact.    Lab Results   Component Value Date     (H) 12/19/2019    CHLPL 178 12/19/2019    TRIG 190 (H) 12/19/2019    HGBA1C 9.20 (H) 12/19/2019        Assessment/Plan   Medicare Risks and Personalized Health Plan  CMS Preventative Services Quick Reference  Advance Directive Discussion  Cardiovascular risk  Colon Cancer Screening  Diabetic Lab Screening   Obesity/Overweight     The above risks/problems have been discussed with the patient.  Pertinent information has been shared with the patient in the After Visit Summary.  Follow up plans and orders are seen below in the Assessment/Plan Section.    Diagnoses and all orders for this visit:    1. Medicare annual wellness visit, subsequent (Primary)    2. Type 2 " diabetes mellitus with microalbuminuria, without long-term current use of insulin (CMS/[facility])  -     Semaglutide,0.25 or 0.5MG/DOS, (OZEMPIC) 2 MG/1.5ML solution pen-injector; Inject 0.25 mg subcutaneously once weekly for 4 weeks and then inject 0.5 mg subcutaneously once weekly thereafter.  Dispense: 2 pen; Refill: 6  -     Comprehensive Metabolic Panel; Future  -     Hemoglobin A1c; Future  -     Microalbumin / Creatinine Urine Ratio - Urine, Clean Catch; Future    3. Hyperlipidemia  -     CK; Future  -     NMR LipoProfile; Future    4. Benign essential hypertension    5. Depression with anxiety    6. Generalized anxiety disorder    7. Gout    8. Microalbuminuria  -     Microalbumin / Creatinine Urine Ratio - Urine, Clean Catch; Future    9. GEORGE (nonalcoholic steatohepatitis)    10. Obstructive sleep apnea, 12/17/2015--AHI 14.6.  RDI 48.9 with REM sleep.  O2 sat 77%.  Cannot tolerate CPAP.    11. Pedal edema    12. Vitamin D deficiency    13. Family history of breast cancer    14. Non morbid obesity    15. Diabetic foot exam    16. Therapeutic drug monitoring    17. Colon cancer screening  -     Cologuard - Stool, Per Rectum; Future      Follow Up:  No follow-ups on file.     An After Visit Summary and PPPS were given to the patient.

## 2020-01-20 RX ORDER — BENAZEPRIL HYDROCHLORIDE 40 MG/1
TABLET, FILM COATED ORAL
Qty: 90 TABLET | Refills: 0 | Status: SHIPPED | OUTPATIENT
Start: 2020-01-20 | End: 2020-04-20

## 2020-03-11 RX ORDER — SIMVASTATIN 40 MG
TABLET ORAL
Qty: 30 TABLET | Refills: 4 | Status: SHIPPED | OUTPATIENT
Start: 2020-03-11 | End: 2020-03-26 | Stop reason: SDUPTHER

## 2020-03-13 RX ORDER — FUROSEMIDE 20 MG/1
TABLET ORAL
Qty: 90 TABLET | Refills: 1 | Status: SHIPPED | OUTPATIENT
Start: 2020-03-13 | End: 2020-09-02

## 2020-03-26 RX ORDER — SIMVASTATIN 40 MG
40 TABLET ORAL NIGHTLY
Qty: 30 TABLET | Refills: 4 | Status: SHIPPED | OUTPATIENT
Start: 2020-03-26 | End: 2020-08-28 | Stop reason: SDUPTHER

## 2020-04-14 ENCOUNTER — TELEPHONE (OUTPATIENT)
Dept: INTERNAL MEDICINE | Facility: CLINIC | Age: 70
End: 2020-04-14

## 2020-04-14 NOTE — TELEPHONE ENCOUNTER
PT CALLED STATING SHE IS OUT OF THE OZEMPIC PIN FOR HER DIABETES AND WANTS TO COME TO THE OFFICE TOMORROW TO PICK SOME UP.     PLEASE ADVISE     PT CALL BACK   [phone]

## 2020-04-20 RX ORDER — BENAZEPRIL HYDROCHLORIDE 40 MG/1
TABLET, FILM COATED ORAL
Qty: 90 TABLET | Refills: 0 | Status: SHIPPED | OUTPATIENT
Start: 2020-04-20 | End: 2020-05-15 | Stop reason: SDUPTHER

## 2020-05-07 ENCOUNTER — TELEPHONE (OUTPATIENT)
Dept: INTERNAL MEDICINE | Facility: CLINIC | Age: 70
End: 2020-05-07

## 2020-05-07 DIAGNOSIS — R80.9 TYPE 2 DIABETES MELLITUS WITH MICROALBUMINURIA, WITHOUT LONG-TERM CURRENT USE OF INSULIN (HCC): ICD-10-CM

## 2020-05-07 DIAGNOSIS — E11.29 TYPE 2 DIABETES MELLITUS WITH MICROALBUMINURIA, WITHOUT LONG-TERM CURRENT USE OF INSULIN (HCC): ICD-10-CM

## 2020-05-07 NOTE — TELEPHONE ENCOUNTER
PATIENT CALLED IN AND AND STATED SHE NEEDS MORE ON THE ZYMPIC PIN AND WANTED TO KNOW IF  THE URGENT CARE NEAR OUR OFFICE IF SHE COULD GO INTO URGENT CARE AND GET TESTED FOR COVID -19 . PLEASE CALL AND ADVISE AT  [phone].

## 2020-05-07 NOTE — TELEPHONE ENCOUNTER
Left pt a voicemail letting her know we don't have any samples of ozempic in the office right now.  Also told her if she's not having any symptoms it would be best not to go to the urgent care, she could be putting herself at more risk.  Advised pt to return call if she would like a rx for ozempic sent to her pharmacy

## 2020-05-18 RX ORDER — BENAZEPRIL HYDROCHLORIDE 40 MG/1
40 TABLET, FILM COATED ORAL DAILY
Qty: 90 TABLET | Refills: 0 | Status: SHIPPED | OUTPATIENT
Start: 2020-05-18 | End: 2020-06-22

## 2020-05-19 ENCOUNTER — TELEPHONE (OUTPATIENT)
Dept: INTERNAL MEDICINE | Facility: CLINIC | Age: 70
End: 2020-05-19

## 2020-05-19 NOTE — TELEPHONE ENCOUNTER
PT CALLED IN AGAIN STATING THAT SHE NEEDS A PRESCRIPTION FOR THE ZYMPIC PIN SENT TO HER PHARMACY BUT SHE NEEDS A COUPON BECAUSE SHE IS UNABLE TO PAY FULL PRICE FOR THE MEDICATION.    PHARMACY CONFIRMED  PT CALL BACK

## 2020-05-20 ENCOUNTER — TELEPHONE (OUTPATIENT)
Dept: INTERNAL MEDICINE | Facility: CLINIC | Age: 70
End: 2020-05-20

## 2020-05-20 NOTE — TELEPHONE ENCOUNTER
PT CALLED TO SEE IF THERE IS A DIFFERENT RX OTHER THAN Semaglutide,0.25 or 0.5MG/DOS, (OZEMPIC) 2 MG/1.5ML solution pen-injector SHE CAN USE OR IF SHE CAN GET SAMPLES? THE RX IS TOO EXPENSIVE FOR HER TO GET. SHE SAID THAT SHE WAS TAKEN OFF THE METFORMIN AS SHE DID NOT LIKE IT AND DOES NOT WANT TO GO BACK ON IT.    [address] [place], [place]    CALL BACK # [phone]

## 2020-05-20 NOTE — TELEPHONE ENCOUNTER
Left pt a voicemail letting her know we don't have ozempic samples right now and the best thing would be to find out which medication her insurance does cover and let us know so she can get a rx.

## 2020-05-20 NOTE — TELEPHONE ENCOUNTER
Pt called the hub and said she has stopped taking her metformin, she says she doesn't like it and doesn't want to go back on it

## 2020-06-03 ENCOUNTER — TELEPHONE (OUTPATIENT)
Dept: INTERNAL MEDICINE | Facility: CLINIC | Age: 70
End: 2020-06-03

## 2020-06-03 NOTE — TELEPHONE ENCOUNTER
PATIENT CALLED IN AND STATED Semaglutide,0.25 or 0.5MG/DOS, (OZEMPIC) 2 MG/1.5ML solution pen-injector  PATIENT STATED SHE CAN'T AFFORD $60.00 FOR IT . PATIENT HAS BEEN TAKING TWO OF SITagliptin (JANUVIA) 100 MG tablet  EVERYDAY . PATIENT DOESN'T EVEN KNOW WHAT HER BLOOD SUGAR IS BECAUSE  SHE CANT AFFORD THE STRIPS AND WANTS TO KNOW A DIFFERENT AVENUE . PATIENT CALL BACK [phone].

## 2020-06-03 NOTE — TELEPHONE ENCOUNTER
Tell patient she cannot take 2 of the Januvia.  Just stay off the Ozempic and do the best you can with your diet.

## 2020-06-08 ENCOUNTER — LAB (OUTPATIENT)
Dept: LAB | Facility: HOSPITAL | Age: 70
End: 2020-06-08

## 2020-06-08 ENCOUNTER — TELEPHONE (OUTPATIENT)
Dept: INTERNAL MEDICINE | Facility: CLINIC | Age: 70
End: 2020-06-08

## 2020-06-08 ENCOUNTER — OFFICE VISIT (OUTPATIENT)
Dept: INTERNAL MEDICINE | Facility: CLINIC | Age: 70
End: 2020-06-08

## 2020-06-08 VITALS
BODY MASS INDEX: 34.15 KG/M2 | WEIGHT: 200 LBS | SYSTOLIC BLOOD PRESSURE: 100 MMHG | HEART RATE: 86 BPM | HEIGHT: 64 IN | OXYGEN SATURATION: 98 % | DIASTOLIC BLOOD PRESSURE: 70 MMHG

## 2020-06-08 DIAGNOSIS — K75.81 NASH (NONALCOHOLIC STEATOHEPATITIS): Chronic | ICD-10-CM

## 2020-06-08 DIAGNOSIS — I10 BENIGN ESSENTIAL HYPERTENSION: Chronic | ICD-10-CM

## 2020-06-08 DIAGNOSIS — E11.29 TYPE 2 DIABETES MELLITUS WITH MICROALBUMINURIA, WITHOUT LONG-TERM CURRENT USE OF INSULIN (HCC): Chronic | ICD-10-CM

## 2020-06-08 DIAGNOSIS — R80.9 TYPE 2 DIABETES MELLITUS WITH MICROALBUMINURIA, WITHOUT LONG-TERM CURRENT USE OF INSULIN (HCC): Chronic | ICD-10-CM

## 2020-06-08 DIAGNOSIS — R60.0 BILATERAL LOWER EXTREMITY EDEMA: Chronic | ICD-10-CM

## 2020-06-08 DIAGNOSIS — E11.65 POORLY CONTROLLED TYPE 2 DIABETES MELLITUS (HCC): ICD-10-CM

## 2020-06-08 DIAGNOSIS — E78.2 MIXED HYPERLIPIDEMIA: Chronic | ICD-10-CM

## 2020-06-08 DIAGNOSIS — R80.9 MICROALBUMINURIA: Chronic | ICD-10-CM

## 2020-06-08 DIAGNOSIS — Z51.81 THERAPEUTIC DRUG MONITORING: ICD-10-CM

## 2020-06-08 DIAGNOSIS — R55 SYNCOPE AND COLLAPSE: Primary | ICD-10-CM

## 2020-06-08 DIAGNOSIS — E66.9 NON MORBID OBESITY: Chronic | ICD-10-CM

## 2020-06-08 DIAGNOSIS — E55.9 VITAMIN D DEFICIENCY: Chronic | ICD-10-CM

## 2020-06-08 DIAGNOSIS — G47.33 OBSTRUCTIVE SLEEP APNEA: Chronic | ICD-10-CM

## 2020-06-08 DIAGNOSIS — Z87.39 HISTORY OF GOUT: Chronic | ICD-10-CM

## 2020-06-08 LAB
ALBUMIN SERPL-MCNC: 4.5 G/DL (ref 3.5–5.2)
ALBUMIN/GLOB SERPL: 1.7 G/DL
ALP SERPL-CCNC: 89 U/L (ref 39–117)
ALT SERPL W P-5'-P-CCNC: 23 U/L (ref 1–33)
ANION GAP SERPL CALCULATED.3IONS-SCNC: 9.5 MMOL/L (ref 5–15)
AST SERPL-CCNC: 22 U/L (ref 1–32)
BILIRUB SERPL-MCNC: 0.4 MG/DL (ref 0.2–1.2)
BUN BLD-MCNC: 22 MG/DL (ref 8–23)
BUN/CREAT SERPL: 23.2 (ref 7–25)
CALCIUM SPEC-SCNC: 10 MG/DL (ref 8.6–10.5)
CHLORIDE SERPL-SCNC: 102 MMOL/L (ref 98–107)
CO2 SERPL-SCNC: 29.5 MMOL/L (ref 22–29)
CREAT BLD-MCNC: 0.95 MG/DL (ref 0.57–1)
DEPRECATED RDW RBC AUTO: 45.9 FL (ref 37–54)
ERYTHROCYTE [DISTWIDTH] IN BLOOD BY AUTOMATED COUNT: 13.4 % (ref 12.3–15.4)
GFR SERPL CREATININE-BSD FRML MDRD: 58 ML/MIN/1.73
GLOBULIN UR ELPH-MCNC: 2.7 GM/DL
GLUCOSE BLD-MCNC: 281 MG/DL (ref 65–99)
HBA1C MFR BLD: 8.9 % (ref 4.8–5.6)
HCT VFR BLD AUTO: 42.3 % (ref 34–46.6)
HGB BLD-MCNC: 13.8 G/DL (ref 12–15.9)
MCH RBC QN AUTO: 30.2 PG (ref 26.6–33)
MCHC RBC AUTO-ENTMCNC: 32.6 G/DL (ref 31.5–35.7)
MCV RBC AUTO: 92.6 FL (ref 79–97)
PLATELET # BLD AUTO: 319 10*3/MM3 (ref 140–450)
PMV BLD AUTO: 11 FL (ref 6–12)
POTASSIUM BLD-SCNC: 4.8 MMOL/L (ref 3.5–5.2)
PROT SERPL-MCNC: 7.2 G/DL (ref 6–8.5)
RBC # BLD AUTO: 4.57 10*6/MM3 (ref 3.77–5.28)
SODIUM BLD-SCNC: 141 MMOL/L (ref 136–145)
WBC NRBC COR # BLD: 9.02 10*3/MM3 (ref 3.4–10.8)

## 2020-06-08 PROCEDURE — 99214 OFFICE O/P EST MOD 30 MIN: CPT | Performed by: INTERNAL MEDICINE

## 2020-06-08 PROCEDURE — 36415 COLL VENOUS BLD VENIPUNCTURE: CPT | Performed by: INTERNAL MEDICINE

## 2020-06-08 PROCEDURE — 83036 HEMOGLOBIN GLYCOSYLATED A1C: CPT | Performed by: INTERNAL MEDICINE

## 2020-06-08 PROCEDURE — 85027 COMPLETE CBC AUTOMATED: CPT | Performed by: INTERNAL MEDICINE

## 2020-06-08 PROCEDURE — 80053 COMPREHEN METABOLIC PANEL: CPT | Performed by: INTERNAL MEDICINE
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
## 2020-06-09 DIAGNOSIS — F41.1 GENERALIZED ANXIETY DISORDER: Chronic | ICD-10-CM

## 2020-06-09 RX ORDER — ALPRAZOLAM 0.5 MG/1
TABLET ORAL
Qty: 60 TABLET | Refills: 0 | Status: SHIPPED | OUTPATIENT
Start: 2020-06-09 | End: 2020-07-14

## 2020-06-22 RX ORDER — BENAZEPRIL HYDROCHLORIDE 40 MG/1
40 TABLET, FILM COATED ORAL DAILY
Qty: 65 TABLET | Refills: 0 | Status: SHIPPED | OUTPATIENT
Start: 2020-06-22 | End: 2020-06-23

## 2020-06-23 DIAGNOSIS — Z87.39 HISTORY OF GOUT: Chronic | ICD-10-CM

## 2020-06-23 RX ORDER — ALLOPURINOL 300 MG/1
TABLET ORAL
Qty: 90 TABLET | Refills: 2 | Status: SHIPPED | OUTPATIENT
Start: 2020-06-23 | End: 2020-06-23

## 2020-06-23 RX ORDER — ALLOPURINOL 300 MG/1
TABLET ORAL
Qty: 90 TABLET | Refills: 2 | Status: SHIPPED | OUTPATIENT
Start: 2020-06-23 | End: 2021-07-30

## 2020-06-23 RX ORDER — BENAZEPRIL HYDROCHLORIDE 40 MG/1
40 TABLET, FILM COATED ORAL DAILY
Qty: 65 TABLET | Refills: 0 | Status: SHIPPED | OUTPATIENT
Start: 2020-06-23 | End: 2020-07-07 | Stop reason: SDUPTHER

## 2020-06-30 ENCOUNTER — TELEPHONE (OUTPATIENT)
Dept: INTERNAL MEDICINE | Facility: CLINIC | Age: 70
End: 2020-06-30

## 2020-06-30 NOTE — TELEPHONE ENCOUNTER
DELETE AFTER REVIEWING: Telephone encounter to be sent to the clinical pool     Caller: Reema Easley    Relationship: Self    Best call back number: [phone]    What medication are you requesting: Needles for Rx, Liraglutide (Victoza) 18 MG/3ML solution pen-injector injection    If a prescription is needed, what is your preferred pharmacy and phone number: [facility] [address] [place] [address] AT [facility] [phone] [facility] [phone] FX     Additional notes: She ran out today, 06/30/2020

## 2020-07-01 ENCOUNTER — TELEPHONE (OUTPATIENT)
Dept: INTERNAL MEDICINE | Facility: CLINIC | Age: 70
End: 2020-07-01

## 2020-07-01 DIAGNOSIS — R80.9 TYPE 2 DIABETES MELLITUS WITH MICROALBUMINURIA, WITHOUT LONG-TERM CURRENT USE OF INSULIN (HCC): Chronic | ICD-10-CM

## 2020-07-01 DIAGNOSIS — E11.29 TYPE 2 DIABETES MELLITUS WITH MICROALBUMINURIA, WITHOUT LONG-TERM CURRENT USE OF INSULIN (HCC): Chronic | ICD-10-CM

## 2020-07-01 NOTE — TELEPHONE ENCOUNTER
PATIENT IS REQUESTING A REFILL OF THE VICTOZA 18MG 3ML SOLUTION PEN INJECTOR NEEDLES     SHE STATES SHE IS COMPLETELY OUT         GOOD CONTACT NUMBER   [phone] (T)          [facility]IED PHARMACY   [address] [place] [address] AT [facility] [phone] [facility] [phone] FX

## 2020-07-02 NOTE — TELEPHONE ENCOUNTER
Patient called and stated that she is going out of town soon and is out of her Insulin Pen Needle (B-D UF III MINI PEN NEEDLES) 31G X 5 MM misc. She states she is out of her needles and has been using the same one just wiping it down with alcohol after each use. The patient would like to know when these can be called in. She states this is the third time she has called regarding these. Please advise.     Pharmacy is [facility] [address]     Patient call back [phone]

## 2020-07-06 ENCOUNTER — TELEPHONE (OUTPATIENT)
Dept: INTERNAL MEDICINE | Facility: CLINIC | Age: 70
End: 2020-07-06

## 2020-07-06 NOTE — TELEPHONE ENCOUNTER
Called and spoke with [facility] to get pen needles. They just needed sig. Gave directions. Called pt and informed her it was taken care of. She said she would not need the samples from [place] if everything went well.

## 2020-07-06 NOTE — TELEPHONE ENCOUNTER
PATIENT CALLED AND WHEN SHE WENT TO  HER NEEDLES FOR HER INSULIN PEN THE PHARMACY TOLD HER THE INSURANCE COMPANY NEEDED MORE INFORMATION ON WHY SHE NEEDED THE NEEDLES.     [employer] IS HER INSURANCE.     PATIENT HAS BEEN USING THE SAME NEEDLE FOR HER INSULIN FOR TWO WEEKS NOW.     [facility] [address] [place] [address] AT [facility] [phone] [facility] [phone] FX     PATIENT CALLBACK [phone]    PLEASE CALL PATIENT WHEN THIS HAS BEEN CHECKED ON AND/OR TAKEN CARE OF.

## 2020-07-06 NOTE — TELEPHONE ENCOUNTER
Spoke with pt and she is stopping by to  samples and checking to see what needles they will cover.

## 2020-07-07 RX ORDER — BENAZEPRIL HYDROCHLORIDE 40 MG/1
40 TABLET, FILM COATED ORAL DAILY
Qty: 90 TABLET | Refills: 1 | Status: SHIPPED | OUTPATIENT
Start: 2020-07-07 | End: 2020-12-28

## 2020-07-13 DIAGNOSIS — F41.1 GENERALIZED ANXIETY DISORDER: Chronic | ICD-10-CM

## 2020-07-14 RX ORDER — ALPRAZOLAM 0.5 MG/1
TABLET ORAL
Qty: 60 TABLET | Refills: 5 | Status: SHIPPED | OUTPATIENT
Start: 2020-07-14 | End: 2020-09-03 | Stop reason: SDUPTHER

## 2020-07-20 ENCOUNTER — LAB (OUTPATIENT)
Dept: LAB | Facility: HOSPITAL | Age: 70
End: 2020-07-20

## 2020-07-20 DIAGNOSIS — E11.29 TYPE 2 DIABETES MELLITUS WITH MICROALBUMINURIA, WITHOUT LONG-TERM CURRENT USE OF INSULIN (HCC): Chronic | ICD-10-CM

## 2020-07-20 DIAGNOSIS — R80.9 TYPE 2 DIABETES MELLITUS WITH MICROALBUMINURIA, WITHOUT LONG-TERM CURRENT USE OF INSULIN (HCC): Chronic | ICD-10-CM

## 2020-07-20 DIAGNOSIS — E55.9 VITAMIN D DEFICIENCY: Chronic | ICD-10-CM

## 2020-07-20 DIAGNOSIS — Z87.39 HISTORY OF GOUT: Chronic | ICD-10-CM

## 2020-07-20 DIAGNOSIS — E78.2 MIXED HYPERLIPIDEMIA: Chronic | ICD-10-CM

## 2020-07-20 LAB
25(OH)D3 SERPL-MCNC: 37.7 NG/ML (ref 30–100)
ALBUMIN UR-MCNC: <1.2 MG/DL
CK SERPL-CCNC: 110 U/L (ref 20–180)
CREAT UR-MCNC: 35.4 MG/DL
HBA1C MFR BLD: 8.7 % (ref 4.8–5.6)
MICROALBUMIN/CREAT UR: NORMAL MG/G{CREAT}
T3FREE SERPL-MCNC: 2.74 PG/ML (ref 2–4.4)
T4 FREE SERPL-MCNC: 1.15 NG/DL (ref 0.93–1.7)
TSH SERPL DL<=0.05 MIU/L-ACNC: 1.62 UIU/ML (ref 0.27–4.2)
URATE SERPL-MCNC: 5.3 MG/DL (ref 2.4–5.7)

## 2020-07-20 PROCEDURE — 36415 COLL VENOUS BLD VENIPUNCTURE: CPT

## 2020-07-20 PROCEDURE — 84481 FREE ASSAY (FT-3): CPT | Performed by: INTERNAL MEDICINE

## 2020-07-20 PROCEDURE — 83036 HEMOGLOBIN GLYCOSYLATED A1C: CPT | Performed by: INTERNAL MEDICINE

## 2020-07-20 PROCEDURE — 84550 ASSAY OF BLOOD/URIC ACID: CPT | Performed by: INTERNAL MEDICINE

## 2020-07-20 PROCEDURE — 84439 ASSAY OF FREE THYROXINE: CPT | Performed by: INTERNAL MEDICINE

## 2020-07-20 PROCEDURE — 83704 LIPOPROTEIN BLD QUAN PART: CPT | Performed by: INTERNAL MEDICINE

## 2020-07-20 PROCEDURE — 82043 UR ALBUMIN QUANTITATIVE: CPT | Performed by: INTERNAL MEDICINE

## 2020-07-20 PROCEDURE — 84443 ASSAY THYROID STIM HORMONE: CPT | Performed by: INTERNAL MEDICINE

## 2020-07-20 PROCEDURE — 80061 LIPID PANEL: CPT | Performed by: INTERNAL MEDICINE

## 2020-07-20 PROCEDURE — 82570 ASSAY OF URINE CREATININE: CPT | Performed by: INTERNAL MEDICINE

## 2020-07-20 PROCEDURE — 82306 VITAMIN D 25 HYDROXY: CPT | Performed by: INTERNAL MEDICINE

## 2020-07-20 PROCEDURE — 82550 ASSAY OF CK (CPK): CPT | Performed by: INTERNAL MEDICINE

## 2020-07-22 ENCOUNTER — TELEPHONE (OUTPATIENT)
Dept: INTERNAL MEDICINE | Facility: CLINIC | Age: 70
End: 2020-07-22

## 2020-07-22 LAB
CHOLEST SERPL-MCNC: 168 MG/DL (ref 100–199)
HDL SERPL-SCNC: 37.2 UMOL/L
HDLC SERPL-MCNC: 55 MG/DL
LDL-P: 1243 NMOL/L
LDLC REAL SIZE PAT SERPL: 20.7 NM
LDLC SERPL CALC-MCNC: 82 MG/DL (ref 0–99)
SMALL LDL-P: 811 NMOL/L
TRIGL SERPL-MCNC: 154 MG/DL (ref 0–149)

## 2020-08-12 ENCOUNTER — TELEPHONE (OUTPATIENT)
Dept: INTERNAL MEDICINE | Facility: CLINIC | Age: 70
End: 2020-08-12

## 2020-08-12 NOTE — TELEPHONE ENCOUNTER
Patient called and wanted to let you know that she has COVID-19. She received her results last night.

## 2020-08-17 NOTE — TELEPHONE ENCOUNTER
PATIENT IS CALLING IN SHE HAS BEEN QUARANTINING SINCE 08/11/2020 AND SHE IS WANTING TO KNOW WHEN SHE CAN GO BACK AND BE RETESTED TO SEE IF SHE COMES UP NEGATIVE NOW.      SILVERIO ADVISED HER TO GET IN CONTACT WITH HER PRIMARY TO GET ADVISED WHEN AND WHERE TO GO TO GET RETESTED.    PLEASE ADVISED    CALLBACK NUMBER IS:  [phone]

## 2020-08-20 NOTE — TELEPHONE ENCOUNTER
Patient can be re-tested at any of the current facilities that offer testing. Likely the [facility] will not retest patient without symptoms.

## 2020-08-28 RX ORDER — SIMVASTATIN 40 MG
40 TABLET ORAL NIGHTLY
Qty: 30 TABLET | Refills: 4 | Status: SHIPPED | OUTPATIENT
Start: 2020-08-28 | End: 2020-10-27 | Stop reason: SDUPTHER

## 2020-09-02 RX ORDER — FUROSEMIDE 20 MG/1
TABLET ORAL
Qty: 90 TABLET | Refills: 2 | Status: SHIPPED | OUTPATIENT
Start: 2020-09-02 | End: 2020-09-03 | Stop reason: SDUPTHER

## 2020-09-03 DIAGNOSIS — F41.1 GENERALIZED ANXIETY DISORDER: Chronic | ICD-10-CM

## 2020-09-03 RX ORDER — ALPRAZOLAM 0.5 MG/1
0.5 TABLET ORAL 2 TIMES DAILY
Qty: 60 TABLET | Refills: 3 | Status: SHIPPED | OUTPATIENT
Start: 2020-09-03 | End: 2021-03-03

## 2020-09-03 RX ORDER — FUROSEMIDE 20 MG/1
20 TABLET ORAL DAILY
Qty: 90 TABLET | Refills: 0 | Status: SHIPPED | OUTPATIENT
Start: 2020-09-03 | End: 2021-02-03

## 2020-09-04 ENCOUNTER — TELEPHONE (OUTPATIENT)
Dept: INTERNAL MEDICINE | Facility: CLINIC | Age: 70
End: 2020-09-04

## 2020-09-04 NOTE — TELEPHONE ENCOUNTER
PATIENT A POSITIVE COVID RESULT FROM TWO TESTS.FIRST TEST 8/4, RECEIVED TEST RESULT 8/10  PATIENT WENT 8/25 AND RESULTS BACK ON THE 27TH. BOTH POSITIVE.    PATIENT WAS RECENTLY TESTED AGAIN, AND IS AWAITING HER TEST RESULTS.      PATIENT IS HAVING SHORTNESS OF BREATH, LACK OF APPETITE, NO TASTE, NO FEVER, NAUSEA, SOME CHILLS, AND FATIGUE AND ACHES    PATIENT IS BEING TOLD DIFFERENT THINGS REGARDING HOW LONG SHE IS CONTAGIOUS. HEALTH DEPARTMENT STATED SHE WAS NOT CONTAGIOUS, AND SAYWER'S STATES SHE IS STILL CONSIDERED CONTAGIOUS. PATIENT WAS WANTING TO KNOW IF THIS EFFECTS HER DIABETES, AS HER SUGAR HAS BEEN FLUCTUATING.    PATIENT IS WANTING TO KNOW WHICH IS CORRECT, IS SHE CONTAGIOUS OR IS SHE ABLE TO BE AROUND OTHERS. PATIENT IS WANTING TO KNOW HOW LONG COVID CAN TYPICALLY LAST OR IF THAT IS KNOW. PLEASE CALL [facility]  [phone]

## 2020-09-04 NOTE — TELEPHONE ENCOUNTER
I spoke to pt and her question was more related to her SOB. She states that her symptoms feel manageable but she is SOB and asked how would she know that she should go to ER, I let her know only she knows her body and how the SOB makes her feel, but that she should be checked at the ER sooner rather than later especially being COVID+.  She is going to go get a CXR today and get evaluated because she said the SOB is bothering her.

## 2020-09-15 ENCOUNTER — OFFICE VISIT (OUTPATIENT)
Dept: INTERNAL MEDICINE | Facility: CLINIC | Age: 70
End: 2020-09-15

## 2020-09-15 VITALS
SYSTOLIC BLOOD PRESSURE: 136 MMHG | DIASTOLIC BLOOD PRESSURE: 78 MMHG | HEIGHT: 64 IN | WEIGHT: 204 LBS | HEART RATE: 77 BPM | OXYGEN SATURATION: 98 % | BODY MASS INDEX: 34.83 KG/M2

## 2020-09-15 DIAGNOSIS — R55 SYNCOPE AND COLLAPSE: ICD-10-CM

## 2020-09-15 DIAGNOSIS — E78.2 MIXED HYPERLIPIDEMIA: Chronic | ICD-10-CM

## 2020-09-15 DIAGNOSIS — H57.11 OCULAR PAIN, RIGHT EYE: ICD-10-CM

## 2020-09-15 DIAGNOSIS — E11.29 TYPE 2 DIABETES MELLITUS WITH MICROALBUMINURIA, WITHOUT LONG-TERM CURRENT USE OF INSULIN (HCC): Chronic | ICD-10-CM

## 2020-09-15 DIAGNOSIS — E11.65 POORLY CONTROLLED TYPE 2 DIABETES MELLITUS (HCC): ICD-10-CM

## 2020-09-15 DIAGNOSIS — R46.89 NON-COMPLIANT BEHAVIOR: ICD-10-CM

## 2020-09-15 DIAGNOSIS — Z23 NEED FOR INFLUENZA VACCINATION: ICD-10-CM

## 2020-09-15 DIAGNOSIS — I10 BENIGN ESSENTIAL HYPERTENSION: Chronic | ICD-10-CM

## 2020-09-15 DIAGNOSIS — G44.329 CHRONIC POST-TRAUMATIC HEADACHE, NOT INTRACTABLE: Primary | ICD-10-CM

## 2020-09-15 DIAGNOSIS — R80.9 TYPE 2 DIABETES MELLITUS WITH MICROALBUMINURIA, WITHOUT LONG-TERM CURRENT USE OF INSULIN (HCC): Chronic | ICD-10-CM

## 2020-09-15 PROCEDURE — 90694 VACC AIIV4 NO PRSRV 0.5ML IM: CPT | Performed by: INTERNAL MEDICINE

## 2020-09-15 PROCEDURE — G0008 ADMIN INFLUENZA VIRUS VAC: HCPCS | Performed by: INTERNAL MEDICINE

## 2020-09-15 PROCEDURE — 99214 OFFICE O/P EST MOD 30 MIN: CPT | Performed by: INTERNAL MEDICINE
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
## 2020-09-29 ENCOUNTER — HOSPITAL ENCOUNTER (OUTPATIENT)
Dept: GENERAL RADIOLOGY | Facility: HOSPITAL | Age: 70
Discharge: HOME | End: 2020-09-29
Admitting: INTERNAL MEDICINE

## 2020-09-29 DIAGNOSIS — S10.95XA FOREIGN BODY OF NECK: Primary | ICD-10-CM

## 2020-09-29 DIAGNOSIS — G44.329 CHRONIC POST-TRAUMATIC HEADACHE, NOT INTRACTABLE: Primary | ICD-10-CM

## 2020-09-29 DIAGNOSIS — S10.95XA FOREIGN BODY OF NECK: ICD-10-CM

## 2020-09-29 PROCEDURE — 70360 X-RAY EXAM OF NECK: CPT

## 2020-09-30 ENCOUNTER — APPOINTMENT (OUTPATIENT)
Dept: MRI IMAGING | Facility: HOSPITAL | Age: 70
End: 2020-09-30

## 2020-10-05 ENCOUNTER — HOSPITAL ENCOUNTER (OUTPATIENT)
Dept: CT IMAGING | Facility: HOSPITAL | Age: 70
Discharge: HOME | End: 2020-10-05
Admitting: INTERNAL MEDICINE

## 2020-10-05 DIAGNOSIS — G44.329 CHRONIC POST-TRAUMATIC HEADACHE, NOT INTRACTABLE: ICD-10-CM

## 2020-10-05 PROCEDURE — 70450 CT HEAD/BRAIN W/O DYE: CPT

## 2020-10-09 ENCOUNTER — OFFICE VISIT (OUTPATIENT)
Dept: INTERNAL MEDICINE | Facility: CLINIC | Age: 70
End: 2020-10-09

## 2020-10-09 VITALS
WEIGHT: 203.4 LBS | HEART RATE: 86 BPM | DIASTOLIC BLOOD PRESSURE: 80 MMHG | BODY MASS INDEX: 34.72 KG/M2 | HEIGHT: 64 IN | SYSTOLIC BLOOD PRESSURE: 134 MMHG | OXYGEN SATURATION: 98 %

## 2020-10-09 DIAGNOSIS — K75.81 NASH (NONALCOHOLIC STEATOHEPATITIS): Chronic | ICD-10-CM

## 2020-10-09 DIAGNOSIS — Z51.81 THERAPEUTIC DRUG MONITORING: ICD-10-CM

## 2020-10-09 DIAGNOSIS — G44.329 CHRONIC POST-TRAUMATIC HEADACHE, NOT INTRACTABLE: Primary | ICD-10-CM

## 2020-10-09 DIAGNOSIS — J32.3 CHRONIC SPHENOIDAL SINUSITIS: ICD-10-CM

## 2020-10-09 DIAGNOSIS — I10 BENIGN ESSENTIAL HYPERTENSION: Chronic | ICD-10-CM

## 2020-10-09 DIAGNOSIS — R80.9 TYPE 2 DIABETES MELLITUS WITH MICROALBUMINURIA, WITHOUT LONG-TERM CURRENT USE OF INSULIN (HCC): Chronic | ICD-10-CM

## 2020-10-09 DIAGNOSIS — Z87.39 HISTORY OF GOUT: Chronic | ICD-10-CM

## 2020-10-09 DIAGNOSIS — R80.9 MICROALBUMINURIA: Chronic | ICD-10-CM

## 2020-10-09 DIAGNOSIS — E11.65 POORLY CONTROLLED TYPE 2 DIABETES MELLITUS (HCC): ICD-10-CM

## 2020-10-09 DIAGNOSIS — R55 SYNCOPE AND COLLAPSE: ICD-10-CM

## 2020-10-09 DIAGNOSIS — E11.29 TYPE 2 DIABETES MELLITUS WITH MICROALBUMINURIA, WITHOUT LONG-TERM CURRENT USE OF INSULIN (HCC): Chronic | ICD-10-CM

## 2020-10-09 DIAGNOSIS — R46.89 NON-COMPLIANT BEHAVIOR: ICD-10-CM

## 2020-10-09 DIAGNOSIS — E55.9 VITAMIN D DEFICIENCY: Chronic | ICD-10-CM

## 2020-10-09 DIAGNOSIS — E78.2 MIXED HYPERLIPIDEMIA: Chronic | ICD-10-CM

## 2020-10-09 PROCEDURE — 99214 OFFICE O/P EST MOD 30 MIN: CPT | Performed by: INTERNAL MEDICINE

## 2020-10-09 RX ORDER — AMOXICILLIN AND CLAVULANATE POTASSIUM 875; 125 MG/1; MG/1
TABLET, FILM COATED ORAL
Qty: 24 TABLET | Refills: 0 | Status: SHIPPED | OUTPATIENT
Start: 2020-10-09 | End: 2021-02-03
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
## 2020-10-27 DIAGNOSIS — E78.2 MIXED HYPERLIPIDEMIA: Primary | Chronic | ICD-10-CM

## 2020-10-27 RX ORDER — SIMVASTATIN 40 MG
40 TABLET ORAL NIGHTLY
Qty: 30 TABLET | Refills: 4 | Status: CANCELLED | OUTPATIENT
Start: 2020-10-27

## 2020-10-27 RX ORDER — SIMVASTATIN 40 MG
40 TABLET ORAL NIGHTLY
Qty: 30 TABLET | Refills: 4 | Status: SHIPPED | OUTPATIENT
Start: 2020-10-27 | End: 2021-09-23

## 2020-10-30 DIAGNOSIS — E11.29 TYPE 2 DIABETES MELLITUS WITH MICROALBUMINURIA, WITHOUT LONG-TERM CURRENT USE OF INSULIN (HCC): Chronic | ICD-10-CM

## 2020-10-30 DIAGNOSIS — R80.9 TYPE 2 DIABETES MELLITUS WITH MICROALBUMINURIA, WITHOUT LONG-TERM CURRENT USE OF INSULIN (HCC): Chronic | ICD-10-CM

## 2020-10-30 RX ORDER — LIRAGLUTIDE 6 MG/ML
INJECTION SUBCUTANEOUS
Qty: 3 PEN | Refills: 6 | Status: SHIPPED | OUTPATIENT
Start: 2020-10-30 | End: 2021-01-25

## 2020-11-18 ENCOUNTER — APPOINTMENT (OUTPATIENT)
Dept: WOMENS IMAGING | Facility: HOSPITAL | Age: 70
End: 2020-11-18

## 2020-11-18 PROCEDURE — 77063 BREAST TOMOSYNTHESIS BI: CPT | Performed by: RADIOLOGY

## 2020-11-18 PROCEDURE — 77067 SCR MAMMO BI INCL CAD: CPT | Performed by: RADIOLOGY

## 2020-12-18 ENCOUNTER — TELEPHONE (OUTPATIENT)
Dept: INTERNAL MEDICINE | Facility: CLINIC | Age: 70
End: 2020-12-18

## 2020-12-18 DIAGNOSIS — E11.9 TYPE 2 DIABETES MELLITUS WITHOUT COMPLICATION, WITHOUT LONG-TERM CURRENT USE OF INSULIN: Chronic | ICD-10-CM

## 2020-12-18 NOTE — TELEPHONE ENCOUNTER
Caller: Reema Easley    Relationship to patient: Self    Best call back number:[phone]     Concerns or Questions if Applicable: Patient is calling to request samples Januvia 100 MG.  If not , patient will need an RX.    Please advise.    SADE [address] [place] [address] AT [facility] - [phone] [facility] [phone]   [phone]

## 2020-12-28 DIAGNOSIS — I10 BENIGN ESSENTIAL HYPERTENSION: Primary | Chronic | ICD-10-CM

## 2020-12-29 RX ORDER — BENAZEPRIL HYDROCHLORIDE 40 MG/1
TABLET, FILM COATED ORAL
Qty: 12 TABLET | Refills: 2 | Status: SHIPPED | OUTPATIENT
Start: 2020-12-29 | End: 2021-03-29

## 2021-01-23 DIAGNOSIS — E11.29 TYPE 2 DIABETES MELLITUS WITH MICROALBUMINURIA, WITHOUT LONG-TERM CURRENT USE OF INSULIN (HCC): Chronic | ICD-10-CM

## 2021-01-23 DIAGNOSIS — R80.9 TYPE 2 DIABETES MELLITUS WITH MICROALBUMINURIA, WITHOUT LONG-TERM CURRENT USE OF INSULIN (HCC): Chronic | ICD-10-CM

## 2021-01-25 RX ORDER — LIRAGLUTIDE 6 MG/ML
INJECTION SUBCUTANEOUS
Qty: 3 PEN | Refills: 5 | Status: SHIPPED | OUTPATIENT
Start: 2021-01-25 | End: 2021-02-25

## 2021-01-27 ENCOUNTER — LAB (OUTPATIENT)
Dept: LAB | Facility: HOSPITAL | Age: 71
End: 2021-01-27

## 2021-01-27 DIAGNOSIS — E78.2 MIXED HYPERLIPIDEMIA: Chronic | ICD-10-CM

## 2021-01-27 DIAGNOSIS — E55.9 VITAMIN D DEFICIENCY: Chronic | ICD-10-CM

## 2021-01-27 DIAGNOSIS — R80.9 TYPE 2 DIABETES MELLITUS WITH MICROALBUMINURIA, WITHOUT LONG-TERM CURRENT USE OF INSULIN (HCC): Chronic | ICD-10-CM

## 2021-01-27 DIAGNOSIS — R80.9 MICROALBUMINURIA: Chronic | ICD-10-CM

## 2021-01-27 DIAGNOSIS — Z87.39 HISTORY OF GOUT: Chronic | ICD-10-CM

## 2021-01-27 DIAGNOSIS — I10 BENIGN ESSENTIAL HYPERTENSION: Chronic | ICD-10-CM

## 2021-01-27 DIAGNOSIS — E11.29 TYPE 2 DIABETES MELLITUS WITH MICROALBUMINURIA, WITHOUT LONG-TERM CURRENT USE OF INSULIN (HCC): Chronic | ICD-10-CM

## 2021-01-27 DIAGNOSIS — Z51.81 THERAPEUTIC DRUG MONITORING: ICD-10-CM

## 2021-01-27 LAB
25(OH)D3 SERPL-MCNC: 46.9 NG/ML (ref 30–100)
ALBUMIN SERPL-MCNC: 4.4 G/DL (ref 3.5–5.2)
ALBUMIN UR-MCNC: 1.9 MG/DL
ALBUMIN/GLOB SERPL: 1.8 G/DL
ALP SERPL-CCNC: 94 U/L (ref 39–117)
ALT SERPL W P-5'-P-CCNC: 16 U/L (ref 1–33)
ANION GAP SERPL CALCULATED.3IONS-SCNC: 12.3 MMOL/L (ref 5–15)
AST SERPL-CCNC: 20 U/L (ref 1–32)
BILIRUB SERPL-MCNC: 0.4 MG/DL (ref 0–1.2)
BUN SERPL-MCNC: 18 MG/DL (ref 8–23)
BUN/CREAT SERPL: 24.3 (ref 7–25)
CALCIUM SPEC-SCNC: 9.7 MG/DL (ref 8.6–10.5)
CHLORIDE SERPL-SCNC: 99 MMOL/L (ref 98–107)
CK SERPL-CCNC: 89 U/L (ref 20–180)
CO2 SERPL-SCNC: 26.7 MMOL/L (ref 22–29)
CREAT SERPL-MCNC: 0.74 MG/DL (ref 0.57–1)
CREAT UR-MCNC: 98.8 MG/DL
DEPRECATED RDW RBC AUTO: 42.7 FL (ref 37–54)
ERYTHROCYTE [DISTWIDTH] IN BLOOD BY AUTOMATED COUNT: 13.1 % (ref 12.3–15.4)
GFR SERPL CREATININE-BSD FRML MDRD: 78 ML/MIN/1.73
GLOBULIN UR ELPH-MCNC: 2.5 GM/DL
GLUCOSE SERPL-MCNC: 218 MG/DL (ref 65–99)
HBA1C MFR BLD: 8.91 % (ref 4.8–5.6)
HCT VFR BLD AUTO: 44.9 % (ref 34–46.6)
HGB BLD-MCNC: 14.4 G/DL (ref 12–15.9)
MCH RBC QN AUTO: 28.9 PG (ref 26.6–33)
MCHC RBC AUTO-ENTMCNC: 32.1 G/DL (ref 31.5–35.7)
MCV RBC AUTO: 90.2 FL (ref 79–97)
MICROALBUMIN/CREAT UR: 19.2 MG/G
PLATELET # BLD AUTO: 380 10*3/MM3 (ref 140–450)
PMV BLD AUTO: 10.1 FL (ref 6–12)
POTASSIUM SERPL-SCNC: 3.8 MMOL/L (ref 3.5–5.2)
PROT SERPL-MCNC: 6.9 G/DL (ref 6–8.5)
RBC # BLD AUTO: 4.98 10*6/MM3 (ref 3.77–5.28)
SODIUM SERPL-SCNC: 138 MMOL/L (ref 136–145)
T3FREE SERPL-MCNC: 2.92 PG/ML (ref 2–4.4)
T4 FREE SERPL-MCNC: 1.24 NG/DL (ref 0.93–1.7)
TSH SERPL DL<=0.05 MIU/L-ACNC: 2.61 UIU/ML (ref 0.27–4.2)
URATE SERPL-MCNC: 4.4 MG/DL (ref 2.4–5.7)
WBC # BLD AUTO: 9.95 10*3/MM3 (ref 3.4–10.8)

## 2021-01-27 PROCEDURE — 84550 ASSAY OF BLOOD/URIC ACID: CPT

## 2021-01-27 PROCEDURE — 85027 COMPLETE CBC AUTOMATED: CPT

## 2021-01-27 PROCEDURE — 82043 UR ALBUMIN QUANTITATIVE: CPT

## 2021-01-27 PROCEDURE — 82306 VITAMIN D 25 HYDROXY: CPT

## 2021-01-27 PROCEDURE — 36415 COLL VENOUS BLD VENIPUNCTURE: CPT

## 2021-01-27 PROCEDURE — 82570 ASSAY OF URINE CREATININE: CPT

## 2021-01-27 PROCEDURE — 83704 LIPOPROTEIN BLD QUAN PART: CPT

## 2021-01-27 PROCEDURE — 84439 ASSAY OF FREE THYROXINE: CPT

## 2021-01-27 PROCEDURE — 84443 ASSAY THYROID STIM HORMONE: CPT

## 2021-01-27 PROCEDURE — 80053 COMPREHEN METABOLIC PANEL: CPT

## 2021-01-27 PROCEDURE — 84481 FREE ASSAY (FT-3): CPT

## 2021-01-27 PROCEDURE — 83036 HEMOGLOBIN GLYCOSYLATED A1C: CPT

## 2021-01-27 PROCEDURE — 82550 ASSAY OF CK (CPK): CPT

## 2021-01-27 PROCEDURE — 80061 LIPID PANEL: CPT

## 2021-01-29 LAB
CHOLEST SERPL-MCNC: 160 MG/DL (ref 100–199)
HDL SERPL-SCNC: 44.4 UMOL/L
HDLC SERPL-MCNC: 56 MG/DL
LDL SERPL QN: 20.3 NM
LDL SERPL-SCNC: 944 NMOL/L
LDL SMALL SERPL-SCNC: 720 NMOL/L
LDLC SERPL CALC-MCNC: 72 MG/DL (ref 0–99)
TRIGL SERPL-MCNC: 192 MG/DL (ref 0–149)

## 2021-02-03 ENCOUNTER — OFFICE VISIT (OUTPATIENT)
Dept: INTERNAL MEDICINE | Facility: CLINIC | Age: 71
End: 2021-02-03

## 2021-02-03 VITALS
SYSTOLIC BLOOD PRESSURE: 114 MMHG | BODY MASS INDEX: 34.72 KG/M2 | DIASTOLIC BLOOD PRESSURE: 70 MMHG | WEIGHT: 203.4 LBS | HEART RATE: 91 BPM | HEIGHT: 64 IN | OXYGEN SATURATION: 98 %

## 2021-02-03 DIAGNOSIS — E11.9 ENCOUNTER FOR DIABETIC FOOT EXAM (HCC): Chronic | ICD-10-CM

## 2021-02-03 DIAGNOSIS — N26.1 RENAL ATROPHY, LEFT: Chronic | ICD-10-CM

## 2021-02-03 DIAGNOSIS — E55.9 VITAMIN D DEFICIENCY: Chronic | ICD-10-CM

## 2021-02-03 DIAGNOSIS — R80.9 MICROALBUMINURIA: Chronic | ICD-10-CM

## 2021-02-03 DIAGNOSIS — K75.81 NASH (NONALCOHOLIC STEATOHEPATITIS): Chronic | ICD-10-CM

## 2021-02-03 DIAGNOSIS — E78.2 MIXED HYPERLIPIDEMIA: Chronic | ICD-10-CM

## 2021-02-03 DIAGNOSIS — I10 BENIGN ESSENTIAL HYPERTENSION: Chronic | ICD-10-CM

## 2021-02-03 DIAGNOSIS — F51.04 CHRONIC INSOMNIA: Chronic | ICD-10-CM

## 2021-02-03 DIAGNOSIS — F41.8 DEPRESSION WITH ANXIETY: Chronic | ICD-10-CM

## 2021-02-03 DIAGNOSIS — R60.0 BILATERAL LOWER EXTREMITY EDEMA: Chronic | ICD-10-CM

## 2021-02-03 DIAGNOSIS — G47.33 OBSTRUCTIVE SLEEP APNEA: Chronic | ICD-10-CM

## 2021-02-03 DIAGNOSIS — R80.9 TYPE 2 DIABETES MELLITUS WITH MICROALBUMINURIA, WITHOUT LONG-TERM CURRENT USE OF INSULIN (HCC): Chronic | ICD-10-CM

## 2021-02-03 DIAGNOSIS — E66.9 NON MORBID OBESITY: Chronic | ICD-10-CM

## 2021-02-03 DIAGNOSIS — Z00.00 ENCOUNTER FOR SUBSEQUENT ANNUAL WELLNESS VISIT (AWV) IN MEDICARE PATIENT: Primary | ICD-10-CM

## 2021-02-03 DIAGNOSIS — Z51.81 THERAPEUTIC DRUG MONITORING: ICD-10-CM

## 2021-02-03 DIAGNOSIS — Z80.3 FAMILY HISTORY OF BREAST CANCER: Chronic | ICD-10-CM

## 2021-02-03 DIAGNOSIS — Z87.39 HISTORY OF GOUT: Chronic | ICD-10-CM

## 2021-02-03 DIAGNOSIS — E11.29 TYPE 2 DIABETES MELLITUS WITH MICROALBUMINURIA, WITHOUT LONG-TERM CURRENT USE OF INSULIN (HCC): Chronic | ICD-10-CM

## 2021-02-03 DIAGNOSIS — F41.1 GENERALIZED ANXIETY DISORDER: Chronic | ICD-10-CM

## 2021-02-03 DIAGNOSIS — R46.89 NON-COMPLIANT BEHAVIOR: ICD-10-CM

## 2021-02-03 PROBLEM — G44.329 CHRONIC POST-TRAUMATIC HEADACHE, NOT INTRACTABLE: Status: RESOLVED | Noted: 2020-09-15 | Resolved: 2021-02-03

## 2021-02-03 PROBLEM — J32.3 CHRONIC SPHENOIDAL SINUSITIS: Status: RESOLVED | Noted: 2020-10-09 | Resolved: 2021-02-03

## 2021-02-03 PROBLEM — R55 SYNCOPE AND COLLAPSE: Status: RESOLVED | Noted: 2020-06-08 | Resolved: 2021-02-03

## 2021-02-03 PROCEDURE — 99214 OFFICE O/P EST MOD 30 MIN: CPT | Performed by: INTERNAL MEDICINE

## 2021-02-03 PROCEDURE — G0439 PPPS, SUBSEQ VISIT: HCPCS | Performed by: INTERNAL MEDICINE

## 2021-02-03 RX ORDER — DAPAGLIFLOZIN 10 MG/1
TABLET, FILM COATED ORAL
Qty: 30 TABLET | Refills: 6 | Status: SHIPPED | OUTPATIENT
Start: 2021-02-03 | End: 2022-06-07 | Stop reason: SDUPTHER
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
## 2021-02-24 ENCOUNTER — LAB (OUTPATIENT)
Dept: LAB | Facility: HOSPITAL | Age: 71
End: 2021-02-24

## 2021-02-24 ENCOUNTER — OFFICE VISIT (OUTPATIENT)
Dept: INTERNAL MEDICINE | Facility: CLINIC | Age: 71
End: 2021-02-24

## 2021-02-24 VITALS
OXYGEN SATURATION: 98 % | SYSTOLIC BLOOD PRESSURE: 108 MMHG | HEIGHT: 64 IN | DIASTOLIC BLOOD PRESSURE: 70 MMHG | HEART RATE: 80 BPM | WEIGHT: 199.8 LBS | BODY MASS INDEX: 34.11 KG/M2

## 2021-02-24 DIAGNOSIS — R60.0 BILATERAL LOWER EXTREMITY EDEMA: Chronic | ICD-10-CM

## 2021-02-24 DIAGNOSIS — E55.9 VITAMIN D DEFICIENCY: Chronic | ICD-10-CM

## 2021-02-24 DIAGNOSIS — N26.1 RENAL ATROPHY, LEFT: Chronic | ICD-10-CM

## 2021-02-24 DIAGNOSIS — Z51.81 THERAPEUTIC DRUG MONITORING: ICD-10-CM

## 2021-02-24 DIAGNOSIS — R80.9 TYPE 2 DIABETES MELLITUS WITH MICROALBUMINURIA, WITHOUT LONG-TERM CURRENT USE OF INSULIN (HCC): Primary | Chronic | ICD-10-CM

## 2021-02-24 DIAGNOSIS — N95.1 MENOPAUSAL STATE: ICD-10-CM

## 2021-02-24 DIAGNOSIS — Z13.820 OSTEOPOROSIS SCREENING: ICD-10-CM

## 2021-02-24 DIAGNOSIS — E11.29 TYPE 2 DIABETES MELLITUS WITH MICROALBUMINURIA, WITHOUT LONG-TERM CURRENT USE OF INSULIN (HCC): Primary | Chronic | ICD-10-CM

## 2021-02-24 DIAGNOSIS — I10 BENIGN ESSENTIAL HYPERTENSION: Chronic | ICD-10-CM

## 2021-02-24 DIAGNOSIS — E78.2 MIXED HYPERLIPIDEMIA: Chronic | ICD-10-CM

## 2021-02-24 DIAGNOSIS — Z87.39 HISTORY OF GOUT: Chronic | ICD-10-CM

## 2021-02-24 DIAGNOSIS — R80.9 MICROALBUMINURIA: Chronic | ICD-10-CM

## 2021-02-24 DIAGNOSIS — E11.29 TYPE 2 DIABETES MELLITUS WITH MICROALBUMINURIA, WITHOUT LONG-TERM CURRENT USE OF INSULIN (HCC): Chronic | ICD-10-CM

## 2021-02-24 DIAGNOSIS — E11.65 POORLY CONTROLLED TYPE 2 DIABETES MELLITUS (HCC): ICD-10-CM

## 2021-02-24 DIAGNOSIS — R80.9 TYPE 2 DIABETES MELLITUS WITH MICROALBUMINURIA, WITHOUT LONG-TERM CURRENT USE OF INSULIN (HCC): Chronic | ICD-10-CM

## 2021-02-24 DIAGNOSIS — K75.81 NASH (NONALCOHOLIC STEATOHEPATITIS): Chronic | ICD-10-CM

## 2021-02-24 DIAGNOSIS — E66.9 NON MORBID OBESITY: Chronic | ICD-10-CM

## 2021-02-24 LAB
ALBUMIN SERPL-MCNC: 4.4 G/DL (ref 3.5–5.2)
ALBUMIN/GLOB SERPL: 1.7 G/DL
ALP SERPL-CCNC: 91 U/L (ref 39–117)
ALT SERPL W P-5'-P-CCNC: 18 U/L (ref 1–33)
ANION GAP SERPL CALCULATED.3IONS-SCNC: 10.1 MMOL/L (ref 5–15)
AST SERPL-CCNC: 22 U/L (ref 1–32)
BILIRUB SERPL-MCNC: 0.3 MG/DL (ref 0–1.2)
BUN SERPL-MCNC: 18 MG/DL (ref 8–23)
BUN/CREAT SERPL: 25.7 (ref 7–25)
CALCIUM SPEC-SCNC: 9.5 MG/DL (ref 8.6–10.5)
CHLORIDE SERPL-SCNC: 104 MMOL/L (ref 98–107)
CO2 SERPL-SCNC: 26.9 MMOL/L (ref 22–29)
CREAT SERPL-MCNC: 0.7 MG/DL (ref 0.57–1)
GFR SERPL CREATININE-BSD FRML MDRD: 83 ML/MIN/1.73
GLOBULIN UR ELPH-MCNC: 2.6 GM/DL
GLUCOSE SERPL-MCNC: 159 MG/DL (ref 65–99)
POTASSIUM SERPL-SCNC: 5 MMOL/L (ref 3.5–5.2)
PROT SERPL-MCNC: 7 G/DL (ref 6–8.5)
SODIUM SERPL-SCNC: 141 MMOL/L (ref 136–145)

## 2021-02-24 PROCEDURE — 36415 COLL VENOUS BLD VENIPUNCTURE: CPT | Performed by: INTERNAL MEDICINE

## 2021-02-24 PROCEDURE — 99214 OFFICE O/P EST MOD 30 MIN: CPT | Performed by: INTERNAL MEDICINE

## 2021-02-24 PROCEDURE — 80053 COMPREHEN METABOLIC PANEL: CPT | Performed by: INTERNAL MEDICINE
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
## 2021-02-24 NOTE — PROGRESS NOTES
The ABCs of the Annual Wellness Visit  Subsequent Medicare Wellness Visit    No chief complaint on file.      Subjective   History of Present Illness:  Reema Easley is a 70 y.o. female who presents for a Subsequent Medicare Wellness Visit.    HEALTH RISK ASSESSMENT    Recent Hospitalizations:  No hospitalization(s) within the last year.    Current Medical Providers:  Patient Care Team:  Marc Rasheed MD as PCP - General (Internal Medicine)  Valarie Limon MD as Consulting Physician (Obstetrics and Gynecology)    Smoking Status:  Social History     Tobacco Use   Smoking Status Never Smoker   Smokeless Tobacco Never Used       Alcohol Consumption:  Social History     Substance and Sexual Activity   Alcohol Use No   • Frequency: Never   • Binge frequency: Never       Depression Screen:   PHQ-2/PHQ-9 Depression Screening 2/3/2021   Little interest or pleasure in doing things 0   Feeling down, depressed, or hopeless 0   Total Score 0       Fall Risk Screen:  CITLALI Fall Risk Assessment was completed, and patient is at HIGH risk for falls. Assessment completed on:2/3/2021    Health Habits and Functional and Cognitive Screening:  Functional & Cognitive Status 2/3/2021   Do you have difficulty preparing food and eating? No   Do you have difficulty bathing yourself, getting dressed or grooming yourself? No   Do you have difficulty using the toilet? No   Do you have difficulty moving around from place to place? No   Do you have trouble with steps or getting out of a bed or a chair? No   Current Diet Well Balanced Diet   Dental Exam Up to date   Eye Exam Up to date   Exercise (times per week) 7 times per week   Current Exercise Activities Include Walking   Do you need help using the phone?  No   Are you deaf or do you have serious difficulty hearing?  No   Do you need help with transportation? No   Do you need help shopping? No   Do you need help preparing meals?  No   Do you need help with housework?  No   Do you  need help with laundry? No   Do you need help taking your medications? No   Do you need help managing money? No   Do you ever drive or ride in a car without wearing a seat belt? No   Have you felt unusual stress, anger or loneliness in the last month? No   Who do you live with? Child   If you need help, do you have trouble finding someone available to you? No   Have you been bothered in the last four weeks by sexual problems? No   Do you have difficulty concentrating, remembering or making decisions? No         Does the patient have evidence of cognitive impairment? No    Asprin use counseling:Does not need ASA (and currently is not on it)    Age-appropriate Screening Schedule:  Refer to the list below for future screening recommendations based on patient's age, sex and/or medical conditions. Orders for these recommended tests are listed in the plan section. The patient has been provided with a written plan.    Health Maintenance   Topic Date Due   • DXA SCAN  04/15/2013   • HEMOGLOBIN A1C  07/27/2021   • DIABETIC EYE EXAM  10/08/2021   • LIPID PANEL  01/27/2022   • URINE MICROALBUMIN  01/27/2022   • DIABETIC FOOT EXAM  02/03/2022   • MAMMOGRAM  11/18/2022   • TDAP/TD VACCINES (2 - Td) 10/18/2025   • INFLUENZA VACCINE  Completed   • PAP SMEAR  Discontinued   • ZOSTER VACCINE  Discontinued          The following portions of the patient's history were reviewed and updated as appropriate: allergies, current medications, past family history, past medical history, past social history, past surgical history and problem list.    Outpatient Medications Prior to Visit   Medication Sig Dispense Refill   • allopurinol (ZYLOPRIM) 300 MG tablet TAKE ONE TABLET BY MOUTH DAILY 90 tablet 2   • ALPRAZolam (XANAX) 0.5 MG tablet Take 1 tablet by mouth 2 (Two) Times a Day. 60 tablet 3   • benazepril (LOTENSIN) 40 MG tablet TAKE ONE TABLET BY MOUTH DAILY 12 tablet 2   • Chlorcyclizine-Pseudoephed 25-60 MG tablet Take 1 p.o. twice daily  for head congestion/sinus 60 tablet 0   • Insulin Pen Needle (B-D UF III MINI PEN NEEDLES) 31G X 5 MM misc E11.9 30 each 3   • Liraglutide (Victoza) 18 MG/3ML solution pen-injector injection DIAL AND INJECT UNDER THE SKIN 1.8 MG DAILY 3 pen 5   • simvastatin (ZOCOR) 40 MG tablet Take 1 tablet by mouth Every Night. 30 tablet 4   • SITagliptin (JANUVIA) 100 MG tablet Take 1 p.o. daily for diabetes 30 tablet 6   • furosemide (LASIX) 20 MG tablet Take 1 tablet by mouth Daily. 90 tablet 0   • amoxicillin-clavulanate (Augmentin) 875-125 MG per tablet Take 1 p.o. twice daily until gone for sinusitis. 24 tablet 0   • aspirin 81 MG EC tablet Take 1 tablet by mouth Daily. PT TO HOLD MED PRIOR TO OR     • STOOL SOFTENER 100 MG capsule TAKE ONE CAPSULE BY MOUTH TWICE A DAY 60 capsule 0     No facility-administered medications prior to visit.        Patient Active Problem List   Diagnosis   • Benign essential hypertension   • Chronic insomnia   • Depression with anxiety   • Diverticulosis of colon   • Generalized anxiety disorder   • Gout   • Hyperlipidemia   • Microalbuminuria   • GEORGE (nonalcoholic steatohepatitis)   • Obstructive sleep apnea, 12/17/2015--AHI 14.6.  RDI 48.9 with REM sleep.  O2 sat 77%.  Cannot tolerate CPAP.   • Primary osteoarthritis of right knee   • Bilateral lower extremity edema   • Renal atrophy, left   • Type 2 diabetes mellitus with microalbuminuria, without long-term current use of insulin (CMS/HCC)   • Vitamin D deficiency   • Family history of ovarian cancer   • Family history of breast cancer   • Therapeutic drug monitoring   • Allergic rhinitis   • Non morbid obesity   • Diabetic foot exam   • Diabetic eye exam (CMS/HCC)   • Poorly controlled type 2 diabetes mellitus (CMS/HCC)   • Non-compliant behavior       Advanced Care Planning:  ACP discussion was held with the patient during this visit. Patient does not have an advance directive, information provided.    Review of Systems   Constitutional:  "Negative.    HENT: Negative.    Cardiovascular: Positive for leg swelling.   Gastrointestinal: Negative.    Endocrine: Negative.    Genitourinary: Negative.    Musculoskeletal: Positive for arthralgias.   Skin: Negative.    Allergic/Immunologic: Negative.    Neurological: Negative.    Hematological: Negative.    Psychiatric/Behavioral: Negative.        Compared to one year ago, the patient feels her physical health is the same.  Compared to one year ago, the patient feels her mental health is the same.    Reviewed chart for potential of high risk medication in the elderly: yes  Reviewed chart for potential of harmful drug interactions in the elderly:yes    Objective         Vitals:    02/03/21 1037   BP: 114/70   BP Location: Left arm   Patient Position: Sitting   Cuff Size: Large Adult   Pulse: 91   SpO2: 98%   Weight: 92.3 kg (203 lb 6.4 oz)   Height: 162.6 cm (64.02\")   PainSc: 0-No pain       Body mass index is 34.89 kg/m².  Discussed the patient's BMI with her. The BMI is above average; BMI management plan is completed.    Physical Exam    General: Alert and oriented x 3.  No acute distress.  Normal affect.  Obese.  HEENT: Pupils equal, round, reactive to light; extraocular movements intact; sclerae nonicteric; pharynx, ear canals and TMs normal.  Neck: Without JVD, thyromegaly, bruit, or adenopathy.  Lungs: Clear to auscultation in all fields.  Heart: Regular rate and rhythm without murmur, rub, gallop, or click.  Abdomen: Soft, nontender, without hepatosplenomegaly or hernia.  Bowel sounds normal.  : Deferred.  Rectal: Deferred.  Extremities: Without clubbing, cyanosis, edema, or pulse deficit.  Neurologic: Intact without focal deficit.  Normal station and gait observed during ingress and egress from the examination room.  Skin: Without significant lesion.  Musculoskeletal: Unremarkable.    Lab Results   Component Value Date    CHLPL 160 01/27/2021    TRIG 192 (H) 01/27/2021    HGBA1C 8.91 (H) 01/27/2021    "     Assessment/Plan   Medicare Risks and Personalized Health Plan  CMS Preventative Services Quick Reference  Advance Directive Discussion  Cardiovascular risk  Diabetic Lab Screening   Obesity/Overweight   Osteoprorosis Risk    The above risks/problems have been discussed with the patient.  Pertinent information has been shared with the patient in the After Visit Summary.  Follow up plans and orders are seen below in the Assessment/Plan Section.    Diagnoses and all orders for this visit:    1. Encounter for subsequent annual wellness visit (AWV) in Medicare patient (Primary)    2. Type 2 diabetes mellitus with microalbuminuria, without long-term current use of insulin (CMS/[facility])  -     Dapagliflozin Propanediol (Farxiga) 10 MG tablet; Take 1 p.o. daily before the first meal for diabetes  Dispense: 30 tablet; Refill: 6    3. Microalbuminuria    4. Hyperlipidemia    5. GEORGE (nonalcoholic steatohepatitis)    6. Gout    7. Benign essential hypertension    8. Chronic insomnia    9. Depression with anxiety    10. Generalized anxiety disorder    11. Obstructive sleep apnea, 12/17/2015--AHI 14.6.  RDI 48.9 with REM sleep.  O2 sat 77%.  Cannot tolerate CPAP.    12. Bilateral lower extremity edema    13. Renal atrophy, left    14. Vitamin D deficiency    15. Family history of breast cancer    16. Non morbid obesity    17. Diabetic foot exam    18. Non-compliant behavior    19. Therapeutic drug monitoring      Follow Up:  No follow-ups on file.     An After Visit Summary and PPPS were given to the patient.

## 2021-02-25 DIAGNOSIS — R80.9 TYPE 2 DIABETES MELLITUS WITH MICROALBUMINURIA, WITHOUT LONG-TERM CURRENT USE OF INSULIN (HCC): Primary | Chronic | ICD-10-CM

## 2021-02-25 DIAGNOSIS — E11.29 TYPE 2 DIABETES MELLITUS WITH MICROALBUMINURIA, WITHOUT LONG-TERM CURRENT USE OF INSULIN (HCC): Primary | Chronic | ICD-10-CM

## 2021-02-25 RX ORDER — SEMAGLUTIDE 1.34 MG/ML
INJECTION, SOLUTION SUBCUTANEOUS
Start: 2021-02-25 | End: 2021-06-24

## 2021-03-03 DIAGNOSIS — F41.1 GENERALIZED ANXIETY DISORDER: Chronic | ICD-10-CM

## 2021-03-03 RX ORDER — ALPRAZOLAM 0.5 MG/1
TABLET ORAL
Qty: 60 TABLET | Refills: 4 | Status: SHIPPED | OUTPATIENT
Start: 2021-03-03 | End: 2021-07-30

## 2021-03-04 ENCOUNTER — TELEPHONE (OUTPATIENT)
Dept: INTERNAL MEDICINE | Facility: CLINIC | Age: 71
End: 2021-03-04

## 2021-03-04 NOTE — TELEPHONE ENCOUNTER
PATIENT IS REQUESTING A LIST OF ALL HER APPT'S AND COST OF COPAYS FOR THE YEAR 2020. PLEASE ADVISE.    PATIENT WANTS TO  PAPERWORK TOMORROW 3-5-21 BETWEEN 10AM-12PM    CALL BACK: [phone]

## 2021-03-16 ENCOUNTER — BULK ORDERING (OUTPATIENT)
Dept: CASE MANAGEMENT | Facility: OTHER | Age: 71
End: 2021-03-16

## 2021-03-16 ENCOUNTER — TELEPHONE (OUTPATIENT)
Dept: INTERNAL MEDICINE | Facility: CLINIC | Age: 71
End: 2021-03-16

## 2021-03-16 DIAGNOSIS — B35.6 TINEA CRURIS: Primary | ICD-10-CM

## 2021-03-16 DIAGNOSIS — Z23 IMMUNIZATION DUE: ICD-10-CM

## 2021-03-16 NOTE — TELEPHONE ENCOUNTER
PATIENT CALLED STATING SHE IS SUFFERING FROM THE GENITAL ITCHING SIDE EFFECT FROM THE NEW MEDICATION Dapagliflozin Propanediol (Farxiga) 10 MG tablet. PATIENT NEEDS RECOMMENDATIONS FOR COMFORT OR POSSIBLY A CREAM. PLEASE ADVISE.   Send RX to [facility]

## 2021-03-16 NOTE — TELEPHONE ENCOUNTER
Caller: Reema Easley    Relationship: Self    Best call back number: [phone]    What medication are you requesting: RECOMMENDATIONS/CREAM FOR VAGINAL ITCHING.     What are your current symptoms: VAGINAL ITCHING    How long have you been experiencing symptoms: 3 WEEKS    Have you had these symptoms before:    [] Yes  [x] No    Have you been treated for these symptoms before:   [] Yes  [x] No    If a prescription is needed, what is your preferred pharmacy and phone number: [address] [place] [address] [phone] [facility] [phone]      Additional notes: PATIENT CALLED STATING SHE IS SUFFERING FROM THE GENITAL ITCHING SIDE EFFECT FROM THE NEW MEDICATION Dapagliflozin Propanediol (Farxiga) 10 MG tablet. PATIENT NEEDS RECOMMENDATIONS FOR COMFORT OR POSSIBLY A CREAM. PLEASE ADVISE.

## 2021-03-24 DIAGNOSIS — I10 BENIGN ESSENTIAL HYPERTENSION: Chronic | ICD-10-CM

## 2021-03-25 ENCOUNTER — PATIENT OUTREACH (OUTPATIENT)
Dept: PHARMACY | Facility: HOSPITAL | Age: 71
End: 2021-03-25

## 2021-03-25 NOTE — OUTREACH NOTE
Medication Adherence Call    Patient was called today to discuss medication adherence with simvastatin, as the patient was identified as having care opportunities.     The patient denies issues with adherence and denies any barriers to receiving medications. We discussed the option to receive 90 day supplies of her medications. She will speak to her pharmacy about the pricing of this option and then will consider the transition.    Heidi Khan, PharmD  03/25/21

## 2021-03-29 RX ORDER — BENAZEPRIL HYDROCHLORIDE 40 MG/1
TABLET, FILM COATED ORAL
Qty: 90 TABLET | Refills: 0 | Status: SHIPPED | OUTPATIENT
Start: 2021-03-29 | End: 2021-09-30

## 2021-05-07 ENCOUNTER — TELEPHONE (OUTPATIENT)
Dept: INTERNAL MEDICINE | Facility: CLINIC | Age: 71
End: 2021-05-07

## 2021-05-07 NOTE — TELEPHONE ENCOUNTER
PATIENT WOULD LIKE TO KNOW IF WE HAVE ANY SAMPLES OF JANUMET     PLEASE CALL PATIENT AT: [phone]

## 2021-05-28 ENCOUNTER — HOSPITAL ENCOUNTER (OUTPATIENT)
Dept: BONE DENSITY | Facility: HOSPITAL | Age: 71
Discharge: HOME | End: 2021-05-28
Admitting: INTERNAL MEDICINE

## 2021-05-28 PROCEDURE — 77080 DXA BONE DENSITY AXIAL: CPT

## 2021-06-14 ENCOUNTER — TELEPHONE (OUTPATIENT)
Dept: INTERNAL MEDICINE | Facility: CLINIC | Age: 71
End: 2021-06-14

## 2021-06-14 NOTE — TELEPHONE ENCOUNTER
THE PATIENT IS CALLING IN FOR AN UPDATE ON HER MEDICATION THAT WAS SHIPPED TO THE OFFICE. THE PATIENT WOULD LIKE TO KNOW WHAT SHE NEEDS TO DO FOR HER MEDICATION UNTIL THIS IS FOUND. SHE IS DOWN TO HER LAST 2-3 DOSES OF HER VIAL. PLEASE ADVISE [phone].

## 2021-06-15 DIAGNOSIS — E11.29 TYPE 2 DIABETES MELLITUS WITH MICROALBUMINURIA, WITHOUT LONG-TERM CURRENT USE OF INSULIN (HCC): Chronic | ICD-10-CM

## 2021-06-15 DIAGNOSIS — Z51.81 THERAPEUTIC DRUG MONITORING: ICD-10-CM

## 2021-06-15 DIAGNOSIS — E78.2 MIXED HYPERLIPIDEMIA: Chronic | ICD-10-CM

## 2021-06-15 DIAGNOSIS — E55.9 VITAMIN D DEFICIENCY: Chronic | ICD-10-CM

## 2021-06-15 DIAGNOSIS — R80.9 TYPE 2 DIABETES MELLITUS WITH MICROALBUMINURIA, WITHOUT LONG-TERM CURRENT USE OF INSULIN (HCC): Chronic | ICD-10-CM

## 2021-06-15 DIAGNOSIS — Z87.39 HISTORY OF GOUT: Chronic | ICD-10-CM

## 2021-06-17 ENCOUNTER — LAB (OUTPATIENT)
Dept: LAB | Facility: HOSPITAL | Age: 71
End: 2021-06-17

## 2021-06-17 LAB
25(OH)D3 SERPL-MCNC: 48.7 NG/ML (ref 30–100)
ALBUMIN SERPL-MCNC: 4.2 G/DL (ref 3.5–5.2)
ALBUMIN/GLOB SERPL: 1.6 G/DL
ALP SERPL-CCNC: 85 U/L (ref 39–117)
ALT SERPL W P-5'-P-CCNC: 18 U/L (ref 1–33)
ANION GAP SERPL CALCULATED.3IONS-SCNC: 11.5 MMOL/L (ref 5–15)
AST SERPL-CCNC: 15 U/L (ref 1–32)
BILIRUB SERPL-MCNC: 0.5 MG/DL (ref 0–1.2)
BUN SERPL-MCNC: 17 MG/DL (ref 8–23)
BUN/CREAT SERPL: 19.3 (ref 7–25)
CALCIUM SPEC-SCNC: 10 MG/DL (ref 8.6–10.5)
CHLORIDE SERPL-SCNC: 103 MMOL/L (ref 98–107)
CK SERPL-CCNC: 86 U/L (ref 20–180)
CO2 SERPL-SCNC: 27.5 MMOL/L (ref 22–29)
CREAT SERPL-MCNC: 0.88 MG/DL (ref 0.57–1)
DEPRECATED RDW RBC AUTO: 41.3 FL (ref 37–54)
ERYTHROCYTE [DISTWIDTH] IN BLOOD BY AUTOMATED COUNT: 12.8 % (ref 12.3–15.4)
GFR SERPL CREATININE-BSD FRML MDRD: 64 ML/MIN/1.73
GLOBULIN UR ELPH-MCNC: 2.6 GM/DL
GLUCOSE SERPL-MCNC: 169 MG/DL (ref 65–99)
HBA1C MFR BLD: 8.48 % (ref 4.8–5.6)
HCT VFR BLD AUTO: 44.7 % (ref 34–46.6)
HGB BLD-MCNC: 14.8 G/DL (ref 12–15.9)
MCH RBC QN AUTO: 29.5 PG (ref 26.6–33)
MCHC RBC AUTO-ENTMCNC: 33.1 G/DL (ref 31.5–35.7)
MCV RBC AUTO: 89 FL (ref 79–97)
PLATELET # BLD AUTO: 288 10*3/MM3 (ref 140–450)
PMV BLD AUTO: 10.3 FL (ref 6–12)
POTASSIUM SERPL-SCNC: 4.9 MMOL/L (ref 3.5–5.2)
PROT SERPL-MCNC: 6.8 G/DL (ref 6–8.5)
RBC # BLD AUTO: 5.02 10*6/MM3 (ref 3.77–5.28)
SODIUM SERPL-SCNC: 142 MMOL/L (ref 136–145)
T3FREE SERPL-MCNC: 2.42 PG/ML (ref 2–4.4)
T4 FREE SERPL-MCNC: 1.07 NG/DL (ref 0.93–1.7)
TSH SERPL DL<=0.05 MIU/L-ACNC: 1.93 UIU/ML (ref 0.27–4.2)
URATE SERPL-MCNC: 2.9 MG/DL (ref 2.4–5.7)
WBC # BLD AUTO: 8.84 10*3/MM3 (ref 3.4–10.8)

## 2021-06-17 PROCEDURE — 83036 HEMOGLOBIN GLYCOSYLATED A1C: CPT | Performed by: INTERNAL MEDICINE

## 2021-06-17 PROCEDURE — 84481 FREE ASSAY (FT-3): CPT | Performed by: INTERNAL MEDICINE

## 2021-06-17 PROCEDURE — 85027 COMPLETE CBC AUTOMATED: CPT | Performed by: INTERNAL MEDICINE

## 2021-06-17 PROCEDURE — 84439 ASSAY OF FREE THYROXINE: CPT | Performed by: INTERNAL MEDICINE

## 2021-06-17 PROCEDURE — 83704 LIPOPROTEIN BLD QUAN PART: CPT | Performed by: INTERNAL MEDICINE

## 2021-06-17 PROCEDURE — 80061 LIPID PANEL: CPT | Performed by: INTERNAL MEDICINE

## 2021-06-17 PROCEDURE — 36415 COLL VENOUS BLD VENIPUNCTURE: CPT

## 2021-06-17 PROCEDURE — 82550 ASSAY OF CK (CPK): CPT | Performed by: INTERNAL MEDICINE

## 2021-06-17 PROCEDURE — 84443 ASSAY THYROID STIM HORMONE: CPT | Performed by: INTERNAL MEDICINE

## 2021-06-17 PROCEDURE — 80053 COMPREHEN METABOLIC PANEL: CPT

## 2021-06-17 PROCEDURE — 82306 VITAMIN D 25 HYDROXY: CPT | Performed by: INTERNAL MEDICINE

## 2021-06-17 PROCEDURE — 84550 ASSAY OF BLOOD/URIC ACID: CPT | Performed by: INTERNAL MEDICINE

## 2021-06-18 LAB
CHOLEST SERPL-MCNC: 153 MG/DL (ref 100–199)
HDL SERPL-SCNC: 42.4 UMOL/L
HDLC SERPL-MCNC: 51 MG/DL
LDL SERPL QN: 20.5 NM
LDL SERPL-SCNC: 829 NMOL/L
LDL SMALL SERPL-SCNC: 429 NMOL/L
LDLC SERPL CALC-MCNC: 74 MG/DL (ref 0–99)
TRIGL SERPL-MCNC: 163 MG/DL (ref 0–149)

## 2021-06-24 ENCOUNTER — OFFICE VISIT (OUTPATIENT)
Dept: INTERNAL MEDICINE | Facility: CLINIC | Age: 71
End: 2021-06-24

## 2021-06-24 VITALS
OXYGEN SATURATION: 98 % | BODY MASS INDEX: 32.9 KG/M2 | SYSTOLIC BLOOD PRESSURE: 122 MMHG | WEIGHT: 191.8 LBS | RESPIRATION RATE: 16 BRPM | DIASTOLIC BLOOD PRESSURE: 82 MMHG | HEART RATE: 91 BPM

## 2021-06-24 DIAGNOSIS — G47.33 OBSTRUCTIVE SLEEP APNEA: Chronic | ICD-10-CM

## 2021-06-24 DIAGNOSIS — E11.65 POORLY CONTROLLED TYPE 2 DIABETES MELLITUS (HCC): Primary | ICD-10-CM

## 2021-06-24 DIAGNOSIS — E11.29 TYPE 2 DIABETES MELLITUS WITH MICROALBUMINURIA, WITHOUT LONG-TERM CURRENT USE OF INSULIN (HCC): Chronic | ICD-10-CM

## 2021-06-24 DIAGNOSIS — R80.9 MICROALBUMINURIA: Chronic | ICD-10-CM

## 2021-06-24 DIAGNOSIS — E78.2 MIXED HYPERLIPIDEMIA: Chronic | ICD-10-CM

## 2021-06-24 DIAGNOSIS — M85.89 OSTEOPENIA OF MULTIPLE SITES: ICD-10-CM

## 2021-06-24 DIAGNOSIS — I10 BENIGN ESSENTIAL HYPERTENSION: Chronic | ICD-10-CM

## 2021-06-24 DIAGNOSIS — R80.9 TYPE 2 DIABETES MELLITUS WITH MICROALBUMINURIA, WITHOUT LONG-TERM CURRENT USE OF INSULIN (HCC): Chronic | ICD-10-CM

## 2021-06-24 DIAGNOSIS — F41.8 DEPRESSION WITH ANXIETY: Chronic | ICD-10-CM

## 2021-06-24 DIAGNOSIS — R60.0 BILATERAL LOWER EXTREMITY EDEMA: Chronic | ICD-10-CM

## 2021-06-24 DIAGNOSIS — Z51.81 THERAPEUTIC DRUG MONITORING: ICD-10-CM

## 2021-06-24 DIAGNOSIS — F51.04 CHRONIC INSOMNIA: Chronic | ICD-10-CM

## 2021-06-24 DIAGNOSIS — E55.9 VITAMIN D DEFICIENCY: Chronic | ICD-10-CM

## 2021-06-24 DIAGNOSIS — F41.1 GENERALIZED ANXIETY DISORDER: Chronic | ICD-10-CM

## 2021-06-24 DIAGNOSIS — Z80.3 FAMILY HISTORY OF BREAST CANCER: Chronic | ICD-10-CM

## 2021-06-24 DIAGNOSIS — K75.81 NASH (NONALCOHOLIC STEATOHEPATITIS): Chronic | ICD-10-CM

## 2021-06-24 DIAGNOSIS — E66.9 NON MORBID OBESITY: Chronic | ICD-10-CM

## 2021-06-24 DIAGNOSIS — Z87.39 HISTORY OF GOUT: Chronic | ICD-10-CM

## 2021-06-24 DIAGNOSIS — R46.89 NON-COMPLIANT BEHAVIOR: Chronic | ICD-10-CM

## 2021-06-24 PROBLEM — N95.1 MENOPAUSAL STATE: Chronic | Status: ACTIVE | Noted: 2021-02-24

## 2021-06-24 PROCEDURE — 99214 OFFICE O/P EST MOD 30 MIN: CPT | Performed by: INTERNAL MEDICINE

## 2021-06-24 RX ORDER — GLIMEPIRIDE 4 MG/1
TABLET ORAL
Qty: 90 TABLET | Refills: 1 | Status: SHIPPED | OUTPATIENT
Start: 2021-06-24 | End: 2021-11-15
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
## 2021-07-29 DIAGNOSIS — Z87.39 HISTORY OF GOUT: Chronic | ICD-10-CM

## 2021-07-30 DIAGNOSIS — F41.1 GENERALIZED ANXIETY DISORDER: Chronic | ICD-10-CM

## 2021-07-30 RX ORDER — ALLOPURINOL 300 MG/1
TABLET ORAL
Qty: 90 TABLET | Refills: 2 | Status: SHIPPED | OUTPATIENT
Start: 2021-07-30 | End: 2022-03-07

## 2021-07-30 RX ORDER — ALPRAZOLAM 0.5 MG/1
TABLET ORAL
Qty: 60 TABLET | Refills: 1 | Status: SHIPPED | OUTPATIENT
Start: 2021-07-30 | End: 2021-09-29

## 2021-08-26 ENCOUNTER — OFFICE VISIT (OUTPATIENT)
Dept: INTERNAL MEDICINE | Facility: CLINIC | Age: 71
End: 2021-08-26

## 2021-08-26 ENCOUNTER — HOSPITAL ENCOUNTER (OUTPATIENT)
Dept: GENERAL RADIOLOGY | Facility: HOSPITAL | Age: 71
Discharge: HOME | End: 2021-08-26
Admitting: INTERNAL MEDICINE

## 2021-08-26 VITALS
OXYGEN SATURATION: 99 % | WEIGHT: 197 LBS | HEART RATE: 84 BPM | HEIGHT: 64 IN | SYSTOLIC BLOOD PRESSURE: 122 MMHG | BODY MASS INDEX: 33.63 KG/M2 | RESPIRATION RATE: 15 BRPM | DIASTOLIC BLOOD PRESSURE: 76 MMHG

## 2021-08-26 DIAGNOSIS — M85.89 OSTEOPENIA OF MULTIPLE SITES: Chronic | ICD-10-CM

## 2021-08-26 DIAGNOSIS — M25.562 ACUTE PAIN OF LEFT KNEE: Primary | ICD-10-CM

## 2021-08-26 DIAGNOSIS — M25.562 ACUTE PAIN OF LEFT KNEE: ICD-10-CM

## 2021-08-26 PROCEDURE — 99214 OFFICE O/P EST MOD 30 MIN: CPT | Performed by: INTERNAL MEDICINE

## 2021-08-26 PROCEDURE — 73560 X-RAY EXAM OF KNEE 1 OR 2: CPT

## 2021-08-26 RX ORDER — NAPROXEN 500 MG/1
TABLET ORAL
Qty: 60 TABLET | Refills: 0 | Status: SHIPPED | OUTPATIENT
Start: 2021-08-26 | End: 2021-09-21
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
## 2021-09-05 ENCOUNTER — HOSPITAL ENCOUNTER (OUTPATIENT)
Dept: MRI IMAGING | Facility: HOSPITAL | Age: 71
Discharge: HOME | End: 2021-09-05
Admitting: INTERNAL MEDICINE

## 2021-09-05 DIAGNOSIS — M25.562 ACUTE PAIN OF LEFT KNEE: ICD-10-CM

## 2021-09-05 PROCEDURE — 73721 MRI JNT OF LWR EXTRE W/O DYE: CPT

## 2021-09-08 DIAGNOSIS — S83.242D ACUTE MEDIAL MENISCUS TEAR OF LEFT KNEE, SUBSEQUENT ENCOUNTER: Primary | ICD-10-CM

## 2021-09-08 PROBLEM — S83.242A ACUTE MEDIAL MENISCUS TEAR OF LEFT KNEE: Status: ACTIVE | Noted: 2021-09-08

## 2021-09-10 ENCOUNTER — TELEPHONE (OUTPATIENT)
Dept: INTERNAL MEDICINE | Facility: CLINIC | Age: 71
End: 2021-09-10

## 2021-09-13 ENCOUNTER — TELEPHONE (OUTPATIENT)
Dept: INTERNAL MEDICINE | Facility: CLINIC | Age: 71
End: 2021-09-13

## 2021-09-13 NOTE — TELEPHONE ENCOUNTER
Caller: Reema Easley    Relationship: Self    Best call back number: [phone]*    What is the best time to reach you: ANYTIME    Who are you requesting to speak with (clinical staff, provider,  specific staff member): CLINICAL STAFF MEMBER    What was the call regarding: Liraglutide (VICTOZA) 18 MG/3ML solution pen-injector injection. PATIENT CALLED AND STATED THAT HER VICTOZA IS SHIPPED TO THE OFFICE AND WANTED TO KNOW IF THE SHIPMENT HAS BEEN RECEIVED.    Do you require a callback: YES

## 2021-09-13 NOTE — TELEPHONE ENCOUNTER
Called pt to let her know that we didn't have samples, but would let her know when we had them in.

## 2021-09-21 DIAGNOSIS — M25.562 ACUTE PAIN OF LEFT KNEE: ICD-10-CM

## 2021-09-21 RX ORDER — NAPROXEN 500 MG/1
TABLET ORAL
Qty: 60 TABLET | Refills: 5 | Status: SHIPPED | OUTPATIENT
Start: 2021-09-21 | End: 2021-12-27 | Stop reason: SDUPTHER

## 2021-09-23 DIAGNOSIS — E78.2 MIXED HYPERLIPIDEMIA: Chronic | ICD-10-CM

## 2021-09-23 RX ORDER — SIMVASTATIN 40 MG
TABLET ORAL
Qty: 30 TABLET | Refills: 1 | Status: SHIPPED | OUTPATIENT
Start: 2021-09-23 | End: 2022-04-26 | Stop reason: SDUPTHER

## 2021-09-23 RX ORDER — SIMVASTATIN 40 MG
TABLET ORAL
Qty: 90 TABLET | Refills: 1 | Status: SHIPPED | OUTPATIENT
Start: 2021-09-23 | End: 2021-09-23

## 2021-09-27 ENCOUNTER — TELEPHONE (OUTPATIENT)
Dept: INTERNAL MEDICINE | Facility: CLINIC | Age: 71
End: 2021-09-27

## 2021-09-27 NOTE — TELEPHONE ENCOUNTER
Guidebook provided to patient. Reviewed indications, side effects of pain medication/narcotics and constipation prevention.  Stressed importance of increased fluids/roughage in diet, continued use stool softeners along with laxatives and suppositories as ne PLEASE ADVISE PATIENT IF HER VICTOZA MEDICATION HAS ARRIVED FOR HER TO SET UP AN APPOINTMENT @ [phone].

## 2021-09-28 ENCOUNTER — TELEPHONE (OUTPATIENT)
Dept: INTERNAL MEDICINE | Facility: CLINIC | Age: 71
End: 2021-09-28

## 2021-09-28 DIAGNOSIS — F41.1 GENERALIZED ANXIETY DISORDER: Chronic | ICD-10-CM

## 2021-09-28 NOTE — TELEPHONE ENCOUNTER
PATIENT CALLED AGAIN WANTING TO CHECK AND SEE IF HER   Liraglutide (VICTOZA) 18 MG/3ML solution pen-injector injection    HAS BEEN DELIVERED TO THE OFFICE.   SHE IS DOWN TO HER LAST TUBE.    SHE WILL BE OUT OVER THE WEEKEND. PLEASE CALL IF NOT ARRIVED SO SHE CAN CALL COMPANY    PLEASE CALL [phone]

## 2021-09-28 NOTE — TELEPHONE ENCOUNTER
Patient expressed understanding.  She will call [facility] to find out when it was sent to the office.

## 2021-09-28 NOTE — TELEPHONE ENCOUNTER
Caller: Reema Easley    Relationship: Self    Best call back number: [phone]    What is the best time to reach you: ANYTIME    Who are you requesting to speak with (clinical staff, provider,  specific staff member): CLINICAL OR SOMEONE TO SCHEDULE APPOINTMENT.     What was the call regarding: PATIENT TALKED TO [facility] AND THEY SAID THEY WERE FAXING PATIENT REFILL FORM TODAY THAT NEEDED TO BE FILLED OUT AND SENT BACK. PLEASE CALL PATIENT BACK WHEN SHE CAN MAKE AN APPOINTMENT TO GET HER MEDICATION.     Do you require a callback: YES

## 2021-09-30 DIAGNOSIS — I10 BENIGN ESSENTIAL HYPERTENSION: Chronic | ICD-10-CM

## 2021-09-30 RX ORDER — BENAZEPRIL HYDROCHLORIDE 40 MG/1
TABLET, FILM COATED ORAL
Qty: 90 TABLET | Refills: 0 | Status: SHIPPED | OUTPATIENT
Start: 2021-09-30 | End: 2021-11-29

## 2021-10-04 RX ORDER — ALPRAZOLAM 0.5 MG/1
TABLET ORAL
Qty: 60 TABLET | Refills: 3 | Status: SHIPPED | OUTPATIENT
Start: 2021-10-04 | End: 2022-01-24

## 2021-10-04 NOTE — TELEPHONE ENCOUNTER
Caller: Reema Easley    Relationship: Self      Medication requested (name and dosage): ALPRAZolam (XANAX) 0.5 MG tablet    Pharmacy where request should be sent: [facility] [address] AT [facility] [phone] [facility] [phone]       Additional details provided by patient: PATIENT'S DAUGHTER CALLED TO REQUEST A MEDICATION REFILL ON PATIENT'S MEDICATION. PATIENT'S DAUGHTER STATES THAT HE HAS A 1 DAY SUPPLY LEFT.        Best call back number: [phone] (H)    Does the patient have less than a 3 day supply:  [x] Yes  [] No    Jazmine Martinez Rep   10/04/21 08:37 EDT         THANKS

## 2021-10-19 ENCOUNTER — LAB (OUTPATIENT)
Dept: LAB | Facility: HOSPITAL | Age: 71
End: 2021-10-19

## 2021-10-19 DIAGNOSIS — Z87.39 HISTORY OF GOUT: Chronic | ICD-10-CM

## 2021-10-19 DIAGNOSIS — R80.9 TYPE 2 DIABETES MELLITUS WITH MICROALBUMINURIA, WITHOUT LONG-TERM CURRENT USE OF INSULIN (HCC): Chronic | ICD-10-CM

## 2021-10-19 DIAGNOSIS — R80.9 MICROALBUMINURIA: Chronic | ICD-10-CM

## 2021-10-19 DIAGNOSIS — E55.9 VITAMIN D DEFICIENCY: Chronic | ICD-10-CM

## 2021-10-19 DIAGNOSIS — E11.29 TYPE 2 DIABETES MELLITUS WITH MICROALBUMINURIA, WITHOUT LONG-TERM CURRENT USE OF INSULIN (HCC): Chronic | ICD-10-CM

## 2021-10-19 DIAGNOSIS — Z51.81 THERAPEUTIC DRUG MONITORING: ICD-10-CM

## 2021-10-19 DIAGNOSIS — E78.2 MIXED HYPERLIPIDEMIA: Chronic | ICD-10-CM

## 2021-10-19 LAB
25(OH)D3 SERPL-MCNC: 36.6 NG/ML (ref 30–100)
ALBUMIN SERPL-MCNC: 4.1 G/DL (ref 3.5–5.2)
ALBUMIN UR-MCNC: <1.2 MG/DL
ALBUMIN/GLOB SERPL: 1.6 G/DL
ALP SERPL-CCNC: 87 U/L (ref 39–117)
ALT SERPL W P-5'-P-CCNC: 20 U/L (ref 1–33)
ANION GAP SERPL CALCULATED.3IONS-SCNC: 9.4 MMOL/L (ref 5–15)
AST SERPL-CCNC: 21 U/L (ref 1–32)
BILIRUB SERPL-MCNC: 0.6 MG/DL (ref 0–1.2)
BUN SERPL-MCNC: 20 MG/DL (ref 8–23)
BUN/CREAT SERPL: 23.3 (ref 7–25)
CALCIUM SPEC-SCNC: 9.7 MG/DL (ref 8.6–10.5)
CHLORIDE SERPL-SCNC: 103 MMOL/L (ref 98–107)
CK SERPL-CCNC: 97 U/L (ref 20–180)
CO2 SERPL-SCNC: 26.6 MMOL/L (ref 22–29)
CREAT SERPL-MCNC: 0.86 MG/DL (ref 0.57–1)
CREAT UR-MCNC: 36.9 MG/DL
DEPRECATED RDW RBC AUTO: 43 FL (ref 37–54)
ERYTHROCYTE [DISTWIDTH] IN BLOOD BY AUTOMATED COUNT: 13.1 % (ref 12.3–15.4)
GFR SERPL CREATININE-BSD FRML MDRD: 65 ML/MIN/1.73
GLOBULIN UR ELPH-MCNC: 2.6 GM/DL
GLUCOSE SERPL-MCNC: 124 MG/DL (ref 65–99)
HBA1C MFR BLD: 7.2 % (ref 4.8–5.6)
HCT VFR BLD AUTO: 46.8 % (ref 34–46.6)
HGB BLD-MCNC: 15.3 G/DL (ref 12–15.9)
MCH RBC QN AUTO: 29.1 PG (ref 26.6–33)
MCHC RBC AUTO-ENTMCNC: 32.7 G/DL (ref 31.5–35.7)
MCV RBC AUTO: 89 FL (ref 79–97)
MICROALBUMIN/CREAT UR: NORMAL MG/G{CREAT}
PLATELET # BLD AUTO: 322 10*3/MM3 (ref 140–450)
PMV BLD AUTO: 9.7 FL (ref 6–12)
POTASSIUM SERPL-SCNC: 5.1 MMOL/L (ref 3.5–5.2)
PROT SERPL-MCNC: 6.7 G/DL (ref 6–8.5)
RBC # BLD AUTO: 5.26 10*6/MM3 (ref 3.77–5.28)
SODIUM SERPL-SCNC: 139 MMOL/L (ref 136–145)
T3FREE SERPL-MCNC: 2.61 PG/ML (ref 2–4.4)
T4 FREE SERPL-MCNC: 1.25 NG/DL (ref 0.93–1.7)
TSH SERPL DL<=0.05 MIU/L-ACNC: 2.22 UIU/ML (ref 0.27–4.2)
URATE SERPL-MCNC: 2.5 MG/DL (ref 2.4–5.7)
WBC # BLD AUTO: 9.56 10*3/MM3 (ref 3.4–10.8)

## 2021-10-19 PROCEDURE — 85027 COMPLETE CBC AUTOMATED: CPT

## 2021-10-19 PROCEDURE — 82570 ASSAY OF URINE CREATININE: CPT

## 2021-10-19 PROCEDURE — 36415 COLL VENOUS BLD VENIPUNCTURE: CPT

## 2021-10-19 PROCEDURE — 84443 ASSAY THYROID STIM HORMONE: CPT

## 2021-10-19 PROCEDURE — 82306 VITAMIN D 25 HYDROXY: CPT

## 2021-10-19 PROCEDURE — 84439 ASSAY OF FREE THYROXINE: CPT

## 2021-10-19 PROCEDURE — 82550 ASSAY OF CK (CPK): CPT

## 2021-10-19 PROCEDURE — 80053 COMPREHEN METABOLIC PANEL: CPT

## 2021-10-19 PROCEDURE — 84481 FREE ASSAY (FT-3): CPT

## 2021-10-19 PROCEDURE — 82043 UR ALBUMIN QUANTITATIVE: CPT

## 2021-10-19 PROCEDURE — 80061 LIPID PANEL: CPT

## 2021-10-19 PROCEDURE — 83704 LIPOPROTEIN BLD QUAN PART: CPT

## 2021-10-19 PROCEDURE — 84550 ASSAY OF BLOOD/URIC ACID: CPT

## 2021-10-19 PROCEDURE — 83036 HEMOGLOBIN GLYCOSYLATED A1C: CPT

## 2021-10-21 LAB
CHOLEST SERPL-MCNC: 160 MG/DL (ref 100–199)
HDL SERPL-SCNC: 44.7 UMOL/L
HDLC SERPL-MCNC: 62 MG/DL
LDL SERPL QN: 21 NM
LDL SERPL-SCNC: 1064 NMOL/L
LDL SMALL SERPL-SCNC: 522 NMOL/L
LDLC SERPL CALC-MCNC: 76 MG/DL (ref 0–99)
TRIGL SERPL-MCNC: 125 MG/DL (ref 0–149)

## 2021-10-25 ENCOUNTER — OFFICE VISIT (OUTPATIENT)
Dept: INTERNAL MEDICINE | Facility: CLINIC | Age: 71
End: 2021-10-25

## 2021-10-25 VITALS
OXYGEN SATURATION: 98 % | WEIGHT: 200 LBS | RESPIRATION RATE: 18 BRPM | BODY MASS INDEX: 34.15 KG/M2 | DIASTOLIC BLOOD PRESSURE: 78 MMHG | HEART RATE: 93 BPM | SYSTOLIC BLOOD PRESSURE: 120 MMHG | HEIGHT: 64 IN

## 2021-10-25 DIAGNOSIS — N95.1 MENOPAUSAL STATE: Chronic | ICD-10-CM

## 2021-10-25 DIAGNOSIS — E66.9 NON MORBID OBESITY: Chronic | ICD-10-CM

## 2021-10-25 DIAGNOSIS — K75.81 NASH (NONALCOHOLIC STEATOHEPATITIS): Chronic | ICD-10-CM

## 2021-10-25 DIAGNOSIS — R60.0 BILATERAL LOWER EXTREMITY EDEMA: Chronic | ICD-10-CM

## 2021-10-25 DIAGNOSIS — Z80.3 FAMILY HISTORY OF BREAST CANCER: Chronic | ICD-10-CM

## 2021-10-25 DIAGNOSIS — G47.33 OBSTRUCTIVE SLEEP APNEA: Chronic | ICD-10-CM

## 2021-10-25 DIAGNOSIS — F41.8 DEPRESSION WITH ANXIETY: Chronic | ICD-10-CM

## 2021-10-25 DIAGNOSIS — R80.9 MICROALBUMINURIA: Chronic | ICD-10-CM

## 2021-10-25 DIAGNOSIS — E78.2 MIXED HYPERLIPIDEMIA: Chronic | ICD-10-CM

## 2021-10-25 DIAGNOSIS — E11.29 TYPE 2 DIABETES MELLITUS WITH MICROALBUMINURIA, WITHOUT LONG-TERM CURRENT USE OF INSULIN (HCC): Primary | Chronic | ICD-10-CM

## 2021-10-25 DIAGNOSIS — E55.9 VITAMIN D DEFICIENCY: Chronic | ICD-10-CM

## 2021-10-25 DIAGNOSIS — Z87.39 HISTORY OF GOUT: Chronic | ICD-10-CM

## 2021-10-25 DIAGNOSIS — N26.1 RENAL ATROPHY, LEFT: Chronic | ICD-10-CM

## 2021-10-25 DIAGNOSIS — M25.562 ACUTE PAIN OF LEFT KNEE: ICD-10-CM

## 2021-10-25 DIAGNOSIS — S83.242D ACUTE MEDIAL MENISCUS TEAR OF LEFT KNEE, SUBSEQUENT ENCOUNTER: ICD-10-CM

## 2021-10-25 DIAGNOSIS — F41.1 GENERALIZED ANXIETY DISORDER: Chronic | ICD-10-CM

## 2021-10-25 DIAGNOSIS — R80.9 TYPE 2 DIABETES MELLITUS WITH MICROALBUMINURIA, WITHOUT LONG-TERM CURRENT USE OF INSULIN (HCC): Primary | Chronic | ICD-10-CM

## 2021-10-25 DIAGNOSIS — F51.04 CHRONIC INSOMNIA: Chronic | ICD-10-CM

## 2021-10-25 DIAGNOSIS — Z51.81 THERAPEUTIC DRUG MONITORING: ICD-10-CM

## 2021-10-25 DIAGNOSIS — B07.9 VIRAL WART ON FINGER: ICD-10-CM

## 2021-10-25 DIAGNOSIS — Z23 NEED FOR INFLUENZA VACCINATION: ICD-10-CM

## 2021-10-25 DIAGNOSIS — M85.89 OSTEOPENIA OF MULTIPLE SITES: Chronic | ICD-10-CM

## 2021-10-25 DIAGNOSIS — I10 BENIGN ESSENTIAL HYPERTENSION: Chronic | ICD-10-CM

## 2021-10-25 PROBLEM — E11.65 POORLY CONTROLLED TYPE 2 DIABETES MELLITUS: Status: RESOLVED | Noted: 2020-06-08 | Resolved: 2021-10-25

## 2021-10-25 PROCEDURE — 17110 DESTRUCTION B9 LES UP TO 14: CPT | Performed by: INTERNAL MEDICINE

## 2021-10-25 PROCEDURE — 99214 OFFICE O/P EST MOD 30 MIN: CPT | Performed by: INTERNAL MEDICINE
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
## 2021-11-11 ENCOUNTER — TELEPHONE (OUTPATIENT)
Dept: INTERNAL MEDICINE | Facility: CLINIC | Age: 71
End: 2021-11-11

## 2021-11-11 NOTE — TELEPHONE ENCOUNTER
PATIENT CALLING IN REGARDS TO ASK FOR A REFERRAL TO GO BACK TO SEE DR CRISSY RENE FOR HER KNEES. PLEASE ADVISE THANK YOU!

## 2021-11-15 DIAGNOSIS — E11.29 TYPE 2 DIABETES MELLITUS WITH MICROALBUMINURIA, WITHOUT LONG-TERM CURRENT USE OF INSULIN (HCC): Chronic | ICD-10-CM

## 2021-11-15 DIAGNOSIS — R80.9 TYPE 2 DIABETES MELLITUS WITH MICROALBUMINURIA, WITHOUT LONG-TERM CURRENT USE OF INSULIN (HCC): Chronic | ICD-10-CM

## 2021-11-15 RX ORDER — GLIMEPIRIDE 4 MG/1
TABLET ORAL
Qty: 90 TABLET | Refills: 2 | Status: SHIPPED | OUTPATIENT
Start: 2021-11-15 | End: 2022-04-26 | Stop reason: SDUPTHER

## 2021-11-22 ENCOUNTER — APPOINTMENT (OUTPATIENT)
Dept: WOMENS IMAGING | Facility: HOSPITAL | Age: 71
End: 2021-11-22

## 2021-11-22 PROCEDURE — 77067 SCR MAMMO BI INCL CAD: CPT | Performed by: RADIOLOGY

## 2021-11-22 PROCEDURE — 77063 BREAST TOMOSYNTHESIS BI: CPT | Performed by: RADIOLOGY

## 2021-11-28 DIAGNOSIS — I10 BENIGN ESSENTIAL HYPERTENSION: Chronic | ICD-10-CM

## 2021-11-29 RX ORDER — BENAZEPRIL HYDROCHLORIDE 40 MG/1
TABLET, FILM COATED ORAL
Qty: 90 TABLET | Refills: 1 | Status: SHIPPED | OUTPATIENT
Start: 2021-11-29 | End: 2022-04-26 | Stop reason: SDUPTHER

## 2021-12-13 ENCOUNTER — TELEPHONE (OUTPATIENT)
Dept: INTERNAL MEDICINE | Facility: CLINIC | Age: 71
End: 2021-12-13

## 2021-12-13 DIAGNOSIS — R80.9 TYPE 2 DIABETES MELLITUS WITH MICROALBUMINURIA, WITHOUT LONG-TERM CURRENT USE OF INSULIN (HCC): Primary | ICD-10-CM

## 2021-12-13 DIAGNOSIS — E11.29 TYPE 2 DIABETES MELLITUS WITH MICROALBUMINURIA, WITHOUT LONG-TERM CURRENT USE OF INSULIN (HCC): Primary | ICD-10-CM

## 2021-12-13 RX ORDER — BLOOD-GLUCOSE METER
1 KIT MISCELLANEOUS AS NEEDED
Qty: 1 EACH | Refills: 0 | Status: SHIPPED | OUTPATIENT
Start: 2021-12-13

## 2021-12-13 NOTE — TELEPHONE ENCOUNTER
Caller: [facility] - [place], [place] - [address] - [phone] [facility] [phone] FX    Relationship: Pharmacy    Best call back number: [phone]    What medication are you requesting: HUMANA GLUCOMETER    If a prescription is needed, what is your preferred pharmacy and phone number: [facility] - [place], [place] - [address] - [phone]  - [phone] FX     Additional notes: [facility] IS REQUESTING A PRESCRIPTION FOR A HUMANA GLUCOMETER

## 2021-12-23 ENCOUNTER — HOSPITAL ENCOUNTER (OUTPATIENT)
Dept: GENERAL RADIOLOGY | Facility: HOSPITAL | Age: 71
Discharge: HOME | End: 2021-12-23

## 2021-12-23 ENCOUNTER — PRE-ADMISSION TESTING (OUTPATIENT)
Dept: PREADMISSION TESTING | Facility: HOSPITAL | Age: 71
End: 2021-12-23

## 2021-12-23 VITALS
OXYGEN SATURATION: 97 % | BODY MASS INDEX: 35.17 KG/M2 | TEMPERATURE: 97.9 F | HEIGHT: 64 IN | WEIGHT: 206 LBS | RESPIRATION RATE: 16 BRPM | HEART RATE: 99 BPM

## 2021-12-23 LAB
ALBUMIN SERPL-MCNC: 4 G/DL (ref 3.5–5.2)
ALBUMIN/GLOB SERPL: 1.5 G/DL
ALP SERPL-CCNC: 85 U/L (ref 39–117)
ALT SERPL W P-5'-P-CCNC: 16 U/L (ref 1–33)
ANION GAP SERPL CALCULATED.3IONS-SCNC: 11.8 MMOL/L (ref 5–15)
APTT PPP: 26.1 SECONDS (ref 22.7–35.4)
AST SERPL-CCNC: 16 U/L (ref 1–32)
BACTERIA UR QL AUTO: ABNORMAL /HPF
BASOPHILS # BLD AUTO: 0.09 10*3/MM3 (ref 0–0.2)
BASOPHILS NFR BLD AUTO: 1.1 % (ref 0–1.5)
BILIRUB SERPL-MCNC: 0.2 MG/DL (ref 0–1.2)
BILIRUB UR QL STRIP: NEGATIVE
BUN SERPL-MCNC: 22 MG/DL (ref 8–23)
BUN/CREAT SERPL: 26.8 (ref 7–25)
CALCIUM SPEC-SCNC: 9.5 MG/DL (ref 8.6–10.5)
CHLORIDE SERPL-SCNC: 107 MMOL/L (ref 98–107)
CLARITY UR: CLEAR
CO2 SERPL-SCNC: 24.2 MMOL/L (ref 22–29)
COLOR UR: YELLOW
CREAT SERPL-MCNC: 0.82 MG/DL (ref 0.57–1)
DEPRECATED RDW RBC AUTO: 44.2 FL (ref 37–54)
EOSINOPHIL # BLD AUTO: 0.68 10*3/MM3 (ref 0–0.4)
EOSINOPHIL NFR BLD AUTO: 7.9 % (ref 0.3–6.2)
ERYTHROCYTE [DISTWIDTH] IN BLOOD BY AUTOMATED COUNT: 13.6 % (ref 12.3–15.4)
GFR SERPL CREATININE-BSD FRML MDRD: 69 ML/MIN/1.73
GLOBULIN UR ELPH-MCNC: 2.6 GM/DL
GLUCOSE SERPL-MCNC: 147 MG/DL (ref 65–99)
GLUCOSE UR STRIP-MCNC: ABNORMAL MG/DL
HBA1C MFR BLD: 7.4 % (ref 4.8–5.6)
HCT VFR BLD AUTO: 46.4 % (ref 34–46.6)
HGB BLD-MCNC: 14.9 G/DL (ref 12–15.9)
HGB UR QL STRIP.AUTO: NEGATIVE
HYALINE CASTS UR QL AUTO: ABNORMAL /LPF
IMM GRANULOCYTES # BLD AUTO: 0.02 10*3/MM3 (ref 0–0.05)
IMM GRANULOCYTES NFR BLD AUTO: 0.2 % (ref 0–0.5)
INR PPP: 1.05 (ref 0.9–1.1)
KETONES UR QL STRIP: NEGATIVE
LEUKOCYTE ESTERASE UR QL STRIP.AUTO: NEGATIVE
LYMPHOCYTES # BLD AUTO: 2.6 10*3/MM3 (ref 0.7–3.1)
LYMPHOCYTES NFR BLD AUTO: 30.3 % (ref 19.6–45.3)
MCH RBC QN AUTO: 28.8 PG (ref 26.6–33)
MCHC RBC AUTO-ENTMCNC: 32.1 G/DL (ref 31.5–35.7)
MCV RBC AUTO: 89.7 FL (ref 79–97)
MONOCYTES # BLD AUTO: 0.5 10*3/MM3 (ref 0.1–0.9)
MONOCYTES NFR BLD AUTO: 5.8 % (ref 5–12)
NEUTROPHILS NFR BLD AUTO: 4.68 10*3/MM3 (ref 1.7–7)
NEUTROPHILS NFR BLD AUTO: 54.7 % (ref 42.7–76)
NITRITE UR QL STRIP: NEGATIVE
NRBC BLD AUTO-RTO: 0 /100 WBC (ref 0–0.2)
PH UR STRIP.AUTO: <=5 [PH] (ref 5–8)
PLATELET # BLD AUTO: 324 10*3/MM3 (ref 140–450)
PMV BLD AUTO: 9.4 FL (ref 6–12)
POTASSIUM SERPL-SCNC: 3.8 MMOL/L (ref 3.5–5.2)
PROT SERPL-MCNC: 6.6 G/DL (ref 6–8.5)
PROT UR QL STRIP: NEGATIVE
PROTHROMBIN TIME: 13.5 SECONDS (ref 11.7–14.2)
QT INTERVAL: 370 MS
RBC # BLD AUTO: 5.17 10*6/MM3 (ref 3.77–5.28)
RBC # UR STRIP: ABNORMAL /HPF
REF LAB TEST METHOD: ABNORMAL
SODIUM SERPL-SCNC: 143 MMOL/L (ref 136–145)
SP GR UR STRIP: >=1.03 (ref 1–1.03)
SQUAMOUS #/AREA URNS HPF: ABNORMAL /HPF
UROBILINOGEN UR QL STRIP: ABNORMAL
WBC # UR STRIP: ABNORMAL /HPF
WBC NRBC COR # BLD: 8.57 10*3/MM3 (ref 3.4–10.8)

## 2021-12-23 PROCEDURE — 73560 X-RAY EXAM OF KNEE 1 OR 2: CPT

## 2021-12-23 PROCEDURE — 81001 URINALYSIS AUTO W/SCOPE: CPT

## 2021-12-23 PROCEDURE — 93010 ELECTROCARDIOGRAM REPORT: CPT | Performed by: INTERNAL MEDICINE

## 2021-12-23 PROCEDURE — 80053 COMPREHEN METABOLIC PANEL: CPT

## 2021-12-23 PROCEDURE — 85730 THROMBOPLASTIN TIME PARTIAL: CPT

## 2021-12-23 PROCEDURE — 93005 ELECTROCARDIOGRAM TRACING: CPT

## 2021-12-23 PROCEDURE — 85025 COMPLETE CBC W/AUTO DIFF WBC: CPT

## 2021-12-23 PROCEDURE — 71046 X-RAY EXAM CHEST 2 VIEWS: CPT

## 2021-12-23 PROCEDURE — 36415 COLL VENOUS BLD VENIPUNCTURE: CPT

## 2021-12-23 PROCEDURE — 85610 PROTHROMBIN TIME: CPT

## 2021-12-23 PROCEDURE — 83036 HEMOGLOBIN GLYCOSYLATED A1C: CPT

## 2021-12-23 ASSESSMENT — KOOS JR
KOOS JR SCORE: 18
KOOS JR SCORE: 42.281

## 2021-12-23 NOTE — DISCHARGE INSTRUCTIONS
Take the following medications the morning of surgery:    NONE      ARRIVE AT 9:00    If you are on prescription narcotic pain medication to control your pain you may also take that medication the morning of surgery.    General Instructions:  • Do not eat solid food after midnight the night before surgery.  • You may drink clear liquids day of surgery but must stop at least one hour before your hospital arrival time.  • It is beneficial for you to have a clear drink that contains carbohydrates the day of surgery.  We suggest a 12 to 20 ounce bottle of Gatorade or Powerade for non-diabetic patients or a 12 to 20 ounce bottle of G2 or Powerade Zero for diabetic patients. (Pediatric patients, are not advised to drink a 12 to 20 ounce carbohydrate drink)    Clear liquids are liquids you can see through.  Nothing red in color.     Plain water                               Sports drinks  Sodas                                   Gelatin (Jell-O)  Fruit juices without pulp such as white grape juice and apple juice  Popsicles that contain no fruit or yogurt  Tea or coffee (no cream or milk added)  Gatorade / Powerade  G2 / Powerade Zero    •   • Patients who avoid smoking, chewing tobacco and alcohol for 4 weeks prior to surgery have a reduced risk of post-operative complications.  Quit smoking as many days before surgery as you can.  • Do not smoke, use chewing tobacco or drink alcohol the day of surgery.   • If applicable bring your C-PAP/ BI-PAP machine.  • Bring any papers given to you in the doctor’s office.  • Wear clean comfortable clothes.  • Do not wear contact lenses, false eyelashes or make-up.  Bring a case for your glasses.   • Bring crutches or walker if applicable.  • Remove all piercings.  Leave jewelry and any other valuables at home.  • Hair extensions with metal clips must be removed prior to surgery.  • The Pre-Admission Testing nurse will instruct you to bring medications if unable to obtain an accurate  list in Pre-Admission Testing.            Preventing a Surgical Site Infection:  • For 2 to 3 days before surgery, avoid shaving with a razor because the razor can irritate skin and make it easier to develop an infection.    • Any areas of open skin can increase the risk of a post-operative wound infection by allowing bacteria to enter and travel throughout the body.  Notify your surgeon if you have any skin wounds / rashes even if it is not near the expected surgical site.  The area will need assessed to determine if surgery should be delayed until it is healed.  • The night prior to surgery shower using a fresh bar of anti-bacterial soap (such as Dial) and clean washcloth.  Sleep in a clean bed with clean clothing.  Do not allow pets to sleep with you.  • Shower on the morning of surgery using a fresh bar of anti-bacterial soap (such as Dial) and clean washcloth.  Dry with a clean towel and dress in clean clothing.  • Ask your surgeon if you will be receiving antibiotics prior to surgery.  • Make sure you, your family, and all healthcare providers clean their hands with soap and water or an alcohol based hand  before caring for you or your wound.    Day of surgery:  Your arrival time is approximately two hours before your scheduled surgery time.  Upon arrival, a Pre-op nurse and Anesthesiologist will review your health history, obtain vital signs, and answer questions you may have.  The only belongings needed at this time will be a list of your home medications and if applicable your C-PAP/BI-PAP machine.  A Pre-op nurse will start an IV and you may receive medication in preparation for surgery, including something to help you relax.     Please be aware that surgery does come with discomfort.  We want to make every effort to control your discomfort so please discuss any uncontrolled symptoms with your nurse.   Your doctor will most likely have prescribed pain medications.      If you are going home after  surgery you will receive individualized written care instructions before being discharged.  A responsible adult must drive you to and from the hospital on the day of your surgery and stay with you for 24 hours.  Discharge prescriptions can be filled by the hospital pharmacy during regular pharmacy hours.  If you are having surgery late in the day/evening your prescription may be e-prescribed to your pharmacy.  Please verify your pharmacy hours or chose a 24 hour pharmacy to avoid not having access to your prescription because your pharmacy has closed for the day.    If you are staying overnight following surgery, you will be transported to your hospital room following the recovery period.  [facility] has all private rooms.    If you have any questions please call Pre-Admission Testing at [phone].  Deductibles and co-payments are collected on the day of service. Please be prepared to pay the required co-pay, deductible or deposit on the day of service as defined by your plan.    Patient Education for Self-Quarantine Process    • Following your COVID testing, we strongly recommend that you wear a mask when you are with other people and practice social distancing.   • Limit your activities to only required outings.  • Wash your hands with soap and water frequently for at least 20 seconds.   • Avoid touching your eyes, nose and mouth with unwashed hands.  • Do not share anything - utensils, drinking glasses, food from the same bowl.   • Sanitize household surfaces daily. Include all high touch areas (door handles, light switches, phones, countertops, etc.)    Call your surgeon immediately if you experience any of the following symptoms:  • Sore Throat  • Shortness of Breath or difficulty breathing  • Cough  • Chills  • Body soreness or muscle pain  • Headache  • Fever  • New loss of taste or smell  • Do not arrive for your surgery ill.  Your procedure will need to be rescheduled to another time.   You will need to call your physician before the day of surgery to avoid any unnecessary exposure to hospital staff as well as other patients.    CHLORHEXIDINE CLOTH INSTRUCTIONS  The morning of surgery follow these instructions using the Chlorhexidine cloths you've been given.  These steps reduce bacteria on the body.  Do not use the cloths near your eyes, ears mouth, genitalia or on open wounds.  Throw the cloths away after use but do not try to flush them down a toilet.      • Open and remove one cloth at a time from the package.    • Leave the cloth unfolded and begin the bathing.  • Massage the skin with the cloths using gentle pressure to remove bacteria.  Do not scrub harshly.   • Follow the steps below with one 2% CHG cloth per area (6 total cloths).  • One cloth for neck, shoulders and chest.  • One cloth for both arms, hands, fingers and underarms (do underarms last).  • One cloth for the abdomen followed by groin.  • One cloth for right leg and foot including between the toes.  • One cloth for left leg and foot including between the toes.  • The last cloth is to be used for the back of the neck, back and buttocks.    Allow the CHG to air dry 3 minutes on the skin which will give it time to work and decrease the chance of irritation.  The skin may feel sticky until it is dry.  Do not rinse with water or any other liquid or you will lose the beneficial effects of the CHG.  If mild skin irritation occurs, do rinse the skin to remove the CHG.  Report this to the nurse at time of admission.  Do not apply lotions, creams, ointments, deodorants or perfumes after using the clothes. Dress in clean clothes before coming to the hospital.    BACTROBAN NASAL OINTMENT  There are many germs normally in your nose. Bactroban is an ointment that will help reduce these germs. Please follow these instructions for Bactroban use:      ____The day before surgery in the morning  Date________    ____The day before surgery in the  evening              Date________    ____The day of surgery in the morning    Date________    **Squirt ½ package of Bactroban Ointment onto a cotton applicator and apply to inside of 1st nostril.  Squirt the remaining Bactroban and apply to the inside of the other nostril.

## 2021-12-27 DIAGNOSIS — M25.562 ACUTE PAIN OF LEFT KNEE: ICD-10-CM

## 2021-12-27 NOTE — TELEPHONE ENCOUNTER
Caller: Reema Easley    Relationship: Self    Best call back number: [phone]    Requested Prescriptions:   Requested Prescriptions     Pending Prescriptions Disp Refills   • naproxen (NAPROSYN) 500 MG tablet 60 tablet 5     Sig: TAKE ONE TABLET BY MOUTH TWICE A DAY FOR ARTHRITIS OR KNEE PAIN      Pharmacy where request should be sent: [facility] - [address] - [phone]  - [phone] FX     Additional details provided by patient:     Does the patient have less than a 3 day supply:  [] Yes  [x] No    Jazmine Mandujano Rep   12/27/21 12:01 EST

## 2021-12-28 RX ORDER — NAPROXEN 500 MG/1
TABLET ORAL
Qty: 60 TABLET | Refills: 5 | Status: SHIPPED | OUTPATIENT
Start: 2021-12-28 | End: 2022-04-26 | Stop reason: SDUPTHER

## 2022-01-03 ENCOUNTER — LAB (OUTPATIENT)
Dept: LAB | Facility: HOSPITAL | Age: 72
End: 2022-01-03

## 2022-01-03 DIAGNOSIS — E11.29 TYPE 2 DIABETES MELLITUS WITH MICROALBUMINURIA, WITHOUT LONG-TERM CURRENT USE OF INSULIN: Primary | ICD-10-CM

## 2022-01-03 DIAGNOSIS — R80.9 TYPE 2 DIABETES MELLITUS WITH MICROALBUMINURIA, WITHOUT LONG-TERM CURRENT USE OF INSULIN: Primary | ICD-10-CM

## 2022-01-03 LAB — SARS-COV-2 ORF1AB RESP QL NAA+PROBE: NOT DETECTED

## 2022-01-03 PROCEDURE — U0004 COV-19 TEST NON-CDC HGH THRU: HCPCS

## 2022-01-03 PROCEDURE — C9803 HOPD COVID-19 SPEC COLLECT: HCPCS

## 2022-01-03 NOTE — TELEPHONE ENCOUNTER
PATIENT IS REQUESTING LANCETS AND STRIPS FOR HER NEW GLUCOSE METER AND SHE STATES THAT [facility] ADVISED THAT NEW SCRIPTS WERE NEEDED.     SHE STATES THAT HER NEW METER IS VASILIY-METRIC BRAND.    [facility] - [place], [place] - [address] - [phone]  - [phone]   [phone]

## 2022-01-04 ENCOUNTER — HOSPITAL ENCOUNTER (OUTPATIENT)
Facility: HOSPITAL | Age: 72
Discharge: HOME | End: 2022-01-05
Attending: ORTHOPAEDIC SURGERY | Admitting: ORTHOPAEDIC SURGERY

## 2022-01-04 ENCOUNTER — APPOINTMENT (OUTPATIENT)
Dept: GENERAL RADIOLOGY | Facility: HOSPITAL | Age: 72
End: 2022-01-04

## 2022-01-04 ENCOUNTER — ANESTHESIA EVENT (OUTPATIENT)
Dept: PERIOP | Facility: HOSPITAL | Age: 72
End: 2022-01-04

## 2022-01-04 ENCOUNTER — ANESTHESIA (OUTPATIENT)
Dept: PERIOP | Facility: HOSPITAL | Age: 72
End: 2022-01-04

## 2022-01-04 PROBLEM — R11.0 NAUSEA: Status: ACTIVE | Noted: 2022-01-04

## 2022-01-04 PROBLEM — Z96.659 HISTORY OF KNEE REPLACEMENT: Status: ACTIVE | Noted: 2022-01-04

## 2022-01-04 LAB
ANION GAP SERPL CALCULATED.3IONS-SCNC: 8.2 MMOL/L (ref 5–15)
BUN SERPL-MCNC: 18 MG/DL (ref 8–23)
BUN/CREAT SERPL: 22 (ref 7–25)
CALCIUM SPEC-SCNC: 8.9 MG/DL (ref 8.6–10.5)
CHLORIDE SERPL-SCNC: 103 MMOL/L (ref 98–107)
CO2 SERPL-SCNC: 24.8 MMOL/L (ref 22–29)
CREAT SERPL-MCNC: 0.82 MG/DL (ref 0.57–1)
DEPRECATED RDW RBC AUTO: 43.3 FL (ref 37–54)
ERYTHROCYTE [DISTWIDTH] IN BLOOD BY AUTOMATED COUNT: 13.3 % (ref 12.3–15.4)
GFR SERPL CREATININE-BSD FRML MDRD: 69 ML/MIN/1.73
GLUCOSE BLDC GLUCOMTR-MCNC: 132 MG/DL (ref 70–130)
GLUCOSE BLDC GLUCOMTR-MCNC: 218 MG/DL (ref 70–130)
GLUCOSE SERPL-MCNC: 255 MG/DL (ref 65–99)
HCT VFR BLD AUTO: 43 % (ref 34–46.6)
HGB BLD-MCNC: 14.1 G/DL (ref 12–15.9)
MCH RBC QN AUTO: 29.4 PG (ref 26.6–33)
MCHC RBC AUTO-ENTMCNC: 32.8 G/DL (ref 31.5–35.7)
MCV RBC AUTO: 89.8 FL (ref 79–97)
PLATELET # BLD AUTO: 296 10*3/MM3 (ref 140–450)
PMV BLD AUTO: 9.2 FL (ref 6–12)
POTASSIUM SERPL-SCNC: 4.7 MMOL/L (ref 3.5–5.2)
RBC # BLD AUTO: 4.79 10*6/MM3 (ref 3.77–5.28)
SODIUM SERPL-SCNC: 136 MMOL/L (ref 136–145)
WBC NRBC COR # BLD: 17.55 10*3/MM3 (ref 3.4–10.8)

## 2022-01-04 PROCEDURE — 25010000002 EPINEPHRINE 1 MG/ML SOLUTION 30 ML VIAL: Performed by: ORTHOPAEDIC SURGERY

## 2022-01-04 PROCEDURE — 25010000002 FENTANYL CITRATE (PF) 50 MCG/ML SOLUTION: Performed by: ANESTHESIOLOGY

## 2022-01-04 PROCEDURE — 25010000002 NEOSTIGMINE 5 MG/10ML SOLUTION: Performed by: NURSE ANESTHETIST, CERTIFIED REGISTERED

## 2022-01-04 PROCEDURE — 73560 X-RAY EXAM OF KNEE 1 OR 2: CPT

## 2022-01-04 PROCEDURE — 25010000002 HYDROMORPHONE 1 MG/ML SOLUTION: Performed by: NURSE ANESTHETIST, CERTIFIED REGISTERED

## 2022-01-04 PROCEDURE — 0 CEFAZOLIN IN DEXTROSE 2-4 GM/100ML-% SOLUTION: Performed by: ORTHOPAEDIC SURGERY

## 2022-01-04 PROCEDURE — 25010000002 PROPOFOL 10 MG/ML EMULSION: Performed by: NURSE ANESTHETIST, CERTIFIED REGISTERED

## 2022-01-04 PROCEDURE — 25010000002 CLONIDINE PER 1 MG: Performed by: ORTHOPAEDIC SURGERY

## 2022-01-04 PROCEDURE — 80048 BASIC METABOLIC PNL TOTAL CA: CPT | Performed by: ORTHOPAEDIC SURGERY

## 2022-01-04 PROCEDURE — C1889 IMPLANT/INSERT DEVICE, NOC: HCPCS | Performed by: ORTHOPAEDIC SURGERY

## 2022-01-04 PROCEDURE — 97530 THERAPEUTIC ACTIVITIES: CPT

## 2022-01-04 PROCEDURE — 25010000002 KETOROLAC TROMETHAMINE PER 15 MG: Performed by: ORTHOPAEDIC SURGERY

## 2022-01-04 PROCEDURE — C1713 ANCHOR/SCREW BN/BN,TIS/BN: HCPCS | Performed by: ORTHOPAEDIC SURGERY

## 2022-01-04 PROCEDURE — 97161 PT EVAL LOW COMPLEX 20 MIN: CPT

## 2022-01-04 PROCEDURE — 25010000002 DEXAMETHASONE PER 1 MG: Performed by: NURSE ANESTHETIST, CERTIFIED REGISTERED

## 2022-01-04 PROCEDURE — 25010000002 FENTANYL CITRATE (PF) 50 MCG/ML SOLUTION: Performed by: NURSE ANESTHETIST, CERTIFIED REGISTERED

## 2022-01-04 PROCEDURE — 85027 COMPLETE CBC AUTOMATED: CPT | Performed by: ORTHOPAEDIC SURGERY

## 2022-01-04 PROCEDURE — 25010000002 PHENYLEPHRINE 10 MG/ML SOLUTION: Performed by: NURSE ANESTHETIST, CERTIFIED REGISTERED

## 2022-01-04 PROCEDURE — G0378 HOSPITAL OBSERVATION PER HR: HCPCS

## 2022-01-04 PROCEDURE — C1776 JOINT DEVICE (IMPLANTABLE): HCPCS | Performed by: ORTHOPAEDIC SURGERY

## 2022-01-04 PROCEDURE — 25010000002 ONDANSETRON PER 1 MG: Performed by: ORTHOPAEDIC SURGERY

## 2022-01-04 PROCEDURE — 25010000002 MIDAZOLAM PER 1 MG: Performed by: ANESTHESIOLOGY

## 2022-01-04 PROCEDURE — 25010000002 ROPIVACAINE PER 1 MG: Performed by: ORTHOPAEDIC SURGERY

## 2022-01-04 PROCEDURE — 82962 GLUCOSE BLOOD TEST: CPT

## 2022-01-04 PROCEDURE — 63710000001 INSULIN LISPRO (HUMAN) PER 5 UNITS: Performed by: INTERNAL MEDICINE

## 2022-01-04 PROCEDURE — 25010000002 HYDROMORPHONE PER 4 MG: Performed by: NURSE ANESTHETIST, CERTIFIED REGISTERED

## 2022-01-04 PROCEDURE — 25010000002 ONDANSETRON PER 1 MG: Performed by: NURSE ANESTHETIST, CERTIFIED REGISTERED

## 2022-01-04 DEVICE — JOURNEY 7.5 ROUND RESURF PAT 32MM STANDARD
Type: IMPLANTABLE DEVICE | Site: KNEE | Status: FUNCTIONAL
Brand: JOURNEY

## 2022-01-04 DEVICE — VIOLET ANTIBACTERIAL POLYDIOXANONE, KNOTLESS TISSUE CONTROL DEVICE
Type: IMPLANTABLE DEVICE | Site: KNEE | Status: FUNCTIONAL
Brand: STRATAFIX

## 2022-01-04 DEVICE — IMPLANTABLE DEVICE: Type: IMPLANTABLE DEVICE | Site: KNEE | Status: FUNCTIONAL

## 2022-01-04 DEVICE — JOURNEY II CR INSERT XLPE LEFT SIZE 3-4 9MM
Type: IMPLANTABLE DEVICE | Site: KNEE | Status: FUNCTIONAL
Brand: JOURNEY

## 2022-01-04 DEVICE — JOURNEY II CR FEMORAL OXINIUM NONPOROUS LEFT SIZE 6
Type: IMPLANTABLE DEVICE | Site: KNEE | Status: FUNCTIONAL
Brand: JOURNEY

## 2022-01-04 DEVICE — JOURNEY TIBIAL BASEPLATE NONPOROUS                                    LEFT SIZE 4
Type: IMPLANTABLE DEVICE | Site: KNEE | Status: FUNCTIONAL
Brand: JOURNEY

## 2022-01-04 DEVICE — PALACOS® R IS A FAST-CURING, RADIOPAQUE, POLY(METHYL METHACRYLATE)-BASED BONE CEMENT.PALACOS ® R CONTAINS THE X-RAY CONTRAST MEDIUM ZIRCONIUM DIOXIDE. TO IMPROVE VISIBILITY IN THE SURGICAL FIELD PALACOS ® R HAS BEEN COLOURED WITH CHLOROPHYLL (E141). THE BONE CEMENT IS PREPARED DIRECTLY BEFORE USE BY MIXING A POLYMER POWDER COMPONENT WITH A LIQUID MONOMER COMPONENT. A DUCTILE DOUGH FORMS WHICH CURES WITHIN A FEW MINUTES.
Type: IMPLANTABLE DEVICE | Site: KNEE | Status: FUNCTIONAL
Brand: PALACOS®

## 2022-01-04 DEVICE — DEV CONTRL TISS STRATAFIX SPIRAL PDS PLS CT1 2-0 45CM: Type: IMPLANTABLE DEVICE | Site: KNEE | Status: FUNCTIONAL

## 2022-01-04 RX ORDER — CEFAZOLIN SODIUM 2 G/100ML
2 INJECTION, SOLUTION INTRAVENOUS ONCE
Status: COMPLETED | OUTPATIENT
Start: 2022-01-04 | End: 2022-01-04

## 2022-01-04 RX ORDER — OXYCODONE HYDROCHLORIDE AND ACETAMINOPHEN 5; 325 MG/1; MG/1
1 TABLET ORAL EVERY 4 HOURS PRN
Status: DISCONTINUED | OUTPATIENT
Start: 2022-01-04 | End: 2022-01-05 | Stop reason: HOSPADM

## 2022-01-04 RX ORDER — LISINOPRIL 20 MG/1
10 TABLET ORAL
Status: DISCONTINUED | OUTPATIENT
Start: 2022-01-04 | End: 2022-01-05 | Stop reason: HOSPADM

## 2022-01-04 RX ORDER — NALOXONE HCL 0.4 MG/ML
0.1 VIAL (ML) INJECTION
Status: DISCONTINUED | OUTPATIENT
Start: 2022-01-04 | End: 2022-01-05 | Stop reason: HOSPADM

## 2022-01-04 RX ORDER — ALPRAZOLAM 0.5 MG/1
0.5 TABLET ORAL 3 TIMES DAILY PRN
Status: DISCONTINUED | OUTPATIENT
Start: 2022-01-04 | End: 2022-01-05 | Stop reason: HOSPADM

## 2022-01-04 RX ORDER — ACETAMINOPHEN 500 MG
1000 TABLET ORAL ONCE
Status: COMPLETED | OUTPATIENT
Start: 2022-01-04 | End: 2022-01-04

## 2022-01-04 RX ORDER — NICARDIPINE HYDROCHLORIDE 2.5 MG/ML
INJECTION INTRAVENOUS AS NEEDED
Status: DISCONTINUED | OUTPATIENT
Start: 2022-01-04 | End: 2022-01-04 | Stop reason: SURG

## 2022-01-04 RX ORDER — ONDANSETRON 4 MG/1
4 TABLET, FILM COATED ORAL EVERY 6 HOURS PRN
Status: DISCONTINUED | OUTPATIENT
Start: 2022-01-04 | End: 2022-01-05 | Stop reason: HOSPADM

## 2022-01-04 RX ORDER — UREA 10 %
1 LOTION (ML) TOPICAL NIGHTLY PRN
Status: DISCONTINUED | OUTPATIENT
Start: 2022-01-04 | End: 2022-01-05 | Stop reason: HOSPADM

## 2022-01-04 RX ORDER — CELECOXIB 200 MG/1
200 CAPSULE ORAL ONCE
Status: COMPLETED | OUTPATIENT
Start: 2022-01-04 | End: 2022-01-04

## 2022-01-04 RX ORDER — LIDOCAINE HYDROCHLORIDE 10 MG/ML
0.5 INJECTION, SOLUTION EPIDURAL; INFILTRATION; INTRACAUDAL; PERINEURAL ONCE AS NEEDED
Status: DISCONTINUED | OUTPATIENT
Start: 2022-01-04 | End: 2022-01-04 | Stop reason: HOSPADM

## 2022-01-04 RX ORDER — EPHEDRINE SULFATE 50 MG/ML
5 INJECTION, SOLUTION INTRAVENOUS ONCE AS NEEDED
Status: DISCONTINUED | OUTPATIENT
Start: 2022-01-04 | End: 2022-01-04 | Stop reason: HOSPADM

## 2022-01-04 RX ORDER — ASPIRIN 81 MG/1
81 TABLET ORAL DAILY
Status: DISCONTINUED | OUTPATIENT
Start: 2022-01-04 | End: 2022-01-05 | Stop reason: HOSPADM

## 2022-01-04 RX ORDER — LIDOCAINE HYDROCHLORIDE 20 MG/ML
INJECTION, SOLUTION INFILTRATION; PERINEURAL AS NEEDED
Status: DISCONTINUED | OUTPATIENT
Start: 2022-01-04 | End: 2022-01-04 | Stop reason: SURG

## 2022-01-04 RX ORDER — MAGNESIUM HYDROXIDE 1200 MG/15ML
LIQUID ORAL AS NEEDED
Status: DISCONTINUED | OUTPATIENT
Start: 2022-01-04 | End: 2022-01-04 | Stop reason: HOSPADM

## 2022-01-04 RX ORDER — NALOXONE HCL 0.4 MG/ML
0.2 VIAL (ML) INJECTION AS NEEDED
Status: DISCONTINUED | OUTPATIENT
Start: 2022-01-04 | End: 2022-01-04 | Stop reason: HOSPADM

## 2022-01-04 RX ORDER — TRANEXAMIC ACID 100 MG/ML
INJECTION, SOLUTION INTRAVENOUS AS NEEDED
Status: DISCONTINUED | OUTPATIENT
Start: 2022-01-04 | End: 2022-01-04 | Stop reason: SURG

## 2022-01-04 RX ORDER — OXYCODONE HYDROCHLORIDE 5 MG/1
5 TABLET ORAL ONCE
Status: COMPLETED | OUTPATIENT
Start: 2022-01-04 | End: 2022-01-04

## 2022-01-04 RX ORDER — FENTANYL CITRATE 50 UG/ML
50 INJECTION, SOLUTION INTRAMUSCULAR; INTRAVENOUS
Status: DISCONTINUED | OUTPATIENT
Start: 2022-01-04 | End: 2022-01-04 | Stop reason: HOSPADM

## 2022-01-04 RX ORDER — INSULIN LISPRO 100 [IU]/ML
0-14 INJECTION, SOLUTION INTRAVENOUS; SUBCUTANEOUS
Status: DISCONTINUED | OUTPATIENT
Start: 2022-01-04 | End: 2022-01-05 | Stop reason: HOSPADM

## 2022-01-04 RX ORDER — CEFAZOLIN SODIUM 2 G/100ML
2 INJECTION, SOLUTION INTRAVENOUS EVERY 8 HOURS
Status: COMPLETED | OUTPATIENT
Start: 2022-01-04 | End: 2022-01-05

## 2022-01-04 RX ORDER — NEOSTIGMINE METHYLSULFATE 0.5 MG/ML
INJECTION, SOLUTION INTRAVENOUS AS NEEDED
Status: DISCONTINUED | OUTPATIENT
Start: 2022-01-04 | End: 2022-01-04 | Stop reason: SURG

## 2022-01-04 RX ORDER — GLYCOPYRROLATE 0.2 MG/ML
INJECTION INTRAMUSCULAR; INTRAVENOUS AS NEEDED
Status: DISCONTINUED | OUTPATIENT
Start: 2022-01-04 | End: 2022-01-04 | Stop reason: SURG

## 2022-01-04 RX ORDER — ACETAMINOPHEN 500 MG
1000 TABLET ORAL ONCE
Status: DISCONTINUED | OUTPATIENT
Start: 2022-01-04 | End: 2022-01-04

## 2022-01-04 RX ORDER — LANCETS 28 GAUGE
EACH MISCELLANEOUS
Qty: 100 EACH | Refills: 11 | Status: SHIPPED | OUTPATIENT
Start: 2022-01-04 | End: 2023-01-07

## 2022-01-04 RX ORDER — SODIUM CHLORIDE 0.9 % (FLUSH) 0.9 %
3-10 SYRINGE (ML) INJECTION AS NEEDED
Status: DISCONTINUED | OUTPATIENT
Start: 2022-01-04 | End: 2022-01-04 | Stop reason: HOSPADM

## 2022-01-04 RX ORDER — MIDAZOLAM HYDROCHLORIDE 1 MG/ML
0.5 INJECTION INTRAMUSCULAR; INTRAVENOUS
Status: DISCONTINUED | OUTPATIENT
Start: 2022-01-04 | End: 2022-01-04 | Stop reason: HOSPADM

## 2022-01-04 RX ORDER — PROMETHAZINE HYDROCHLORIDE 25 MG/1
25 TABLET ORAL ONCE AS NEEDED
Status: DISCONTINUED | OUTPATIENT
Start: 2022-01-04 | End: 2022-01-04 | Stop reason: HOSPADM

## 2022-01-04 RX ORDER — DIPHENHYDRAMINE HCL 25 MG
25 CAPSULE ORAL
Status: DISCONTINUED | OUTPATIENT
Start: 2022-01-04 | End: 2022-01-04 | Stop reason: HOSPADM

## 2022-01-04 RX ORDER — MELOXICAM 15 MG/1
15 TABLET ORAL DAILY
Status: DISCONTINUED | OUTPATIENT
Start: 2022-01-04 | End: 2022-01-05 | Stop reason: HOSPADM

## 2022-01-04 RX ORDER — DOCUSATE SODIUM 100 MG/1
100 CAPSULE, LIQUID FILLED ORAL 2 TIMES DAILY PRN
Status: DISCONTINUED | OUTPATIENT
Start: 2022-01-04 | End: 2022-01-05 | Stop reason: HOSPADM

## 2022-01-04 RX ORDER — PHENYLEPHRINE HYDROCHLORIDE 10 MG/ML
INJECTION INTRAVENOUS AS NEEDED
Status: DISCONTINUED | OUTPATIENT
Start: 2022-01-04 | End: 2022-01-04 | Stop reason: SURG

## 2022-01-04 RX ORDER — FAMOTIDINE 10 MG/ML
20 INJECTION, SOLUTION INTRAVENOUS ONCE
Status: COMPLETED | OUTPATIENT
Start: 2022-01-04 | End: 2022-01-04

## 2022-01-04 RX ORDER — NICOTINE POLACRILEX 4 MG
15 LOZENGE BUCCAL
Status: DISCONTINUED | OUTPATIENT
Start: 2022-01-04 | End: 2022-01-05 | Stop reason: HOSPADM

## 2022-01-04 RX ORDER — HYDROMORPHONE HYDROCHLORIDE 1 MG/ML
0.5 INJECTION, SOLUTION INTRAMUSCULAR; INTRAVENOUS; SUBCUTANEOUS
Status: DISCONTINUED | OUTPATIENT
Start: 2022-01-04 | End: 2022-01-04 | Stop reason: HOSPADM

## 2022-01-04 RX ORDER — DEXTROSE MONOHYDRATE 25 G/50ML
25 INJECTION, SOLUTION INTRAVENOUS
Status: DISCONTINUED | OUTPATIENT
Start: 2022-01-04 | End: 2022-01-05 | Stop reason: HOSPADM

## 2022-01-04 RX ORDER — HYDRALAZINE HYDROCHLORIDE 20 MG/ML
5 INJECTION INTRAMUSCULAR; INTRAVENOUS
Status: DISCONTINUED | OUTPATIENT
Start: 2022-01-04 | End: 2022-01-04 | Stop reason: HOSPADM

## 2022-01-04 RX ORDER — SODIUM CHLORIDE 0.9 % (FLUSH) 0.9 %
3 SYRINGE (ML) INJECTION EVERY 12 HOURS SCHEDULED
Status: DISCONTINUED | OUTPATIENT
Start: 2022-01-04 | End: 2022-01-04 | Stop reason: HOSPADM

## 2022-01-04 RX ORDER — SODIUM CHLORIDE, SODIUM LACTATE, POTASSIUM CHLORIDE, CALCIUM CHLORIDE 600; 310; 30; 20 MG/100ML; MG/100ML; MG/100ML; MG/100ML
9 INJECTION, SOLUTION INTRAVENOUS CONTINUOUS
Status: DISCONTINUED | OUTPATIENT
Start: 2022-01-04 | End: 2022-01-05 | Stop reason: HOSPADM

## 2022-01-04 RX ORDER — TRANEXAMIC ACID 100 MG/ML
INJECTION, SOLUTION INTRAVENOUS AS NEEDED
Status: DISCONTINUED | OUTPATIENT
Start: 2022-01-04 | End: 2022-01-04 | Stop reason: HOSPADM

## 2022-01-04 RX ORDER — FAMOTIDINE 20 MG/1
40 TABLET, FILM COATED ORAL DAILY
Status: DISCONTINUED | OUTPATIENT
Start: 2022-01-04 | End: 2022-01-05 | Stop reason: HOSPADM

## 2022-01-04 RX ORDER — DIPHENHYDRAMINE HCL 25 MG
50 CAPSULE ORAL EVERY 6 HOURS PRN
Status: DISCONTINUED | OUTPATIENT
Start: 2022-01-04 | End: 2022-01-05 | Stop reason: HOSPADM

## 2022-01-04 RX ORDER — HYDROCODONE BITARTRATE AND ACETAMINOPHEN 7.5; 325 MG/1; MG/1
2 TABLET ORAL EVERY 4 HOURS PRN
Status: DISCONTINUED | OUTPATIENT
Start: 2022-01-04 | End: 2022-01-04 | Stop reason: HOSPADM

## 2022-01-04 RX ORDER — LABETALOL HYDROCHLORIDE 5 MG/ML
5 INJECTION, SOLUTION INTRAVENOUS
Status: DISCONTINUED | OUTPATIENT
Start: 2022-01-04 | End: 2022-01-04 | Stop reason: HOSPADM

## 2022-01-04 RX ORDER — ACETAMINOPHEN 500 MG
1000 TABLET ORAL EVERY 6 HOURS PRN
COMMUNITY
End: 2023-01-11

## 2022-01-04 RX ORDER — SODIUM CHLORIDE 9 MG/ML
100 INJECTION, SOLUTION INTRAVENOUS CONTINUOUS
Status: DISCONTINUED | OUTPATIENT
Start: 2022-01-04 | End: 2022-01-05 | Stop reason: HOSPADM

## 2022-01-04 RX ORDER — PROMETHAZINE HYDROCHLORIDE 25 MG/1
25 SUPPOSITORY RECTAL ONCE AS NEEDED
Status: DISCONTINUED | OUTPATIENT
Start: 2022-01-04 | End: 2022-01-04 | Stop reason: HOSPADM

## 2022-01-04 RX ORDER — HYDROCODONE BITARTRATE AND ACETAMINOPHEN 5; 325 MG/1; MG/1
1 TABLET ORAL ONCE AS NEEDED
Status: DISCONTINUED | OUTPATIENT
Start: 2022-01-04 | End: 2022-01-04 | Stop reason: HOSPADM

## 2022-01-04 RX ORDER — ONDANSETRON 2 MG/ML
4 INJECTION INTRAMUSCULAR; INTRAVENOUS EVERY 6 HOURS PRN
Status: DISCONTINUED | OUTPATIENT
Start: 2022-01-04 | End: 2022-01-05 | Stop reason: HOSPADM

## 2022-01-04 RX ORDER — ONDANSETRON 2 MG/ML
4 INJECTION INTRAMUSCULAR; INTRAVENOUS ONCE AS NEEDED
Status: DISCONTINUED | OUTPATIENT
Start: 2022-01-04 | End: 2022-01-04 | Stop reason: HOSPADM

## 2022-01-04 RX ORDER — OXYCODONE HYDROCHLORIDE AND ACETAMINOPHEN 5; 325 MG/1; MG/1
2 TABLET ORAL EVERY 4 HOURS PRN
Status: DISCONTINUED | OUTPATIENT
Start: 2022-01-04 | End: 2022-01-05 | Stop reason: HOSPADM

## 2022-01-04 RX ORDER — PROPOFOL 10 MG/ML
VIAL (ML) INTRAVENOUS AS NEEDED
Status: DISCONTINUED | OUTPATIENT
Start: 2022-01-04 | End: 2022-01-04 | Stop reason: SURG

## 2022-01-04 RX ORDER — DEXAMETHASONE SODIUM PHOSPHATE 4 MG/ML
INJECTION, SOLUTION INTRA-ARTICULAR; INTRALESIONAL; INTRAMUSCULAR; INTRAVENOUS; SOFT TISSUE AS NEEDED
Status: DISCONTINUED | OUTPATIENT
Start: 2022-01-04 | End: 2022-01-04 | Stop reason: SURG

## 2022-01-04 RX ORDER — FLUMAZENIL 0.1 MG/ML
0.2 INJECTION INTRAVENOUS AS NEEDED
Status: DISCONTINUED | OUTPATIENT
Start: 2022-01-04 | End: 2022-01-04 | Stop reason: HOSPADM

## 2022-01-04 RX ORDER — ROCURONIUM BROMIDE 10 MG/ML
INJECTION, SOLUTION INTRAVENOUS AS NEEDED
Status: DISCONTINUED | OUTPATIENT
Start: 2022-01-04 | End: 2022-01-04 | Stop reason: SURG

## 2022-01-04 RX ORDER — DIPHENHYDRAMINE HYDROCHLORIDE 50 MG/ML
25 INJECTION INTRAMUSCULAR; INTRAVENOUS EVERY 6 HOURS PRN
Status: DISCONTINUED | OUTPATIENT
Start: 2022-01-04 | End: 2022-01-05 | Stop reason: HOSPADM

## 2022-01-04 RX ORDER — ONDANSETRON 2 MG/ML
INJECTION INTRAMUSCULAR; INTRAVENOUS AS NEEDED
Status: DISCONTINUED | OUTPATIENT
Start: 2022-01-04 | End: 2022-01-04 | Stop reason: SURG

## 2022-01-04 RX ORDER — DIPHENHYDRAMINE HYDROCHLORIDE 50 MG/ML
12.5 INJECTION INTRAMUSCULAR; INTRAVENOUS
Status: DISCONTINUED | OUTPATIENT
Start: 2022-01-04 | End: 2022-01-04 | Stop reason: HOSPADM

## 2022-01-04 RX ADMIN — NICARDIPINE HYDROCHLORIDE 0.1 MG: 2.5 INJECTION, SOLUTION INTRAVENOUS at 11:26

## 2022-01-04 RX ADMIN — ROCURONIUM BROMIDE 40 MG: 50 INJECTION INTRAVENOUS at 10:23

## 2022-01-04 RX ADMIN — GLYCOPYRROLATE 0.3 MG: 0.2 INJECTION INTRAMUSCULAR; INTRAVENOUS at 11:23

## 2022-01-04 RX ADMIN — CELECOXIB 200 MG: 200 CAPSULE ORAL at 09:27

## 2022-01-04 RX ADMIN — ONDANSETRON 4 MG: 2 INJECTION INTRAMUSCULAR; INTRAVENOUS at 11:15

## 2022-01-04 RX ADMIN — NICARDIPINE HYDROCHLORIDE 0.05 MG: 2.5 INJECTION, SOLUTION INTRAVENOUS at 10:52

## 2022-01-04 RX ADMIN — FENTANYL CITRATE 50 MCG: 0.05 INJECTION, SOLUTION INTRAMUSCULAR; INTRAVENOUS at 10:42

## 2022-01-04 RX ADMIN — NEOSTIGMINE METHYLSULFATE 2.5 MG: 0.5 INJECTION INTRAVENOUS at 11:23

## 2022-01-04 RX ADMIN — NICARDIPINE HYDROCHLORIDE 0.1 MG: 2.5 INJECTION, SOLUTION INTRAVENOUS at 11:22

## 2022-01-04 RX ADMIN — DEXAMETHASONE SODIUM PHOSPHATE 8 MG: 4 INJECTION, SOLUTION INTRAMUSCULAR; INTRAVENOUS at 10:34

## 2022-01-04 RX ADMIN — FENTANYL CITRATE 50 MCG: 0.05 INJECTION, SOLUTION INTRAMUSCULAR; INTRAVENOUS at 10:23

## 2022-01-04 RX ADMIN — SODIUM CHLORIDE, POTASSIUM CHLORIDE, SODIUM LACTATE AND CALCIUM CHLORIDE: 600; 310; 30; 20 INJECTION, SOLUTION INTRAVENOUS at 11:29

## 2022-01-04 RX ADMIN — HYDROMORPHONE HYDROCHLORIDE 0.5 MG: 1 INJECTION, SOLUTION INTRAMUSCULAR; INTRAVENOUS; SUBCUTANEOUS at 12:01

## 2022-01-04 RX ADMIN — MELOXICAM 15 MG: 15 TABLET ORAL at 18:37

## 2022-01-04 RX ADMIN — NICARDIPINE HYDROCHLORIDE 0.15 MG: 2.5 INJECTION, SOLUTION INTRAVENOUS at 10:57

## 2022-01-04 RX ADMIN — ONDANSETRON 4 MG: 2 INJECTION INTRAMUSCULAR; INTRAVENOUS at 17:08

## 2022-01-04 RX ADMIN — ACETAMINOPHEN 1000 MG: 500 TABLET ORAL at 09:27

## 2022-01-04 RX ADMIN — FENTANYL CITRATE 50 MCG: 50 INJECTION INTRAMUSCULAR; INTRAVENOUS at 12:00

## 2022-01-04 RX ADMIN — TRANEXAMIC ACID 1000 MG: 1 INJECTION, SOLUTION INTRAVENOUS at 10:37

## 2022-01-04 RX ADMIN — NICARDIPINE HYDROCHLORIDE 0.2 MG: 2.5 INJECTION, SOLUTION INTRAVENOUS at 11:06

## 2022-01-04 RX ADMIN — CEFAZOLIN SODIUM 2 G: 2 INJECTION, SOLUTION INTRAVENOUS at 17:08

## 2022-01-04 RX ADMIN — LIDOCAINE HYDROCHLORIDE 80 MG: 20 INJECTION, SOLUTION INFILTRATION; PERINEURAL at 10:23

## 2022-01-04 RX ADMIN — MIDAZOLAM 0.5 MG: 1 INJECTION INTRAMUSCULAR; INTRAVENOUS at 09:29

## 2022-01-04 RX ADMIN — PROPOFOL 25 MCG/KG/MIN: 10 INJECTION, EMULSION INTRAVENOUS at 10:30

## 2022-01-04 RX ADMIN — NICARDIPINE HYDROCHLORIDE 0.1 MG: 2.5 INJECTION, SOLUTION INTRAVENOUS at 11:01

## 2022-01-04 RX ADMIN — ASPIRIN 81 MG: 81 TABLET, COATED ORAL at 21:01

## 2022-01-04 RX ADMIN — OXYCODONE HYDROCHLORIDE 5 MG: 5 TABLET ORAL at 09:27

## 2022-01-04 RX ADMIN — NICARDIPINE HYDROCHLORIDE 0.1 MG: 2.5 INJECTION, SOLUTION INTRAVENOUS at 11:18

## 2022-01-04 RX ADMIN — SODIUM CHLORIDE, POTASSIUM CHLORIDE, SODIUM LACTATE AND CALCIUM CHLORIDE 9 ML/HR: 600; 310; 30; 20 INJECTION, SOLUTION INTRAVENOUS at 08:54

## 2022-01-04 RX ADMIN — FAMOTIDINE 20 MG: 10 INJECTION INTRAVENOUS at 09:29

## 2022-01-04 RX ADMIN — HYDROMORPHONE HYDROCHLORIDE 0.5 MG: 1 INJECTION, SOLUTION INTRAMUSCULAR; INTRAVENOUS; SUBCUTANEOUS at 10:46

## 2022-01-04 RX ADMIN — NICARDIPINE HYDROCHLORIDE 0.1 MG: 2.5 INJECTION, SOLUTION INTRAVENOUS at 11:10

## 2022-01-04 RX ADMIN — OXYCODONE AND ACETAMINOPHEN 1 TABLET: 5; 325 TABLET ORAL at 18:38

## 2022-01-04 RX ADMIN — INSULIN LISPRO 5 UNITS: 100 INJECTION, SOLUTION INTRAVENOUS; SUBCUTANEOUS at 21:01

## 2022-01-04 RX ADMIN — PROPOFOL 150 MG: 10 INJECTION, EMULSION INTRAVENOUS at 10:23

## 2022-01-04 RX ADMIN — CEFAZOLIN SODIUM 2 G: 2 INJECTION, SOLUTION INTRAVENOUS at 09:59

## 2022-01-04 RX ADMIN — PHENYLEPHRINE HYDROCHLORIDE 100 MCG: 10 INJECTION, SOLUTION INTRAVENOUS at 10:28

## 2022-01-04 RX ADMIN — OXYCODONE AND ACETAMINOPHEN 1 TABLET: 5; 325 TABLET ORAL at 22:52
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
## 2022-01-04 NOTE — PLAN OF CARE
Goal Outcome Evaluation:  Plan of Care Reviewed With: patient        Progress: no change  Outcome Summary: Patient is a 70 yo female who is POD0 L TKA. Pt lives alone and reports daughter to stay [place] her [place]. She is ind at baseline without use of AD but has rwx for discharge. She has no DELANO home and 3 DELANO bedroom. Pt is very lethargic, dizzy, and dry heaving throughout session which limited overall functional mobility. Pt unable to perform SLR. She completed bed mobility with modAx1-2 and STS to rwx with modA. Cues for UE placement. She was able to take 3 sidesteps to HOB with modA using rwx and cues for LE sequencing. Pt will continue to benefit from skilled PT to increase level of independence. Anticipate home with family and HHPT at discharge pending progress.

## 2022-01-04 NOTE — PLAN OF CARE
Goal Outcome Evaluation:  Plan of Care Reviewed With: patient        Progress: improving  Outcome Summary: A&OX4, WAS NOT ABLE TO AMBULATE FROM STRETCHER TO BED D/T DROWSINESS AND NAUSEA, ONLY ABLE TO COMPLETE BED EXERCISES C PT FOR THE SAME REASON, VOIDED PER BSC, PAIN CONTROLLED, IVF, IV ABX, IV ZOFRAN GIVEN, NVI, VSS, DSG CDI- [place] FLASH[place] GREEN

## 2022-01-04 NOTE — OP NOTE
Total Knee Replacement Operative Note  Dr. ANALIA Murillo II  [phone]    PATIENT NAME: Reema Easley  MRN: [id number]  : 1950 AGE: 71 y.o. GENDER: female  DATE OF OPERATION: 2022  PREOPERATIVE DIAGNOSIS: End Stage Arthritis  POSTOPERATIVE DIAGNOSIS: Same  OPERATION PERFORMED: Left Total Knee Arthroplasty  SURGEON: Vlad Murillo MD  Circulator: Victor Hugo Quevedo RN; Alan Maya RN  Scrub Person: Ferny Lanza  Assistant: Antoinette Maciel PA  ANESTHESIA: General  ASSISTANT: JENNIFER Gaffney. This case would not have been possible without another set of skilled surgical hands for retraction, bone reduction, use of instrumentation, assistance with implants, and closure.  This assistance was vital to the success of the case.   ESTIMATED BLOOD LOSS: 50cc  SPONGE AND NEEDLE COUNT: Correct  INDICATIONS:   A discussion of operative versus nonoperative treatment was had with the patient and they failed conservative management. They elected to undergo total knee arthroplasty. The risks of surgery were discussed and included the risk of anesthesia, infection, damage to neurovascular structures, implant loosening/failure, fracture, hardware prominence, continued pain, early failure, the need for further procedures, medical complications, and others. No guarantees were made. The patient wished to proceed with surgery and a surgical consent was signed.    COMPONENTS:   · Journey II CR Oxinium Femoral Component: Size 6  · Journey II Tibial Baseplate: 4   · CR Articular Insert: 9  · Patella: 32mm    PERTINENT FINDINGS: Degenerative Arthritis    DETAILS OF PROCEDURE:  The patient was met in the preoperative area. The site was marked. The consent and H&P were reviewed. The patient was then wheeled back to the operative suite and transferred to the operative table. The patient underwent anesthesia. A tourniquet was placed on the upper thigh. Surgical alcohol was used to thoroughly clean the entire  operative extremity.     The leg was then prepped in the normal sterile fashion. New outer gloves were used by all sterile surgical team members after final draping. After a surgical timeout, the tourniquet was inflated.     In flexion, a midline knee incision was utilized centered on the patella and ending medial to the tibial tubercle. Dissection was carried down to the knee capsule. A midvastus ararthrotomy was completed. The patellar fat pad was excised. The MCL was minimally elevated to gain adequate exposure to the knee.      The patella was subluxed laterally. The patella was held vertical using 2 clamps, and was then cut using a saw. The patella was then sized, and the lug holes were drilled. Excess patellar bone was removed using a saw. The patella was then protected during this case using the metal patella shield.    The femoral canal was breached using the reamer. The canal was thoroughly irrigated. The intramedullary femoral jig was inserted. The distal cutting block was pinned in place and held with a kocher clamp. The cut was made with an oscillating saw. With all saw cuts, the soft tissues were protected with retractors. The sizing jig was placed onto the femur and set to the desired amount of external rotation. Rotation was checked against the epicondylar axis and Whitesides line. The femur was then sized. The size matching block was placed and secured with pins. The cuts were then made with oscillating saw in a routine fashion. All bone cuts were removed.     The tibial canal was then breached using the entry drill. The canal was thoroughly irrigated. Next the intramedullary guide was inserted down the tibial canal. The cutting jig was aligned with the medial third of the tibial tubercle. The height of the cut was measured and the cutting jig was then secured with pins. The slope and varus/valgus positioning was checked with an extramedullary muriel which was attached to the cutting jig.     The cut  was then made using the oscillating saw, again ensuring that the retractors were in proper position to protect the collateral ligaments and the patellar tendon, as well as the neurovascular bundle and PCL posteriorly.     A lamina  was inserted into the notch with the knee in flexion and used to expose the posterior joint. Using a kocher and bovie the remaining medial and lateral menisci were removed. Excess posterior osteophytes were also removed with a osteotome, mallet, and rongeur as necessary.      Next a trial of the knee was performed using trial femoral and tibial trial components. Polyethylene trials were inserted as needed to gain appropriate stability. A drop muriel was once again used to assess the varus/valgus alignment of the knee and the knee was noted to be in excellent alignment. Soft tissue releases were then performed as necessary to fine-tune the balancing of the knee.  After taking the knee through one final range of motion, the tibial rotation of the trial was noted. The femoral lug holes were then drilled and the anterior femoral cut was finalized with a saw.    We next turned our attention back to the tibia to finish the tibial preparation. The tibia was measured and sized. The tibial plate was aligned with the rotation from the trialing process and verified to be positioned near the medial third of the tibial tubercle. The tibial surface was then prepared for the keel .    The knee was thoroughly irrigated with sterile saline using a pulse-lavage system while the final tibial baseplate, femoral component and patellar component were opened. Cement was prepared and mixed using standard techniques. Outer gloves were changed before implant handling to ensure no soft tissue or oily material was exposed to the surfaces of the final implants. The bony knee surfaces were dried and the implants were cemented in place, starting with the tibia, then the femur and finally the patella. Excess cement  "was removed at each step. A trial poly was utilized during cementation for compression. The tourniquet was taken down and adequate hemostasis was achieved. The knee was thoroughly irrigated once again.     The soft tissues about the knee were then injected with an anesthetic cocktail. Care was taken to avoid the peroneal nerve and the neurovascular bundle posteriorly. The cement was allowed to harden. After the cement was fully set, the knee was ranged with various thickness of polyethylene trials to achieve full extension and adequate flexion. The knee was inspected for excess cement, which was removed. The real poly, of corresponding thickness was then opened and inserted into the knee. One final range of motion and stability test showed the knee to be in good condition with a well tracking patellar component.    The knee capsule was then closed with a running barbed suture. The knee and subcutaneous layer were closed in layers.  A sterile dressing was applied.    The patient was awoken from anesthesia, moved to the [facility] and taken to the recovery room in stable condition. Sponge and needle count were correct. There were no complications. Patient tolerated the procedure well.    R \"Moises\" Lidia FREDERICK MD  Orthopaedic Surgery  [facility]  [phone]                  "

## 2022-01-04 NOTE — ANESTHESIA POSTPROCEDURE EVALUATION
Patient: Reema Easley    Procedure Summary     Date: 01/04/22 Room / Location: [facility] OR [address] [place] [address] MAIN OR    Anesthesia Start: 1013 Anesthesia Stop: 1154    Procedure: LEFT TOTAL KNEE ARTHROPLASTY (Left Knee) Diagnosis:     Surgeons: Vlad Murillo II, MD Provider: Raj Mancia MD    Anesthesia Type: general ASA Status: 3          Anesthesia Type: general    Vitals  Vitals Value Taken Time   /77 01/04/22 1216   Temp 36.8 °C (98.2 °F) 01/04/22 1152   Pulse 75 01/04/22 1218   Resp 16 01/04/22 1215   SpO2 99 % 01/04/22 1218   Vitals shown include unvalidated device data.        Post Anesthesia Care and Evaluation    Patient location during evaluation: PACU  Patient participation: complete - patient participated  Level of consciousness: lethargic  Pain management: adequate  Airway patency: patent  Anesthetic complications: No anesthetic complications    Cardiovascular status: acceptable  Respiratory status: acceptable  Hydration status: acceptable    Comments: --------------------            01/04/22               1215     --------------------   BP:       127/77     Pulse:      74       Resp:       16       Temp:                SpO2:      99%      --------------------
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
## 2022-01-04 NOTE — ANESTHESIA PROCEDURE NOTES
Airway  Urgency: elective    Date/Time: 1/4/2022 10:26 AM  Airway not difficult    General Information and Staff    Patient location during procedure: OR  Anesthesiologist: Raj Mancia MD  CRNA: Antoinette Edwards CRNA    Indications and Patient Condition  Indications for airway management: airway protection    Preoxygenated: yes  MILS not maintained throughout  Mask difficulty assessment: 1 - vent by mask    Final Airway Details  Final airway type: endotracheal airway      Successful airway: ETT  Cuffed: yes   Successful intubation technique: direct laryngoscopy  Facilitating devices/methods: intubating stylet  Endotracheal tube insertion site: oral  Blade: Pineda  Blade size: 2  ETT size (mm): 7.0  Cormack-Lehane Classification: grade I - full view of glottis  Placement verified by: chest auscultation and capnometry   Cuff volume (mL): 6  Measured from: teeth  ETT/EBT  to teeth (cm): 21  Number of attempts at approach: 1  Assessment: lips, teeth, and gum same as pre-op and atraumatic intubation    Additional Comments  Airway exam prior to DL, teeth/lips inspected. Preoxygenated with 100% O2; sniffing position, easy mask ventilation. Eyes taped. Atraumatic intubation. Lips and teeth intact, no damage. ETT connected to vent. Confirmed EBBS, +EtCO2.

## 2022-01-05 ENCOUNTER — READMISSION MANAGEMENT (OUTPATIENT)
Dept: CALL CENTER | Facility: HOSPITAL | Age: 72
End: 2022-01-05

## 2022-01-05 VITALS
TEMPERATURE: 96.9 F | RESPIRATION RATE: 16 BRPM | OXYGEN SATURATION: 97 % | WEIGHT: 206.9 LBS | BODY MASS INDEX: 35.32 KG/M2 | SYSTOLIC BLOOD PRESSURE: 113 MMHG | DIASTOLIC BLOOD PRESSURE: 69 MMHG | HEIGHT: 64 IN | HEART RATE: 59 BPM

## 2022-01-05 LAB
ANION GAP SERPL CALCULATED.3IONS-SCNC: 6.8 MMOL/L (ref 5–15)
BUN SERPL-MCNC: 20 MG/DL (ref 8–23)
BUN/CREAT SERPL: 26.7 (ref 7–25)
CALCIUM SPEC-SCNC: 9.2 MG/DL (ref 8.6–10.5)
CHLORIDE SERPL-SCNC: 104 MMOL/L (ref 98–107)
CO2 SERPL-SCNC: 24.2 MMOL/L (ref 22–29)
CREAT SERPL-MCNC: 0.75 MG/DL (ref 0.57–1)
DEPRECATED RDW RBC AUTO: 43.9 FL (ref 37–54)
ERYTHROCYTE [DISTWIDTH] IN BLOOD BY AUTOMATED COUNT: 13.3 % (ref 12.3–15.4)
GFR SERPL CREATININE-BSD FRML MDRD: 76 ML/MIN/1.73
GLUCOSE BLDC GLUCOMTR-MCNC: 181 MG/DL (ref 70–130)
GLUCOSE BLDC GLUCOMTR-MCNC: 263 MG/DL (ref 70–130)
GLUCOSE SERPL-MCNC: 211 MG/DL (ref 65–99)
HBA1C MFR BLD: 7.22 % (ref 4.8–5.6)
HCT VFR BLD AUTO: 40 % (ref 34–46.6)
HGB BLD-MCNC: 13 G/DL (ref 12–15.9)
MCH RBC QN AUTO: 29.3 PG (ref 26.6–33)
MCHC RBC AUTO-ENTMCNC: 32.5 G/DL (ref 31.5–35.7)
MCV RBC AUTO: 90.1 FL (ref 79–97)
PLATELET # BLD AUTO: 270 10*3/MM3 (ref 140–450)
PMV BLD AUTO: 9.9 FL (ref 6–12)
POTASSIUM SERPL-SCNC: 5.4 MMOL/L (ref 3.5–5.2)
RBC # BLD AUTO: 4.44 10*6/MM3 (ref 3.77–5.28)
SODIUM SERPL-SCNC: 135 MMOL/L (ref 136–145)
WBC NRBC COR # BLD: 15.69 10*3/MM3 (ref 3.4–10.8)

## 2022-01-05 PROCEDURE — 97530 THERAPEUTIC ACTIVITIES: CPT

## 2022-01-05 PROCEDURE — 97110 THERAPEUTIC EXERCISES: CPT

## 2022-01-05 PROCEDURE — G0378 HOSPITAL OBSERVATION PER HR: HCPCS

## 2022-01-05 PROCEDURE — 63710000001 DIPHENHYDRAMINE PER 50 MG: Performed by: ORTHOPAEDIC SURGERY

## 2022-01-05 PROCEDURE — 85027 COMPLETE CBC AUTOMATED: CPT | Performed by: INTERNAL MEDICINE

## 2022-01-05 PROCEDURE — 25010000002 ONDANSETRON PER 1 MG: Performed by: ORTHOPAEDIC SURGERY

## 2022-01-05 PROCEDURE — 80048 BASIC METABOLIC PNL TOTAL CA: CPT | Performed by: INTERNAL MEDICINE

## 2022-01-05 PROCEDURE — 83036 HEMOGLOBIN GLYCOSYLATED A1C: CPT | Performed by: INTERNAL MEDICINE

## 2022-01-05 PROCEDURE — 82962 GLUCOSE BLOOD TEST: CPT

## 2022-01-05 PROCEDURE — 0 CEFAZOLIN IN DEXTROSE 2-4 GM/100ML-% SOLUTION: Performed by: ORTHOPAEDIC SURGERY

## 2022-01-05 PROCEDURE — 63710000001 INSULIN LISPRO (HUMAN) PER 5 UNITS: Performed by: INTERNAL MEDICINE

## 2022-01-05 RX ORDER — OXYCODONE HYDROCHLORIDE AND ACETAMINOPHEN 5; 325 MG/1; MG/1
1 TABLET ORAL EVERY 4 HOURS PRN
Qty: 50 TABLET | Refills: 0 | Status: SHIPPED | OUTPATIENT
Start: 2022-01-05 | End: 2022-02-28

## 2022-01-05 RX ORDER — ONDANSETRON 4 MG/1
4 TABLET, FILM COATED ORAL EVERY 6 HOURS PRN
Qty: 30 TABLET | Refills: 0 | Status: SHIPPED | OUTPATIENT
Start: 2022-01-05 | End: 2022-02-28

## 2022-01-05 RX ORDER — ASPIRIN 81 MG/1
81 TABLET ORAL DAILY
Qty: 60 TABLET | Refills: 0 | Status: SHIPPED | OUTPATIENT
Start: 2022-01-05 | End: 2022-02-28

## 2022-01-05 RX ADMIN — INSULIN LISPRO 3 UNITS: 100 INJECTION, SOLUTION INTRAVENOUS; SUBCUTANEOUS at 08:25

## 2022-01-05 RX ADMIN — FAMOTIDINE 40 MG: 20 TABLET, FILM COATED ORAL at 08:25

## 2022-01-05 RX ADMIN — OXYCODONE AND ACETAMINOPHEN 1 TABLET: 5; 325 TABLET ORAL at 12:38

## 2022-01-05 RX ADMIN — ONDANSETRON 4 MG: 2 INJECTION INTRAMUSCULAR; INTRAVENOUS at 02:19

## 2022-01-05 RX ADMIN — LISINOPRIL 10 MG: 20 TABLET ORAL at 08:26

## 2022-01-05 RX ADMIN — ASPIRIN 81 MG: 81 TABLET, COATED ORAL at 08:26

## 2022-01-05 RX ADMIN — INSULIN LISPRO 8 UNITS: 100 INJECTION, SOLUTION INTRAVENOUS; SUBCUTANEOUS at 12:39

## 2022-01-05 RX ADMIN — MELOXICAM 15 MG: 15 TABLET ORAL at 08:25

## 2022-01-05 RX ADMIN — OXYCODONE AND ACETAMINOPHEN 1 TABLET: 5; 325 TABLET ORAL at 08:26

## 2022-01-05 RX ADMIN — DIPHENHYDRAMINE HYDROCHLORIDE 25 MG: 25 CAPSULE ORAL at 02:47

## 2022-01-05 RX ADMIN — ONDANSETRON 4 MG: 2 INJECTION INTRAMUSCULAR; INTRAVENOUS at 08:26

## 2022-01-05 RX ADMIN — OXYCODONE AND ACETAMINOPHEN 1 TABLET: 5; 325 TABLET ORAL at 02:47

## 2022-01-05 RX ADMIN — CEFAZOLIN SODIUM 2 G: 2 INJECTION, SOLUTION INTRAVENOUS at 01:55

## 2022-01-05 NOTE — DISCHARGE INSTRUCTIONS
Total Knee  Discharge Instructions  Dr. ANALIA Hernandez” [place] II  [phone]    • INCISION CARE  o Wash your hands prior to dressing changes  o LA Wound VAC: Postoperatively you had a LA Wound Vac placed on the incision. This was placed under sterile conditions in the operating room. It remains in place for 7 days postoperatively. After 7 days, the entire dressing must be removed, including all of the sticky adhesive. The dressing and battery pack provide gentle suction to the incision and provide several benefits over a traditional dressing:  - It maintains the sterile environment of the OR and reduces the risk of infection  - The suction removes unwanted buildup of blood/hematoma under the skin to reduce swelling  - The suction also promotes fresh blood supply to the skin and soft tissue to speed up healing  - The postoperative scar is reduced in size  - Showering is permitted immediately after surgery, but the battery pack must be protected or removed during the shower.   o After 7 days the LA Wound Vac is removed. If there is no drainage, no dressing is required. If there is some scant drainage a dry bandage can be applied and changed daily until seen in the office or until the drainage stops.   o No creams or ointments to the incisions until 4 weeks post op.  o Do not touch or pick at the incision  o Check incision every day and notify surgeon immediately if any of the following signs or symptoms are seen:  - Increase in redness  - Increase in swelling around the incision and of the entire extremity  - Increase in pain  - NEW drainage or oozing from the incision  - Pulling apart of the edges of the incision  - Increase in overall body temperature (greater than 100.4 degrees)  o Zip-Line: your incision was closed with a state of the art device.   - Is a non-invasive and easy to use wound closure device that replaces sutures, staples and glue for surgical incisions  - It minimizes scarring and eliminating  “railroad” marks that come with staples or sutures  - It makes removal as atraumatic as peeling off a bandage  - Can be removed at home or by a physical therapist or nurse at 14 days postoperatively    • ACTIVITIES  o Exercises:  - Physical therapy will begin immediately while in the hospital. Patients going to a nursing home will get therapy as part of their care at the SNU/SNF facility. Patients going home may also have a therapist come to the house to help them mobilize until they can safely get to an outpatient therapy facility.  - Elevate the affected leg most of the day during the first week post operatively. Caution must be taken to avoid pillow placement directly under the heel of the leg, as this can cause pressure ulcers even with a soft pillow. All pillows and blankets should be placed underneath of the thigh and calf so that the heel is free-floating.  - Use cold packs for 20-30 minutes approximately 5 times per day.  - You should perform the daily stretching and strengthening exercises as taught by the therapist as often as possible. This can be done many times a day.  - Full weight bearing is allowed after surgery. It will be sore/painful to put weight on the leg, but this will help the bone to heal and prevent complications such as pneumonia, bed sores and blood clots. Mobilization is vital to the recovery process.  o Activities of Daily Living:  - No tub baths, hot tubs, or swimming pools for 4 weeks.  - May shower and let water run over the incision immediately after surgery. The battery pack of the LA Wound Vac must be protected or removed while in the shower. After the LA is removed 7 days after surgery showering is permitted as long as there is no drainage from the wound.     • Restrictions  o Weight: It is ok to allow full weight bearing after surgery. Weight on the leg actually quickens the recovery process. While it will be sore/painful to put weight on the leg, it is safe to do so. Hip  replacement after hip fracture has a much slower recover process. It can take months to heal fully from a hip fracture and patients even make some slow benefits up to a year afterwards.   o Driving: Many patients have questions about when it is safe to return to driving. The answer is that this is extremely variable. It depends on the extent of the surgery, as well as how quickly you heal. Certainly left leg surgeries make returning to driving easier while right leg surgeries require more extensive rehabilitation before driving can be safe. Until you can press down on the brake hard, and are off of all narcotics, driving is not permitted. Your surgeon cannot “clear” you to return to driving, only you can make the decision when you feel it is safe.    • Medications  o Anticoagulants: After upper extremity surgery most patients do not require an anticoagulant unless you have another injury that will be keeping you from mobilizing. Lower extremity surgery typically does require use of an anticoagulation medicine.   - IF YOU HAD LOWER EXTREMITY SURGERY AND ARE NOT DISCHARGED HOME WITH ANY ANTICOAGULANT MEDICINE YOU SHOULD TAKE ASPIRIN 325mg DAILY FOR 30 DAYS POSTOPERATIVELY.  - If you are discharged home with an anticoagulant such as Aspirin, Xarelto, Eliquis, Coumadin, or Lovenox, follow these simple instructions:   - Notify surgeon immediately if any mandy bleeding is noted in the urine, stool, emesis, or from the nose or the incision. Blood in the stool will often appear as black rather than red. Blood in urine may appear as pink. Blood in emesis may appear as brown/black like coffee grounds.  - You will need to apply pressure for longer periods of time to any cuts or abrasions to stop bleeding  - Avoid alcohol while taking anticoagulants  - Most anticoagulants are to be taken for 30 days postoperatively. After this time, you may stop using them unless instructed otherwise.   - If you were already taking an  anticoagulant (commonly Aspirin, Coumadin, or Plavix) you will likely be resuming your normal dose postoperatively and will be continuing that medication at the discretion of the prescribing physician.  o Stool Softeners: You will be at greater risk of constipation after surgery due to being less mobile and the pain medications.  - Take stool softeners as needed. Over the counter Colace 100 mg 1-2 capsules twice daily can be taken.  - If stools become too loose or too frequent, please decreases the dosage or stop the stool softener.  - If constipation occurs despite use of stool softeners, you are to continue the stool softeners and add a laxative (Milk of Magnesia 1 ounce daily as needed)  - Drink plenty of fluids, and eat fruits and vegetables during your recovery time. Getting up and mobilizing will help the bowels to recover their regular function, as will weaning off of all narcotics when the pain becomes tolerable.  o Pain Medications: Utilized after surgery are narcotics. This is some general information about these medications.  - CLASSIFICATION: Pain medications are called Opioids and are narcotics  - LEGALITIES: It is illegal to share narcotics with others  - DRIVING: it is illegal to drive while under the influence of narcotics. Doing so is a DUI.  - POTENTIAL SIDE EFFECTS: nausea, vomiting, itching, dizziness, drowsiness, dry mouth, constipation, and difficulty urinating.  - POTENTIAL ADVERSE EFFECTS:  - Opioid tolerance can develop with use of pain medications and this simply means that it requires more and more of the medication to control pain. However, this is seen more in patients that use opioids for longer periods of time.  - Opioid dependence can develop with use of Opioids. People with opioid dependence will experience withdrawal symptoms upon cessation of the medication.  - Opioid addiction can develop with use of Opioids. The incidence of this is very unlikely in patients who take the  medications as ordered and stop the medications as instructed.  - Opioid overdose can be dangerous, but is unlikely when the medication is taken as ordered and stopped when ordered. It is important not to mix opioids with alcohol as this can lead to over sedation and respiratory difficulty.  - DOSAGE:  - After the initial surgical pain begins to resolve, you may begin to decrease the pain medication. By the end of a few weeks, you should be off of pain medications.  - Refills will not be given by the office during evening hours, on weekends, or after 6 weeks post-op. You are responsible for weaning off of pain medication. You can increase the time between narcotic pills, taking one every 4 then 6 then 8 hours and so on.  - To seek refills on pain medications during the initial 6-week post-operative period, you must call the office to request the refill. The office will then notify you when to  the prescription. DO NOT wait until you are out of the medication to request a refill. Prescriptions will not be filled over the weekend and depending on the schedule, it may take a couple days for the prescription to be available. Someone will have to pick the prescription in person at the office.    • FOLLOW-UP VISITS  o You will need to follow up in the office with your surgeon in 3 weeks, or as instructed elsewhere in your discharge paperwork. Please call this number [phone] to schedule this appointment. If you are going to an SNF/SNU facility, they will arrange for you to follow up in the office.  o If you have any concerns or suspected complications prior to your follow up visit, please call the office. Do not wait until your appointment time if you suspect complications. These will need to be addressed in the office promptly.      Vlad Murillo II, MD  Orthopaedic Surgery  [facility]

## 2022-01-05 NOTE — CONSULTS
Met with patient this morning. We prayed together for all that many ways that she feels blessed with her family and loved ones.

## 2022-01-05 NOTE — CASE MANAGEMENT/SOCIAL WORK
Case Management Discharge Note      Final Note: Home with [facility] .         Selected Continued Care - Discharged on 1/5/2022 Admission date: 1/4/2022 - Discharge disposition: Home or Self Care    Destination    No services have been selected for the patient.              Durable Medical Equipment    No services have been selected for the patient.              Dialysis/Infusion    No services have been selected for the patient.              Home Medical Care Coordination complete.    Service Provider Selected Services Address Phone Fax Patient Preferred    [facility]  Home Health Services [address] [place] [address] [phone] --          Therapy    No services have been selected for the patient.              Community Resources    No services have been selected for the patient.              Community & DME    No services have been selected for the patient.                       Final Discharge Disposition Code: 01 - home or self-care

## 2022-01-05 NOTE — DISCHARGE SUMMARY
Orthopaedic Discharge Summary  Dr. ANALIA Hernandez” [place] II  [phone]    NAME: Reema CANO Easley PCP: Marc Rasheed MD   :  MRN: [id number]  [id number] LOS:  ADMIT: 0 days  2022   AGE/SEX: 71 y.o. female DC:  today             · Admitting Diagnosis: History of knee replacement [Z96.659]  · History of knee replacement [Z96.659]    · Surgery Performed: WI TOTAL KNEE ARTHROPLASTY [89855] (LEFT TOTAL KNEE ARTHROPLASTY)    · Discharge Medications:         Discharge Medications      New Medications      Instructions Start Date   aspirin 81 MG EC tablet   81 mg, Oral, Daily      freestyle lancets   Use as directed daily. Has hany-metric glucometer.      glucose blood test strip   Use as instructed. Requesting hany-metric strips.      ondansetron 4 MG tablet  Commonly known as: Zofran   4 mg, Oral, Every 6 Hours PRN      oxyCODONE-acetaminophen 5-325 MG per tablet  Commonly known as: PERCOCET   1 tablet, Oral, Every 4 Hours PRN         Changes to Medications      Instructions Start Date   ALPRAZolam 0.5 MG tablet  Commonly known as: XANAX  What changed:   · when to take this  · reasons to take this   TAKE ONE TABLET BY MOUTH TWICE A DAY      Farxiga 10 MG tablet  Generic drug: Dapagliflozin Propanediol  What changed:   · how much to take  · how to take this  · when to take this   Take 1 p.o. daily before the first meal for diabetes      glimepiride 4 MG tablet  Commonly known as: AMARYL  What changed:   · how much to take  · how to take this  · when to take this   TAKE ONE TABLET BY MOUTH DAILY WITH THE LARGEST MEAL FOR DIABETES      Insulin Pen Needle 31G X 5 MM misc  Commonly known as: B-D UF III MINI PEN NEEDLES  What changed:   · how much to take  · how to take this  · when to take this   E11.9      naproxen 500 MG tablet  Commonly known as: NAPROSYN  What changed:   · how much to take  · how to take this  · when to take this  · reasons to take this   TAKE ONE TABLET BY MOUTH TWICE A DAY FOR ARTHRITIS OR KNEE  PAIN      nystatin-triamcinolone 357305-2.1 UNIT/GM-% cream  Commonly known as: MYCOLOG II  What changed:   · how much to take  · how to take this  · when to take this  · reasons to take this   Apply to the affected areas 3 times daily as needed      SITagliptin 100 MG tablet  Commonly known as: Januvia  What changed:   · how much to take  · how to take this  · when to take this   Take 1 p.o. daily for diabetes         Continue These Medications      Instructions Start Date   acetaminophen 500 MG tablet  Commonly known as: TYLENOL   1,000 mg, Oral, Every 6 Hours PRN      allopurinol 300 MG tablet  Commonly known as: ZYLOPRIM   TAKE ONE TABLET BY MOUTH DAILY      benazepril 40 MG tablet  Commonly known as: LOTENSIN   TAKE ONE TABLET BY MOUTH DAILY      Chlorhexidine Gluconate 2 % pads   1 each, Apply externally, Take As Directed, As directed pre op      glucose monitor monitoring kit   1 each, Does not apply, As Needed      Liraglutide 18 MG/3ML solution pen-injector injection  Commonly known as: VICTOZA   1.8 mg, Subcutaneous, Daily      mupirocin 2 % nasal ointment  Commonly known as: BACTROBAN   1 application, Nasal, Take As Directed      simvastatin 40 MG tablet  Commonly known as: ZOCOR   TAKE ONE TABLET BY MOUTH ONCE NIGHTLY             · Vitals:     Vitals:    01/04/22 1909 01/04/22 2248 01/05/22 0235 01/05/22 0725   BP: 140/78 110/73 118/62 113/69   BP Location: Left arm Left arm Left arm Left arm   Patient Position: Lying Lying Lying Lying   Pulse: 86 78 55 59   Resp: 16 16 16 16   Temp: 97.6 °F (36.4 °C) 97.5 °F (36.4 °C) 96.8 °F (36 °C) 96.9 °F (36.1 °C)   TempSrc: Oral Oral Oral Oral   SpO2: 98% 99% 97% 97%   Weight:       Height:           · Labs:      Admission on 01/04/2022   Component Date Value Ref Range Status   • Glucose 01/04/2022 132* 70 - 130 mg/dL Final    Meter: [id number] : [id number] Rodolfo MILNER   • Glucose 01/04/2022 255* 65 - 99 mg/dL Final   • BUN 01/04/2022 18  8 - 23 mg/dL Final    • Creatinine 01/04/2022 0.82  0.57 - 1.00 mg/dL Final   • Sodium 01/04/2022 136  136 - 145 mmol/L Final   • Potassium 01/04/2022 4.7  3.5 - 5.2 mmol/L Final   • Chloride 01/04/2022 103  98 - 107 mmol/L Final   • CO2 01/04/2022 24.8  22.0 - 29.0 mmol/L Final   • Calcium 01/04/2022 8.9  8.6 - 10.5 mg/dL Final   • eGFR Non African Amer 01/04/2022 69  >60 mL/min/1.73 Final   • BUN/Creatinine Ratio 01/04/2022 22.0  7.0 - 25.0 Final   • Anion Gap 01/04/2022 8.2  5.0 - 15.0 mmol/L Final   • WBC 01/04/2022 17.55* 3.40 - 10.80 10*3/mm3 Final   • RBC 01/04/2022 4.79  3.77 - 5.28 10*6/mm3 Final   • Hemoglobin 01/04/2022 14.1  12.0 - 15.9 g/dL Final   • Hematocrit 01/04/2022 43.0  34.0 - 46.6 % Final   • MCV 01/04/2022 89.8  79.0 - 97.0 fL Final   • MCH 01/04/2022 29.4  26.6 - 33.0 pg Final   • MCHC 01/04/2022 32.8  31.5 - 35.7 g/dL Final   • RDW 01/04/2022 13.3  12.3 - 15.4 % Final   • RDW-SD 01/04/2022 43.3  37.0 - 54.0 fl Final   • MPV 01/04/2022 9.2  6.0 - 12.0 fL Final   • Platelets 01/04/2022 296  140 - 450 10*3/mm3 Final   • Glucose 01/04/2022 218* 70 - 130 mg/dL Final    Meter: [id number] : [id number] Bernard Johnson [facility]   • WBC 01/05/2022 15.69* 3.40 - 10.80 10*3/mm3 Final   • RBC 01/05/2022 4.44  3.77 - 5.28 10*6/mm3 Final   • Hemoglobin 01/05/2022 13.0  12.0 - 15.9 g/dL Final   • Hematocrit 01/05/2022 40.0  34.0 - 46.6 % Final   • MCV 01/05/2022 90.1  79.0 - 97.0 fL Final   • MCH 01/05/2022 29.3  26.6 - 33.0 pg Final   • MCHC 01/05/2022 32.5  31.5 - 35.7 g/dL Final   • RDW 01/05/2022 13.3  12.3 - 15.4 % Final   • RDW-SD 01/05/2022 43.9  37.0 - 54.0 fl Final   • MPV 01/05/2022 9.9  6.0 - 12.0 fL Final   • Platelets 01/05/2022 270  140 - 450 10*3/mm3 Final   • Glucose 01/05/2022 211* 65 - 99 mg/dL Final   • BUN 01/05/2022 20  8 - 23 mg/dL Final   • Creatinine 01/05/2022 0.75  0.57 - 1.00 mg/dL Final   • Sodium 01/05/2022 135* 136 - 145 mmol/L Final   • Potassium 01/05/2022 5.4* 3.5 - 5.2 mmol/L Final   • Chloride  "01/05/2022 104  98 - 107 mmol/L Final   • CO2 01/05/2022 24.2  22.0 - 29.0 mmol/L Final   • Calcium 01/05/2022 9.2  8.6 - 10.5 mg/dL Final   • eGFR Non African Amer 01/05/2022 76  >60 mL/min/1.73 Final   • BUN/Creatinine Ratio 01/05/2022 26.7* 7.0 - 25.0 Final   • Anion Gap 01/05/2022 6.8  5.0 - 15.0 mmol/L Final   • Hemoglobin A1C 01/05/2022 7.22* 4.80 - 5.60 % Final   • Glucose 01/05/2022 181* 70 - 130 mg/dL Final    Meter: [id number] : [id number] Bernard Johnson ALF        No results found.    · Hospital Course:   71 y.o. female was admitted to [facility] to services of Vlad Murillo II, MD with History of knee replacement [Z96.659]  History of knee replacement [Z96.659] on 1/4/2022 and underwent AR TOTAL KNEE ARTHROPLASTY [09535] (LEFT TOTAL KNEE ARTHROPLASTY). Post-operatively the patient transferred to the floor where the patient underwent mobilization therapy. Opioids were titrated to achieve appropriate pain management to allow for participation in mobilization exercises. Vital signs and laboratory values are now within safe parameters for discharge. The dressings and/or incision is intact without signs or symptoms of active infection. Operative extremity neurovascular status remains intact as compared to the preoperative exam. Appropriate education re: incision care, activity levels, medications, and follow up visits was completed and all questions were answered. The patient is now deemed stable for discharge.    HOME: The patient progressed well with physical therapy. There were cleared for discharge to home. The patietn was sent home in good condition}.       R \"Moises\" Lidia FREDERICK MD  Orthopaedic Surgery  [facility]  [phone]                                               "

## 2022-01-05 NOTE — OUTREACH NOTE
Prep Survey      Responses   [facility] patient discharged from? [place]   Is LACE score < 7 ? Yes   Emergency Room discharge w/ pulse ox? No   [facility]ibility [facility]   Date of Admission 01/04/22   Date of Discharge 01/05/22   Discharge Disposition Home or Self Care   Discharge diagnosis TOTAL KNEE ARTHROPLASTY    Does the patient have one of the following disease processes/diagnoses(primary or secondary)? Total Joint Replacement   Does the patient have Home health ordered? Yes   What is the Home health agency?   [facility]    Is there a DME ordered? No   Prep survey completed? Yes          Hilary Currie RN

## 2022-01-05 NOTE — DISCHARGE PLACEMENT REQUEST
"Reema Young (71 y.o. Female)             Date of Birth Social Security Number Address Home Phone MRN    1950  [id number] [address] [phone] [id number]    [employer] Marital Status             Christianity        Admission Date Admission Type Admitting Provider Attending Provider Department, Room/Bed    1/4/22 Elective Vlad Murillo II, MD Sweet, Richard Alexander II, MD [address], P897/1    Discharge Date Discharge Disposition Discharge Destination           Home or Self Care              Attending Provider: Vlad Murillo II, MD    Allergies: No Known Allergies    Isolation: None   Infection: None   Code Status: CPR   Advance Care Planning Activity    Ht: 162.6 cm (64\")   Wt: 93.8 kg (206 lb 14.4 oz)    Admission Cmt: None   Principal Problem: None                Active Insurance as of 1/4/2022     Primary Coverage     Payor Plan Insurance Group Employer/Plan Group    HUMANA MEDICARE REPLACEMENT HUMANA MEDICARE REPLACEMENT [id number]     Payor Plan Address Payor Plan Phone Number Payor Plan Fax Number Effective Dates    [address] [address] [phone]  1/1/2018 - None Entered    [facility] [id number]       Subscriber Name Subscriber Birth Date Member ID       REEMA YOUNG 1950 [id number]                 Emergency Contacts      (Rel.) Home Phone Work Phone Mobile Phone    Jamia Christiansen (Daughter) [phone] -- --              "
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
## 2022-01-05 NOTE — PROGRESS NOTES
Planning home discharge today as long she passes physical therapy.  She did have some problems with nausea yesterday but feels much better now.

## 2022-01-05 NOTE — CASE MANAGEMENT/SOCIAL WORK
Discharge Planning Assessment  [facility]     Patient Name: Reema Easley  MRN: [id number]  Today's Date: 1/5/2022    Admit Date: 1/4/2022     Discharge Needs Assessment    No documentation.                Discharge Plan     Row Name 01/05/22 0942       Plan    Plan Home with family support & [facility].    Patient/Family in Agreement with Plan yes    Plan Comments Spoke with the patient, verified current information and explained the role of the CCP. Patient said she has very good family support. She plans to d/c home with family support & HH. Careplan received from [facility] which plans for the patient to d/c home with [facility]. Discussed with the patient who's agreeable. Referral sent in Epic. Pt also said her family will transport her home at d/c. No other needs identified. CCP will follow.              Continued Care and Services - Admitted Since 1/4/2022     Home Medical Care     Service Provider Request Status Selected Services Address Phone Fax Patient Preferred    [facility]  Accepted N/A [address] [facility] [address] [phone] --              Expected Discharge Date and Time     Expected Discharge Date Expected Discharge Time    Jan 5, 2022         Valarie Alexander RN

## 2022-01-05 NOTE — PLAN OF CARE
Goal Outcome Evaluation:  Plan of Care Reviewed With: patient        Progress: improving  Outcome Summary: Patient is now POD 1 of L TKA. VSS and voiding function is intact. Pain is managed with po meds. Patient able to perform transfers to BSC with x1 assist, but mobility is limited d/t nausea/dry heaving associated with movement. Patient educated on BG monitoring and med management, including SSI regimen. Patient plans to d/c home with hh and family support when stable.

## 2022-01-05 NOTE — PLAN OF CARE
Goal Outcome Evaluation:  Plan of Care Reviewed With: patient        Progress: improving  Outcome Summary: Patient is POD1 L TKA. Pt demonstrated improved overall functional mobility. She completed bed mobility and STS to rwx with SBA. She ambulated 200ft using rwx with SBA and cues for reciprocal gait pattern. Pt able to complete 4 steps with R handrail with CGA. Pt is safe to discharge home with family and HHPT.    Patient was wearing a face mask during this therapy encounter. Therapist used appropriate personal protective equipment including eye protection, mask, and gloves.  Mask used was standard procedure mask. Appropriate PPE was worn during the entire therapy session. Hand hygiene was completed before and after therapy session. Patient is not in enhanced droplet precautions.

## 2022-01-06 ENCOUNTER — TRANSITIONAL CARE MANAGEMENT TELEPHONE ENCOUNTER (OUTPATIENT)
Dept: CALL CENTER | Facility: HOSPITAL | Age: 72
End: 2022-01-06

## 2022-01-06 NOTE — OUTREACH NOTE
Call Center TCM Note      Responses   [facility] patient discharged from? [place]   Does the patient have one of the following disease processes/diagnoses(primary or secondary)? Total Joint Replacement   Joint surgery performed? Knee   TCM attempt successful? Yes   Discharge diagnosis TOTAL KNEE ARTHROPLASTY    Does the patient have all medications related to this admission filled (includes all antibiotics, pain medications, etc.) Yes   Is the patient taking all medications as directed (includes completed medication regime)? Yes   Is the patient able to teach back alternate methods of pain control? Ice,  Reposition,  Correct alignment,  Knee-elevation/no pillow under knee,  Shoulder-elevate above heart/ keep in sling as advised,  Short, frequent activity   Medication comments Pt has been prescribed Percocet 5/325 for post op pain, but makes her feel a bit woozy. She will check pharm for ok to halve the pills (they are not extended resease) or she will call surgeon ofc for change to [place].    Does the patient have a follow up appointment with their surgeon? Yes   Has the patient kept scheduled appointments due by today? Yes   What is the Home health agency?   [facility]    Has home health visited the patient within 72 hours of discharge? Yes   Psychosocial issues? No   Has the patient began therapy sessions (either in the home or as an out patient)? Yes   Does the patient have a wound vac in place? N/A   Has the patient fallen since discharge? No   Did the patient receive a copy of their discharge instructions? Yes   Nursing interventions Reviewed instructions with patient   What is the patient's perception of their functional status since discharge? Improving   Is the patient able to teach back signs and symptoms of infection? Temp >100.4 for 24h or longer,  Blisters around incision,  Severe discomfort or pain,  Shortness of breath or chest pain,  Changes in mobility,  Increased swelling or redness  around incision (not associated with surgical edema),  Incisional drainage   Is the patient able to teach back how to prevent infection? Check incision daily,  Shower only as directed by surgeon,  No lotion or creams,  Monitor blood sugar if diabetic,  Wash hands before and after touching incision,  Keep incision covered if drainage,  Eat well-balanced diet,  No tub baths, hot tub or swimming,  Keep incision covered if pets in house   Is the patient able to teach back signs and symptoms of DVT? Redness in calf,  Swelling in calf,  Area hot to touch,  Severe pain in calf,  Shortness of breath or chest pain   Is the patient able to teach back home safety measures? Ability to shower,  Modifications with ADLs such as dressing, cooking, toileting,  Accessibility to necessary areas in home,  Modifications to reach items   Did the patient implement home safety suggestions from pre-surgery classes if attended? N/A   If the patient is a current smoker, are they able to teach back resources for cessation? Not a smoker   Is the patient/caregiver able to teach back the hierarchy of who to call/visit for symptoms/problems? PCP, Specialist, Home health nurse, Urgent Care, ED, 911 Yes   TCM call completed? Yes   Wrap up additional comments Pt doing well s/p TKR. [facility] saw pt today & will see her M/W/F next week. Pt has been prescribed Percocet 5/325 for post op pain, but makes her feel a bit woozy. She will check pharm for ok to halve the pills (they are not extended resease) or she will call surgeon ofc for change to [place]. No other questions. For now, pt wishes to decline TCM FWP with PCP but will keep 02/2022 fwp.            Dang Vo MA    1/6/2022, 11:52 EST

## 2022-01-10 ENCOUNTER — TELEPHONE (OUTPATIENT)
Dept: INTERNAL MEDICINE | Facility: CLINIC | Age: 72
End: 2022-01-10

## 2022-01-10 NOTE — TELEPHONE ENCOUNTER
Caller: Reema Easley RACHAEL    Relationship: Self    Best call back number: [phone]    What medications are you currently taking:   Current Outpatient Medications on File Prior to Visit   Medication Sig Dispense Refill   • acetaminophen (TYLENOL) 500 MG tablet Take 1,000 mg by mouth Every 6 (Six) Hours As Needed for Mild Pain .     • allopurinol (ZYLOPRIM) 300 MG tablet TAKE ONE TABLET BY MOUTH DAILY (Patient taking differently: Take 300 mg by mouth Daily.) 90 tablet 2   • ALPRAZolam (XANAX) 0.5 MG tablet TAKE ONE TABLET BY MOUTH TWICE A DAY (Patient taking differently: Take 0.5 mg by mouth 3 (Three) Times a Day As Needed.) 60 tablet 3   • aspirin 81 MG EC tablet Take 1 tablet by mouth Daily. 60 tablet 0   • benazepril (LOTENSIN) 40 MG tablet TAKE ONE TABLET BY MOUTH DAILY (Patient taking differently: Take 40 mg by mouth Daily.) 90 tablet 1   • Chlorhexidine Gluconate 2 % pads Apply 1 each topically Take As Directed. As directed pre op      • Dapagliflozin Propanediol (Farxiga) 10 MG tablet Take 1 p.o. daily before the first meal for diabetes (Patient taking differently: Take 1 tablet by mouth Every Morning. Take 1 p.o. daily before the first meal for diabetes) 30 tablet 6   • glimepiride (AMARYL) 4 MG tablet TAKE ONE TABLET BY MOUTH DAILY WITH THE LARGEST MEAL FOR DIABETES (Patient taking differently: Take 4 mg by mouth Every Morning Before Breakfast. TAKE ONE TABLET BY MOUTH DAILY WITH THE LARGEST MEAL FOR DIABETES) 90 tablet 2   • glucose blood test strip Use as instructed. Requesting hany-metric strips. 100 each 11   • glucose monitor monitoring kit 1 each As Needed (as needed). 1 each 0   • Insulin Pen Needle (B-D UF III MINI PEN NEEDLES) 31G X 5 MM misc E11.9 (Patient taking differently: 1 each by Other route Take As Directed. E11.9) 30 each 3   • Lancets (freestyle) lancets Use as directed daily. Has hany-metric glucometer. 100 each 11   • Liraglutide (VICTOZA) 18 MG/3ML solution pen-injector injection Inject  1.8 mg under the skin into the appropriate area as directed Daily.     • mupirocin (BACTROBAN) 2 % nasal ointment 1 application into the nostril(s) as directed by provider Take As Directed.     • naproxen (NAPROSYN) 500 MG tablet TAKE ONE TABLET BY MOUTH TWICE A DAY FOR ARTHRITIS OR KNEE PAIN (Patient taking differently: Take 500 mg by mouth 2 (Two) Times a Day As Needed. TAKE ONE TABLET BY MOUTH TWICE A DAY FOR ARTHRITIS OR KNEE PAIN) 60 tablet 5   • nystatin-triamcinolone (MYCOLOG II) 951573-1.1 UNIT/GM-% cream Apply to the affected areas 3 times daily as needed (Patient taking differently: Apply 1 application topically to the appropriate area as directed 3 (Three) Times a Day As Needed. Apply to the affected areas 3 times daily as needed) 60 g 1   • ondansetron (Zofran) 4 MG tablet Take 1 tablet by mouth Every 6 (Six) Hours As Needed for Nausea or Vomiting. 30 tablet 0   • oxyCODONE-acetaminophen (PERCOCET) 5-325 MG per tablet Take 1 tablet by mouth Every 4 (Four) Hours As Needed for Severe Pain . 50 tablet 0   • simvastatin (ZOCOR) 40 MG tablet TAKE ONE TABLET BY MOUTH ONCE NIGHTLY (Patient taking differently: Take 40 mg by mouth Every Night.) 30 tablet 1   • SITagliptin (JANUVIA) 100 MG tablet Take 1 p.o. daily for diabetes (Patient taking differently: Take 100 mg by mouth Daily. Take 1 p.o. daily for diabetes) 30 tablet 6     No current facility-administered medications on file prior to visit.      PATIENT HAS SEVERAL MEDICATIONS THAT SHE RECEIVED A LETTER FROM [employer] THAT THEY HAVE HAD ANY RESPONSE FROM THE PROVIDER FOR HER REFILLS?  PLEASE CHECK.  PATIENT'S LETTER ISN'T CLEAR AND THEY ARE SAYING THAT THE REFILLS ARE NOT APPROVED ON WHICH MEDICATIONS.    SECOND, SHE WOULD LIKE TO KNOW IF HER VICTOZA HAS CAME IN FOR HER APPOINTMENT TO HAVE INJECTIONS?  SHE IS DOWN TO 1 PEN.

## 2022-01-14 NOTE — TELEPHONE ENCOUNTER
Hub staff attempted to follow warm transfer process and was unsuccessful     Caller: Reema Easley    Relationship to patient: Self    Best call back number:  [phone] - PLEASE CALL PATIENT ONCE PAPERWORK IS SENT BACK TO PATIENT ASSISTANCE PROGRAM     Patient is needing: PATIENT STATED [employer] WILL BE SENDING OVER A FAX FOR HER   Liraglutide (VICTOZA) 18 MG/3ML solution pen-injector injection  AND WANTED DR. TEJEDA KNOW . PATIENT HAS 4 DAYS LEFT .

## 2022-01-22 DIAGNOSIS — F41.1 GENERALIZED ANXIETY DISORDER: Chronic | ICD-10-CM

## 2022-01-24 ENCOUNTER — TELEPHONE (OUTPATIENT)
Dept: INTERNAL MEDICINE | Facility: CLINIC | Age: 72
End: 2022-01-24

## 2022-01-24 RX ORDER — ALPRAZOLAM 0.5 MG/1
TABLET ORAL
Qty: 60 TABLET | Refills: 3 | Status: SHIPPED | OUTPATIENT
Start: 2022-01-24 | End: 2022-05-24

## 2022-01-24 NOTE — TELEPHONE ENCOUNTER
PATIENT CALLED STATING THAT [employer] HAS SENT A FAX WITH THE FORM FOR THE PATIENT ASSISTANCE PROGRAM SO SHE CAN HAVE VICTOZA.  PATIENT STATED HAS BEEN WITHOUT IF FOR 2 WEEKS NOW AND BLOOD SUGAR .  NEEDS THE DOCTOR TO COMPLETE THAT FORM AND SEND IT BACK TO THE FAX NUMBER ON THE FORM ASAP.

## 2022-01-24 NOTE — TELEPHONE ENCOUNTER
Caller: Reema Easley    Relationship: Self    Best call back number: [phone]    Who are you requesting to speak with (clinical staff, provider,  specific staff member): CLINICAL STAFF     What was the call regarding: PATIENT IS OUT OF   Liraglutide (VICTOZA) 18 MG/3ML solution pen-injector injection HER BLOOD SUGAR THIS MORNING    WANTING TO KNOW STATUS OF PAPERWORK FOR MEDICATION AND IS THERE SOMETHING ELSE THAT CAN BE CALLED IN TO PHARMACY TO REPLACE THE VICTOZA TO HELP   PLEASE CALL AND ADVISE     Do you require a callback: YES

## 2022-01-26 ENCOUNTER — TELEPHONE (OUTPATIENT)
Dept: INTERNAL MEDICINE | Facility: CLINIC | Age: 72
End: 2022-01-26

## 2022-01-26 NOTE — TELEPHONE ENCOUNTER
Caller: Reema Easley    Relationship to patient: Self    Best call back number: [phone]    Patient is needing: PATIENT CALLED IN AND SAID HER DAUGHTER RAÚL IS GOING TO COME AND  THE VICTOZA INJECTION AROUND 10:15AM. SHE SAID SHE HAS TO GO TO PHYSICAL THERAPY AND WANTED TO GIVE HER PERMISSION FOR RAÚL TO  THE SAMPLE.

## 2022-01-28 NOTE — TELEPHONE ENCOUNTER
Caller: Reema Easley    Relationship to patient: Self    Best call back number: [phone]    Patient is needing: PT IS WANTING TO SPEAK TO MIREILLE IN REGARDS TO THE PAPERORK SENT OVER FROM HER INSURANCE COMPANY REGARDING VICTOZA. SHE STATED THAT THEY HAD SENT FORMS OVER FOR MD TEJEDA TO FILL AND WANTS TO VERIFY IF THEY HAVE BEEN FILLED AND SENT BACK. PLEASE CONTACT PT WHEN POSSIBLE

## 2022-02-21 ENCOUNTER — LAB (OUTPATIENT)
Dept: LAB | Facility: HOSPITAL | Age: 72
End: 2022-02-21

## 2022-02-21 DIAGNOSIS — E78.2 MIXED HYPERLIPIDEMIA: Chronic | ICD-10-CM

## 2022-02-21 DIAGNOSIS — E11.29 TYPE 2 DIABETES MELLITUS WITH MICROALBUMINURIA, WITHOUT LONG-TERM CURRENT USE OF INSULIN: Chronic | ICD-10-CM

## 2022-02-21 DIAGNOSIS — Z51.81 THERAPEUTIC DRUG MONITORING: ICD-10-CM

## 2022-02-21 DIAGNOSIS — R80.9 MICROALBUMINURIA: Chronic | ICD-10-CM

## 2022-02-21 DIAGNOSIS — E55.9 VITAMIN D DEFICIENCY: Chronic | ICD-10-CM

## 2022-02-21 DIAGNOSIS — R80.9 TYPE 2 DIABETES MELLITUS WITH MICROALBUMINURIA, WITHOUT LONG-TERM CURRENT USE OF INSULIN: Chronic | ICD-10-CM

## 2022-02-21 DIAGNOSIS — Z87.39 HISTORY OF GOUT: Chronic | ICD-10-CM

## 2022-02-21 LAB
25(OH)D3 SERPL-MCNC: 34.4 NG/ML (ref 30–100)
ALBUMIN SERPL-MCNC: 4.3 G/DL (ref 3.5–5.2)
ALBUMIN UR-MCNC: <1.2 MG/DL
ALBUMIN/GLOB SERPL: 1.7 G/DL
ALP SERPL-CCNC: 103 U/L (ref 39–117)
ALT SERPL W P-5'-P-CCNC: 10 U/L (ref 1–33)
ANION GAP SERPL CALCULATED.3IONS-SCNC: 9 MMOL/L (ref 5–15)
AST SERPL-CCNC: 13 U/L (ref 1–32)
BILIRUB SERPL-MCNC: 0.4 MG/DL (ref 0–1.2)
BILIRUB UR QL STRIP: NEGATIVE
BUN SERPL-MCNC: 18 MG/DL (ref 8–23)
BUN/CREAT SERPL: 23.4 (ref 7–25)
CALCIUM SPEC-SCNC: 9.4 MG/DL (ref 8.6–10.5)
CHLORIDE SERPL-SCNC: 102 MMOL/L (ref 98–107)
CK SERPL-CCNC: 40 U/L (ref 20–180)
CLARITY UR: CLEAR
CO2 SERPL-SCNC: 27 MMOL/L (ref 22–29)
COLOR UR: YELLOW
CREAT SERPL-MCNC: 0.77 MG/DL (ref 0.57–1)
CREAT UR-MCNC: 73.1 MG/DL
DEPRECATED RDW RBC AUTO: 44 FL (ref 37–54)
ERYTHROCYTE [DISTWIDTH] IN BLOOD BY AUTOMATED COUNT: 13 % (ref 12.3–15.4)
GFR SERPL CREATININE-BSD FRML MDRD: 74 ML/MIN/1.73
GLOBULIN UR ELPH-MCNC: 2.5 GM/DL
GLUCOSE SERPL-MCNC: 132 MG/DL (ref 65–99)
GLUCOSE UR STRIP-MCNC: ABNORMAL MG/DL
HBA1C MFR BLD: 7 % (ref 4.8–5.6)
HCT VFR BLD AUTO: 44.1 % (ref 34–46.6)
HGB BLD-MCNC: 14.3 G/DL (ref 12–15.9)
HGB UR QL STRIP.AUTO: NEGATIVE
KETONES UR QL STRIP: NEGATIVE
LEUKOCYTE ESTERASE UR QL STRIP.AUTO: NEGATIVE
MCH RBC QN AUTO: 29.8 PG (ref 26.6–33)
MCHC RBC AUTO-ENTMCNC: 32.4 G/DL (ref 31.5–35.7)
MCV RBC AUTO: 91.9 FL (ref 79–97)
MICROALBUMIN/CREAT UR: NORMAL MG/G{CREAT}
NITRITE UR QL STRIP: NEGATIVE
PH UR STRIP.AUTO: 5.5 [PH] (ref 5–8)
PLATELET # BLD AUTO: 337 10*3/MM3 (ref 140–450)
PMV BLD AUTO: 9.8 FL (ref 6–12)
POTASSIUM SERPL-SCNC: 4.3 MMOL/L (ref 3.5–5.2)
PROT SERPL-MCNC: 6.8 G/DL (ref 6–8.5)
PROT UR QL STRIP: NEGATIVE
RBC # BLD AUTO: 4.8 10*6/MM3 (ref 3.77–5.28)
SODIUM SERPL-SCNC: 138 MMOL/L (ref 136–145)
SP GR UR STRIP: >=1.03 (ref 1–1.03)
T3FREE SERPL-MCNC: 2.17 PG/ML (ref 2–4.4)
T4 FREE SERPL-MCNC: 1.15 NG/DL (ref 0.93–1.7)
TSH SERPL DL<=0.05 MIU/L-ACNC: 1.72 UIU/ML (ref 0.27–4.2)
URATE SERPL-MCNC: 2.5 MG/DL (ref 2.4–5.7)
UROBILINOGEN UR QL STRIP: ABNORMAL
WBC NRBC COR # BLD: 9.35 10*3/MM3 (ref 3.4–10.8)

## 2022-02-21 PROCEDURE — 82043 UR ALBUMIN QUANTITATIVE: CPT

## 2022-02-21 PROCEDURE — 82306 VITAMIN D 25 HYDROXY: CPT

## 2022-02-21 PROCEDURE — 36415 COLL VENOUS BLD VENIPUNCTURE: CPT

## 2022-02-21 PROCEDURE — 85027 COMPLETE CBC AUTOMATED: CPT

## 2022-02-21 PROCEDURE — 82570 ASSAY OF URINE CREATININE: CPT

## 2022-02-21 PROCEDURE — 84481 FREE ASSAY (FT-3): CPT

## 2022-02-21 PROCEDURE — 84443 ASSAY THYROID STIM HORMONE: CPT

## 2022-02-21 PROCEDURE — 81003 URINALYSIS AUTO W/O SCOPE: CPT

## 2022-02-21 PROCEDURE — 80061 LIPID PANEL: CPT

## 2022-02-21 PROCEDURE — 84439 ASSAY OF FREE THYROXINE: CPT

## 2022-02-21 PROCEDURE — 82550 ASSAY OF CK (CPK): CPT

## 2022-02-21 PROCEDURE — 84550 ASSAY OF BLOOD/URIC ACID: CPT

## 2022-02-21 PROCEDURE — 83036 HEMOGLOBIN GLYCOSYLATED A1C: CPT

## 2022-02-21 PROCEDURE — 83704 LIPOPROTEIN BLD QUAN PART: CPT

## 2022-02-21 PROCEDURE — 80053 COMPREHEN METABOLIC PANEL: CPT

## 2022-02-22 ENCOUNTER — TELEPHONE (OUTPATIENT)
Dept: INTERNAL MEDICINE | Facility: CLINIC | Age: 72
End: 2022-02-22

## 2022-02-22 NOTE — TELEPHONE ENCOUNTER
PATIENT IS CALLING IN ABOUT HER VICTOZA MEDICATION SHE STATES THAT THE PHARMACEUTICAL COMPANY IS SAYING THAT THEY NEED TO HAVE OFFICE SEND OVER A LETTER OF EXPLANATION ON WHY THIS MEDICATION WAS NOT DELIVERED TO THE OFFICE AND THAT THEY NEVER RECEIVED IT THEY NEED SIGNED BY DOCTOR AND DATED.    AND ALSO THEY NEED ANOTHER REFILL REQUEST FOR THE MEDICATION AS WELL.       FAX NUMBER IS  [phone]    CAN YOU PUT HER PATIENT ID NUMBER ON THE FAX AS WELL    [id number] IS HER PATIENT ID.

## 2022-02-23 LAB
CHOLEST SERPL-MCNC: 162 MG/DL (ref 100–199)
HDL SERPL-SCNC: 41.6 UMOL/L
HDLC SERPL-MCNC: 57 MG/DL
LDL SERPL QN: 20.7 NM
LDL SERPL-SCNC: 1019 NMOL/L
LDL SMALL SERPL-SCNC: 626 NMOL/L
LDLC SERPL CALC-MCNC: 81 MG/DL (ref 0–99)
TRIGL SERPL-MCNC: 138 MG/DL (ref 0–149)

## 2022-02-28 ENCOUNTER — OFFICE VISIT (OUTPATIENT)
Dept: INTERNAL MEDICINE | Facility: CLINIC | Age: 72
End: 2022-02-28

## 2022-02-28 VITALS
RESPIRATION RATE: 18 BRPM | HEART RATE: 92 BPM | DIASTOLIC BLOOD PRESSURE: 78 MMHG | OXYGEN SATURATION: 99 % | BODY MASS INDEX: 32.58 KG/M2 | WEIGHT: 190.8 LBS | SYSTOLIC BLOOD PRESSURE: 122 MMHG | HEIGHT: 64 IN

## 2022-02-28 DIAGNOSIS — F41.8 DEPRESSION WITH ANXIETY: Chronic | ICD-10-CM

## 2022-02-28 DIAGNOSIS — R80.9 TYPE 2 DIABETES MELLITUS WITH MICROALBUMINURIA, WITHOUT LONG-TERM CURRENT USE OF INSULIN: Primary | Chronic | ICD-10-CM

## 2022-02-28 DIAGNOSIS — N95.1 MENOPAUSAL STATE: Chronic | ICD-10-CM

## 2022-02-28 DIAGNOSIS — K75.81 NASH (NONALCOHOLIC STEATOHEPATITIS): Chronic | ICD-10-CM

## 2022-02-28 DIAGNOSIS — G47.33 OBSTRUCTIVE SLEEP APNEA: Chronic | ICD-10-CM

## 2022-02-28 DIAGNOSIS — F51.04 CHRONIC INSOMNIA: Chronic | ICD-10-CM

## 2022-02-28 DIAGNOSIS — E55.9 VITAMIN D DEFICIENCY: Chronic | ICD-10-CM

## 2022-02-28 DIAGNOSIS — E66.9 NON MORBID OBESITY: Chronic | ICD-10-CM

## 2022-02-28 DIAGNOSIS — F41.1 GENERALIZED ANXIETY DISORDER: Chronic | ICD-10-CM

## 2022-02-28 DIAGNOSIS — R46.89 NON-COMPLIANT BEHAVIOR: Chronic | ICD-10-CM

## 2022-02-28 DIAGNOSIS — Z96.652 HISTORY OF TOTAL LEFT KNEE REPLACEMENT: ICD-10-CM

## 2022-02-28 DIAGNOSIS — R80.9 MICROALBUMINURIA: Chronic | ICD-10-CM

## 2022-02-28 DIAGNOSIS — E78.2 MIXED HYPERLIPIDEMIA: Chronic | ICD-10-CM

## 2022-02-28 DIAGNOSIS — Z51.81 THERAPEUTIC DRUG MONITORING: ICD-10-CM

## 2022-02-28 DIAGNOSIS — E11.29 TYPE 2 DIABETES MELLITUS WITH MICROALBUMINURIA, WITHOUT LONG-TERM CURRENT USE OF INSULIN: Primary | Chronic | ICD-10-CM

## 2022-02-28 DIAGNOSIS — I10 BENIGN ESSENTIAL HYPERTENSION: Chronic | ICD-10-CM

## 2022-02-28 DIAGNOSIS — Z87.39 HISTORY OF GOUT: Chronic | ICD-10-CM

## 2022-02-28 DIAGNOSIS — R60.0 BILATERAL LOWER EXTREMITY EDEMA: Chronic | ICD-10-CM

## 2022-02-28 DIAGNOSIS — M85.89 OSTEOPENIA OF MULTIPLE SITES: Chronic | ICD-10-CM

## 2022-02-28 PROBLEM — S83.242A ACUTE MEDIAL MENISCUS TEAR OF LEFT KNEE: Status: RESOLVED | Noted: 2021-09-08 | Resolved: 2022-02-28

## 2022-02-28 PROBLEM — Z96.659 HISTORY OF KNEE REPLACEMENT: Status: RESOLVED | Noted: 2022-01-04 | Resolved: 2022-02-28

## 2022-02-28 PROBLEM — M25.562 ACUTE PAIN OF LEFT KNEE: Status: RESOLVED | Noted: 2021-08-26 | Resolved: 2022-02-28

## 2022-02-28 PROBLEM — R11.0 NAUSEA: Status: RESOLVED | Noted: 2022-01-04 | Resolved: 2022-02-28

## 2022-02-28 PROBLEM — B07.9 VIRAL WART ON FINGER: Status: RESOLVED | Noted: 2021-10-25 | Resolved: 2022-02-28

## 2022-02-28 PROCEDURE — 99214 OFFICE O/P EST MOD 30 MIN: CPT | Performed by: INTERNAL MEDICINE
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
## 2022-03-06 DIAGNOSIS — Z87.39 HISTORY OF GOUT: Chronic | ICD-10-CM

## 2022-03-07 RX ORDER — ALLOPURINOL 300 MG/1
TABLET ORAL
Qty: 90 TABLET | Refills: 3 | Status: SHIPPED | OUTPATIENT
Start: 2022-03-07 | End: 2023-01-31 | Stop reason: SDUPTHER

## 2022-04-21 ENCOUNTER — TELEPHONE (OUTPATIENT)
Dept: INTERNAL MEDICINE | Facility: CLINIC | Age: 72
End: 2022-04-21

## 2022-04-21 DIAGNOSIS — E78.2 MIXED HYPERLIPIDEMIA: Chronic | ICD-10-CM

## 2022-04-26 DIAGNOSIS — M25.562 ACUTE PAIN OF LEFT KNEE: ICD-10-CM

## 2022-04-26 DIAGNOSIS — I10 BENIGN ESSENTIAL HYPERTENSION: Chronic | ICD-10-CM

## 2022-04-26 DIAGNOSIS — R80.9 TYPE 2 DIABETES MELLITUS WITH MICROALBUMINURIA, WITHOUT LONG-TERM CURRENT USE OF INSULIN: Chronic | ICD-10-CM

## 2022-04-26 DIAGNOSIS — E11.29 TYPE 2 DIABETES MELLITUS WITH MICROALBUMINURIA, WITHOUT LONG-TERM CURRENT USE OF INSULIN: Chronic | ICD-10-CM

## 2022-04-26 DIAGNOSIS — E78.2 MIXED HYPERLIPIDEMIA: Chronic | ICD-10-CM

## 2022-04-26 RX ORDER — SIMVASTATIN 40 MG
40 TABLET ORAL NIGHTLY
Qty: 90 TABLET | Refills: 2 | Status: SHIPPED | OUTPATIENT
Start: 2022-04-26 | End: 2023-01-25

## 2022-04-26 RX ORDER — BENAZEPRIL HYDROCHLORIDE 40 MG/1
40 TABLET, FILM COATED ORAL DAILY
Qty: 90 TABLET | Refills: 2 | Status: SHIPPED | OUTPATIENT
Start: 2022-04-26 | End: 2023-01-25

## 2022-04-26 RX ORDER — GLIMEPIRIDE 4 MG/1
TABLET ORAL
Qty: 90 TABLET | Refills: 2 | Status: SHIPPED | OUTPATIENT
Start: 2022-04-26 | End: 2023-01-25

## 2022-04-26 RX ORDER — NAPROXEN 500 MG/1
TABLET ORAL
Qty: 60 TABLET | Refills: 5 | Status: SHIPPED | OUTPATIENT
Start: 2022-04-26 | End: 2022-11-08

## 2022-04-26 NOTE — TELEPHONE ENCOUNTER
Caller: EasleyRaginiReema K    Relationship: Self    Best call back number: [phone]     Requested Prescriptions:   Requested Prescriptions     Pending Prescriptions Disp Refills   • benazepril (LOTENSIN) 40 MG tablet 90 tablet 1     Sig: Take 1 tablet by mouth Daily.   • glimepiride (AMARYL) 4 MG tablet 90 tablet 2     Sig: TAKE ONE TABLET BY MOUTH DAILY WITH THE LARGEST MEAL FOR DIABETES   • naproxen (NAPROSYN) 500 MG tablet 60 tablet 5     Sig: TAKE ONE TABLET BY MOUTH TWICE A DAY FOR ARTHRITIS OR KNEE PAIN   • simvastatin (ZOCOR) 40 MG tablet 30 tablet 1     Sig: Take 1 tablet by mouth Every Night.        Pharmacy where request should be sent: [facility] - [facility] [address] - [phone] [facility] [phone] FX     Additional details provided by patient:     Does the patient have less than a 3 day supply:  [x] Yes  [] No    Jazmine Floyd Rep   04/26/22 12:12 EDT

## 2022-05-24 DIAGNOSIS — F41.1 GENERALIZED ANXIETY DISORDER: Chronic | ICD-10-CM

## 2022-05-24 RX ORDER — ALPRAZOLAM 0.5 MG/1
TABLET ORAL
Qty: 60 TABLET | Refills: 5 | Status: SHIPPED | OUTPATIENT
Start: 2022-05-24 | End: 2022-11-16

## 2022-06-07 DIAGNOSIS — E11.29 TYPE 2 DIABETES MELLITUS WITH MICROALBUMINURIA, WITHOUT LONG-TERM CURRENT USE OF INSULIN: Chronic | ICD-10-CM

## 2022-06-07 DIAGNOSIS — R80.9 TYPE 2 DIABETES MELLITUS WITH MICROALBUMINURIA, WITHOUT LONG-TERM CURRENT USE OF INSULIN: Chronic | ICD-10-CM

## 2022-06-07 RX ORDER — DAPAGLIFLOZIN 10 MG/1
TABLET, FILM COATED ORAL
Qty: 30 TABLET | Refills: 6 | Status: SHIPPED | OUTPATIENT
Start: 2022-06-07 | End: 2022-07-11

## 2022-06-07 NOTE — TELEPHONE ENCOUNTER
Caller: Reema Easley    Relationship: Self    Best call back number: [phone]    Requested Prescriptions:   Requested Prescriptions     Pending Prescriptions Disp Refills   • dapagliflozin Propanediol (Farxiga) 10 MG tablet 30 tablet 6     Sig: Take 1 p.o. daily before the first meal for diabetes        Pharmacy where request should be sent: [facility] - [address] - [phone]  - [phone]      Additional details provided by patient: PATIENT IS OUT OF MEDICATION.     Does the patient have less than a 3 day supply:  [x] Yes  [] No    Jazmine Major Rep   06/07/22 10:21 EDT

## 2022-07-07 ENCOUNTER — LAB (OUTPATIENT)
Dept: LAB | Facility: HOSPITAL | Age: 72
End: 2022-07-07

## 2022-07-07 DIAGNOSIS — E11.29 TYPE 2 DIABETES MELLITUS WITH MICROALBUMINURIA, WITHOUT LONG-TERM CURRENT USE OF INSULIN: Chronic | ICD-10-CM

## 2022-07-07 DIAGNOSIS — R80.9 TYPE 2 DIABETES MELLITUS WITH MICROALBUMINURIA, WITHOUT LONG-TERM CURRENT USE OF INSULIN: Chronic | ICD-10-CM

## 2022-07-07 DIAGNOSIS — Z87.39 HISTORY OF GOUT: Chronic | ICD-10-CM

## 2022-07-07 DIAGNOSIS — Z51.81 THERAPEUTIC DRUG MONITORING: ICD-10-CM

## 2022-07-07 DIAGNOSIS — E78.2 MIXED HYPERLIPIDEMIA: Chronic | ICD-10-CM

## 2022-07-07 DIAGNOSIS — E55.9 VITAMIN D DEFICIENCY: Chronic | ICD-10-CM

## 2022-07-07 LAB
25(OH)D3 SERPL-MCNC: 34.2 NG/ML (ref 30–100)
ALBUMIN SERPL-MCNC: 4.5 G/DL (ref 3.5–5.2)
ALBUMIN/GLOB SERPL: 1.9 G/DL
ALP SERPL-CCNC: 109 U/L (ref 39–117)
ALT SERPL W P-5'-P-CCNC: 17 U/L (ref 1–33)
ANION GAP SERPL CALCULATED.3IONS-SCNC: 11 MMOL/L (ref 5–15)
AST SERPL-CCNC: 22 U/L (ref 1–32)
BILIRUB SERPL-MCNC: 0.5 MG/DL (ref 0–1.2)
BUN SERPL-MCNC: 13 MG/DL (ref 8–23)
BUN/CREAT SERPL: 17.8 (ref 7–25)
CALCIUM SPEC-SCNC: 9.7 MG/DL (ref 8.6–10.5)
CHLORIDE SERPL-SCNC: 100 MMOL/L (ref 98–107)
CK SERPL-CCNC: 97 U/L (ref 20–180)
CO2 SERPL-SCNC: 27 MMOL/L (ref 22–29)
CREAT SERPL-MCNC: 0.73 MG/DL (ref 0.57–1)
DEPRECATED RDW RBC AUTO: 44.8 FL (ref 37–54)
EGFRCR SERPLBLD CKD-EPI 2021: 88.6 ML/MIN/1.73
ERYTHROCYTE [DISTWIDTH] IN BLOOD BY AUTOMATED COUNT: 13.7 % (ref 12.3–15.4)
GLOBULIN UR ELPH-MCNC: 2.4 GM/DL
GLUCOSE SERPL-MCNC: 164 MG/DL (ref 65–99)
HBA1C MFR BLD: 7.6 % (ref 4.8–5.6)
HCT VFR BLD AUTO: 43.6 % (ref 34–46.6)
HGB BLD-MCNC: 14.4 G/DL (ref 12–15.9)
MCH RBC QN AUTO: 29.5 PG (ref 26.6–33)
MCHC RBC AUTO-ENTMCNC: 33 G/DL (ref 31.5–35.7)
MCV RBC AUTO: 89.3 FL (ref 79–97)
PLATELET # BLD AUTO: 365 10*3/MM3 (ref 140–450)
PMV BLD AUTO: 10.1 FL (ref 6–12)
POTASSIUM SERPL-SCNC: 4.5 MMOL/L (ref 3.5–5.2)
PROT SERPL-MCNC: 6.9 G/DL (ref 6–8.5)
RBC # BLD AUTO: 4.88 10*6/MM3 (ref 3.77–5.28)
SODIUM SERPL-SCNC: 138 MMOL/L (ref 136–145)
T3FREE SERPL-MCNC: 2.53 PG/ML (ref 2–4.4)
T4 FREE SERPL-MCNC: 1.34 NG/DL (ref 0.93–1.7)
TSH SERPL DL<=0.05 MIU/L-ACNC: 1.61 UIU/ML (ref 0.27–4.2)
URATE SERPL-MCNC: 3.8 MG/DL (ref 2.4–5.7)
WBC NRBC COR # BLD: 10.01 10*3/MM3 (ref 3.4–10.8)

## 2022-07-07 PROCEDURE — 84481 FREE ASSAY (FT-3): CPT

## 2022-07-07 PROCEDURE — 85027 COMPLETE CBC AUTOMATED: CPT

## 2022-07-07 PROCEDURE — 83036 HEMOGLOBIN GLYCOSYLATED A1C: CPT

## 2022-07-07 PROCEDURE — 80061 LIPID PANEL: CPT

## 2022-07-07 PROCEDURE — 82306 VITAMIN D 25 HYDROXY: CPT

## 2022-07-07 PROCEDURE — 80053 COMPREHEN METABOLIC PANEL: CPT

## 2022-07-07 PROCEDURE — 83704 LIPOPROTEIN BLD QUAN PART: CPT

## 2022-07-07 PROCEDURE — 84550 ASSAY OF BLOOD/URIC ACID: CPT

## 2022-07-07 PROCEDURE — 84443 ASSAY THYROID STIM HORMONE: CPT

## 2022-07-07 PROCEDURE — 84439 ASSAY OF FREE THYROXINE: CPT

## 2022-07-07 PROCEDURE — 36415 COLL VENOUS BLD VENIPUNCTURE: CPT

## 2022-07-07 PROCEDURE — 82550 ASSAY OF CK (CPK): CPT

## 2022-07-09 LAB
CHOLEST SERPL-MCNC: 173 MG/DL (ref 100–199)
HDL SERPL-SCNC: 41.5 UMOL/L
HDLC SERPL-MCNC: 60 MG/DL
LDL SERPL QN: 21.3 NM
LDL SERPL-SCNC: 1160 NMOL/L
LDL SMALL SERPL-SCNC: 648 NMOL/L
LDLC SERPL CALC-MCNC: 92 MG/DL (ref 0–99)
TRIGL SERPL-MCNC: 119 MG/DL (ref 0–149)

## 2022-07-11 ENCOUNTER — OFFICE VISIT (OUTPATIENT)
Dept: INTERNAL MEDICINE | Facility: CLINIC | Age: 72
End: 2022-07-11

## 2022-07-11 VITALS
DIASTOLIC BLOOD PRESSURE: 78 MMHG | BODY MASS INDEX: 34.15 KG/M2 | SYSTOLIC BLOOD PRESSURE: 128 MMHG | HEART RATE: 79 BPM | OXYGEN SATURATION: 98 % | HEIGHT: 64 IN | WEIGHT: 200 LBS | RESPIRATION RATE: 18 BRPM

## 2022-07-11 DIAGNOSIS — Z87.39 HISTORY OF GOUT: Chronic | ICD-10-CM

## 2022-07-11 DIAGNOSIS — F41.1 GENERALIZED ANXIETY DISORDER: Chronic | ICD-10-CM

## 2022-07-11 DIAGNOSIS — E66.9 NON MORBID OBESITY: Chronic | ICD-10-CM

## 2022-07-11 DIAGNOSIS — R60.0 BILATERAL LOWER EXTREMITY EDEMA: Chronic | ICD-10-CM

## 2022-07-11 DIAGNOSIS — I10 BENIGN ESSENTIAL HYPERTENSION: Chronic | ICD-10-CM

## 2022-07-11 DIAGNOSIS — Z80.3 FAMILY HISTORY OF BREAST CANCER: Chronic | ICD-10-CM

## 2022-07-11 DIAGNOSIS — N26.1 RENAL ATROPHY, LEFT: Chronic | ICD-10-CM

## 2022-07-11 DIAGNOSIS — N95.1 MENOPAUSAL STATE: Chronic | ICD-10-CM

## 2022-07-11 DIAGNOSIS — R80.9 MICROALBUMINURIA: Chronic | ICD-10-CM

## 2022-07-11 DIAGNOSIS — E11.29 TYPE 2 DIABETES MELLITUS WITH MICROALBUMINURIA, WITHOUT LONG-TERM CURRENT USE OF INSULIN: Primary | Chronic | ICD-10-CM

## 2022-07-11 DIAGNOSIS — Z51.81 THERAPEUTIC DRUG MONITORING: ICD-10-CM

## 2022-07-11 DIAGNOSIS — R80.9 TYPE 2 DIABETES MELLITUS WITH MICROALBUMINURIA, WITHOUT LONG-TERM CURRENT USE OF INSULIN: Primary | Chronic | ICD-10-CM

## 2022-07-11 DIAGNOSIS — M85.89 OSTEOPENIA OF MULTIPLE SITES: Chronic | ICD-10-CM

## 2022-07-11 DIAGNOSIS — E55.9 VITAMIN D DEFICIENCY: Chronic | ICD-10-CM

## 2022-07-11 DIAGNOSIS — E78.2 MIXED HYPERLIPIDEMIA: Chronic | ICD-10-CM

## 2022-07-11 DIAGNOSIS — F51.04 CHRONIC INSOMNIA: Chronic | ICD-10-CM

## 2022-07-11 DIAGNOSIS — G47.33 OBSTRUCTIVE SLEEP APNEA: Chronic | ICD-10-CM

## 2022-07-11 DIAGNOSIS — Z86.16 HISTORY OF 2019 NOVEL CORONAVIRUS DISEASE (COVID-19): Chronic | ICD-10-CM

## 2022-07-11 DIAGNOSIS — K75.81 NASH (NONALCOHOLIC STEATOHEPATITIS): Chronic | ICD-10-CM

## 2022-07-11 DIAGNOSIS — F41.8 DEPRESSION WITH ANXIETY: Chronic | ICD-10-CM

## 2022-07-11 DIAGNOSIS — B35.6 TINEA CRURIS: ICD-10-CM

## 2022-07-11 PROBLEM — Z96.652 HISTORY OF TOTAL LEFT KNEE REPLACEMENT: Chronic | Status: RESOLVED | Noted: 2022-02-28 | Resolved: 2022-07-11

## 2022-07-11 PROCEDURE — 99214 OFFICE O/P EST MOD 30 MIN: CPT | Performed by: INTERNAL MEDICINE

## 2022-07-11 RX ORDER — ESTRADIOL 0.1 MG/G
CREAM VAGINAL
COMMUNITY
Start: 2022-04-27
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
## 2022-07-21 ENCOUNTER — TELEPHONE (OUTPATIENT)
Dept: INTERNAL MEDICINE | Facility: CLINIC | Age: 72
End: 2022-07-21

## 2022-07-21 DIAGNOSIS — R80.9 TYPE 2 DIABETES MELLITUS WITH MICROALBUMINURIA, WITHOUT LONG-TERM CURRENT USE OF INSULIN: Chronic | ICD-10-CM

## 2022-07-21 DIAGNOSIS — E11.29 TYPE 2 DIABETES MELLITUS WITH MICROALBUMINURIA, WITHOUT LONG-TERM CURRENT USE OF INSULIN: Chronic | ICD-10-CM

## 2022-07-21 RX ORDER — DAPAGLIFLOZIN 10 MG/1
TABLET, FILM COATED ORAL
Qty: 90 TABLET | Refills: 3 | Status: SHIPPED | OUTPATIENT
Start: 2022-07-21 | End: 2022-07-26 | Stop reason: SDUPTHER

## 2022-07-21 NOTE — TELEPHONE ENCOUNTER
HUB ATTEMPTED TO WARM TRANSFER TO THE OFFICE AND WAS UNSUCCESSFUL- DUE TO BLOOD SUGAR CONCERNS     Caller: Reema Easley    Relationship: Self    Best call back number: [phone] (H)    What medication are you requesting: FARXIGA     What are your current symptoms: BLOOD SUGAR LEVELS INCREASED.      Have you had these symptoms before:  [x] Yes  [] No    Have you been treated for these symptoms before:   [x] Yes  [] No    If a prescription is needed, what is your preferred pharmacy and phone number:      PATIENT REQUESTS WE FAX A PRESCRIPTION TO [facility]- THEY PROVIDE THIS FOR FREE    FAX NUMBER FOR [email or web address] : [phone]

## 2022-07-26 ENCOUNTER — TELEPHONE (OUTPATIENT)
Dept: INTERNAL MEDICINE | Facility: CLINIC | Age: 72
End: 2022-07-26

## 2022-07-26 DIAGNOSIS — E11.29 TYPE 2 DIABETES MELLITUS WITH MICROALBUMINURIA, WITHOUT LONG-TERM CURRENT USE OF INSULIN: Chronic | ICD-10-CM

## 2022-07-26 DIAGNOSIS — R80.9 TYPE 2 DIABETES MELLITUS WITH MICROALBUMINURIA, WITHOUT LONG-TERM CURRENT USE OF INSULIN: Chronic | ICD-10-CM

## 2022-07-26 RX ORDER — DAPAGLIFLOZIN 10 MG/1
TABLET, FILM COATED ORAL
Qty: 90 TABLET | Refills: 3 | Status: SHIPPED | OUTPATIENT
Start: 2022-07-26 | End: 2022-07-26 | Stop reason: SDUPTHER

## 2022-07-26 RX ORDER — DAPAGLIFLOZIN 10 MG/1
TABLET, FILM COATED ORAL
Qty: 90 TABLET | Refills: 3 | Status: SHIPPED | OUTPATIENT
Start: 2022-07-26

## 2022-07-26 NOTE — TELEPHONE ENCOUNTER
Caller: FRANCISCO J    Relationship: NING ANDERSON    Best call back number: [phone]    What is the best time to reach you:     Who are you requesting to speak with (clinical staff, provider,  specific staff member):     Do you know the name of the person who called:     What was the call regarding: FRANCISCO J IS CALLING IN WISHING TO SPEAK TO SOMEONE REGARDING THE PATIENTS dapagliflozin Propanediol (Farxiga) 10 MG tablet   HE SAYS HE WILL NEED A  NEW PRESCRIPTION WRITTEN FOR THE PATIENT FOR THE MEDICATION THEN FAXED TO HIM AS HE IS TRYING TO GET THE MEDICATION AT A DISCOUNTED PRICE FOR HER.  THE PRESCRIPTION CAN BE FAXED  [phone]  INCLUDE A FAX COVER LETTER AS WELL    Do you require a callback: YES

## 2022-07-28 RX ORDER — SITAGLIPTIN 100 MG/1
100 TABLET, FILM COATED ORAL DAILY
Qty: 90 TABLET | Refills: 0 | Status: SHIPPED | OUTPATIENT
Start: 2022-07-28

## 2022-07-28 RX ORDER — SITAGLIPTIN 100 MG/1
TABLET, FILM COATED ORAL
COMMUNITY
Start: 2022-07-12 | End: 2022-07-28 | Stop reason: SDUPTHER

## 2022-10-28 ENCOUNTER — TELEPHONE (OUTPATIENT)
Dept: INTERNAL MEDICINE | Facility: CLINIC | Age: 72
End: 2022-10-28

## 2022-10-28 NOTE — TELEPHONE ENCOUNTER
Caller: Reema Easley    Relationship to patient: Self    Best call back number: [phone]    Patient is needing: PATIENT WOULD LIKE TO KNOW IF YOU HAVE ANY VICTOZA SAMPLES IN STOCK. PLEASE CALL AND ADVISE.

## 2022-11-08 DIAGNOSIS — M25.562 ACUTE PAIN OF LEFT KNEE: ICD-10-CM

## 2022-11-08 RX ORDER — NAPROXEN 500 MG/1
TABLET ORAL
Qty: 180 TABLET | Refills: 0 | Status: SHIPPED | OUTPATIENT
Start: 2022-11-08

## 2022-11-16 DIAGNOSIS — F41.1 GENERALIZED ANXIETY DISORDER: Chronic | ICD-10-CM

## 2022-11-16 RX ORDER — ALPRAZOLAM 0.5 MG/1
TABLET ORAL
Qty: 60 TABLET | Refills: 1 | Status: SHIPPED | OUTPATIENT
Start: 2022-11-16 | End: 2023-01-17

## 2022-12-07 ENCOUNTER — APPOINTMENT (OUTPATIENT)
Dept: WOMENS IMAGING | Facility: HOSPITAL | Age: 72
End: 2022-12-07

## 2022-12-07 PROCEDURE — 77067 SCR MAMMO BI INCL CAD: CPT | Performed by: RADIOLOGY

## 2022-12-07 PROCEDURE — 77063 BREAST TOMOSYNTHESIS BI: CPT | Performed by: RADIOLOGY

## 2022-12-22 ENCOUNTER — TELEPHONE (OUTPATIENT)
Dept: INTERNAL MEDICINE | Facility: CLINIC | Age: 72
End: 2022-12-22

## 2022-12-22 NOTE — TELEPHONE ENCOUNTER
Caller: Reema Easley    Relationship: Self    Best call back number: [phone]    Who are you requesting to speak with (clinical staff, provider,  specific staff member): CLINICAL STAFF     What was the call regarding: WANTING TO CHECK ON THE DIABETIC PAPERWORK HAS BEEN FILLED OUT FOR HER DIABETIC MEDICATION PLEASE CALL AND ADVISE     Do you require a callback: YES

## 2023-01-07 DIAGNOSIS — R80.9 TYPE 2 DIABETES MELLITUS WITH MICROALBUMINURIA, WITHOUT LONG-TERM CURRENT USE OF INSULIN: ICD-10-CM

## 2023-01-07 DIAGNOSIS — E11.29 TYPE 2 DIABETES MELLITUS WITH MICROALBUMINURIA, WITHOUT LONG-TERM CURRENT USE OF INSULIN: ICD-10-CM

## 2023-01-07 RX ORDER — GLUCOSAM/CHON-MSM1/C/MANG/BOSW 500-416.6
TABLET ORAL
Qty: 100 EACH | Refills: 11 | Status: SHIPPED | OUTPATIENT
Start: 2023-01-07

## 2023-01-07 RX ORDER — CALCIUM CITRATE/VITAMIN D3 200MG-6.25
TABLET ORAL
Qty: 100 EACH | Refills: 2 | Status: SHIPPED | OUTPATIENT
Start: 2023-01-07

## 2023-01-11 ENCOUNTER — OFFICE VISIT (OUTPATIENT)
Dept: INTERNAL MEDICINE | Facility: CLINIC | Age: 73
End: 2023-01-11
Payer: MEDICARE

## 2023-01-11 VITALS
DIASTOLIC BLOOD PRESSURE: 78 MMHG | OXYGEN SATURATION: 95 % | HEART RATE: 68 BPM | WEIGHT: 190 LBS | HEIGHT: 64 IN | RESPIRATION RATE: 18 BRPM | SYSTOLIC BLOOD PRESSURE: 130 MMHG | BODY MASS INDEX: 32.44 KG/M2

## 2023-01-11 DIAGNOSIS — N26.1 RENAL ATROPHY, LEFT: Chronic | ICD-10-CM

## 2023-01-11 DIAGNOSIS — G47.33 OBSTRUCTIVE SLEEP APNEA: Chronic | ICD-10-CM

## 2023-01-11 DIAGNOSIS — E55.9 VITAMIN D DEFICIENCY: Chronic | ICD-10-CM

## 2023-01-11 DIAGNOSIS — I10 BENIGN ESSENTIAL HYPERTENSION: Chronic | ICD-10-CM

## 2023-01-11 DIAGNOSIS — R80.9 TYPE 2 DIABETES MELLITUS WITH MICROALBUMINURIA, WITHOUT LONG-TERM CURRENT USE OF INSULIN: Primary | Chronic | ICD-10-CM

## 2023-01-11 DIAGNOSIS — R80.9 MICROALBUMINURIA: Chronic | ICD-10-CM

## 2023-01-11 DIAGNOSIS — Z78.0 POSTMENOPAUSAL STATE: Chronic | ICD-10-CM

## 2023-01-11 DIAGNOSIS — E11.29 TYPE 2 DIABETES MELLITUS WITH MICROALBUMINURIA, WITHOUT LONG-TERM CURRENT USE OF INSULIN: Primary | Chronic | ICD-10-CM

## 2023-01-11 DIAGNOSIS — E78.2 MIXED HYPERLIPIDEMIA: Chronic | ICD-10-CM

## 2023-01-11 DIAGNOSIS — F41.1 GENERALIZED ANXIETY DISORDER: Chronic | ICD-10-CM

## 2023-01-11 DIAGNOSIS — F41.8 DEPRESSION WITH ANXIETY: Chronic | ICD-10-CM

## 2023-01-11 DIAGNOSIS — R60.0 BILATERAL LOWER EXTREMITY EDEMA: Chronic | ICD-10-CM

## 2023-01-11 DIAGNOSIS — F51.04 CHRONIC INSOMNIA: Chronic | ICD-10-CM

## 2023-01-11 DIAGNOSIS — Z80.3 FAMILY HISTORY OF BREAST CANCER: Chronic | ICD-10-CM

## 2023-01-11 DIAGNOSIS — K75.81 NASH (NONALCOHOLIC STEATOHEPATITIS): Chronic | ICD-10-CM

## 2023-01-11 DIAGNOSIS — Z86.16 HISTORY OF 2019 NOVEL CORONAVIRUS DISEASE (COVID-19): Chronic | ICD-10-CM

## 2023-01-11 DIAGNOSIS — Z87.39 HISTORY OF GOUT: Chronic | ICD-10-CM

## 2023-01-11 DIAGNOSIS — M85.89 OSTEOPENIA OF MULTIPLE SITES: Chronic | ICD-10-CM

## 2023-01-11 DIAGNOSIS — E66.9 NON MORBID OBESITY: Chronic | ICD-10-CM

## 2023-01-11 DIAGNOSIS — Z51.81 THERAPEUTIC DRUG MONITORING: ICD-10-CM

## 2023-01-11 PROCEDURE — 99214 OFFICE O/P EST MOD 30 MIN: CPT | Performed by: INTERNAL MEDICINE
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
## 2023-01-14 DIAGNOSIS — F41.1 GENERALIZED ANXIETY DISORDER: Chronic | ICD-10-CM

## 2023-01-17 RX ORDER — ALPRAZOLAM 0.5 MG/1
TABLET ORAL
Qty: 60 TABLET | Refills: 5 | Status: SHIPPED | OUTPATIENT
Start: 2023-01-17

## 2023-01-25 ENCOUNTER — TELEPHONE (OUTPATIENT)
Dept: INTERNAL MEDICINE | Facility: CLINIC | Age: 73
End: 2023-01-25

## 2023-01-25 DIAGNOSIS — I10 BENIGN ESSENTIAL HYPERTENSION: Chronic | ICD-10-CM

## 2023-01-25 DIAGNOSIS — E78.2 MIXED HYPERLIPIDEMIA: Chronic | ICD-10-CM

## 2023-01-25 DIAGNOSIS — E11.29 TYPE 2 DIABETES MELLITUS WITH MICROALBUMINURIA, WITHOUT LONG-TERM CURRENT USE OF INSULIN: Chronic | ICD-10-CM

## 2023-01-25 DIAGNOSIS — R80.9 TYPE 2 DIABETES MELLITUS WITH MICROALBUMINURIA, WITHOUT LONG-TERM CURRENT USE OF INSULIN: Chronic | ICD-10-CM

## 2023-01-25 RX ORDER — GLIMEPIRIDE 4 MG/1
TABLET ORAL
Qty: 90 TABLET | Refills: 2 | Status: SHIPPED | OUTPATIENT
Start: 2023-01-25

## 2023-01-25 RX ORDER — SIMVASTATIN 40 MG
TABLET ORAL
Qty: 90 TABLET | Refills: 2 | Status: SHIPPED | OUTPATIENT
Start: 2023-01-25

## 2023-01-25 RX ORDER — BENAZEPRIL HYDROCHLORIDE 40 MG/1
TABLET, FILM COATED ORAL
Qty: 90 TABLET | Refills: 2 | Status: SHIPPED | OUTPATIENT
Start: 2023-01-25

## 2023-01-25 NOTE — TELEPHONE ENCOUNTER
Caller: Reema Easley    Relationship: Self    Best call back number: [phone]    What is the medical concern/diagnosis: CONCERNING SPOTS ON LIP AND OTHER PLACES ON BODY    What specialty or service is being requested: FEMALE DERMATOLOGIST    What is the provider, practice or medical service name: WHOEVER DR TEJEDA RECOMMENDS    Any additional details: PLEASE ADVISE

## 2023-01-31 DIAGNOSIS — Z87.39 HISTORY OF GOUT: Chronic | ICD-10-CM

## 2023-01-31 RX ORDER — ALLOPURINOL 300 MG/1
300 TABLET ORAL DAILY
Qty: 90 TABLET | Refills: 3 | Status: SHIPPED | OUTPATIENT
Start: 2023-01-31

## 2023-01-31 NOTE — TELEPHONE ENCOUNTER
Caller: [facility] - [place], [place] - [address] - [phone] [facility] [phone] FX    Relationship: Pharmacy    Best call back number: [phone]    Requested Prescriptions:   Requested Prescriptions     Pending Prescriptions Disp Refills   • allopurinol (ZYLOPRIM) 300 MG tablet 90 tablet 3     Sig: Take 1 tablet by mouth Daily.        Pharmacy where request should be sent: [facility] - [place], [place] - [address] - [phone] [facility] [phone] FX     Yudelka Jeter, PCT   01/31/23 09:23 EST

## 2023-02-01 DIAGNOSIS — L81.9 CHANGE IN MULTIPLE PIGMENTED SKIN LESIONS: Primary | ICD-10-CM

## 2023-02-07 ENCOUNTER — TELEPHONE (OUTPATIENT)
Dept: INTERNAL MEDICINE | Facility: CLINIC | Age: 73
End: 2023-02-07

## 2023-02-07 NOTE — TELEPHONE ENCOUNTER
PATIENT CALLED AND STATES SHE RECEIVED A LETTER FROM [facility] THAT HER NEXT COLOGARD TEST IS DUE 1/15/23 HER LAST ONE WAS 1/15/20. DOES SHE NEED AN ORDER FOR THIS TEST.     PLEASE CALL [phone]

## 2023-02-10 ENCOUNTER — TELEPHONE (OUTPATIENT)
Dept: INTERNAL MEDICINE | Facility: CLINIC | Age: 73
End: 2023-02-10

## 2023-02-10 NOTE — TELEPHONE ENCOUNTER
Caller: Reema Easley     Relationship: [unfilled]     Best call back number: [phone]    What is your medical concern? PATIENT HAS BEEN OUT OF HER Liraglutide (VICTOZA) 18 MG/3ML solution pen-injector injection FOR 2 MONTHS. WOULD LIKE TO KNOW IF THE OFFICE HAS THIS IN STOCK?

## 2023-02-10 NOTE — TELEPHONE ENCOUNTER
PATIENT IS CALLING BACK TO FIND OUT ABOUT THIS. WOULD LIKE TO KNOW IF THE ORDER IS NEEDED. CALLBACK IS: [phone]

## 2023-02-14 NOTE — TELEPHONE ENCOUNTER
PATIENT WANTS TO KNOW IF DR. TEJEDA WOULD LIKE HE TO DO COLO GUARD, SHE GOT A LETTER SAYING SHE IS DUE, PLEASE ADVISE.

## 2023-02-16 NOTE — TELEPHONE ENCOUNTER
PATIENT IS CALLING BACK TO SEE IF THE OFFICE HAS THE ORIGINAL FORM THAT WAS SUBMITTED FOR THE VICTOZA. PATIENT CALLED THE COMPANY IN REGARDS TO THIS. THE COMPANY TOLD HER THAT PAGE 5 NEEDS TO BE FILLED OUT BY DR TEJEDA. THE COMPANY FOR VICTOZA STATES THAT THEY HAD FAXED OVER A COMPLETE PACKET FOR CLINICAL BUT ONLY PAGE 5 NEEDS TO BE FILLED OUT AND SENT BACK TO THEM. PATIENT'S CALLBACK IS: [phone]

## 2023-02-16 NOTE — TELEPHONE ENCOUNTER
Caller: Reema Easley    Relationship: Self    Best call back number: [phone]    What orders are you requesting (i.e. lab or imaging): COLOGOCTAVIA     In what timeframe would the patient need to come in: AS SOON AS SHE CAN. PATIENT IS DUE     Where will you receive your lab/imaging services: N/A    Additional notes:

## 2023-02-17 NOTE — TELEPHONE ENCOUNTER
PATIENT CALLED AGAIN IN REGARDS TO [facility] LETTER STATING SHE IS DUE FOR ANOTHER TEST. IT WILL NEED AN ORDER SENT TO [facility]    CALL BACK NUMBER [phone]    PLEASE CALL AND ADVISE

## 2023-02-17 NOTE — TELEPHONE ENCOUNTER
PATIENT CALLED SHARED INFO ABOUT SAMPLES OF VICTOZA. SHE UNDERSTANDS    SHE IS CALLING IN REGARDS TO A FORM FOR VICTOZA. COMPANY SENT A FAX YESTERDAY TO BE FILLED OUT FOR VICTOZA FROM [facility] AND FAXED BACK.   SHE HAS CALLED SEVERAL TIMES.    PLEASE CALL WHEN FAX IS RECEIVED.  [phone]    SHE WILL BE WITHOUT MEDICATION FOR ABOUT 4-5 MONTHS

## 2023-03-13 ENCOUNTER — TELEPHONE (OUTPATIENT)
Dept: INTERNAL MEDICINE | Facility: CLINIC | Age: 73
End: 2023-03-13

## 2023-03-13 ENCOUNTER — TELEPHONE (OUTPATIENT)
Dept: INTERNAL MEDICINE | Facility: CLINIC | Age: 73
End: 2023-03-13
Payer: MEDICARE

## 2023-03-13 NOTE — TELEPHONE ENCOUNTER
PT IS ON 2 MEDICATION ASSISTANCE PROGRAMS AND IS OUT OF HER MEDS, THEY ARE FARXIGA AND VICTOZA, AND SHE NEEDS HER COLOGUARD ORDERED ALSO. PT STATES SHE HAS CALLED A COUPLE TIMES.

## 2023-04-10 NOTE — TELEPHONE ENCOUNTER
PATIENT CALLING TO FOLLOW UP ON THIS PRESCRIPTION FOR SHE HAS BEEN OUT OF THIS MEDICATION.     PLEASE ADVISE   [phone]

## 2023-04-14 DIAGNOSIS — E11.29 TYPE 2 DIABETES MELLITUS WITH MICROALBUMINURIA, WITHOUT LONG-TERM CURRENT USE OF INSULIN: Primary | Chronic | ICD-10-CM

## 2023-04-14 DIAGNOSIS — R80.9 TYPE 2 DIABETES MELLITUS WITH MICROALBUMINURIA, WITHOUT LONG-TERM CURRENT USE OF INSULIN: Primary | Chronic | ICD-10-CM

## 2023-04-14 NOTE — TELEPHONE ENCOUNTER
Pt says sent it has been over 90 days, she has to do new application. Ari scanned into her chart says it was faxed 12/21/2022. Patient says she has received new form that she will drop off today, pickup next week, and mail it back herself.    ,     She is asking if you can send prescription to [facility] and her glucose has been in 200 range due to not having Victoza for months.  Bleeding behind her eye started yesterday and noticed a kellen underneath her eye. Went to optometrist and says she was told it was from bleeding behind her eye due to DM.

## 2023-05-02 ENCOUNTER — TELEPHONE (OUTPATIENT)
Dept: INTERNAL MEDICINE | Facility: CLINIC | Age: 73
End: 2023-05-02

## 2023-05-02 NOTE — TELEPHONE ENCOUNTER
Caller: Reema Easley    Relationship to patient: Self    Best call back number: [phone]    Patient is needing: PATIENT WAS WANTING TO THANK ARTHUR FOR HELPING HER. PATIENT STATED SHE WAS DISCONNECTED BEFORE SHE COULD THANK HER.

## 2023-06-05 ENCOUNTER — TELEPHONE (OUTPATIENT)
Dept: INTERNAL MEDICINE | Facility: CLINIC | Age: 73
End: 2023-06-05

## 2023-06-05 NOTE — TELEPHONE ENCOUNTER
Caller: Reema Easley    Relationship to patient: Self    Best call back number: [phone] (Mobile)     Patient is needing: TO BE SCHEDULED FOR LABS.

## 2023-07-31 ENCOUNTER — NURSE TRIAGE (OUTPATIENT)
Dept: CALL CENTER | Facility: HOSPITAL | Age: 73
End: 2023-07-31
Payer: MEDICARE

## 2023-08-03 DIAGNOSIS — E11.29 TYPE 2 DIABETES MELLITUS WITH MICROALBUMINURIA, WITHOUT LONG-TERM CURRENT USE OF INSULIN: Chronic | ICD-10-CM

## 2023-08-03 DIAGNOSIS — M25.562 ACUTE PAIN OF LEFT KNEE: ICD-10-CM

## 2023-08-03 DIAGNOSIS — R80.9 TYPE 2 DIABETES MELLITUS WITH MICROALBUMINURIA, WITHOUT LONG-TERM CURRENT USE OF INSULIN: Chronic | ICD-10-CM

## 2023-08-04 DIAGNOSIS — R80.9 TYPE 2 DIABETES MELLITUS WITH MICROALBUMINURIA, WITHOUT LONG-TERM CURRENT USE OF INSULIN: Chronic | ICD-10-CM

## 2023-08-04 DIAGNOSIS — E11.29 TYPE 2 DIABETES MELLITUS WITH MICROALBUMINURIA, WITHOUT LONG-TERM CURRENT USE OF INSULIN: Chronic | ICD-10-CM

## 2023-08-04 RX ORDER — DAPAGLIFLOZIN 10 MG/1
TABLET, FILM COATED ORAL
Qty: 90 TABLET | Refills: 3 | Status: SHIPPED | OUTPATIENT
Start: 2023-08-04 | End: 2023-08-04 | Stop reason: SDUPTHER

## 2023-08-04 RX ORDER — DAPAGLIFLOZIN 10 MG/1
TABLET, FILM COATED ORAL
Qty: 90 TABLET | Refills: 3 | Status: SHIPPED | OUTPATIENT
Start: 2023-08-04 | End: 2023-08-07 | Stop reason: SDUPTHER

## 2023-08-04 RX ORDER — NAPROXEN 500 MG/1
TABLET ORAL
Qty: 180 TABLET | Refills: 0 | Status: SHIPPED | OUTPATIENT
Start: 2023-08-04 | End: 2023-08-07 | Stop reason: SDUPTHER

## 2023-08-07 ENCOUNTER — TELEPHONE (OUTPATIENT)
Dept: INTERNAL MEDICINE | Facility: CLINIC | Age: 73
End: 2023-08-07
Payer: MEDICARE

## 2023-08-07 DIAGNOSIS — M25.562 ACUTE PAIN OF LEFT KNEE: ICD-10-CM

## 2023-08-07 DIAGNOSIS — E11.29 TYPE 2 DIABETES MELLITUS WITH MICROALBUMINURIA, WITHOUT LONG-TERM CURRENT USE OF INSULIN: Chronic | ICD-10-CM

## 2023-08-07 DIAGNOSIS — R80.9 TYPE 2 DIABETES MELLITUS WITH MICROALBUMINURIA, WITHOUT LONG-TERM CURRENT USE OF INSULIN: Chronic | ICD-10-CM

## 2023-08-07 RX ORDER — DAPAGLIFLOZIN 10 MG/1
TABLET, FILM COATED ORAL
Qty: 120 TABLET | Refills: 2 | Status: SHIPPED | OUTPATIENT
Start: 2023-08-07

## 2023-08-07 RX ORDER — NAPROXEN 500 MG/1
TABLET ORAL
Qty: 180 TABLET | Refills: 0 | Status: SHIPPED | OUTPATIENT
Start: 2023-08-07

## 2023-08-07 NOTE — TELEPHONE ENCOUNTER
Caller: Reema Easley    Relationship to patient: Self    Best call back number:     Patient is needing: PATIENT IS CALLING TO CHECK THE STATUS OF PRESCRIPTION REQUESTS FOR THE FOLLOWING MEDICATIONS.      naproxen (NAPROSYN) 500 MG tablet     dapagliflozin Propanediol (Farxiga) 10 MG tablet     PATIENT STATES BOTH WENT TO INCORRECT PHARMACY.    THE NAPROXEN SHOULD GO TO   [facility] - [facility] [address] - [phone] [facility] [phone]  [phone]       dapagliflozin Propanediol (Farxiga) 10 MG tablet   PATIENT STATES THIS PRESCRIPTION SHOULD GO TO [facility] FAX #[phone]      PLEASE ADVISE.

## 2023-08-11 ENCOUNTER — TELEPHONE (OUTPATIENT)
Dept: INTERNAL MEDICINE | Facility: CLINIC | Age: 73
End: 2023-08-11
Payer: MEDICARE

## 2023-08-16 DIAGNOSIS — F41.1 GENERALIZED ANXIETY DISORDER: Chronic | ICD-10-CM

## 2023-08-17 RX ORDER — ALPRAZOLAM 0.5 MG/1
TABLET ORAL
Qty: 60 TABLET | Refills: 4 | Status: SHIPPED | OUTPATIENT
Start: 2023-08-17

## 2023-08-29 DIAGNOSIS — M25.562 ACUTE PAIN OF LEFT KNEE: ICD-10-CM

## 2023-08-29 RX ORDER — NAPROXEN 500 MG/1
TABLET ORAL
Qty: 180 TABLET | Refills: 0 | OUTPATIENT
Start: 2023-08-29

## 2023-09-20 ENCOUNTER — OFFICE VISIT (OUTPATIENT)
Dept: INTERNAL MEDICINE | Facility: CLINIC | Age: 73
End: 2023-09-20
Payer: MEDICARE

## 2023-09-20 VITALS
BODY MASS INDEX: 33.46 KG/M2 | OXYGEN SATURATION: 98 % | TEMPERATURE: 96.4 F | WEIGHT: 196 LBS | RESPIRATION RATE: 16 BRPM | HEIGHT: 64 IN | SYSTOLIC BLOOD PRESSURE: 126 MMHG | HEART RATE: 84 BPM | DIASTOLIC BLOOD PRESSURE: 78 MMHG

## 2023-09-20 DIAGNOSIS — F41.1 GENERALIZED ANXIETY DISORDER: Chronic | ICD-10-CM

## 2023-09-20 DIAGNOSIS — F51.04 CHRONIC INSOMNIA: Chronic | ICD-10-CM

## 2023-09-20 DIAGNOSIS — E11.29 TYPE 2 DIABETES MELLITUS WITH MICROALBUMINURIA, WITHOUT LONG-TERM CURRENT USE OF INSULIN: Chronic | ICD-10-CM

## 2023-09-20 DIAGNOSIS — M85.89 OSTEOPENIA OF MULTIPLE SITES: Chronic | ICD-10-CM

## 2023-09-20 DIAGNOSIS — Z80.3 FAMILY HISTORY OF BREAST CANCER: Chronic | ICD-10-CM

## 2023-09-20 DIAGNOSIS — M25.562 CHRONIC PAIN OF BOTH KNEES: ICD-10-CM

## 2023-09-20 DIAGNOSIS — F41.8 DEPRESSION WITH ANXIETY: Chronic | ICD-10-CM

## 2023-09-20 DIAGNOSIS — G47.33 OBSTRUCTIVE SLEEP APNEA: Chronic | ICD-10-CM

## 2023-09-20 DIAGNOSIS — Z51.81 THERAPEUTIC DRUG MONITORING: ICD-10-CM

## 2023-09-20 DIAGNOSIS — R10.13 EPIGASTRIC ABDOMINAL PAIN: ICD-10-CM

## 2023-09-20 DIAGNOSIS — Z86.16 HISTORY OF 2019 NOVEL CORONAVIRUS DISEASE (COVID-19): Chronic | ICD-10-CM

## 2023-09-20 DIAGNOSIS — M25.561 CHRONIC PAIN OF BOTH KNEES: ICD-10-CM

## 2023-09-20 DIAGNOSIS — E55.9 VITAMIN D DEFICIENCY: Chronic | ICD-10-CM

## 2023-09-20 DIAGNOSIS — R80.9 TYPE 2 DIABETES MELLITUS WITH MICROALBUMINURIA, WITHOUT LONG-TERM CURRENT USE OF INSULIN: Chronic | ICD-10-CM

## 2023-09-20 DIAGNOSIS — M17.0 PRIMARY OSTEOARTHRITIS OF BOTH KNEES: ICD-10-CM

## 2023-09-20 DIAGNOSIS — Z78.0 POSTMENOPAUSAL STATE: Chronic | ICD-10-CM

## 2023-09-20 DIAGNOSIS — R80.9 MICROALBUMINURIA: Chronic | ICD-10-CM

## 2023-09-20 DIAGNOSIS — E66.9 NON MORBID OBESITY: Chronic | ICD-10-CM

## 2023-09-20 DIAGNOSIS — E11.9 ENCOUNTER FOR DIABETIC FOOT EXAM: Chronic | ICD-10-CM

## 2023-09-20 DIAGNOSIS — K75.81 NASH (NONALCOHOLIC STEATOHEPATITIS): Chronic | ICD-10-CM

## 2023-09-20 DIAGNOSIS — E78.2 MIXED HYPERLIPIDEMIA: Chronic | ICD-10-CM

## 2023-09-20 DIAGNOSIS — Z87.39 HISTORY OF GOUT: Chronic | ICD-10-CM

## 2023-09-20 DIAGNOSIS — R60.0 BILATERAL LOWER EXTREMITY EDEMA: Chronic | ICD-10-CM

## 2023-09-20 DIAGNOSIS — Z00.00 ENCOUNTER FOR SUBSEQUENT ANNUAL WELLNESS VISIT (AWV) IN MEDICARE PATIENT: Primary | ICD-10-CM

## 2023-09-20 DIAGNOSIS — G89.29 CHRONIC PAIN OF BOTH KNEES: ICD-10-CM

## 2023-09-20 DIAGNOSIS — N26.1 RENAL ATROPHY, LEFT: Chronic | ICD-10-CM

## 2023-09-20 NOTE — PROGRESS NOTES
The ABCs of the Annual Wellness Visit  Subsequent Medicare Wellness Visit    Subjective      Reema Easley is a 72 y.o. female who presents for a Subsequent Medicare Wellness Visit.    The following portions of the patient's history were reviewed and   updated as appropriate: allergies, current medications, past family history, past medical history, past social history, past surgical history, and problem list.    Compared to one year ago, the patient feels her physical   health is the same.    Compared to one year ago, the patient feels her mental   health is the same.    Recent Hospitalizations:  She was not admitted to the hospital during the last year.       Current Medical Providers:  Patient Care Team:  Marc Rasheed MD as PCP - General (Internal Medicine)  Valarie Limon MD as Consulting Physician (Obstetrics and Gynecology)    Outpatient Medications Prior to Visit   Medication Sig Dispense Refill    allopurinol (ZYLOPRIM) 300 MG tablet Take 1 tablet by mouth Daily. 90 tablet 3    ALPRAZolam (XANAX) 0.5 MG tablet TAKE 1 TABLET BY MOUTH TWICE A DAY 60 tablet 4    benazepril (LOTENSIN) 40 MG tablet TAKE 1 TABLET EVERY DAY 90 tablet 2    estradiol (ESTRACE) 0.1 MG/GM vaginal cream       glimepiride (AMARYL) 4 MG tablet TAKE ONE TABLET EVERY DAY WITH THE LARGEST MEAL. FOR DIABETES 90 tablet 2    glucose monitor monitoring kit 1 each As Needed (as needed). 1 each 0    Januvia 100 MG tablet Take 1 tablet by mouth Daily. 90 tablet 0    Liraglutide (VICTOZA) 18 MG/3ML solution pen-injector injection Inject 1.8 mg subcutaneously daily as directed for diabetes 3 mL 5    naproxen (NAPROSYN) 500 MG tablet TAKE ONE TABLET TWICE A DAY FOR ARTHRITIS OR KNEE PAIN 180 tablet 0    nystatin-triamcinolone (MYCOLOG II) 355647-1.1 UNIT/GM-% cream Apply to the affected areas 3 times daily as needed 60 g 1    simvastatin (ZOCOR) 40 MG tablet TAKE 1 TABLET EVERY NIGHT 90 tablet 2    True Metrix Blood Glucose Test test  strip USE AS DIRECTED 100 each 2    TRUEplus Lancets 30G misc TEST BLOOD SUGAR EVERY DAY AS DIRECTED 100 each 11    dapagliflozin Propanediol (Farxiga) 10 MG tablet Take 1 p.o. daily before the first meal for diabetes 120 tablet 2    Chlorhexidine Gluconate 2 % pads Apply 1 each topically Take As Directed. As directed pre op       No facility-administered medications prior to visit.       No opioid medication identified on active medication list. I have reviewed chart for other potential  high risk medication/s and harmful drug interactions in the elderly.        Aspirin is not on active medication list.  Aspirin use is not indicated based on review of current medical condition/s. Risk of harm outweighs potential benefits.  .    Patient Active Problem List   Diagnosis    Benign essential hypertension    Chronic insomnia    Depression with anxiety    Diverticulosis of colon    Generalized anxiety disorder    Gout    Hyperlipidemia    Microalbuminuria    GEORGE (nonalcoholic steatohepatitis)    Obstructive sleep apnea, 12/17/2015--AHI 14.6.  RDI 48.9 with REM sleep.  O2 sat 77%.  Cannot tolerate CPAP.    Primary osteoarthritis of right knee    Bilateral lower extremity edema    Renal atrophy, left    Type 2 diabetes mellitus with microalbuminuria, without long-term current use of insulin    Vitamin D deficiency    Family history of ovarian cancer    Family history of breast cancer    Therapeutic drug monitoring    Allergic rhinitis    Non morbid obesity    Diabetic foot exam    Diabetic eye exam    Non-compliant behavior    Postmenopausal state    Osteopenia of multiple sites, 5/28/2021--lumbar spine 0.7.  Left femoral neck -2.0.  Right femoral neck -2.3.    Chronic pain of both knees    History of 2019 novel coronavirus disease (COVID-19)    Primary osteoarthritis of both knees    Epigastric abdominal pain     Advance Care Planning   Advance Care Planning     Advance Directive is not on file.  ACP discussion was held  "with the patient during this visit. Patient does not have an advance directive, information provided.     Objective    Vitals:    23 0722   BP: 126/78   Pulse: 84   Resp: 16   Temp: 96.4 °F (35.8 °C)   TempSrc: Temporal   SpO2: 98%   Weight: 88.9 kg (196 lb)   Height: 162.6 cm (64.02\")     Estimated body mass index is 33.63 kg/m² as calculated from the following:    Height as of this encounter: 162.6 cm (64.02\").    Weight as of this encounter: 88.9 kg (196 lb).           Does the patient have evidence of cognitive impairment?   No    Lab Results   Component Value Date    CHLPL 170 2023    TRIG 160 (H) 2023    HGBA1C 7.90 (H) 2023          HEALTH RISK ASSESSMENT    Smoking Status:  Social History     Tobacco Use   Smoking Status Never   Smokeless Tobacco Never     Alcohol Consumption:  Social History     Substance and Sexual Activity   Alcohol Use No     Fall Risk Screen:    STEADI Fall Risk Assessment was completed, and patient is at LOW risk for falls.Assessment completed on:2023    Depression Screenin/20/2023     7:30 AM   PHQ-2/PHQ-9 Depression Screening   Little Interest or Pleasure in Doing Things 0-->not at all   Feeling Down, Depressed or Hopeless 0-->not at all   PHQ-9: Brief Depression Severity Measure Score 0       Health Habits and Functional and Cognitive Screenin/20/2023     7:31 AM   Functional & Cognitive Status   Do you have difficulty preparing food and eating? No   Do you have difficulty bathing yourself, getting dressed or grooming yourself? No   Do you have difficulty using the toilet? No   Do you have difficulty moving around from place to place? No   Do you have trouble with steps or getting out of a bed or a chair? No   Current Diet Well Balanced Diet   Dental Exam Up to date   Eye Exam Up to date   Exercise (times per week) 0 times per week   Current Exercises Include No Regular Exercise   Do you need help using the phone?  No   Are you deaf or " do you have serious difficulty hearing?  No   Do you need help to go to places out of walking distance? No   Do you need help shopping? No   Do you need help preparing meals?  No   Do you need help with housework?  No   Do you need help with laundry? No   Do you need help taking your medications? No   Do you need help managing money? No   Do you ever drive or ride in a car without wearing a seat belt? No   Have you felt unusual stress, anger or loneliness in the last month? No   Have you been bothered in the last four weeks by sexual problems? No   Do you have difficulty concentrating, remembering or making decisions? No       Age-appropriate Screening Schedule:  Refer to the list below for future screening recommendations based on patient's age, sex and/or medical conditions. Orders for these recommended tests are listed in the plan section. The patient has been provided with a written plan.    Health Maintenance   Topic Date Due    INFLUENZA VACCINE  10/01/2023    DIABETIC EYE EXAM  11/10/2023    HEMOGLOBIN A1C  01/25/2024    LIPID PANEL  07/25/2024    URINE MICROALBUMIN  07/25/2024    ANNUAL WELLNESS VISIT  09/20/2024    DIABETIC FOOT EXAM  09/20/2024    BMI FOLLOWUP  09/20/2024    MAMMOGRAM  12/07/2024    DXA SCAN  12/07/2024    COLORECTAL CANCER SCREENING  12/22/2024    TDAP/TD VACCINES (2 - Td or Tdap) 10/18/2025    HEPATITIS C SCREENING  Completed    Pneumococcal Vaccine 65+  Completed    COVID-19 Vaccine  Discontinued    PAP SMEAR  Discontinued    ZOSTER VACCINE  Discontinued                CBC normal except white count slightly elevated 11.0.  Hematocrit elevated at 47.3.  Hemoglobin is normal at 15.8.  CPK normal.  CMP normal except glucose 157.  Hemoglobin A1c 7.9.  Total cholesterol 170, triglycerides mildly elevated 160, LDL particle number slightly elevated 1046, HDL particle number normal at 38.5.  Microalbumin/creatinine ratio normal at 22.  Thyroid function tests are normal.  Uric acid normal at  3.1.  Vitamin D normal at 36.2.    CMS Preventative Services Quick Reference  Risk Factors Identified During Encounter:    Immunizations Discussed/Encouraged: Influenza, COVID19, and RSV    The above risks/problems have been discussed with the patient.  Pertinent information has been shared with the patient in the After Visit Summary.    Diagnoses and all orders for this visit:    1. Encounter for subsequent annual wellness visit (AWV) in Medicare patient (Primary)    2. Type 2 diabetes mellitus with microalbuminuria, without long-term current use of insulin  -     Cancel: Comprehensive Metabolic Panel  -     Cancel: Hemoglobin A1c  -     Comprehensive Metabolic Panel; Future  -     Hemoglobin A1c; Future  -     metFORMIN (GLUCOPHAGE) 1000 MG tablet; Take 1 p.o. twice daily at meals for diabetes  Dispense: 180 tablet; Refill: 3    3. Microalbuminuria    4. Hyperlipidemia  -     Cancel: CK  -     Cancel: Comprehensive Metabolic Panel  -     Cancel: NMR LipoProfile  -     Cancel: TSH  -     Cancel: T4, Free  -     Cancel: T3, Free  -     CK; Future  -     Comprehensive Metabolic Panel; Future  -     NMR LipoProfile; Future  -     TSH; Future  -     T4, Free; Future  -     T3, Free; Future    5. GEORGE (nonalcoholic steatohepatitis)  -     Cancel: Comprehensive Metabolic Panel    6. Non morbid obesity    7. Gout  -     Cancel: Uric Acid  -     Uric Acid; Future    8. Vitamin D deficiency  -     Cancel: Vitamin D,25-Hydroxy  -     Vitamin D,25-Hydroxy; Future    9. Bilateral lower extremity edema    10. Renal atrophy, left    11. Postmenopausal state    12. Osteopenia of multiple sites, 5/28/2021--lumbar spine 0.7.  Left femoral neck -2.0.  Right femoral neck -2.3.    13. Obstructive sleep apnea, 12/17/2015--AHI 14.6.  RDI 48.9 with REM sleep.  O2 sat 77%.  Cannot tolerate CPAP.    14. History of 2019 novel coronavirus disease (COVID-19)  -     Cancel: SARS-CoV-2 Antibodies, Nucleocapsid (Natural Immunity)  -      SARS-CoV-2 Antibodies, Nucleocapsid (Natural Immunity); Future    15. Diabetic foot exam    16. Chronic insomnia    17. Generalized anxiety disorder    18. Depression with anxiety    19. Family history of breast cancer    20. Therapeutic drug monitoring  -     Cancel: CBC (No Diff)  -     CBC (No Diff); Future    21. Chronic pain of both knees  -     Ambulatory Referral to Orthopedic Surgery    22. Primary osteoarthritis of both knees  -     Ambulatory Referral to Orthopedic Surgery    23. Epigastric abdominal pain  -     Amylase  -     Lipase  -     CT Abdomen Pelvis With Contrast    September 20, 2023--routine diabetic foot exam reveals no evidence of diabetic foot ulcer or preulcerative callus.  Distal pulses are palpable and sensation is intact.    Addendum: September 28, 2023--patient is here for her subsequent Medicare wellness visit but has a new problem that needs to be addressed which is above and beyond the scope of the Medicare wellness visit.  She is describing intermittent episodes of epigastric pain that seems to emanate from her back and occurs more at nighttime.  The pain radiates from her back into the epigastrium and is described as a severe ache.  It can last upwards of 30 minutes.  No nausea or vomiting.  Patient had a remote cholecystectomy.  No fever or chills.  Her exam of the abdomen is benign although there is some tenderness to deep palpation in the epigastrium.  Assessment is epigastric abdominal pain associated with mid back pain.  1 concern would be a problem with the pancreas.      Plan is as follows: Check amylase and lipase today.  CT scan of the abdomen and pelvis with contrast.  I will contact patient with results and further instructions.      Follow Up:   Next Medicare Wellness visit to be scheduled in 1 year.      An After Visit Summary and PPPS were made available to the patient.        Body mass index is 33.63 kg/m².  BMI is >= 30 and <35. (Class 1 Obesity). The following  yesterday options were offered after discussion;: weight loss educational material (shared in after visit summary), exercise counseling/recommendations, and nutrition counseling/recommendations

## 2023-09-20 NOTE — ADDENDUM NOTE
Addended by: ARIANE TEJEDA on: 9/20/2023 08:15 AM     Modules accepted: Orders, Level of Service

## 2023-09-21 LAB
25(OH)D3+25(OH)D2 SERPL-MCNC: 91.9 NG/ML (ref 30–100)
ALBUMIN SERPL-MCNC: 4.6 G/DL (ref 3.5–5.2)
ALBUMIN/GLOB SERPL: 1.8 G/DL
ALP SERPL-CCNC: 112 U/L (ref 39–117)
ALT SERPL-CCNC: 15 U/L (ref 1–33)
AMYLASE SERPL-CCNC: 33 U/L (ref 28–100)
AST SERPL-CCNC: 19 U/L (ref 1–32)
BILIRUB SERPL-MCNC: 0.4 MG/DL (ref 0–1.2)
BUN SERPL-MCNC: 17 MG/DL (ref 8–23)
BUN/CREAT SERPL: 18.5 (ref 7–25)
CALCIUM SERPL-MCNC: 10.4 MG/DL (ref 8.6–10.5)
CHLORIDE SERPL-SCNC: 105 MMOL/L (ref 98–107)
CHOLEST SERPL-MCNC: 158 MG/DL (ref 100–199)
CK SERPL-CCNC: 128 U/L (ref 20–180)
CO2 SERPL-SCNC: 23.4 MMOL/L (ref 22–29)
CREAT SERPL-MCNC: 0.92 MG/DL (ref 0.57–1)
EGFRCR SERPLBLD CKD-EPI 2021: 66.7 ML/MIN/1.73
ERYTHROCYTE [DISTWIDTH] IN BLOOD BY AUTOMATED COUNT: 13.5 % (ref 12.3–15.4)
GLOBULIN SER CALC-MCNC: 2.5 GM/DL
GLUCOSE SERPL-MCNC: 163 MG/DL (ref 65–99)
HBA1C MFR BLD: 8.4 % (ref 4.8–5.6)
HCT VFR BLD AUTO: 45.5 % (ref 34–46.6)
HDL SERPL-SCNC: 40 UMOL/L
HDLC SERPL-MCNC: 54 MG/DL
HGB BLD-MCNC: 14.6 G/DL (ref 12–15.9)
LDL SERPL QN: 21 NM
LDL SERPL-SCNC: 1066 NMOL/L
LDL SMALL SERPL-SCNC: 616 NMOL/L
LDLC SERPL CALC-MCNC: 76 MG/DL (ref 0–99)
LIPASE SERPL-CCNC: 32 U/L (ref 13–60)
MCH RBC QN AUTO: 29.2 PG (ref 26.6–33)
MCHC RBC AUTO-ENTMCNC: 32.1 G/DL (ref 31.5–35.7)
MCV RBC AUTO: 91 FL (ref 79–97)
PLATELET # BLD AUTO: 327 10*3/MM3 (ref 140–450)
POTASSIUM SERPL-SCNC: 4.6 MMOL/L (ref 3.5–5.2)
PROT SERPL-MCNC: 7.1 G/DL (ref 6–8.5)
RBC # BLD AUTO: 5 10*6/MM3 (ref 3.77–5.28)
SARS-COV-2 AB SERPL QL IA: POSITIVE
SODIUM SERPL-SCNC: 144 MMOL/L (ref 136–145)
T3FREE SERPL-MCNC: 2.2 PG/ML (ref 2–4.4)
T4 FREE SERPL-MCNC: 1.08 NG/DL (ref 0.93–1.7)
TRIGL SERPL-MCNC: 166 MG/DL (ref 0–149)
TSH SERPL DL<=0.005 MIU/L-ACNC: 2.13 UIU/ML (ref 0.27–4.2)
URATE SERPL-MCNC: 3.5 MG/DL (ref 2.4–5.7)
WBC # BLD AUTO: 9.04 10*3/MM3 (ref 3.4–10.8)

## 2023-10-16 ENCOUNTER — HOSPITAL ENCOUNTER (OUTPATIENT)
Facility: HOSPITAL | Age: 73
Discharge: HOME | End: 2023-10-16
Admitting: INTERNAL MEDICINE
Payer: MEDICARE

## 2023-10-16 DIAGNOSIS — R10.13 EPIGASTRIC ABDOMINAL PAIN: Primary | ICD-10-CM

## 2023-10-16 DIAGNOSIS — K86.9 PANCREATIC LESION: ICD-10-CM

## 2023-10-16 DIAGNOSIS — R10.13 EPIGASTRIC ABDOMINAL PAIN: ICD-10-CM

## 2023-10-16 PROCEDURE — 25510000001 IOPAMIDOL 61 % SOLUTION: Performed by: INTERNAL MEDICINE

## 2023-10-16 PROCEDURE — 74178 CT ABD&PLV WO CNTR FLWD CNTR: CPT

## 2023-10-16 PROCEDURE — 0 DIATRIZOATE MEGLUMINE & SODIUM PER 1 ML: Performed by: INTERNAL MEDICINE

## 2023-10-16 RX ADMIN — IOPAMIDOL 100 ML: 612 INJECTION, SOLUTION INTRAVENOUS at 10:37

## 2023-10-16 RX ADMIN — DIATRIZOATE MEGLUMINE AND DIATRIZOATE SODIUM 30 ML: 600; 100 SOLUTION ORAL; RECTAL at 10:37

## 2023-10-18 ENCOUNTER — TELEPHONE (OUTPATIENT)
Dept: INTERNAL MEDICINE | Facility: CLINIC | Age: 73
End: 2023-10-18

## 2023-10-18 NOTE — TELEPHONE ENCOUNTER
Caller: Reema Easley    Relationship: Self    Best call back number: [phone]     What is the best time to reach you: ANY    Who are you requesting to speak with (clinical staff, provider,  specific staff member): ANY    What was the call regarding: PATIENT IS REQUESTING A CALL BACK WITH THE NAME & PHONE NUMBER OF THE GASTRO OFFICE THE PATIENT WAS REFERRED TO.     Is it okay if the provider responds through MyChart: NO

## 2023-11-01 DIAGNOSIS — E11.29 TYPE 2 DIABETES MELLITUS WITH MICROALBUMINURIA, WITHOUT LONG-TERM CURRENT USE OF INSULIN: ICD-10-CM

## 2023-11-01 DIAGNOSIS — R80.9 TYPE 2 DIABETES MELLITUS WITH MICROALBUMINURIA, WITHOUT LONG-TERM CURRENT USE OF INSULIN: ICD-10-CM

## 2023-11-01 RX ORDER — CALCIUM CITRATE/VITAMIN D3 200MG-6.25
TABLET ORAL
Qty: 100 EACH | Refills: 10 | Status: SHIPPED | OUTPATIENT
Start: 2023-11-01

## 2023-11-14 ENCOUNTER — PREP FOR SURGERY (OUTPATIENT)
Dept: SURGERY | Facility: SURGERY CENTER | Age: 73
End: 2023-11-14
Payer: MEDICARE

## 2023-11-14 ENCOUNTER — OFFICE VISIT (OUTPATIENT)
Dept: GASTROENTEROLOGY | Facility: CLINIC | Age: 73
End: 2023-11-14
Payer: MEDICARE

## 2023-11-14 VITALS
WEIGHT: 197 LBS | DIASTOLIC BLOOD PRESSURE: 82 MMHG | TEMPERATURE: 98.9 F | OXYGEN SATURATION: 99 % | BODY MASS INDEX: 33.63 KG/M2 | HEIGHT: 64 IN | SYSTOLIC BLOOD PRESSURE: 120 MMHG | HEART RATE: 102 BPM

## 2023-11-14 DIAGNOSIS — R11.0 NAUSEA: ICD-10-CM

## 2023-11-14 DIAGNOSIS — R10.13 EPIGASTRIC PAIN: ICD-10-CM

## 2023-11-14 DIAGNOSIS — Z12.11 ENCOUNTER FOR SCREENING FOR MALIGNANT NEOPLASM OF COLON: ICD-10-CM

## 2023-11-14 DIAGNOSIS — R10.11 RIGHT UPPER QUADRANT ABDOMINAL PAIN: Primary | ICD-10-CM

## 2023-11-14 DIAGNOSIS — K86.2 PANCREATIC CYST: ICD-10-CM

## 2023-11-14 RX ORDER — SODIUM CHLORIDE 0.9 % (FLUSH) 0.9 %
3 SYRINGE (ML) INJECTION EVERY 12 HOURS SCHEDULED
Status: CANCELLED | OUTPATIENT
Start: 2023-11-14

## 2023-11-14 RX ORDER — DICYCLOMINE HYDROCHLORIDE 10 MG/1
10 CAPSULE ORAL 2 TIMES DAILY PRN
Qty: 60 CAPSULE | Refills: 5 | Status: SHIPPED | OUTPATIENT
Start: 2023-11-14

## 2023-11-14 RX ORDER — SODIUM CHLORIDE, SODIUM LACTATE, POTASSIUM CHLORIDE, CALCIUM CHLORIDE 600; 310; 30; 20 MG/100ML; MG/100ML; MG/100ML; MG/100ML
30 INJECTION, SOLUTION INTRAVENOUS CONTINUOUS PRN
Status: CANCELLED | OUTPATIENT
Start: 2023-11-14

## 2023-11-14 RX ORDER — SODIUM CHLORIDE 0.9 % (FLUSH) 0.9 %
10 SYRINGE (ML) INJECTION AS NEEDED
Status: CANCELLED | OUTPATIENT
Start: 2023-11-14
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
## 2023-11-14 NOTE — PATIENT INSTRUCTIONS
Schedule MRCP for further evaluation of symptoms and pancreatic lesion.    Schedule EGD for further evaluation of symptoms.     Schedule colonoscopy for colon cancer screening.    For abdominal pain, may use dicyclomine as needed.  Prescription sent to pharmacy.

## 2023-11-20 ENCOUNTER — ANESTHESIA EVENT (OUTPATIENT)
Dept: SURGERY | Facility: SURGERY CENTER | Age: 73
End: 2023-11-20
Payer: MEDICARE

## 2023-11-20 ENCOUNTER — ANESTHESIA (OUTPATIENT)
Dept: SURGERY | Facility: SURGERY CENTER | Age: 73
End: 2023-11-20
Payer: MEDICARE

## 2023-11-20 ENCOUNTER — HOSPITAL ENCOUNTER (OUTPATIENT)
Facility: SURGERY CENTER | Age: 73
Setting detail: HOSPITAL OUTPATIENT SURGERY
Discharge: HOME | End: 2023-11-20
Attending: INTERNAL MEDICINE | Admitting: INTERNAL MEDICINE
Payer: MEDICARE

## 2023-11-20 VITALS
DIASTOLIC BLOOD PRESSURE: 95 MMHG | HEIGHT: 64 IN | RESPIRATION RATE: 16 BRPM | OXYGEN SATURATION: 97 % | WEIGHT: 195.6 LBS | TEMPERATURE: 97.2 F | BODY MASS INDEX: 33.39 KG/M2 | SYSTOLIC BLOOD PRESSURE: 140 MMHG | HEART RATE: 80 BPM

## 2023-11-20 DIAGNOSIS — R10.11 RIGHT UPPER QUADRANT ABDOMINAL PAIN: ICD-10-CM

## 2023-11-20 DIAGNOSIS — R10.13 EPIGASTRIC PAIN: ICD-10-CM

## 2023-11-20 DIAGNOSIS — Z12.11 ENCOUNTER FOR SCREENING FOR MALIGNANT NEOPLASM OF COLON: ICD-10-CM

## 2023-11-20 DIAGNOSIS — R11.0 NAUSEA: ICD-10-CM

## 2023-11-20 LAB — GLUCOSE BLDC GLUCOMTR-MCNC: 208 MG/DL (ref 70–130)

## 2023-11-20 PROCEDURE — 88305 TISSUE EXAM BY PATHOLOGIST: CPT | Performed by: INTERNAL MEDICINE

## 2023-11-20 PROCEDURE — 25010000002 LIDOCAINE 1 % SOLUTION: Performed by: ANESTHESIOLOGY

## 2023-11-20 PROCEDURE — 25010000002 PROPOFOL 1000 MG/100ML EMULSION: Performed by: ANESTHESIOLOGY

## 2023-11-20 PROCEDURE — 25810000003 LACTATED RINGERS PER 1000 ML: Performed by: NURSE PRACTITIONER

## 2023-11-20 PROCEDURE — 45380 COLONOSCOPY AND BIOPSY: CPT | Performed by: INTERNAL MEDICINE

## 2023-11-20 PROCEDURE — 25010000002 PROPOFOL 10 MG/ML EMULSION: Performed by: ANESTHESIOLOGY

## 2023-11-20 PROCEDURE — 87081 CULTURE SCREEN ONLY: CPT | Performed by: INTERNAL MEDICINE

## 2023-11-20 PROCEDURE — 25810000003 LACTATED RINGERS PER 1000 ML: Performed by: ANESTHESIOLOGY

## 2023-11-20 PROCEDURE — 43239 EGD BIOPSY SINGLE/MULTIPLE: CPT | Performed by: INTERNAL MEDICINE

## 2023-11-20 RX ORDER — SODIUM CHLORIDE 0.9 % (FLUSH) 0.9 %
3 SYRINGE (ML) INJECTION EVERY 12 HOURS SCHEDULED
Status: DISCONTINUED | OUTPATIENT
Start: 2023-11-20 | End: 2023-11-20 | Stop reason: HOSPADM

## 2023-11-20 RX ORDER — LIDOCAINE HYDROCHLORIDE 10 MG/ML
INJECTION, SOLUTION INFILTRATION; PERINEURAL AS NEEDED
Status: DISCONTINUED | OUTPATIENT
Start: 2023-11-20 | End: 2023-11-20 | Stop reason: SURG

## 2023-11-20 RX ORDER — SODIUM CHLORIDE, SODIUM LACTATE, POTASSIUM CHLORIDE, CALCIUM CHLORIDE 600; 310; 30; 20 MG/100ML; MG/100ML; MG/100ML; MG/100ML
INJECTION, SOLUTION INTRAVENOUS CONTINUOUS PRN
Status: DISCONTINUED | OUTPATIENT
Start: 2023-11-20 | End: 2023-11-20 | Stop reason: SURG

## 2023-11-20 RX ORDER — PROPOFOL 10 MG/ML
INJECTION, EMULSION INTRAVENOUS AS NEEDED
Status: DISCONTINUED | OUTPATIENT
Start: 2023-11-20 | End: 2023-11-20 | Stop reason: SURG

## 2023-11-20 RX ORDER — SODIUM CHLORIDE 0.9 % (FLUSH) 0.9 %
10 SYRINGE (ML) INJECTION AS NEEDED
Status: DISCONTINUED | OUTPATIENT
Start: 2023-11-20 | End: 2023-11-20 | Stop reason: HOSPADM

## 2023-11-20 RX ORDER — SODIUM CHLORIDE, SODIUM LACTATE, POTASSIUM CHLORIDE, CALCIUM CHLORIDE 600; 310; 30; 20 MG/100ML; MG/100ML; MG/100ML; MG/100ML
30 INJECTION, SOLUTION INTRAVENOUS CONTINUOUS PRN
Status: DISCONTINUED | OUTPATIENT
Start: 2023-11-20 | End: 2023-11-20 | Stop reason: HOSPADM

## 2023-11-20 RX ADMIN — PROPOFOL 180 MCG/KG/MIN: 10 INJECTION, EMULSION INTRAVENOUS at 13:36

## 2023-11-20 RX ADMIN — LIDOCAINE HYDROCHLORIDE 30 MG: 10 INJECTION, SOLUTION INFILTRATION; PERINEURAL at 13:36

## 2023-11-20 RX ADMIN — SODIUM CHLORIDE, POTASSIUM CHLORIDE, SODIUM LACTATE AND CALCIUM CHLORIDE 30 ML/HR: 600; 310; 30; 20 INJECTION, SOLUTION INTRAVENOUS at 12:56

## 2023-11-20 RX ADMIN — SODIUM CHLORIDE, SODIUM LACTATE, POTASSIUM CHLORIDE, AND CALCIUM CHLORIDE: .6; .31; .03; .02 INJECTION, SOLUTION INTRAVENOUS at 13:35

## 2023-11-20 RX ADMIN — PROPOFOL 100 MG: 10 INJECTION, EMULSION INTRAVENOUS at 13:36

## 2023-11-20 NOTE — ANESTHESIA PREPROCEDURE EVALUATION
Anesthesia Evaluation     Patient summary reviewed and Nursing notes reviewed   NPO Solid Status: > 6 hours  NPO Liquid Status: > 2 hours           Airway   Mallampati: I  TM distance: >3 FB  Neck ROM: full  Dental - normal exam     Pulmonary    (+) ,sleep apnea  (-) COPD, asthma, not a smoker  Cardiovascular   Exercise tolerance: good (4-7 METS)    ECG reviewed    (+) hypertension  (-) past MI, dysrhythmias, angina      Neuro/Psych  (+) psychiatric history Anxiety and Depression  (-) seizures, CVA  GI/Hepatic/Renal/Endo    (+) obesity, liver disease fatty liver disease, renal disease (L renal atrophy)-, diabetes mellitus type 2  (-) GERD, no thyroid disorder    Musculoskeletal     Abdominal    Substance History      OB/GYN          Other                      Anesthesia Plan    ASA 3     MAC       Anesthetic plan, risks, benefits, and alternatives have been provided, discussed and informed consent has been obtained with: patient.    CODE STATUS:

## 2023-11-20 NOTE — ANESTHESIA POSTPROCEDURE EVALUATION
Patient: Reema Easley    Procedure Summary       Date: 11/20/23 Room / Location: [facility]    Anesthesia Start: 1333 Anesthesia Stop: 1400    Procedures:       ESOPHAGOGASTRODUODENOSCOPY      COLONOSCOPY FOR SCREENING to Cecum with Polypectomy Diagnosis:       Right upper quadrant abdominal pain      Epigastric pain      Nausea      Encounter for screening for malignant neoplasm of colon      (Right upper quadrant abdominal pain [R10.11])      (Epigastric pain [R10.13])      (Nausea [R11.0])      (Encounter for screening for malignant neoplasm of colon [Z12.11])    Surgeons: Javier Francisco MD Provider: Stanford Watkins MD    Anesthesia Type: MAC ASA Status: 3            Anesthesia Type: MAC    Vitals  Vitals Value Taken Time   /95 11/20/23 1425   Temp 36.2 °C (97.2 °F) 11/20/23 1400   Pulse 80 11/20/23 1425   Resp 16 11/20/23 1425   SpO2 97 % 11/20/23 1425           Post Anesthesia Care and Evaluation    Patient location during evaluation: PHASE II  Patient participation: complete - patient participated  Level of consciousness: awake  Pain management: satisfactory to patient    Airway patency: patent  Anesthetic complications: No anesthetic complications  PONV Status: controlled  Cardiovascular status: acceptable  Respiratory status: acceptable  Hydration status: acceptable

## 2023-11-21 LAB
LAB AP CASE REPORT: NORMAL
PATH REPORT.FINAL DX SPEC: NORMAL
PATH REPORT.GROSS SPEC: NORMAL
UREASE TISS QL: NEGATIVE

## 2023-11-21 RX ORDER — SITAGLIPTIN 100 MG/1
100 TABLET, FILM COATED ORAL DAILY
Qty: 90 TABLET | Refills: 2 | Status: SHIPPED | OUTPATIENT
Start: 2023-11-21

## 2023-11-23 DIAGNOSIS — I10 BENIGN ESSENTIAL HYPERTENSION: Chronic | ICD-10-CM

## 2023-11-23 DIAGNOSIS — E78.2 MIXED HYPERLIPIDEMIA: Chronic | ICD-10-CM

## 2023-11-23 DIAGNOSIS — Z87.39 HISTORY OF GOUT: Chronic | ICD-10-CM

## 2023-11-23 DIAGNOSIS — E11.29 TYPE 2 DIABETES MELLITUS WITH MICROALBUMINURIA, WITHOUT LONG-TERM CURRENT USE OF INSULIN: Chronic | ICD-10-CM

## 2023-11-23 DIAGNOSIS — R80.9 TYPE 2 DIABETES MELLITUS WITH MICROALBUMINURIA, WITHOUT LONG-TERM CURRENT USE OF INSULIN: Chronic | ICD-10-CM

## 2023-11-27 RX ORDER — GLIMEPIRIDE 4 MG/1
TABLET ORAL
Qty: 90 TABLET | Refills: 3 | Status: SHIPPED | OUTPATIENT
Start: 2023-11-27

## 2023-11-27 RX ORDER — SIMVASTATIN 40 MG
TABLET ORAL
Qty: 90 TABLET | Refills: 3 | Status: SHIPPED | OUTPATIENT
Start: 2023-11-27

## 2023-11-27 RX ORDER — BENAZEPRIL HYDROCHLORIDE 40 MG/1
TABLET, FILM COATED ORAL
Qty: 90 TABLET | Refills: 3 | Status: SHIPPED | OUTPATIENT
Start: 2023-11-27

## 2023-11-27 RX ORDER — ALLOPURINOL 300 MG/1
300 TABLET ORAL DAILY
Qty: 90 TABLET | Refills: 3 | Status: SHIPPED | OUTPATIENT
Start: 2023-11-27

## 2023-11-29 ENCOUNTER — HOSPITAL ENCOUNTER (OUTPATIENT)
Dept: MRI IMAGING | Facility: HOSPITAL | Age: 73
Discharge: HOME | End: 2023-11-29
Admitting: NURSE PRACTITIONER
Payer: MEDICARE

## 2023-11-29 DIAGNOSIS — R10.11 RIGHT UPPER QUADRANT ABDOMINAL PAIN: ICD-10-CM

## 2023-11-29 DIAGNOSIS — K86.2 PANCREATIC CYST: ICD-10-CM

## 2023-11-29 PROCEDURE — 82565 ASSAY OF CREATININE: CPT

## 2023-11-29 PROCEDURE — 0 GADOBENATE DIMEGLUMINE 529 MG/ML SOLUTION: Performed by: NURSE PRACTITIONER

## 2023-11-29 PROCEDURE — A9577 INJ MULTIHANCE: HCPCS | Performed by: NURSE PRACTITIONER

## 2023-11-29 PROCEDURE — 74183 MRI ABD W/O CNTR FLWD CNTR: CPT

## 2023-11-29 RX ADMIN — GADOBENATE DIMEGLUMINE 20 ML: 529 INJECTION, SOLUTION INTRAVENOUS at 09:17

## 2023-11-30 LAB — CREAT BLDA-MCNC: 0.7 MG/DL (ref 0.6–1.3)

## 2023-12-04 DIAGNOSIS — K86.2 PANCREATIC CYST: Primary | ICD-10-CM

## 2023-12-04 DIAGNOSIS — K22.89 ESOPHAGEAL CYST: ICD-10-CM

## 2023-12-05 DIAGNOSIS — K25.9 MULTIPLE GASTRIC ULCERS: Primary | ICD-10-CM

## 2023-12-06 RX ORDER — PANTOPRAZOLE SODIUM 40 MG/1
40 TABLET, DELAYED RELEASE ORAL DAILY
Qty: 90 TABLET | Refills: 3 | Status: SHIPPED | OUTPATIENT
Start: 2023-12-06

## 2023-12-08 ENCOUNTER — TELEPHONE (OUTPATIENT)
Dept: INTERNAL MEDICINE | Facility: CLINIC | Age: 73
End: 2023-12-08
Payer: MEDICARE

## 2023-12-08 DIAGNOSIS — Z91.81 AT RISK FOR FALLING: Primary | ICD-10-CM
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
## 2023-12-11 ENCOUNTER — TELEPHONE (OUTPATIENT)
Dept: GASTROENTEROLOGY | Facility: CLINIC | Age: 73
End: 2023-12-11

## 2023-12-11 ENCOUNTER — TELEPHONE (OUTPATIENT)
Dept: INTERNAL MEDICINE | Facility: CLINIC | Age: 73
End: 2023-12-11

## 2023-12-11 NOTE — TELEPHONE ENCOUNTER
PATIENT CALLED AND NEEDS TO HAVE A REFERRAL FOR GASTRO.  PLEASE CALL AND ADVISE OF NEXT STEPS [phone]

## 2023-12-11 NOTE — TELEPHONE ENCOUNTER
Caller: Reema Easley    Relationship: Self    Best call back number: [phone]     What is the best time to reach you: ASAP    Who are you requesting to speak with (clinical staff, provider,  specific staff member): CLINICAL    What was the call regarding: PATIENT HAS EGD AND CLS PERFORMED ON 11/20/23. SHE STATES THAT SHE GOT A PHONE CALL EARLY LAST WEEK INFORMING HER THAT SHE WILL BE UNDERGOING A PROCEDURE WHERE SHE WILL HAVE TO GO UNDER LIGHT ANESTHESIA. NO ONE HAS EVER CALLED HER BACK TO GO OVER ANYTHING TO SCHEDULE. I DO NOT SEE ANY ORDERS IN HER CHART EITHER. PLEASE CALL BACK ASAP TO ADVISE.

## 2023-12-12 ENCOUNTER — TELEPHONE (OUTPATIENT)
Dept: INTERNAL MEDICINE | Facility: CLINIC | Age: 73
End: 2023-12-12

## 2023-12-12 NOTE — TELEPHONE ENCOUNTER
Caller: Reema Easley    Relationship: Self    Best call back number:     [phone]        What is the best time to reach you: ANYTIME    Who are you requesting to speak with (clinical staff, provider,  specific staff member): UNKNOWN    Do you know the name of the person who called: UNKNOWN    What was the call regarding: PATIENT RETURNING CALL SHE BELIEVES IT COULD BE ABOUT THE REFERRAL FOR GASTRO AND PHYSICAL THERAPY    Is it okay if the provider responds through MyChart: NO

## 2023-12-12 NOTE — TELEPHONE ENCOUNTER
"Relay     \"Office has left several message in attempt to reach pt.  Gastro referral is pending but was entered by a different physician, they will be able to update her on that status.  The PT referral has been submitted by this office and pt will be contacted with scheduling information.\"                 "

## 2023-12-14 ENCOUNTER — TELEPHONE (OUTPATIENT)
Dept: GASTROENTEROLOGY | Facility: CLINIC | Age: 73
End: 2023-12-14

## 2023-12-14 NOTE — TELEPHONE ENCOUNTER
Caller: Reema Easley    Relationship: Self    Best call back number: [phone]    What is the best time to reach you: ANYTIME    Who are you requesting to speak with (clinical staff, provider,  specific staff member): CLINICAL STAFF      What was the call regarding: PT STATES SHE CALLED REFERRAL DR TO SCHEDULE. WAS ADVISED THEY HAVE NOT REC'D THE REFERRAL FROM [facility]. PT REQUESTS REFERRAL BE MARKED URGENT AND RESUBMITTED TO DR TALBOT'S OFFICE. PLEASE CONTACT PT TO CONFIRM.      Is it okay if the provider responds through MyChart: NO

## 2023-12-22 DIAGNOSIS — M25.562 ACUTE PAIN OF LEFT KNEE: ICD-10-CM

## 2023-12-26 RX ORDER — NAPROXEN 500 MG/1
TABLET ORAL
Qty: 180 TABLET | Refills: 3 | Status: SHIPPED | OUTPATIENT
Start: 2023-12-26

## 2024-01-04 ENCOUNTER — TELEPHONE (OUTPATIENT)
Dept: INTERNAL MEDICINE | Facility: CLINIC | Age: 74
End: 2024-01-04

## 2024-01-04 ENCOUNTER — OFFICE (OUTPATIENT)
Dept: URBAN - METROPOLITAN AREA CLINIC 76 | Facility: CLINIC | Age: 74
End: 2024-01-04

## 2024-01-04 VITALS
SYSTOLIC BLOOD PRESSURE: 128 MMHG | DIASTOLIC BLOOD PRESSURE: 76 MMHG | HEART RATE: 77 BPM | WEIGHT: 202 LBS | HEIGHT: 63 IN

## 2024-01-04 DIAGNOSIS — K86.9 DISEASE OF PANCREAS, UNSPECIFIED: ICD-10-CM

## 2024-01-04 DIAGNOSIS — R14.0 ABDOMINAL DISTENSION (GASEOUS): ICD-10-CM

## 2024-01-04 DIAGNOSIS — K25.9 GASTRIC ULCER, UNSPECIFIED AS ACUTE OR CHRONIC, WITHOUT HEMO: ICD-10-CM

## 2024-01-04 DIAGNOSIS — K22.9 DISEASE OF ESOPHAGUS, UNSPECIFIED: ICD-10-CM

## 2024-01-04 DIAGNOSIS — Z79.1 LONG TERM (CURRENT) USE OF NON-STEROIDAL ANTI-INFLAMMATORIES: ICD-10-CM

## 2024-01-04 PROCEDURE — 99204 OFFICE O/P NEW MOD 45 MIN: CPT | Performed by: INTERNAL MEDICINE

## 2024-01-04 NOTE — TELEPHONE ENCOUNTER
Caller: Reema Easley    Relationship to patient: Self    Best call back number: [phone]    Patient is needing:     PATIENT HAD DROPPED OFF A FORM TO APPLY FOR THE [email or web address] PATIENT ASSISTANCE PROGRAM.     SHE DROPPED IT OFF AND WAS CALLED TO COME PICK IT UP AND THAT IT WAS READY.     AFTER SHE PICKED IT UP AND RETURNED HOME, SHE REALIZED SHE WAS GIVEN THE APPLICATION THAT WAS FILLED OUT FOR 2022.     SHE WOULD LIKE TO KNOW IF THE APPLICATION FOR 2024 IS READY AND AVAILABLE TO BE PICKED UP.     PLEASE CALL TO CONFIRM OR DISCUSS.

## 2024-01-11 ENCOUNTER — TELEPHONE (OUTPATIENT)
Dept: INTERNAL MEDICINE | Facility: CLINIC | Age: 74
End: 2024-01-11

## 2024-01-11 NOTE — TELEPHONE ENCOUNTER
Caller: Reema Easley    Relationship to patient: Self    Best call back number: [phone] - CONTACT PATIENT BY PHONE, NOT MYCHART    Patient is needing: PATIENT STATES DUE HER STOMACH PAIN, DR. TEJEDA SENT HER TO SEE DR. HOWARD.    PATIENT STATES SHE HAD A COLONOSCOPY AND ENDOSCOPY/EGD AND IT WAS DETERMINED THAT SHE HAD A CYST ON HER PANCREAS AND 3 ULCERS.   SHE STATES DR. HOWARD HAS ADVISED HER NOT TO WORRY ABOUT THE CYST AND THAT THEY WILL KEEP AN EYE ON IT.     SHE STATES WITH THE ULCERS, SHE HAS BEEN GIVEN PRESCRIPTIONS FOR THE ULCERS.         SHE STATES DR. HOWARD REFERRED HER TO DR. GARCIA, ANOTHER GASTROENTEROLOGIST, FOR A CONSULT WHICH SHE HAD 2 WEEKS AGO.     PATIENT STATES THAT DR. GARCIA WANTED TO SCHEDULE ANOTHER ULTRASOUND, WHICH WAS SCHEDULED IN [place], BUT PATIENT TOLD THEM THAT SHE DID NOT WANT TO GO TO [place], SO SHE WAS TOLD THEY WOULD CALL HER BACK WITH AN ULTRASOUND APPOINTMENT IN [place].       PATIENT DID NOT HEAR BACK YET FROM DR. GARCIA'S OFFICE FOR THE ULTRASOUND, AND PATIENT STATES SHE HAS NOW BEEN CONTACTED BY A THIRD GASTROENTEROLOGIST, DR. BROWN, (GALA MCCRACKEN) TO COME IN FOR ANOTHER CONSULT ON FEBRUARY 6TH.   PATIENT IS VERY FRUSTRATED STATING THAT ALL THE PROVIDERS KNOW WHAT HER DIAGNOSES.          PATIENT STATES SHE IS WANTING TO JUST GET THE TESTING DONE, WITHOUT HAVING ALL THE CONSULTS.    PATIENT WOULD LIKE TO GET DR. TEJEDA'S MEDICAL OPINION DIRECTING HER WHAT STEPS SHE NEEDS TO TAKE AT THIS POINT, AND IF SHE SHOULD HAVE THE 3RD CONSULTATION ON 2/6, AND IF HE CAN GET THE TESTING SCHEDULED FOR HER THAT SHE NEEDS TO HAVE WITH REGARDS TO THE ULCERS.    PLEASE CALL PATIENT TO DISCUSS.

## 2024-01-12 NOTE — TELEPHONE ENCOUNTER
I believe the reason for the consults is because she was switching from [place] to [place] and the specialist probably does not practice in both locations.

## 2024-01-13 DIAGNOSIS — R80.9 TYPE 2 DIABETES MELLITUS WITH MICROALBUMINURIA, WITHOUT LONG-TERM CURRENT USE OF INSULIN: ICD-10-CM

## 2024-01-13 DIAGNOSIS — E11.29 TYPE 2 DIABETES MELLITUS WITH MICROALBUMINURIA, WITHOUT LONG-TERM CURRENT USE OF INSULIN: ICD-10-CM

## 2024-01-15 RX ORDER — GLUCOSAM/CHON-MSM1/C/MANG/BOSW 500-416.6
TABLET ORAL
Qty: 100 EACH | Refills: 3 | Status: SHIPPED | OUTPATIENT
Start: 2024-01-15

## 2024-01-18 DIAGNOSIS — F41.1 GENERALIZED ANXIETY DISORDER: Chronic | ICD-10-CM

## 2024-01-18 RX ORDER — ALPRAZOLAM 0.5 MG/1
TABLET ORAL
Qty: 60 TABLET | Refills: 3 | Status: SHIPPED | OUTPATIENT
Start: 2024-01-18

## 2024-01-18 NOTE — TELEPHONE ENCOUNTER
PATIENT IS CALLING BACK REGARDING THIS ISSUE.    PLEASE CALL PATIENT TO DISCUSS.         Best call back number: [phone] - CONTACT PATIENT BY PHONE, NOT MYCHART

## 2024-01-18 NOTE — TELEPHONE ENCOUNTER
Rx Refill Note  Requested Prescriptions     Pending Prescriptions Disp Refills    ALPRAZolam (XANAX) 0.5 MG tablet [Pharmacy Med Name: ALPRAZOLAM 0.5 MG TABLET] 60 tablet      Sig: TAKE 1 TABLET BY MOUTH TWICE A DAY      Last office visit with prescribing clinician: 9/20/2023   Last telemedicine visit with prescribing clinician: Visit date not found   Next office visit with prescribing clinician: 3/27/2024       Cecile Patiño MA  01/18/24, 11:28 EST

## 2024-01-30 ENCOUNTER — PATIENT ROUNDING (BHMG ONLY) (OUTPATIENT)
Dept: URGENT CARE | Facility: CLINIC | Age: 74
End: 2024-01-30
Payer: MEDICARE

## 2024-01-30 NOTE — ED NOTES
Thank you for letting us care for you at your recent visit to [facility]. We would love to hear about your experience with us. Was this the first time you have visited our location?    We’re always looking for ways to make our patients’ experience even better. Do you have any recommendations on ways we may improve?     Please be on the lookout for a survey about your recent visit from Herman Barragan via text or email. We would greatly appreciate if you could fill that out and turn it back in. We want your voice to be heard and we value your feedback.     Thank you for choosing [facility] for your healthcare needs. I appreciate you taking the time to respond!

## 2024-03-27 ENCOUNTER — OFFICE VISIT (OUTPATIENT)
Dept: INTERNAL MEDICINE | Facility: CLINIC | Age: 74
End: 2024-03-27
Payer: MEDICARE

## 2024-03-27 VITALS
SYSTOLIC BLOOD PRESSURE: 128 MMHG | TEMPERATURE: 95.7 F | DIASTOLIC BLOOD PRESSURE: 74 MMHG | OXYGEN SATURATION: 98 % | BODY MASS INDEX: 34.31 KG/M2 | HEART RATE: 84 BPM | HEIGHT: 64 IN | RESPIRATION RATE: 16 BRPM | WEIGHT: 201 LBS

## 2024-03-27 DIAGNOSIS — G89.29 CHRONIC PAIN OF BOTH KNEES: ICD-10-CM

## 2024-03-27 DIAGNOSIS — R80.9 MICROALBUMINURIA: Chronic | ICD-10-CM

## 2024-03-27 DIAGNOSIS — M85.89 OSTEOPENIA OF MULTIPLE SITES: Chronic | ICD-10-CM

## 2024-03-27 DIAGNOSIS — C7A.8 NEUROENDOCRINE CARCINOMA OF PANCREAS: ICD-10-CM

## 2024-03-27 DIAGNOSIS — F51.04 CHRONIC INSOMNIA: Chronic | ICD-10-CM

## 2024-03-27 DIAGNOSIS — G47.33 OBSTRUCTIVE SLEEP APNEA: Chronic | ICD-10-CM

## 2024-03-27 DIAGNOSIS — F41.8 DEPRESSION WITH ANXIETY: Chronic | ICD-10-CM

## 2024-03-27 DIAGNOSIS — R60.0 BILATERAL LOWER EXTREMITY EDEMA: Chronic | ICD-10-CM

## 2024-03-27 DIAGNOSIS — E66.9 NON MORBID OBESITY: Chronic | ICD-10-CM

## 2024-03-27 DIAGNOSIS — Z86.16 HISTORY OF 2019 NOVEL CORONAVIRUS DISEASE (COVID-19): Chronic | ICD-10-CM

## 2024-03-27 DIAGNOSIS — E55.9 VITAMIN D DEFICIENCY: Chronic | ICD-10-CM

## 2024-03-27 DIAGNOSIS — R79.89 HIGH SERUM VITAMIN D: ICD-10-CM

## 2024-03-27 DIAGNOSIS — Z80.3 FAMILY HISTORY OF BREAST CANCER: Chronic | ICD-10-CM

## 2024-03-27 DIAGNOSIS — Z87.39 HISTORY OF GOUT: Chronic | ICD-10-CM

## 2024-03-27 DIAGNOSIS — F41.1 GENERALIZED ANXIETY DISORDER: Chronic | ICD-10-CM

## 2024-03-27 DIAGNOSIS — Z78.0 POSTMENOPAUSAL STATE: Chronic | ICD-10-CM

## 2024-03-27 DIAGNOSIS — M25.561 CHRONIC PAIN OF BOTH KNEES: ICD-10-CM

## 2024-03-27 DIAGNOSIS — K75.81 NASH (NONALCOHOLIC STEATOHEPATITIS): Chronic | ICD-10-CM

## 2024-03-27 DIAGNOSIS — Z51.81 THERAPEUTIC DRUG MONITORING: ICD-10-CM

## 2024-03-27 DIAGNOSIS — E11.29 TYPE 2 DIABETES MELLITUS WITH MICROALBUMINURIA, WITHOUT LONG-TERM CURRENT USE OF INSULIN: Primary | Chronic | ICD-10-CM

## 2024-03-27 DIAGNOSIS — K25.7 CHRONIC GASTRIC ULCER WITHOUT HEMORRHAGE AND WITHOUT PERFORATION: Chronic | ICD-10-CM

## 2024-03-27 DIAGNOSIS — R80.9 TYPE 2 DIABETES MELLITUS WITH MICROALBUMINURIA, WITHOUT LONG-TERM CURRENT USE OF INSULIN: Primary | Chronic | ICD-10-CM

## 2024-03-27 DIAGNOSIS — E78.2 MIXED HYPERLIPIDEMIA: Chronic | ICD-10-CM

## 2024-03-27 DIAGNOSIS — I10 BENIGN ESSENTIAL HYPERTENSION: Chronic | ICD-10-CM

## 2024-03-27 DIAGNOSIS — M25.562 CHRONIC PAIN OF BOTH KNEES: ICD-10-CM

## 2024-03-27 PROBLEM — R11.0 NAUSEA: Status: RESOLVED | Noted: 2023-11-14 | Resolved: 2024-03-27

## 2024-03-27 PROBLEM — Z86.0100 HISTORY OF COLON POLYPS: Status: ACTIVE | Noted: 2024-03-27

## 2024-03-27 PROBLEM — R10.13 EPIGASTRIC PAIN: Status: RESOLVED | Noted: 2023-11-14 | Resolved: 2024-03-27

## 2024-03-27 PROBLEM — M17.0 PRIMARY OSTEOARTHRITIS OF BOTH KNEES: Chronic | Status: ACTIVE | Noted: 2023-09-20

## 2024-03-27 PROBLEM — Z86.010 HISTORY OF COLON POLYPS: Chronic | Status: ACTIVE | Noted: 2024-03-27

## 2024-03-27 PROBLEM — R10.11 RIGHT UPPER QUADRANT ABDOMINAL PAIN: Status: RESOLVED | Noted: 2023-11-14 | Resolved: 2024-03-27

## 2024-03-27 PROBLEM — Z86.010 HISTORY OF COLON POLYPS: Status: ACTIVE | Noted: 2024-03-27

## 2024-03-27 PROBLEM — Z86.0100 HISTORY OF COLON POLYPS: Chronic | Status: ACTIVE | Noted: 2024-03-27

## 2024-03-27 PROBLEM — R10.13 EPIGASTRIC ABDOMINAL PAIN: Status: RESOLVED | Noted: 2023-09-20 | Resolved: 2024-03-27

## 2024-03-27 PROBLEM — Z12.11 ENCOUNTER FOR SCREENING FOR MALIGNANT NEOPLASM OF COLON: Status: RESOLVED | Noted: 2023-11-14 | Resolved: 2024-03-27

## 2024-03-27 RX ORDER — PANTOPRAZOLE SODIUM 40 MG/1
TABLET, DELAYED RELEASE ORAL
Start: 2024-03-27 | End: 2024-03-27 | Stop reason: SDUPTHER

## 2024-03-27 RX ORDER — PANTOPRAZOLE SODIUM 40 MG/1
TABLET, DELAYED RELEASE ORAL
Start: 2024-03-27

## 2024-03-27 RX ORDER — SEMAGLUTIDE 2.68 MG/ML
INJECTION, SOLUTION SUBCUTANEOUS
Qty: 9 ML | Refills: 3 | Status: SHIPPED | OUTPATIENT
Start: 2024-03-27
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
## 2024-04-09 ENCOUNTER — TELEPHONE (OUTPATIENT)
Dept: INTERNAL MEDICINE | Facility: CLINIC | Age: 74
End: 2024-04-09

## 2024-04-10 ENCOUNTER — TELEPHONE (OUTPATIENT)
Dept: INTERNAL MEDICINE | Facility: CLINIC | Age: 74
End: 2024-04-10

## 2024-04-18 ENCOUNTER — TELEPHONE (OUTPATIENT)
Dept: INTERNAL MEDICINE | Facility: CLINIC | Age: 74
End: 2024-04-18
Payer: MEDICARE
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
## 2024-04-18 NOTE — TELEPHONE ENCOUNTER
"Relay     \"Unless she is on insulin her insurance will not pay for this.  Does she plan to pay out of pocket?\"                 "

## 2024-04-19 ENCOUNTER — READMISSION MANAGEMENT (OUTPATIENT)
Dept: CALL CENTER | Facility: HOSPITAL | Age: 74
End: 2024-04-19
Payer: MEDICARE

## 2024-04-19 NOTE — OUTREACH NOTE
Prep Survey      Flowsheet Row Responses   Zoroastrian facility patient discharged from? Non-BH   Is LACE score < 7 ? Non-BH Discharge   Eligibility Not Eligible   What are the reasons patient is not eligible? [facility]   Does the patient have one of the following disease processes/diagnoses(primary or secondary)? Other   Prep survey completed? Yes            DONNIE AMIN - Registered Nurse

## 2024-04-22 NOTE — TELEPHONE ENCOUNTER
Name: EasleyRaginiReema K    Relationship: Self    Best Callback Number: [phone]     HUB PROVIDED THE RELAY MESSAGE FROM THE OFFICE   PATIENT VOICED UNDERSTANDING AND HAS NO FURTHER QUESTIONS AT THIS TIME    ADDITIONAL INFORMATION:

## 2024-04-24 ENCOUNTER — READMISSION MANAGEMENT (OUTPATIENT)
Dept: CALL CENTER | Facility: HOSPITAL | Age: 74
End: 2024-04-24
Payer: MEDICARE

## 2024-04-24 NOTE — OUTREACH NOTE
Prep Survey      Flowsheet Row Responses   Rastafari facility patient discharged from? Non-BH   Is LACE score < 7 ? Non-BH Discharge   Eligibility [facility]   Date of Discharge 04/24/24   Discharge Disposition Home or Self Care   Discharge diagnosis Other specified diseases of pancreas   Does the patient have one of the following disease processes/diagnoses(primary or secondary)? Other   Prep survey completed? Yes            Alicia MARTIN - Registered Nurse

## 2024-04-25 ENCOUNTER — TRANSITIONAL CARE MANAGEMENT TELEPHONE ENCOUNTER (OUTPATIENT)
Dept: CALL CENTER | Facility: HOSPITAL | Age: 74
End: 2024-04-25
Payer: MEDICARE

## 2024-04-25 NOTE — OUTREACH NOTE
Call Center TCM Note      Flowsheet Row Responses   [facility], [place] patient discharged from? Non-   Does the patient have one of the following disease processes/diagnoses(primary or secondary)? Other   TCM attempt successful? Yes   Call start time 1159   Call end time 1204   Discharge diagnosis Other specified diseases of pancreas   Meds reviewed with patient/caregiver? Yes   Is the patient taking all medications as directed (includes completed medication regime)? Yes   Does the patient have an appointment with their PCP within 7-14 days of discharge? Yes   Has home health visited the patient within 72 hours of discharge? N/A   Psychosocial issues? No   What is the patient's perception of their health status since discharge? Improving   Is the patient/caregiver able to teach back signs and symptoms related to disease process for when to call PCP? Yes   Is the patient/caregiver able to teach back signs and symptoms related to disease process for when to call 911? Yes   Is the patient/caregiver able to teach back the hierarchy of who to call/visit for symptoms/problems? PCP, Specialist, Home health nurse, Urgent Care, ED, 911 Yes   If the patient is a current smoker, are they able to teach back resources for cessation? Not a smoker   TCM call completed? Yes   Call end time 1204   Would this patient benefit from a Referral to Amb Social Work? No   Is the patient interested in additional calls from an ambulatory ? No            Elizabeth Hodge LPN    4/25/2024, 12:12 EDT

## 2024-04-25 NOTE — OUTREACH NOTE
Call Center TCM Note      Flowsheet Row Responses   [facility] patient discharged from? Non-   Does the patient have one of the following disease processes/diagnoses(primary or secondary)? Other   TCM attempt successful? Yes   Call start time 1159   Call end time 1204   Discharge diagnosis Other specified diseases of pancreas   Meds reviewed with patient/caregiver? Yes   Is the patient taking all medications as directed (includes completed medication regime)? Yes   Does the patient have an appointment with their PCP within 7-14 days of discharge? Yes   Has home health visited the patient within 72 hours of discharge? N/A   Psychosocial issues? No   What is the patient's perception of their health status since discharge? Improving   Is the patient/caregiver able to teach back signs and symptoms related to disease process for when to call PCP? Yes   Is the patient/caregiver able to teach back signs and symptoms related to disease process for when to call 911? Yes   Is the patient/caregiver able to teach back the hierarchy of who to call/visit for symptoms/problems? PCP, Specialist, Home health nurse, Urgent Care, ED, 911 Yes   If the patient is a current smoker, are they able to teach back resources for cessation? Not a smoker   TCM call completed? Yes   Call end time 1204            Elizabeth Hodge LPN    4/25/2024, 12:11 EDT

## 2024-05-02 ENCOUNTER — OFFICE VISIT (OUTPATIENT)
Dept: INTERNAL MEDICINE | Facility: CLINIC | Age: 74
End: 2024-05-02
Payer: MEDICARE

## 2024-05-02 VITALS
HEIGHT: 64 IN | DIASTOLIC BLOOD PRESSURE: 76 MMHG | TEMPERATURE: 95.7 F | OXYGEN SATURATION: 96 % | SYSTOLIC BLOOD PRESSURE: 126 MMHG | BODY MASS INDEX: 32.95 KG/M2 | HEART RATE: 97 BPM | WEIGHT: 193 LBS | RESPIRATION RATE: 16 BRPM

## 2024-05-02 DIAGNOSIS — Z90.81 STATUS POST SPLENECTOMY: ICD-10-CM

## 2024-05-02 DIAGNOSIS — R80.9 TYPE 2 DIABETES MELLITUS WITH MICROALBUMINURIA, WITHOUT LONG-TERM CURRENT USE OF INSULIN: Chronic | ICD-10-CM

## 2024-05-02 DIAGNOSIS — Z09 HOSPITAL DISCHARGE FOLLOW-UP: Primary | ICD-10-CM

## 2024-05-02 DIAGNOSIS — E11.29 TYPE 2 DIABETES MELLITUS WITH MICROALBUMINURIA, WITHOUT LONG-TERM CURRENT USE OF INSULIN: Chronic | ICD-10-CM

## 2024-05-02 DIAGNOSIS — K86.89 PANCREATIC MASS: ICD-10-CM

## 2024-05-02 DIAGNOSIS — C7A.8 NEUROENDOCRINE CARCINOMA OF PANCREAS: ICD-10-CM

## 2024-05-02 PROBLEM — M25.561 CHRONIC PAIN OF BOTH KNEES: Chronic | Status: ACTIVE | Noted: 2021-08-26

## 2024-05-02 PROBLEM — M25.562 CHRONIC PAIN OF BOTH KNEES: Chronic | Status: ACTIVE | Noted: 2021-08-26

## 2024-05-02 PROBLEM — G89.29 CHRONIC PAIN OF BOTH KNEES: Chronic | Status: ACTIVE | Noted: 2021-08-26

## 2024-05-02 PROCEDURE — 1111F DSCHRG MED/CURRENT MED MERGE: CPT | Performed by: INTERNAL MEDICINE

## 2024-05-02 PROCEDURE — 3046F HEMOGLOBIN A1C LEVEL >9.0%: CPT | Performed by: INTERNAL MEDICINE

## 2024-05-02 PROCEDURE — 1159F MED LIST DOCD IN RCRD: CPT | Performed by: INTERNAL MEDICINE

## 2024-05-02 PROCEDURE — 3078F DIAST BP <80 MM HG: CPT | Performed by: INTERNAL MEDICINE

## 2024-05-02 PROCEDURE — 1160F RVW MEDS BY RX/DR IN RCRD: CPT | Performed by: INTERNAL MEDICINE

## 2024-05-02 PROCEDURE — 3074F SYST BP LT 130 MM HG: CPT | Performed by: INTERNAL MEDICINE

## 2024-05-02 PROCEDURE — 99495 TRANSJ CARE MGMT MOD F2F 14D: CPT | Performed by: INTERNAL MEDICINE

## 2024-05-02 RX ORDER — SITAGLIPTIN 100 MG/1
TABLET, FILM COATED ORAL
Start: 2024-05-02
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
## 2024-05-21 ENCOUNTER — OFFICE VISIT (OUTPATIENT)
Dept: INTERNAL MEDICINE | Facility: CLINIC | Age: 74
End: 2024-05-21
Payer: MEDICARE

## 2024-05-21 VITALS
HEART RATE: 116 BPM | OXYGEN SATURATION: 97 % | RESPIRATION RATE: 16 BRPM | SYSTOLIC BLOOD PRESSURE: 136 MMHG | DIASTOLIC BLOOD PRESSURE: 84 MMHG | HEIGHT: 64 IN | BODY MASS INDEX: 32.44 KG/M2 | TEMPERATURE: 96.2 F | WEIGHT: 190 LBS

## 2024-05-21 DIAGNOSIS — R51.9 NEW ONSET OF HEADACHES AFTER AGE 50: Primary | ICD-10-CM

## 2024-05-21 DIAGNOSIS — E11.29 TYPE 2 DIABETES MELLITUS WITH MICROALBUMINURIA, WITHOUT LONG-TERM CURRENT USE OF INSULIN: Chronic | ICD-10-CM

## 2024-05-21 DIAGNOSIS — R80.9 TYPE 2 DIABETES MELLITUS WITH MICROALBUMINURIA, WITHOUT LONG-TERM CURRENT USE OF INSULIN: Chronic | ICD-10-CM

## 2024-05-21 DIAGNOSIS — I10 BENIGN ESSENTIAL HYPERTENSION: Chronic | ICD-10-CM

## 2024-05-21 DIAGNOSIS — Z91.09 MULTIPLE ENVIRONMENTAL ALLERGIES: ICD-10-CM

## 2024-05-21 DIAGNOSIS — R90.89 ABNORMAL CT OF BRAIN: ICD-10-CM

## 2024-05-21 DIAGNOSIS — C7A.8 NEUROENDOCRINE CARCINOMA OF PANCREAS: ICD-10-CM

## 2024-05-21 DIAGNOSIS — Z90.81 STATUS POST SPLENECTOMY: ICD-10-CM

## 2024-05-21 DIAGNOSIS — T81.49XA INFECTED SURGICAL WOUND: ICD-10-CM

## 2024-05-21 DIAGNOSIS — J01.30 ACUTE NON-RECURRENT SPHENOIDAL SINUSITIS: ICD-10-CM

## 2024-05-21 PROBLEM — Z09 HOSPITAL DISCHARGE FOLLOW-UP: Status: RESOLVED | Noted: 2024-05-02 | Resolved: 2024-05-21

## 2024-05-21 PROBLEM — K86.89 PANCREATIC MASS: Status: RESOLVED | Noted: 2024-04-10 | Resolved: 2024-05-21

## 2024-05-21 PROBLEM — G43.009 MIGRAINE WITHOUT AURA AND WITHOUT STATUS MIGRAINOSUS, NOT INTRACTABLE: Status: ACTIVE | Noted: 2024-05-21

## 2024-05-21 PROCEDURE — 1160F RVW MEDS BY RX/DR IN RCRD: CPT | Performed by: INTERNAL MEDICINE

## 2024-05-21 PROCEDURE — 3075F SYST BP GE 130 - 139MM HG: CPT | Performed by: INTERNAL MEDICINE

## 2024-05-21 PROCEDURE — 1159F MED LIST DOCD IN RCRD: CPT | Performed by: INTERNAL MEDICINE

## 2024-05-21 PROCEDURE — 3079F DIAST BP 80-89 MM HG: CPT | Performed by: INTERNAL MEDICINE

## 2024-05-21 PROCEDURE — 1126F AMNT PAIN NOTED NONE PRSNT: CPT | Performed by: INTERNAL MEDICINE

## 2024-05-21 PROCEDURE — 99215 OFFICE O/P EST HI 40 MIN: CPT | Performed by: INTERNAL MEDICINE

## 2024-05-21 PROCEDURE — 3046F HEMOGLOBIN A1C LEVEL >9.0%: CPT | Performed by: INTERNAL MEDICINE

## 2024-05-21 RX ORDER — CELECOXIB 200 MG/1
CAPSULE ORAL
COMMUNITY
Start: 2024-04-19

## 2024-05-21 RX ORDER — PREDNISONE 10 MG/1
TABLET ORAL
Qty: 25 TABLET | Refills: 0 | Status: SHIPPED | OUTPATIENT
Start: 2024-05-21

## 2024-05-21 RX ORDER — ONDANSETRON 4 MG/1
4 TABLET, ORALLY DISINTEGRATING ORAL
COMMUNITY
Start: 2024-05-19

## 2024-05-21 RX ORDER — AMOXICILLIN AND CLAVULANATE POTASSIUM 875; 125 MG/1; MG/1
1 TABLET, FILM COATED ORAL
COMMUNITY
Start: 2024-05-19 | End: 2024-05-29

## 2024-05-21 NOTE — PROGRESS NOTES
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 antihistamine decongestant.  Her blood sugars have been running upwards of 300 and I think it is time to go ahead and initiate some baseline insulin therapy.  Patient is scheduled to see the endocrinologist but not until July.    Plan is as follows: Start insulin glargine 10 units subcutaneously every day in the evening.  Prednisone 50 mg p.o. daily x 5 days and discontinue.  Stahist AD 1 p.o. twice daily to help with possible involvement with allergy and congestion.  MRI of the brain ordered.  Patient will follow-up after the results are known.    Addendum: At the end of the visit patient showed me 2 areas of her surgical wound on her abdomen from her pancreatectomy that had not closed and were draining fluid.  On exam there is some mild superficial dehiscence superiorly and inferiorly of the vertical wound and there is no surrounding cellulitis.  There is discharge present that has the appearance of pus but could be fat necrosis.  Plan is as follows: Patient instructed on dressing changes with bacitracin.  She must wash the wound twice a day with an antibacterial soap.  " Dry dressing changes at night.  And I explained to her the Augmentin antibiotic she is on for the sinus infection would likely cover any possible infection of this wound.  If this does not improve within the next 8 to 10 days and certainly if it worsens then she needs to see the surgeon who performed the surgery as soon as possible.  Otherwise she can contact me.    Note: Greater than 50 minutes was spent evaluating this patient today with multiple chronic medical problems noted above, some of which are acute as well as chronic and greater than 50% of this time was spent counseling patient.  97607 level service justified.    This patient has a PCP that is the continuing focal point for all health care services, and the patient sees this physician to be evaluated for headache, sinus infection. The inherent complexity that this code () captures is not in the clinical condition itself, but rather the cognitition of the continued responsibility of being the focal point for all needed services for this patient.\"       Procedures        "

## 2024-05-23 DIAGNOSIS — F41.1 GENERALIZED ANXIETY DISORDER: Chronic | ICD-10-CM

## 2024-05-24 RX ORDER — ALPRAZOLAM 0.5 MG/1
TABLET ORAL
Qty: 60 TABLET | Refills: 3 | Status: SHIPPED | OUTPATIENT
Start: 2024-05-24

## 2024-06-10 ENCOUNTER — TELEPHONE (OUTPATIENT)
Dept: INTERNAL MEDICINE | Facility: CLINIC | Age: 74
End: 2024-06-10

## 2024-06-10 NOTE — TELEPHONE ENCOUNTER
Caller: Reema Easley    Relationship: Self    Best call back number: [phone]     What was the call regarding: PATIENT WAS SEEN N 5/18/24 FOR A MIGRAINE AND WAS TOLD TO GET AN MRI OF HER BRAIN. PATIENT STATES SHE DOES NOT FEEL LIKE SHE NEEDS IT. SHE WOULD LIKE TO KNOW WHAT DR TEJEDA RECOMMENDS AND IF HE WANTS HER TO GET THIS DONE. SHE STATES SHE IS NOT HAVING THE HEADACHES OR OTHER SYMPTOMS.     PLEASE ADVISE

## 2024-06-10 NOTE — TELEPHONE ENCOUNTER
Caller: Reema Easley    Relationship: Self    Best call back number: [phone]     What was the call regarding: PATIENT STATES THAT SHE WAS TOLD TO SEE A ENDOCRINOLOGIST AND SHE HAS SINCE THEN BEEN PUT ON INSULIN BY DR DR TEJEDA. SHE STATES HER BLOOD SUGAR IS NOW STAYING UNDER 140 SO SHE WOULD LIKE TO KNOW IF SHE STILL NEEDS TO SEE THE ENDOCRINOLOGIST.    PLEASE CALL AND ADVISE

## 2024-07-02 ENCOUNTER — OFFICE VISIT (OUTPATIENT)
Dept: ENDOCRINOLOGY | Age: 74
End: 2024-07-02
Payer: MEDICARE

## 2024-07-02 VITALS
OXYGEN SATURATION: 97 % | HEIGHT: 64 IN | WEIGHT: 194.2 LBS | SYSTOLIC BLOOD PRESSURE: 148 MMHG | BODY MASS INDEX: 33.16 KG/M2 | HEART RATE: 89 BPM | TEMPERATURE: 98.3 F | DIASTOLIC BLOOD PRESSURE: 90 MMHG

## 2024-07-02 DIAGNOSIS — R80.9 MICROALBUMINURIA: Chronic | ICD-10-CM

## 2024-07-02 DIAGNOSIS — E11.65 TYPE 2 DIABETES MELLITUS WITH HYPERGLYCEMIA, UNSPECIFIED WHETHER LONG TERM INSULIN USE: Primary | ICD-10-CM

## 2024-07-02 DIAGNOSIS — E78.2 MIXED HYPERLIPIDEMIA: Chronic | ICD-10-CM

## 2024-07-02 DIAGNOSIS — C7A.8 NEUROENDOCRINE CARCINOMA OF PANCREAS: ICD-10-CM

## 2024-07-02 PROCEDURE — 99214 OFFICE O/P EST MOD 30 MIN: CPT | Performed by: NURSE PRACTITIONER

## 2024-07-02 PROCEDURE — 1159F MED LIST DOCD IN RCRD: CPT | Performed by: NURSE PRACTITIONER

## 2024-07-02 PROCEDURE — 3046F HEMOGLOBIN A1C LEVEL >9.0%: CPT | Performed by: NURSE PRACTITIONER

## 2024-07-02 PROCEDURE — 1160F RVW MEDS BY RX/DR IN RCRD: CPT | Performed by: NURSE PRACTITIONER

## 2024-07-02 PROCEDURE — 3077F SYST BP >= 140 MM HG: CPT | Performed by: NURSE PRACTITIONER

## 2024-07-02 PROCEDURE — 3080F DIAST BP >= 90 MM HG: CPT | Performed by: NURSE PRACTITIONER

## 2024-07-02 NOTE — PATIENT INSTRUCTIONS
Check BS 3x/day- keep a log   Take lantus 10u at the same time every day  Stop glimepiride and januvia   Take metformin 1000mg BID   Goal morning BS will be less than 140      Diabetes Mellitus and Nutrition, Adult  When you have diabetes, or diabetes mellitus, it is very important to have healthy eating habits because your blood sugar (glucose) levels are greatly affected by what you eat and drink. Eating healthy foods in the right amounts, at about the same times every day, can help you:  Manage your blood glucose.  Lower your risk of heart disease.  Improve your blood pressure.  Reach or maintain a healthy weight.  What can affect my meal plan?  Every person with diabetes is different, and each person has different needs for a meal plan. Your health care provider may recommend that you work with a dietitian to make a meal plan that is best for you. Your meal plan may vary depending on factors such as:  The calories you need.  The medicines you take.  Your weight.  Your blood glucose, blood pressure, and cholesterol levels.  Your activity level.  Other health conditions you have, such as heart or kidney disease.  How do carbohydrates affect me?  Carbohydrates, also called carbs, affect your blood glucose level more than any other type of food. Eating carbs raises the amount of glucose in your blood.  It is important to know how many carbs you can safely have in each meal. This is different for every person. Your dietitian can help you calculate how many carbs you should have at each meal and for each snack.  How does alcohol affect me?  Alcohol can cause a decrease in blood glucose (hypoglycemia), especially if you use insulin or take certain diabetes medicines by mouth. Hypoglycemia can be a life-threatening condition. Symptoms of hypoglycemia, such as sleepiness, dizziness, and confusion, are similar to symptoms of having too much alcohol.  Do not drink alcohol if:  Your health care provider tells you not to  "drink.  You are pregnant, may be pregnant, or are planning to become pregnant.  If you drink alcohol:  Limit how much you have to:  0-1 drink a day for women.  0-2 drinks a day for men.  Know how much alcohol is in your drink. In the [place] one drink equals one 12 oz bottle of beer (355 mL), one 5 oz glass of wine (148 mL), or one 1½ oz glass of hard liquor (44 mL).  Keep yourself hydrated with water, diet soda, or unsweetened iced tea. Keep in mind that regular soda, juice, and other mixers may contain a lot of sugar and must be counted as carbs.  What are tips for following this plan?    Reading food labels  Start by checking the serving size on the Nutrition Facts label of packaged foods and drinks. The number of calories and the amount of carbs, fats, and other nutrients listed on the label are based on one serving of the item. Many items contain more than one serving per package.  Check the total grams (g) of carbs in one serving.  Check the number of grams of saturated fats and trans fats in one serving. Choose foods that have a low amount or none of these fats.  Check the number of milligrams (mg) of salt (sodium) in one serving. Most people should limit total sodium intake to less than 2,300 mg per day.  Always check the nutrition information of foods labeled as \"low-fat\" or \"nonfat.\" These foods may be higher in added sugar or refined carbs and should be avoided.  Talk to your dietitian to identify your daily goals for nutrients listed on the label.  Shopping  Avoid buying canned, pre-made, or processed foods. These foods tend to be high in fat, sodium, and added sugar.  Shop around the outside edge of the grocery store. This is where you will most often find fresh fruits and vegetables, bulk grains, fresh meats, and fresh dairy products.  Cooking  Use low-heat cooking methods, such as baking, instead of high-heat cooking methods, such as deep frying.  Cook using healthy oils, such as olive, canola, or " sunflower oil.  Avoid cooking with butter, cream, or high-fat meats.  Meal planning  Eat meals and snacks regularly, preferably at the same times every day. Avoid going long periods of time without eating.  Eat foods that are high in fiber, such as fresh fruits, vegetables, beans, and whole grains.  Eat 4-6 oz (112-168 g) of lean protein each day, such as lean meat, chicken, fish, eggs, or tofu. One ounce (oz) (28 g) of lean protein is equal to:  1 oz (28 g) of meat, chicken, or fish.  1 egg.  ¼ cup (62 g) of tofu.  Eat some foods each day that contain healthy fats, such as avocado, nuts, seeds, and fish.  What foods should I eat?  Fruits  Berries. Apples. Oranges. Peaches. Apricots. Plums. Grapes. Mangoes. Papayas. Pomegranates. Kiwi. Cherries.  Vegetables  Leafy greens, including lettuce, spinach, kale, chard, angelo greens, mustard greens, and cabbage. Beets. Cauliflower. Broccoli. Carrots. Green beans. Tomatoes. Peppers. Onions. Cucumbers. [place] sprouts.  Grains  Whole grains, such as whole-wheat or whole-grain bread, crackers, tortillas, cereal, and pasta. Unsweetened oatmeal. Quinoa. Brown or wild rice.  Meats and other proteins  Seafood. Poultry without skin. Lean cuts of poultry and beef. Tofu. Nuts. Seeds.  Dairy  Low-fat or fat-free dairy products such as milk, yogurt, and cheese.  The items listed above may not be a complete list of foods and beverages you can eat and drink. Contact a dietitian for more information.  What foods should I avoid?  Fruits  Fruits canned with syrup.  Vegetables  Canned vegetables. Frozen vegetables with butter or cream sauce.  Grains  Refined white flour and flour products such as bread, pasta, snack foods, and cereals. Avoid all processed foods.  Meats and other proteins  Fatty cuts of meat. Poultry with skin. Breaded or fried meats. Processed meat. Avoid saturated fats.  Dairy  Full-fat yogurt, cheese, or milk.  Beverages  Sweetened drinks, such as soda or iced tea.  The  items listed above may not be a complete list of foods and beverages you should avoid. Contact a dietitian for more information.  Questions to ask a health care provider  Do I need to meet with a certified diabetes care and ?  Do I need to meet with a dietitian?  What number can I call if I have questions?  When are the best times to check my blood glucose?  Where to find more information:  American Diabetes Association: diabetes.org  Academy of Nutrition and Dietetics: eatright.org  National [place] of Diabetes and Digestive and Kidney Diseases: niddk.nih.gov  Association of Diabetes Care & Education Specialists: diabeteseducator.org  Summary  It is important to have healthy eating habits because your blood sugar (glucose) levels are greatly affected by what you eat and drink. It is important to use alcohol carefully.  A healthy meal plan will help you manage your blood glucose and lower your risk of heart disease.  Your health care provider may recommend that you work with a dietitian to make a meal plan that is best for you.  This information is not intended to replace advice given to you by your health care provider. Make sure you discuss any questions you have with your health care provider.  Document Revised: 07/21/2021 Document Reviewed: 07/21/2021  Elsevier Patient Education © 2024 Elsevier Inc.

## 2024-07-02 NOTE — PROGRESS NOTES
"Chief Complaint  Diabetes    Subjective        Reema Easley presents to [facility] END[facility]CR[facility]OLOG[facility]  History of Present Illness    Patient is seen as a new patient today sent by Marc Rasheed MD for E11.29,R80.9 (ICD-10-CM) - Type 2 diabetes mellitus with microalbuminuria, without long-term current use of insulin     Pancreatic cancer  4/10/24- portion of pancrease and spleen removed, insulin was started as oral options were not working     Current DM regimen: metformin 1000mg BID- but often misses the second dose, glimepiride and januvia   Also on lantus but has not been taking 10u daily, has taken 1,2,3,4,5,6u as she though the pen was dosed on the days ( day 1, day 2, day 3, ect)    Took 6u last night and BS was > 200 this morning     Diabetes Type 2  Years with diabetes > 25 years   CVA- denies   Retinopathy- denies   Yearly eye exams- 12/2023  Thyroid disorder- denies   CAD- denies   Lung disorder- (+) tayler, did not tolerate he machine in the past   Gastroparesis- denies   Pancreatitis- denies    complications- left kidney atrophy   Neuropathy- denies   Activity level- \" busy all day\" but does not exert herself r/t knee injury and poor balance   Last diabetes education: none           Objective   Vital Signs:  /90   Pulse 89   Temp 98.3 °F (36.8 °C) (Oral)   Ht 162.6 cm (64.02\")   Wt 88.1 kg (194 lb 3.2 oz)   SpO2 97%   BMI 33.32 kg/m²   Estimated body mass index is 33.32 kg/m² as calculated from the following:    Height as of this encounter: 162.6 cm (64.02\").    Weight as of this encounter: 88.1 kg (194 lb 3.2 oz).       Physical Exam  Vitals reviewed.   Constitutional:       General: She is not in acute distress.  HENT:      Head: Normocephalic and atraumatic.   Cardiovascular:      Rate and Rhythm: Normal rate.   Pulmonary:      Effort: Pulmonary effort is normal. No respiratory distress.   Musculoskeletal:         General: No signs of injury. Normal range of motion.      " Cervical back: Normal range of motion and neck supple.   Skin:     General: Skin is warm and dry.   Neurological:      Mental Status: She is alert and oriented to person, place, and time. Mental status is at baseline.   Psychiatric:         Mood and Affect: Mood normal.         Behavior: Behavior normal.         Thought Content: Thought content normal.         Judgment: Judgment normal.          Result Review :    The following data was reviewed by: JACQUELIN Akers on 07/02/2024:  Common labs          4/18/2024    04:00 4/19/2024    05:02 4/26/2024    09:14   Common Labs   WBC 10.61     12.86     11.94       Hemoglobin 10.0     9.6     12.4       Hematocrit 31.5     29.9     38.3       Platelets 559     570     876          Details          This result is from an external source.                          Assessment and Plan     Diagnoses and all orders for this visit:    1. Type 2 diabetes mellitus with hyperglycemia, unspecified whether long term insulin use (Primary)  -     Ambulatory Referral to Diabetic Education    2. Hyperlipidemia    3. Microalbuminuria    4. Neuroendocrine carcinoma of pancreas             Follow Up     Return in about 2 weeks (around 7/16/2024).    Stop glimepiride and januvia given pancreatic involvement    Educated on lantus dosing and use. To use 10u once daily at the same time every day, patient demonstrated this back to me, reviewed insulin safety and hypoglycemia prevention   Goal fasting BS < 140  Check BS 3x/day and keep a log   Only oral DM medication should be metformin  Will get BS log in 7 days, patient to notify me for any Bs < 80  Diabetes education referral   Further recommendations to follow     Patient was given instructions and counseling regarding her condition or for health maintenance advice. Please see specific information pulled into the AVS if appropriate.     JACQUELIN Akers

## 2024-07-03 ENCOUNTER — TELEPHONE (OUTPATIENT)
Dept: ENDOCRINOLOGY | Age: 74
End: 2024-07-03
Payer: MEDICARE

## 2024-07-05 ENCOUNTER — PATIENT ROUNDING (BHMG ONLY) (OUTPATIENT)
Dept: ENDOCRINOLOGY | Age: 74
End: 2024-07-05
Payer: MEDICARE

## 2024-07-08 ENCOUNTER — TELEPHONE (OUTPATIENT)
Dept: INTERNAL MEDICINE | Facility: CLINIC | Age: 74
End: 2024-07-08

## 2024-07-08 NOTE — TELEPHONE ENCOUNTER
Caller: Reema Easley    Relationship: Self    Best call back number: [phone]     What is the medical concern/diagnosis: KNEE PAIN     What specialty or service is being requested: ORTHOPEDICS     Any additional details: WANTING A REFERRAL TO A NEW ORTHOPEDIC OFFICE PLEASE CALL AND ADVISE WHEN DONE

## 2024-07-10 NOTE — TELEPHONE ENCOUNTER
Name: Reema Easley    Relationship: Self    Best Callback Number: [phone]    HUB PROVIDED THE RELAY MESSAGE FROM THE OFFICE   PATIENT HAS FURTHER QUESTIONS AND WOULD LIKE A CALL BACK AT THE FOLLOWING PHONE NUMBER [phone]    ADDITIONAL INFORMATION:PATIENT STATED THAT DR TEJEDA TOLD HER WHEN SHE WAS READY TO GET IN WITH ANOTHER ORTHOPEDIC DOCTOR DUE TO HER STILL LIMPING, THE PAIN, AND IT GIVING OUT ON HER TO GIVER HIM A CALL. PATIENT STATED THAT HE TOLD HER HE WOULD GIVE HER THE NAME OF A SPECIFIC  ORTHOPEDIC SURGEON SINCE THEY HAD BEEN DISCUSSING IT. PLEASE ADVISE.

## 2024-07-15 NOTE — TELEPHONE ENCOUNTER
"Relay     \"Dr. Rasheed is currently out on medical leave and I am not sure who he is wanting to refer her to.  Once he returns we will make the referral.\"                 "

## 2024-07-15 NOTE — TELEPHONE ENCOUNTER
Caller: Reema Easley    Relationship: Self    Best call back number:[phone]     What was the call regarding: PATIENT IS CALLING BACK TO CHECK STATUS OF REFERRAL/RECOMMENDATION FOR KNEE PAIN. DR. TEJEDA TOLD HER HE WOULD RECOMMEND SOMEONE WHEN SHE WAS READY. PLEASE ADVISE.

## 2024-07-16 ENCOUNTER — TELEPHONE (OUTPATIENT)
Dept: ENDOCRINOLOGY | Age: 74
End: 2024-07-16
Payer: MEDICARE

## 2024-07-16 NOTE — TELEPHONE ENCOUNTER
Caller: Reema Easley    Relationship to patient: Self    Best call back number:     [phone]       Patient is needing:   PT BS LEVELS STARTED 2WEEKS AGO    5AM 164@11   224@8PM  THUR 170@6AM 160@4PM  @7AM   @7AM  [id number]@7AM    @7AM   [id number]@7AM  FRK058@7AM  PFPRG853@2AM  @7AM  @7AM  [id number]@7AM    HBZ594@7AM  [id number]@7AM

## 2024-07-17 ENCOUNTER — OFFICE VISIT (OUTPATIENT)
Dept: ENDOCRINOLOGY | Age: 74
End: 2024-07-17
Payer: MEDICARE

## 2024-07-17 VITALS
OXYGEN SATURATION: 96 % | TEMPERATURE: 96.9 F | HEIGHT: 64 IN | DIASTOLIC BLOOD PRESSURE: 84 MMHG | HEART RATE: 108 BPM | WEIGHT: 190.2 LBS | SYSTOLIC BLOOD PRESSURE: 132 MMHG | BODY MASS INDEX: 32.47 KG/M2

## 2024-07-17 DIAGNOSIS — C7A.8 NEUROENDOCRINE CARCINOMA OF PANCREAS: ICD-10-CM

## 2024-07-17 DIAGNOSIS — R80.9 TYPE 2 DIABETES MELLITUS WITH MICROALBUMINURIA, WITHOUT LONG-TERM CURRENT USE OF INSULIN: Primary | Chronic | ICD-10-CM

## 2024-07-17 DIAGNOSIS — E11.29 TYPE 2 DIABETES MELLITUS WITH MICROALBUMINURIA, WITHOUT LONG-TERM CURRENT USE OF INSULIN: Primary | Chronic | ICD-10-CM

## 2024-07-17 PROCEDURE — 3046F HEMOGLOBIN A1C LEVEL >9.0%: CPT | Performed by: NURSE PRACTITIONER

## 2024-07-17 PROCEDURE — 99213 OFFICE O/P EST LOW 20 MIN: CPT | Performed by: NURSE PRACTITIONER

## 2024-07-17 PROCEDURE — 3079F DIAST BP 80-89 MM HG: CPT | Performed by: NURSE PRACTITIONER

## 2024-07-17 PROCEDURE — 3075F SYST BP GE 130 - 139MM HG: CPT | Performed by: NURSE PRACTITIONER

## 2024-07-17 RX ORDER — PEN NEEDLE, DIABETIC 30 GX3/16"
1 NEEDLE, DISPOSABLE MISCELLANEOUS DAILY
Qty: 100 EACH | Refills: 5 | Status: SHIPPED | OUTPATIENT
Start: 2024-07-17

## 2024-07-17 NOTE — PROGRESS NOTES
"Chief Complaint  Diabetes    Subjective        Reema Easley presents to [facility] ENDOCRINOLOGY  History of Present Illness    Patient is seen today for 2 week follow up and new insulin start from her new patient appt 7/2/24 sent by Marc Rasheed MD for E11.29,R80.9 (ICD-10-CM) - Type 2 diabetes mellitus with microalbuminuria, without long-term current use of insulin      Pancreatic cancer  4/10/24- portion of pancrease and spleen removed, insulin was started as oral options were not working      Current DM regimen: metformin 1000mg BID, lantus but has not been taking 10u nightly  Has dexcom with her to start today  Denies hypoglycemia  Will be meeting with dietician tomorrow     7/2/24: Stop glimepiride and januvia given pancreatic involvement       Diabetes Type 2, > 25 years   Yearly eye exams- 12/2023  (+) tayler, did not tolerate the machine in the past   does not exert herself r/t knee injury and poor balance   Last diabetes education: upcoming        Objective   Vital Signs:  /84   Pulse 108   Temp 96.9 °F (36.1 °C) (Temporal)   Ht 162.6 cm (64.02\")   Wt 86.3 kg (190 lb 3.2 oz)   SpO2 96%   BMI 32.63 kg/m²   Estimated body mass index is 32.63 kg/m² as calculated from the following:    Height as of this encounter: 162.6 cm (64.02\").    Weight as of this encounter: 86.3 kg (190 lb 3.2 oz).          Physical Exam  Vitals reviewed.   Constitutional:       General: She is not in acute distress.  HENT:      Head: Normocephalic and atraumatic.   Cardiovascular:      Rate and Rhythm: Normal rate.   Pulmonary:      Effort: Pulmonary effort is normal. No respiratory distress.   Musculoskeletal:         General: No signs of injury. Normal range of motion.      Cervical back: Normal range of motion and neck supple.   Skin:     General: Skin is warm and dry.   Neurological:      Mental Status: She is alert and oriented to person, place, and time. Mental status is at baseline. "   Psychiatric:         Mood and Affect: Mood normal.         Behavior: Behavior normal.         Thought Content: Thought content normal.         Judgment: Judgment normal.        Result Review :    The following data was reviewed by: JACQUELIN Akers on 07/17/2024:  Common labs          4/18/2024    04:00 4/19/2024    05:02 4/26/2024    09:14   Common Labs   WBC 10.61     12.86     11.94       Hemoglobin 10.0     9.6     12.4       Hematocrit 31.5     29.9     38.3       Platelets 559     570     876          Details          This result is from an external source.                          Assessment and Plan     Diagnoses and all orders for this visit:    1. Type 2 diabetes mellitus with microalbuminuria, without long-term current use of insulin (Primary)  -     Insulin Glargine (LANTUS SOLOSTAR) 100 UNIT/ML injection pen; Dose 7/17/24 14u, will adjust weekly with provider to max dose to 50u daily  Dispense: 15 mL; Refill: 1  -     Insulin Pen Needle (Pen Needles) 31G X 5 MM misc; Use 1 each Daily. E11.65  Dispense: 100 each; Refill: 5    2. Neuroendocrine carcinoma of pancreas             Follow Up     Return in about 3 months (around 10/17/2024).    Increase lantus to 14u daily  Start cgm with MA today  Review dexcom in 2 weeks     Patient was given instructions and counseling regarding her condition or for health maintenance advice. Please see specific information pulled into the AVS if appropriate.       JACQUELIN Akers

## 2024-07-18 ENCOUNTER — APPOINTMENT (OUTPATIENT)
Dept: DIABETES SERVICES | Facility: HOSPITAL | Age: 74
End: 2024-07-18
Payer: MEDICARE

## 2024-07-18 ENCOUNTER — HOSPITAL ENCOUNTER (OUTPATIENT)
Dept: DIABETES SERVICES | Facility: HOSPITAL | Age: 74
Discharge: HOME | End: 2024-07-18
Payer: MEDICARE

## 2024-07-18 PROCEDURE — G0108 DIAB MANAGE TRN  PER INDIV: HCPCS

## 2024-07-18 NOTE — PROGRESS NOTES
Diabetes Education    Patient Name:  Reema Easley  YOB: 1950  MRN: [id number]  Admit Date:  7/18/2024    Ms. Easley met with RN and [employer] for diabetes education.  A comprehensive assessment/training record has been sent to medical records (see media tab) to associate with this encounter.       We discussed meal planning - she was advised to follow the ADA plate method to plan meals. We discussed the importance of eating regular meals. We reviewed how to read food labels.  We reviewed portion sizes - we discussed importance of “balance meals/snacks”.   We discussed mindless/emotional eating. We discussed following a consistent carb diet and we reviewed carb counting. Patient verbalized understanding of all information reviewed at today’s visit.       Ms. Easley has been encouraged to call our office with questions or additional education needs. Please place referral for additional services or follow-up should need arise.     Thank you for the referral        Jamia Jolley RD, [place], [place]ILA        Electronically signed by:  Jamia Jolley RD  07/18/24 12:59 EDT

## 2024-07-23 ENCOUNTER — OFFICE VISIT (OUTPATIENT)
Dept: ORTHOPEDIC SURGERY | Facility: CLINIC | Age: 74
End: 2024-07-23
Payer: MEDICARE

## 2024-07-23 VITALS — HEIGHT: 64 IN | BODY MASS INDEX: 32.9 KG/M2 | WEIGHT: 192.7 LBS | TEMPERATURE: 98.4 F

## 2024-07-23 DIAGNOSIS — M25.562 ACUTE PAIN OF LEFT KNEE: ICD-10-CM

## 2024-07-23 DIAGNOSIS — Z96.652 TOTAL KNEE REPLACEMENT STATUS, LEFT: ICD-10-CM

## 2024-07-23 DIAGNOSIS — R52 PAIN: Primary | ICD-10-CM

## 2024-07-23 DIAGNOSIS — M17.11 PRIMARY OSTEOARTHRITIS OF RIGHT KNEE: ICD-10-CM

## 2024-07-23 PROCEDURE — 1160F RVW MEDS BY RX/DR IN RCRD: CPT | Performed by: NURSE PRACTITIONER

## 2024-07-23 PROCEDURE — 1159F MED LIST DOCD IN RCRD: CPT | Performed by: NURSE PRACTITIONER

## 2024-07-23 PROCEDURE — 73562 X-RAY EXAM OF KNEE 3: CPT | Performed by: NURSE PRACTITIONER

## 2024-07-23 PROCEDURE — 73522 X-RAY EXAM HIPS BI 3-4 VIEWS: CPT | Performed by: NURSE PRACTITIONER

## 2024-07-23 PROCEDURE — 99214 OFFICE O/P EST MOD 30 MIN: CPT | Performed by: NURSE PRACTITIONER

## 2024-07-23 NOTE — PROGRESS NOTES
Patient: Reema Easley  YOB: 1950 73 y.o. female  Medical Record Number: [id number]    Chief Complaints:   Chief Complaint   Patient presents with    Left Knee - Pain, Initial Evaluation    Right Knee - Pain, Initial Evaluation       History of Present Illness:Reema Easley is a 73 y.o. female who presents as a new patient both myself as well as to the practice with complaints of bilateral knee pain left greater than right.  Patient had left total knee replacement in 2 to 3 years ago by Dr. Murillo, she reports that her knee was not normal after the surgery and she has had progressively worsening knee pain since.  Definitely worse the last 6 months or so.  She denies any fever chills or recent illness.  She denies any injury.  She also had a right patellar fracture about 4 years ago and was told at that time she had quite a bit of arthritis in her right knee.    Allergies: No Known Allergies    Medications:   Current Outpatient Medications   Medication Sig Dispense Refill    allopurinol (ZYLOPRIM) 300 MG tablet TAKE 1 TABLET EVERY DAY 90 tablet 3    ALPRAZolam (XANAX) 0.5 MG tablet TAKE 1 TABLET BY MOUTH TWICE A DAY 60 tablet 3    benazepril (LOTENSIN) 40 MG tablet TAKE 1 TABLET EVERY DAY 90 tablet 3    Chlorcyclizine-Pseudoephed 25-60 MG tablet Take 1 p.o. twice daily for environmental allergies/congestion and sinus infection 30 tablet 0    glucose blood (True Metrix Blood Glucose Test) test strip USE AS DIRECTED 100 each 10    glucose monitor monitoring kit 1 each As Needed (as needed). 1 each 0    Insulin Glargine (LANTUS SOLOSTAR) 100 UNIT/ML injection pen Dose 7/17/24 14u, will adjust weekly with provider to max dose to 50u daily 15 mL 1    Insulin Pen Needle (Pen Needles) 31G X 5 MM misc Use 1 each Daily. E11.65 100 each 5    metFORMIN (GLUCOPHAGE) 1000 MG tablet Take 1 p.o. twice daily at meals for diabetes 180 tablet 3    nystatin-triamcinolone (MYCOLOG II) 391002-0.1 UNIT/GM-% cream Apply  "to the affected areas 3 times daily as needed 60 g 1    pantoprazole (PROTONIX) 40 MG EC tablet Take 1 p.o. daily before the largest meal on an empty stomach for gastric ulcer      simvastatin (ZOCOR) 40 MG tablet TAKE 1 TABLET EVERY NIGHT 90 tablet 3    TRUEplus Lancets 30G misc TEST BLOOD SUGAR EVERY DAY AS DIRECTED 100 each 3    ondansetron ODT (ZOFRAN-ODT) 4 MG disintegrating tablet Take 1 tablet by mouth. (Patient not taking: Reported on 7/17/2024)       No current facility-administered medications for this visit.         The following portions of the patient's history were reviewed and updated as appropriate: allergies, current medications, past family history, past medical history, past social history, past surgical history and problem list.    Review of Systems:   14 point review of systems was performed. All systems negative except pertinent positives/negatives listed in HPI above    Physical Exam:   Vitals:    07/23/24 1539   Temp: 98.4 °F (36.9 °C)   Weight: 87.4 kg (192 lb 11.2 oz)   Height: 162.6 cm (64\")   PainSc:   9   PainLoc: Knee       General: A and O x 3, ASA, NAD   Skin clear no unusual lesions noted  Left knee the patient is well-healed surgical incision noted 120 degrees flexion neutral in extension, no instability  Right knee patient has well-healed surgical incision noted 122 degrees flexion neutral in extension positive Shimon negative Lockman calf soft and nontender       Radiology:  Xrays 3views (ap,lateral, sunrise) bilateral knees were ordered and reviewed today secondary to pain patient is well-placed well-positioned left total knee replacement she has significant arthritic changes noted right knee with hardware consistent with previous right patellar fracture.  In addition 2 views of bilateral hips were ordered and reviewed today secondary to pain show minimal arthritic changes, no comparative views available    Assessment/Plan: Painful left total knee replacement  Right knee " osteoarthritis with history of patellar fracture    Patient and I discussed options, we will proceed with CRP, sed rate, bone scan attention to left knee since her knee pain is worsening.  Patient will follow-up with us afterward.  In addition we will give her prescription for ketoprofen lidocaine cream to use topically as needed until that time.  Tylenol as needed      Lnig Arzola, APRN  7/23/2024

## 2024-07-24 ENCOUNTER — LAB (OUTPATIENT)
Dept: LAB | Facility: HOSPITAL | Age: 74
End: 2024-07-24
Payer: MEDICARE

## 2024-07-24 DIAGNOSIS — M25.562 ACUTE PAIN OF LEFT KNEE: ICD-10-CM

## 2024-07-24 DIAGNOSIS — Z96.652 TOTAL KNEE REPLACEMENT STATUS, LEFT: ICD-10-CM

## 2024-07-24 LAB
CRP SERPL-MCNC: <0.3 MG/DL (ref 0–0.5)
ERYTHROCYTE [SEDIMENTATION RATE] IN BLOOD: 2 MM/HR (ref 0–30)

## 2024-07-24 PROCEDURE — 85652 RBC SED RATE AUTOMATED: CPT

## 2024-07-24 PROCEDURE — 36415 COLL VENOUS BLD VENIPUNCTURE: CPT

## 2024-07-24 PROCEDURE — 86140 C-REACTIVE PROTEIN: CPT

## 2024-07-31 ENCOUNTER — CLINICAL SUPPORT (OUTPATIENT)
Dept: ENDOCRINOLOGY | Age: 74
End: 2024-07-31
Payer: MEDICARE

## 2024-07-31 DIAGNOSIS — E11.29 TYPE 2 DIABETES MELLITUS WITH MICROALBUMINURIA, WITHOUT LONG-TERM CURRENT USE OF INSULIN: Primary | ICD-10-CM

## 2024-07-31 DIAGNOSIS — R80.9 TYPE 2 DIABETES MELLITUS WITH MICROALBUMINURIA, WITHOUT LONG-TERM CURRENT USE OF INSULIN: Primary | ICD-10-CM

## 2024-07-31 PROCEDURE — 95249 CONT GLUC MNTR PT PROV EQP: CPT | Performed by: NURSE PRACTITIONER

## 2024-07-31 NOTE — PROGRESS NOTES
Cgm reviewed 7/18/24-7/31/24, 93% active data  Avg glu 191  Gmi 7.9%  46% time  in range  41% high  13% very high     BS do well during the night, so would continue lantus at current dose of 14u  Her Bs seem to rise the most during her meal times  I would recommend adding an additional insulin- fast acting insulin ( different than lantus) before each meal  2u before breakfast and 3u before lunch and dinner  Is she ok with this?

## 2024-08-01 RX ORDER — INSULIN ASPART 100 [IU]/ML
INJECTION, SOLUTION INTRAVENOUS; SUBCUTANEOUS
Qty: 93 ML | Refills: 1 | Status: SHIPPED | OUTPATIENT
Start: 2024-08-01

## 2024-08-22 NOTE — PLAN OF CARE
----- Message from Jenni Crawley sent at 8/21/2024  4:00 PM EDT -----  Renal function is declined and sodium is low  All other labs are stable, repeat BMP in 2 weeks to see if labs improve on their own, encourage adequate hydration   Problem: Perioperative Period (Adult)  Goal: Signs and Symptoms of Listed Potential Problems Will be Absent or Manageable (Perioperative Period)  Outcome: Outcome(s) achieved Date Met:  09/14/17 09/14/17 5391   Perioperative Period   Problems Assessed (Perioperative Period) all   Problems Present (Perioperative Period) none

## 2024-08-27 ENCOUNTER — OFFICE VISIT (OUTPATIENT)
Dept: ORTHOPEDIC SURGERY | Facility: CLINIC | Age: 74
End: 2024-08-27
Payer: MEDICARE

## 2024-08-27 VITALS — HEIGHT: 64 IN | TEMPERATURE: 98 F | BODY MASS INDEX: 32.35 KG/M2 | WEIGHT: 189.5 LBS

## 2024-08-27 DIAGNOSIS — Z96.652 TOTAL KNEE REPLACEMENT STATUS, LEFT: Primary | ICD-10-CM

## 2024-08-27 PROBLEM — M25.562 KNEE PAIN, LEFT: Status: ACTIVE | Noted: 2024-08-27

## 2024-08-27 RX ORDER — CHLORHEXIDINE GLUCONATE 500 MG/1
CLOTH TOPICAL 2 TIMES DAILY
OUTPATIENT
Start: 2024-08-27

## 2024-08-27 RX ORDER — PREGABALIN 150 MG/1
150 CAPSULE ORAL ONCE
OUTPATIENT
Start: 2024-08-27 | End: 2024-08-27

## 2024-08-27 RX ORDER — MELOXICAM 7.5 MG/1
15 TABLET ORAL ONCE
OUTPATIENT
Start: 2024-08-27 | End: 2024-08-27

## 2024-08-27 NOTE — PROGRESS NOTES
Patient: Reema Easley  YOB: 1950 73 y.o. female  Medical Record Number: [id number]    Chief Complaints:   Chief Complaint   Patient presents with    Left Knee - Pain, Initial Evaluation    Right Knee - Pain, Initial Evaluation       History of Present Illness:Reema Easley is a 73 y.o. female who presents for follow-up of left knee she had a left total knee replacement performed by Dr. Moises Murillo January 2022.  She states she has always had some pain with it she feels unstable especially with uneven ground she has achiness soreness with nearly every step and it has not improved with anti-inflammatory gels physical therapy and other conservative measures.  In addition she has anterior right knee pain she had a previous right patellar fracture which was fixed with surgery years ago.  The left bothers her quite a bit more than the right.    Allergies: No Known Allergies    Medications:   Current Outpatient Medications   Medication Sig Dispense Refill    allopurinol (ZYLOPRIM) 300 MG tablet TAKE 1 TABLET EVERY DAY 90 tablet 3    ALPRAZolam (XANAX) 0.5 MG tablet TAKE 1 TABLET BY MOUTH TWICE A DAY 60 tablet 3    benazepril (LOTENSIN) 40 MG tablet TAKE 1 TABLET EVERY DAY 90 tablet 3    Chlorcyclizine-Pseudoephed 25-60 MG tablet Take 1 p.o. twice daily for environmental allergies/congestion and sinus infection 30 tablet 0    glucose blood (True Metrix Blood Glucose Test) test strip USE AS DIRECTED 100 each 10    glucose monitor monitoring kit 1 each As Needed (as needed). 1 each 0    insulin aspart (NovoLOG FlexPen) 100 UNIT/ML solution pen-injector sc pen Inject 2 Units under the skin into the appropriate area as directed Every Morning Before Breakfast AND 3 Units Daily Before Lunch AND 3 Units Daily Before Supper. Take injection and start eating within 5 minutes. Do not go > 15 minutes after shot without eating. May increase dose with provider up to 20u daily 93 mL 1    Insulin Glargine (LANTUS  "SOLOSTAR) 100 UNIT/ML injection pen Dose 7/17/24 14u, will adjust weekly with provider to max dose to 50u daily 15 mL 1    Insulin Pen Needle (Pen Needles) 31G X 5 MM misc Use 1 each Daily. E11.65 100 each 5    metFORMIN (GLUCOPHAGE) 1000 MG tablet Take 1 p.o. twice daily at meals for diabetes 180 tablet 3    nystatin-triamcinolone (MYCOLOG II) 352295-0.1 UNIT/GM-% cream Apply to the affected areas 3 times daily as needed 60 g 1    ondansetron ODT (ZOFRAN-ODT) 4 MG disintegrating tablet Take 1 tablet by mouth.      pantoprazole (PROTONIX) 40 MG EC tablet Take 1 p.o. daily before the largest meal on an empty stomach for gastric ulcer      simvastatin (ZOCOR) 40 MG tablet TAKE 1 TABLET EVERY NIGHT 90 tablet 3    TRUEplus Lancets 30G misc TEST BLOOD SUGAR EVERY DAY AS DIRECTED 100 each 3     No current facility-administered medications for this visit.         The following portions of the patient's history were reviewed and updated as appropriate: allergies, current medications, past family history, past medical history, past social history, past surgical history and problem list.    Review of Systems:   Pertinent positives/negative listed in HPI above    Physical Exam:   Vitals:    08/27/24 0946   Temp: 98 °F (36.7 °C)   Weight: 86 kg (189 lb 8 oz)   Height: 162.6 cm (64\")   PainSc:   4   PainLoc: Knee       General: A and O x 3, ASA, NAD   Left knee midline incision is nicely healed she does have evidence of moderate to severe flexion instability at both 90 degrees of flexion and 45 degrees.  There is no obvious instability in extension however she does come to full extension possibly a degree or 2 of hyperextension       Radiology:  Xrays 3views bilateral knees (ap,lateral, sunrise) were reviewed for evaluation of knee pain demonstrating Smith [employer] Nephew journey 2 cruciate retaining left total knee alignment and size appears appropriate the components appear well-fixed there is advanced patellofemoral osteoarthritis " with a screw consistent with previous patellar fracture      TKA implantrs used left knee:  COMPONENTS:   Journey II CR Oxinium Femoral Component: Size 6  Journey II Tibial Baseplate: 4   CR Articular Insert: 9  Patella: 32mm    Recent sed rate and C-reactive protein are normal  Bone scan is pending    Assessment/Plan:  Left tka with flexion instability.  Plan=1) poly change to 11mm deep dish, 2)revision femoral component to PS/constrained  Knee Plan List: Continuation of conservative management vs. Revision TKA discussed.  The patient wishes to proceed with total knee replacement.  At this point the patient has failed the full compliment of conservative treatment and stating complete understanding of the risks/benefits/ anternatives wishes to proceed with surgical treatment.    Risk and benefits of surgery were reviewed.  Including, but not limited to, blood clots or pulmonary embolism, anesthesia risk, infection, fracture, skin/leg numbness, persistent pain/crepitance/popping/catching, failure of the implant, need for future surgeries, hematoma, possible nerve or blood vessel injury, need for transfusion, and potential risk of stroke,heart attack or death, among others.  The patient understands and wishes to proceed.     It was explained that if tissue has been repaired or reconstructed, there is also an increased chance of failure which may require further management.  Following the completion of the discussion, the patient expressed understanding of this planned course of care, all their questions were answered and consent will be obtained preoperatively.    Operative Plan: left knee poly change vs revision Smith and Nephew Oxinium Total Knee Replacement performing the procedure on an outpatient basis with outpatient rehab vs 23 hrs if femur revised    There are no diagnoses linked to this encounter.    Tal Gonzalez MD  8/27/2024

## 2024-08-28 PROBLEM — Z96.652 TOTAL KNEE REPLACEMENT STATUS, LEFT: Status: ACTIVE | Noted: 2024-08-27

## 2024-08-30 ENCOUNTER — HOSPITAL ENCOUNTER (OUTPATIENT)
Dept: NUCLEAR MEDICINE | Facility: HOSPITAL | Age: 74
Discharge: HOME | End: 2024-08-30
Payer: MEDICARE

## 2024-08-30 ENCOUNTER — TELEPHONE (OUTPATIENT)
Dept: ORTHOPEDIC SURGERY | Facility: CLINIC | Age: 74
End: 2024-08-30

## 2024-08-30 DIAGNOSIS — M25.562 ACUTE PAIN OF LEFT KNEE: ICD-10-CM

## 2024-08-30 DIAGNOSIS — Z96.652 TOTAL KNEE REPLACEMENT STATUS, LEFT: ICD-10-CM

## 2024-08-30 PROCEDURE — 78315 BONE IMAGING 3 PHASE: CPT

## 2024-08-30 PROCEDURE — A9503 TC99M MEDRONATE: HCPCS | Performed by: NURSE PRACTITIONER

## 2024-08-30 PROCEDURE — 0 TECHNETIUM MEDRONATE KIT: Performed by: NURSE PRACTITIONER

## 2024-08-30 RX ORDER — TC 99M MEDRONATE 20 MG/10ML
20 INJECTION, POWDER, LYOPHILIZED, FOR SOLUTION INTRAVENOUS
Status: COMPLETED | OUTPATIENT
Start: 2024-08-30 | End: 2024-08-30

## 2024-08-30 RX ADMIN — Medication 20 MILLICURIE: at 09:13

## 2024-08-30 NOTE — TELEPHONE ENCOUNTER
Hub staff attempted to follow warm transfer process and was unsuccessful     Caller: ILAN YOUNGBLOOD    Relationship to patient:     Best call back number: [phone]    Patient is needing: ILAN NEEDING TO CLARIFY 3 PHASE BONE SCAN FOR KNEE, STATING PT IS THERE NOW. PT HAS HISTORY OF PANCREATIC CANCER AND WANTING TO KNOW IF THEY NEED TO DO A WHOLE BODY SCAN.

## 2024-09-03 NOTE — TELEPHONE ENCOUNTER
Patient has a bone scan ordered by Aminta and she is states okay to proceed forward with current order

## 2024-09-06 DIAGNOSIS — R80.9 TYPE 2 DIABETES MELLITUS WITH MICROALBUMINURIA, WITHOUT LONG-TERM CURRENT USE OF INSULIN: Chronic | ICD-10-CM

## 2024-09-06 DIAGNOSIS — E11.29 TYPE 2 DIABETES MELLITUS WITH MICROALBUMINURIA, WITHOUT LONG-TERM CURRENT USE OF INSULIN: Chronic | ICD-10-CM

## 2024-09-16 DIAGNOSIS — F41.1 GENERALIZED ANXIETY DISORDER: Chronic | ICD-10-CM

## 2024-09-17 RX ORDER — ALPRAZOLAM 0.5 MG
0.5 TABLET ORAL 2 TIMES DAILY
Qty: 60 TABLET | Refills: 2 | Status: SHIPPED | OUTPATIENT
Start: 2024-09-17

## 2024-09-27 DIAGNOSIS — E11.29 TYPE 2 DIABETES MELLITUS WITH MICROALBUMINURIA, WITHOUT LONG-TERM CURRENT USE OF INSULIN: Chronic | ICD-10-CM

## 2024-09-27 DIAGNOSIS — R80.9 TYPE 2 DIABETES MELLITUS WITH MICROALBUMINURIA, WITHOUT LONG-TERM CURRENT USE OF INSULIN: Chronic | ICD-10-CM

## 2024-09-30 RX ORDER — INSULIN GLARGINE 100 [IU]/ML
INJECTION, SOLUTION SUBCUTANEOUS
Qty: 30 ML | Refills: 0 | Status: SHIPPED | OUTPATIENT
Start: 2024-09-30

## 2024-10-01 ENCOUNTER — PATIENT OUTREACH (OUTPATIENT)
Dept: CASE MANAGEMENT | Facility: OTHER | Age: 74
End: 2024-10-01
Payer: MEDICARE

## 2024-10-01 NOTE — OUTREACH NOTE
AMBULATORY CASE MANAGEMENT NOTE    Names and Relationships of Patient/Support Persons: Contact: Reema Easley; Relationship: Self -     Adult Patient Profile  Questions/Answers      Flowsheet Row Most Recent Value   Symptoms/Conditions Managed at Home diabetes, type 2   Diabetes Management Strategies coping strategies, diet modification, insulin therapy, blood glucose testing   Frequency of Blood Glucose Testing continuous   Diabetes Self-Management Outcome 4 (good)   Diabetes Comment Liable blood sugars. Treated self during hypoglycemia incidents.        Patient Outreach    Introduced self, explained ACM RN role and provided contact information. Reviewed health and wellness goals with the patient. 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 She is now on fast acting and slow acting insulin. Patient has Dexcom. She gets alerts for hypoglycemia at night and elevations during the day. Patient educated on the importance of keeping blood sugar controled during recovery of surgery during her re-operation knee replacement scheduled next  month. Patient agreeable to continued ACM support and education. Follow up scheduled in 2 weeks.     Education Documentation  Hypoglycemia Identification and Treatment, taught by Vanessa Butt RN at 10/1/2024  5:46 PM.  Learner: Patient  Readiness: Acceptance  Method: Teach Back, Explanation  Response: Demonstrated Understanding  Comment: hypoglycemia and hyperglycemia education sent via [facility] [employer] Message    Hyperglycemia Identification and Treatment, taught by Vanessa Butt RN at 10/1/2024  5:46 PM.  Learner: Patient  Readiness: Acceptance  Method: Teach Back, Explanation  Response: Demonstrated Understanding  Comment: hypoglycemia and hyperglycemia education sent via [facility] Message    Importance of Glycemic Management, taught by Vanessa Butt RN at 10/1/2024  5:46 PM.  Learner: Patient  Readiness: Acceptance  Method: Teach Back, Explanation  Response: Demonstrated  Understanding  Comment: hypoglycemia and hyperglycemia education sent via [employer] Message    Blood Glucose Monitoring, taught by Vanessa Butt, RN at 10/1/2024  5:46 PM.  Learner: Patient  Readiness: Acceptance  Method: Teach Back, Explanation  Response: Demonstrated Understanding  Comment: hypoglycemia and hyperglycemia education sent via [employer] Message    Blood Glucose Goal, taught by Vanessa Butt, RN at 10/1/2024  5:46 PM.  Learner: Patient  Readiness: Acceptance  Method: Teach Back, Explanation  Response: Demonstrated Understanding  Comment: hypoglycemia and hyperglycemia education sent via [facility] Message          Vanessa HELMS  Ambulatory Case Management    10/1/2024, 17:47 EDT

## 2024-10-02 ENCOUNTER — OFFICE VISIT (OUTPATIENT)
Dept: INTERNAL MEDICINE | Facility: CLINIC | Age: 74
End: 2024-10-02
Payer: MEDICARE

## 2024-10-02 VITALS
RESPIRATION RATE: 16 BRPM | HEIGHT: 64 IN | TEMPERATURE: 97.8 F | OXYGEN SATURATION: 97 % | DIASTOLIC BLOOD PRESSURE: 78 MMHG | SYSTOLIC BLOOD PRESSURE: 136 MMHG | HEART RATE: 64 BPM | WEIGHT: 195 LBS | BODY MASS INDEX: 33.29 KG/M2

## 2024-10-02 DIAGNOSIS — Z78.0 POSTMENOPAUSAL STATE: Chronic | ICD-10-CM

## 2024-10-02 DIAGNOSIS — F41.8 DEPRESSION WITH ANXIETY: Chronic | ICD-10-CM

## 2024-10-02 DIAGNOSIS — E11.29 TYPE 2 DIABETES MELLITUS WITH MICROALBUMINURIA, WITHOUT LONG-TERM CURRENT USE OF INSULIN: Chronic | ICD-10-CM

## 2024-10-02 DIAGNOSIS — K25.7 CHRONIC GASTRIC ULCER WITHOUT HEMORRHAGE AND WITHOUT PERFORATION: Chronic | ICD-10-CM

## 2024-10-02 DIAGNOSIS — Z86.16 HISTORY OF 2019 NOVEL CORONAVIRUS DISEASE (COVID-19): Chronic | ICD-10-CM

## 2024-10-02 DIAGNOSIS — F41.1 GENERALIZED ANXIETY DISORDER: Chronic | ICD-10-CM

## 2024-10-02 DIAGNOSIS — R11.0 NAUSEA IN ADULT PATIENT: ICD-10-CM

## 2024-10-02 DIAGNOSIS — Z51.81 THERAPEUTIC DRUG MONITORING: ICD-10-CM

## 2024-10-02 DIAGNOSIS — M25.562 CHRONIC PAIN OF BOTH KNEES: Chronic | ICD-10-CM

## 2024-10-02 DIAGNOSIS — Z91.09 MULTIPLE ENVIRONMENTAL ALLERGIES: Chronic | ICD-10-CM

## 2024-10-02 DIAGNOSIS — R60.0 BILATERAL LOWER EXTREMITY EDEMA: Chronic | ICD-10-CM

## 2024-10-02 DIAGNOSIS — R46.89 NON-COMPLIANT BEHAVIOR: Chronic | ICD-10-CM

## 2024-10-02 DIAGNOSIS — Z00.00 ENCOUNTER FOR SUBSEQUENT ANNUAL WELLNESS VISIT (AWV) IN MEDICARE PATIENT: Primary | ICD-10-CM

## 2024-10-02 DIAGNOSIS — M85.89 OSTEOPENIA OF MULTIPLE SITES: Chronic | ICD-10-CM

## 2024-10-02 DIAGNOSIS — F51.04 CHRONIC INSOMNIA: Chronic | ICD-10-CM

## 2024-10-02 DIAGNOSIS — E55.9 VITAMIN D DEFICIENCY: Chronic | ICD-10-CM

## 2024-10-02 DIAGNOSIS — N26.1 RENAL ATROPHY, LEFT: Chronic | ICD-10-CM

## 2024-10-02 DIAGNOSIS — G47.33 OBSTRUCTIVE SLEEP APNEA: Chronic | ICD-10-CM

## 2024-10-02 DIAGNOSIS — R51.9 NEW ONSET OF HEADACHES AFTER AGE 50: ICD-10-CM

## 2024-10-02 DIAGNOSIS — I10 BENIGN ESSENTIAL HYPERTENSION: Chronic | ICD-10-CM

## 2024-10-02 DIAGNOSIS — Z86.0100 HISTORY OF COLON POLYPS: Chronic | ICD-10-CM

## 2024-10-02 DIAGNOSIS — E66.9 NON MORBID OBESITY: Chronic | ICD-10-CM

## 2024-10-02 DIAGNOSIS — Z80.3 FAMILY HISTORY OF BREAST CANCER: Chronic | ICD-10-CM

## 2024-10-02 DIAGNOSIS — Z87.39 HISTORY OF GOUT: Chronic | ICD-10-CM

## 2024-10-02 DIAGNOSIS — M17.0 PRIMARY OSTEOARTHRITIS OF BOTH KNEES: Chronic | ICD-10-CM

## 2024-10-02 DIAGNOSIS — Z23 NEED FOR INFLUENZA VACCINATION: ICD-10-CM

## 2024-10-02 DIAGNOSIS — R80.9 MICROALBUMINURIA: Chronic | ICD-10-CM

## 2024-10-02 DIAGNOSIS — C7A.8 NEUROENDOCRINE CARCINOMA OF PANCREAS: ICD-10-CM

## 2024-10-02 DIAGNOSIS — E78.2 MIXED HYPERLIPIDEMIA: Chronic | ICD-10-CM

## 2024-10-02 DIAGNOSIS — Z80.41 FAMILY HISTORY OF OVARIAN CANCER: Chronic | ICD-10-CM

## 2024-10-02 DIAGNOSIS — M25.561 CHRONIC PAIN OF BOTH KNEES: Chronic | ICD-10-CM

## 2024-10-02 DIAGNOSIS — R80.9 TYPE 2 DIABETES MELLITUS WITH MICROALBUMINURIA, WITHOUT LONG-TERM CURRENT USE OF INSULIN: Chronic | ICD-10-CM

## 2024-10-02 DIAGNOSIS — G89.29 CHRONIC PAIN OF BOTH KNEES: Chronic | ICD-10-CM

## 2024-10-02 DIAGNOSIS — K75.81 NASH (NONALCOHOLIC STEATOHEPATITIS): Chronic | ICD-10-CM

## 2024-10-02 PROBLEM — J01.30 ACUTE NON-RECURRENT SPHENOIDAL SINUSITIS: Status: RESOLVED | Noted: 2024-05-21 | Resolved: 2024-10-02

## 2024-10-02 PROBLEM — Z90.81 STATUS POST SPLENECTOMY: Status: RESOLVED | Noted: 2024-05-02 | Resolved: 2024-10-02

## 2024-10-02 PROBLEM — T81.49XA INFECTED SURGICAL WOUND: Status: RESOLVED | Noted: 2024-05-21 | Resolved: 2024-10-02

## 2024-10-02 PROBLEM — Z96.652 TOTAL KNEE REPLACEMENT STATUS, LEFT: Status: RESOLVED | Noted: 2024-08-27 | Resolved: 2024-10-02

## 2024-10-02 RX ORDER — ONDANSETRON 4 MG/1
TABLET, ORALLY DISINTEGRATING ORAL
Qty: 30 TABLET | Refills: 6 | Status: SHIPPED | OUTPATIENT
Start: 2024-10-02

## 2024-10-02 NOTE — ASSESSMENT & PLAN NOTE
Patient's (Body mass index is 33.45 kg/m².) indicates that they are obese (BMI >30) with health conditions that include obstructive sleep apnea, hypertension, diabetes mellitus, dyslipidemias, and osteoarthritis . Weight is improving with treatment. BMI  is above average; BMI management plan is completed. We discussed low calorie, low carb based diet program, portion control, and increasing exercise.

## 2024-10-02 NOTE — PROGRESS NOTES
Subjective   The ABCs of the Annual Wellness Visit  Medicare Wellness Visit      Reema Easley is a 73 y.o. patient who presents for a Medicare Wellness Visit.    The following portions of the patient's history were reviewed and   updated as appropriate: allergies, current medications, past family history, past medical history, past social history, past surgical history, and problem list.    Compared to one year ago, the patient's physical   health is worse.  Compared to one year ago, the patient's mental   health is the same.    Recent Hospitalizations:  She was admitted within the past 365 days at [facility].     Current Medical Providers:  Patient Care Team:  Marc Rasheed MD as PCP - General (Internal Medicine)  Valarie Limon MD as Consulting Physician (Obstetrics and Gynecology)  Bree Fernando APRN as Nurse Practitioner (Nurse Practitioner)  Vanessa Butt RN as Ambulatory  ([facility])    Outpatient Medications Prior to Visit   Medication Sig Dispense Refill    allopurinol (ZYLOPRIM) 300 MG tablet TAKE 1 TABLET EVERY DAY 90 tablet 3    ALPRAZolam (XANAX) 0.5 MG tablet TAKE 1 TABLET BY MOUTH 2 TIMES A DAY 60 tablet 2    benazepril (LOTENSIN) 40 MG tablet TAKE 1 TABLET EVERY DAY 90 tablet 3    glucose blood (True Metrix Blood Glucose Test) test strip USE AS DIRECTED 100 each 10    glucose monitor monitoring kit 1 each As Needed (as needed). 1 each 0    insulin aspart (NovoLOG FlexPen) 100 UNIT/ML solution pen-injector sc pen Inject 2 Units under the skin into the appropriate area as directed Every Morning Before Breakfast AND 3 Units Daily Before Lunch AND 3 Units Daily Before Supper. Take injection and start eating within 5 minutes. Do not go > 15 minutes after shot without eating. May increase dose with provider up to 20u daily 93 mL 1    Insulin Glargine (Lantus SoloStar) 100 UNIT/ML injection pen INJECT 14 UNITS DAILY, ADJUST WEEKLY WITH PROVIDER TO MAX DOSE OF 50  UNITS DAILY (DISCARD PEN 28 DAYS AFTER OPENING) 30 mL 0    Insulin Pen Needle (Pen Needles) 31G X 5 MM misc Use 1 each Daily. E11.65 100 each 5    metFORMIN (GLUCOPHAGE) 1000 MG tablet Take 1 p.o. twice daily at meals for diabetes 180 tablet 3    pantoprazole (PROTONIX) 40 MG EC tablet Take 1 p.o. daily before the largest meal on an empty stomach for gastric ulcer      simvastatin (ZOCOR) 40 MG tablet TAKE 1 TABLET EVERY NIGHT 90 tablet 3    TRUEplus Lancets 30G misc TEST BLOOD SUGAR EVERY DAY AS DIRECTED 100 each 3    Chlorcyclizine-Pseudoephed 25-60 MG tablet Take 1 p.o. twice daily for environmental allergies/congestion and sinus infection 30 tablet 0    nystatin-triamcinolone (MYCOLOG II) 325318-7.1 UNIT/GM-% cream Apply to the affected areas 3 times daily as needed 60 g 1    ondansetron ODT (ZOFRAN-ODT) 4 MG disintegrating tablet Take 1 tablet by mouth.       No facility-administered medications prior to visit.     No opioid medication identified on active medication list. I have reviewed chart for other potential  high risk medication/s and harmful drug interactions in the elderly.      Aspirin is not on active medication list.  Aspirin use is not indicated based on review of current medical condition/s. Risk of harm outweighs potential benefits.  .    Patient Active Problem List   Diagnosis    Benign essential hypertension    Chronic insomnia    Depression with anxiety    Diverticulosis of colon    Generalized anxiety disorder    Gout    Hyperlipidemia    Microalbuminuria    GEORGE (nonalcoholic steatohepatitis)    Obstructive sleep apnea, 12/17/2015--AHI 14.6.  RDI 48.9 with REM sleep.  O2 sat 77%.  Cannot tolerate CPAP.    Primary osteoarthritis of right knee    Bilateral lower extremity edema    Renal atrophy, left    Type 2 diabetes mellitus with microalbuminuria, without long-term current use of insulin    Vitamin D deficiency    Family history of ovarian cancer    Family history of breast cancer     "Therapeutic drug monitoring    Allergic rhinitis    Non morbid obesity    Diabetic foot exam    Diabetic eye exam    Non-compliant behavior    Postmenopausal state    Osteopenia of multiple sites, 5/28/2021--lumbar spine 0.7.  Left femoral neck -2.0.  Right femoral neck -2.3.    Chronic pain of both knees    History of 2019 novel coronavirus disease (COVID-19)    Primary osteoarthritis of both knees    Neuroendocrine carcinoma of pancreas    History of colon polyps, 11/20/2023--tubular adenoma x 1.    Chronic gastric ulcer without hemorrhage and without perforation    High serum vitamin D    New onset of headaches after age 51    Abnormal CT of brain    Multiple environmental allergies     Advance Care Planning Advance Directive is not on file.  ACP discussion was held with the patient during this visit. Patient does not have an advance directive, information provided.            Objective   Vitals:    10/02/24 0736   BP: 136/78   Pulse: 64   Resp: 16   Temp: 97.8 °F (36.6 °C)   TempSrc: Temporal   SpO2: 97%   Weight: 88.5 kg (195 lb)   Height: 162.6 cm (64.02\")       Estimated body mass index is 33.45 kg/m² as calculated from the following:    Height as of this encounter: 162.6 cm (64.02\").    Weight as of this encounter: 88.5 kg (195 lb).            Does the patient have evidence of cognitive impairment? No                                                                                               Health  Risk Assessment    Smoking Status:  Social History     Tobacco Use   Smoking Status Never    Passive exposure: Never   Smokeless Tobacco Never     Alcohol Consumption:  Social History     Substance and Sexual Activity   Alcohol Use No       Fall Risk Screen  STEADI Fall Risk Assessment was completed, and patient is at LOW risk for falls.Assessment completed on:10/2/2024    Depression Screening:      10/2/2024     7:45 AM   PHQ-2/PHQ-9 Depression Screening   Little Interest or Pleasure in Doing Things 0-->not at " all   Feeling Down, Depressed or Hopeless 0-->not at all   PHQ-9: Brief Depression Severity Measure Score 0     Health Habits and Functional and Cognitive Screenin/30/2024     8:00 AM   Functional & Cognitive Status   Do you have difficulty preparing food and eating? No   Do you have difficulty bathing yourself, getting dressed or grooming yourself? No   Do you have difficulty using the toilet? No   Do you have difficulty moving around from place to place? No   Do you have trouble with steps or getting out of a bed or a chair? No   Current Diet Well Balanced Diet   Dental Exam Up to date   Eye Exam Up to date   Exercise (times per week) 2 times per week   Current Exercises Include Home Exercise Program (TV, Computer, Etc.);Walking   Do you need help using the phone?  No   Are you deaf or do you have serious difficulty hearing?  No   Do you need help to go to places out of walking distance? No   Do you need help shopping? No   Do you need help preparing meals?  No   Do you need help with housework?  No   Do you need help with laundry? No   Do you need help taking your medications? No   Do you need help managing money? No   Do you ever drive or ride in a car without wearing a seat belt? No   Have you felt unusual stress, anger or loneliness in the last month? No   Who do you live with? Child   If you need help, do you have trouble finding someone available to you? No   Have you been bothered in the last four weeks by sexual problems? No   Do you have difficulty concentrating, remembering or making decisions? No           Age-appropriate Screening Schedule:  Refer to the list below for future screening recommendations based on patient's age, sex and/or medical conditions. Orders for these recommended tests are listed in the plan section. The patient has been provided with a written plan.    Health Maintenance List  Health Maintenance   Topic Date Due    URINE MICROALBUMIN  2024    DIABETIC FOOT EXAM   09/20/2024    HEMOGLOBIN A1C  10/03/2024    DXA SCAN  12/07/2024    BMI FOLLOWUP  12/11/2024    DIABETIC EYE EXAM  12/12/2024    LIPID PANEL  03/20/2025    ANNUAL WELLNESS VISIT  10/02/2025    TDAP/TD VACCINES (2 - Td or Tdap) 10/18/2025    MAMMOGRAM  12/11/2025    COLORECTAL CANCER SCREENING  11/20/2028    HEPATITIS C SCREENING  Completed    INFLUENZA VACCINE  Completed    Pneumococcal Vaccine 65+  Completed    COVID-19 Vaccine  Discontinued    PAP SMEAR  Discontinued    ZOSTER VACCINE  Discontinued                                                                                                                                                CMS Preventative Services Quick Reference  Risk Factors Identified During Encounter  Fall Risk-High or Moderate: Discussed Fall Prevention in the home  Immunizations Discussed/Encouraged: Influenza and Shingrix    The above risks/problems have been discussed with the patient.  Pertinent information has been shared with the patient in the After Visit Summary.  An After Visit Summary and PPPS were made available to the patient.    Follow Up:   Next Medicare Wellness visit to be scheduled in 1 year.     Assessment & Plan  Encounter for subsequent annual wellness visit (AWV) in Medicare patient    Type 2 diabetes mellitus with microalbuminuria, without long-term current use of insulin    Microalbuminuria    Hyperlipidemia     GEORGE (nonalcoholic steatohepatitis)    Benign essential hypertension    Bilateral lower extremity edema    Chronic gastric ulcer without hemorrhage and without perforation    Chronic insomnia    Chronic pain of both knees    Depression with anxiety    Family history of breast cancer    Family history of ovarian cancer    Generalized anxiety disorder    History of 2019 novel coronavirus disease (COVID-19)    History of colon polyps, 11/20/2023--tubular adenoma x 1.    Gout    Multiple environmental allergies    Non morbid obesity  Patient's (Body mass index is  33.45 kg/m².) indicates that they are obese (BMI >30) with health conditions that include obstructive sleep apnea, hypertension, diabetes mellitus, dyslipidemias, and osteoarthritis . Weight is improving with treatment. BMI  is above average; BMI management plan is completed. We discussed low calorie, low carb based diet program, portion control, and increasing exercise.   Non-compliant behavior    Osteopenia of multiple sites, 5/28/2021--lumbar spine 0.7.  Left femoral neck -2.0.  Right femoral neck -2.3.    Obstructive sleep apnea, 12/17/2015--AHI 14.6.  RDI 48.9 with REM sleep.  O2 sat 77%.  Cannot tolerate CPAP.    Postmenopausal state    Primary osteoarthritis of both knees    Renal atrophy, left    Vitamin D deficiency    Neuroendocrine carcinoma of pancreas    New onset of headaches after age 50    Therapeutic drug monitoring    Need for influenza vaccination    Nausea in adult patient      Orders Placed This Encounter   Procedures    Fluzone High-Dose 65+yrs (0128-3497)     New Medications Ordered This Visit   Medications    ondansetron ODT (ZOFRAN-ODT) 4 MG disintegrating tablet     Sig: Take 1 tablet p.o. every 6 hours as needed nausea     Dispense:  30 tablet     Refill:  6          Follow Up:   No follow-ups on file.

## 2024-10-09 ENCOUNTER — OFFICE VISIT (OUTPATIENT)
Dept: INTERNAL MEDICINE | Facility: CLINIC | Age: 74
End: 2024-10-09
Payer: MEDICARE

## 2024-10-09 ENCOUNTER — TELEPHONE (OUTPATIENT)
Dept: ENDOCRINOLOGY | Age: 74
End: 2024-10-09
Payer: MEDICARE

## 2024-10-09 VITALS
OXYGEN SATURATION: 98 % | SYSTOLIC BLOOD PRESSURE: 138 MMHG | WEIGHT: 194 LBS | RESPIRATION RATE: 16 BRPM | HEIGHT: 64 IN | DIASTOLIC BLOOD PRESSURE: 80 MMHG | BODY MASS INDEX: 33.12 KG/M2 | HEART RATE: 98 BPM | TEMPERATURE: 98.4 F

## 2024-10-09 DIAGNOSIS — E78.2 MIXED HYPERLIPIDEMIA: Chronic | ICD-10-CM

## 2024-10-09 DIAGNOSIS — F41.8 DEPRESSION WITH ANXIETY: Chronic | ICD-10-CM

## 2024-10-09 DIAGNOSIS — Z51.81 THERAPEUTIC DRUG MONITORING: ICD-10-CM

## 2024-10-09 DIAGNOSIS — R80.9 TYPE 2 DIABETES MELLITUS WITH MICROALBUMINURIA, WITHOUT LONG-TERM CURRENT USE OF INSULIN: Primary | Chronic | ICD-10-CM

## 2024-10-09 DIAGNOSIS — M85.89 OSTEOPENIA OF MULTIPLE SITES: Chronic | ICD-10-CM

## 2024-10-09 DIAGNOSIS — Z78.0 POSTMENOPAUSAL STATE: Chronic | ICD-10-CM

## 2024-10-09 DIAGNOSIS — R90.89 ABNORMAL CT OF BRAIN: ICD-10-CM

## 2024-10-09 DIAGNOSIS — E11.9 ENCOUNTER FOR DIABETIC FOOT EXAM: Chronic | ICD-10-CM

## 2024-10-09 DIAGNOSIS — Z87.39 HISTORY OF GOUT: Chronic | ICD-10-CM

## 2024-10-09 DIAGNOSIS — C7A.8 NEUROENDOCRINE CARCINOMA OF PANCREAS: ICD-10-CM

## 2024-10-09 DIAGNOSIS — F51.04 CHRONIC INSOMNIA: Chronic | ICD-10-CM

## 2024-10-09 DIAGNOSIS — M17.0 PRIMARY OSTEOARTHRITIS OF BOTH KNEES: Chronic | ICD-10-CM

## 2024-10-09 DIAGNOSIS — K75.81 NASH (NONALCOHOLIC STEATOHEPATITIS): Chronic | ICD-10-CM

## 2024-10-09 DIAGNOSIS — N26.1 RENAL ATROPHY, LEFT: Chronic | ICD-10-CM

## 2024-10-09 DIAGNOSIS — K25.7 CHRONIC GASTRIC ULCER WITHOUT HEMORRHAGE AND WITHOUT PERFORATION: Chronic | ICD-10-CM

## 2024-10-09 DIAGNOSIS — Z80.41 FAMILY HISTORY OF OVARIAN CANCER: Chronic | ICD-10-CM

## 2024-10-09 DIAGNOSIS — E11.29 TYPE 2 DIABETES MELLITUS WITH MICROALBUMINURIA, WITHOUT LONG-TERM CURRENT USE OF INSULIN: Primary | Chronic | ICD-10-CM

## 2024-10-09 DIAGNOSIS — R60.0 BILATERAL LOWER EXTREMITY EDEMA: Chronic | ICD-10-CM

## 2024-10-09 DIAGNOSIS — E66.9 NON MORBID OBESITY: Chronic | ICD-10-CM

## 2024-10-09 DIAGNOSIS — D75.839 THROMBOCYTOSIS: ICD-10-CM

## 2024-10-09 DIAGNOSIS — G47.33 OBSTRUCTIVE SLEEP APNEA: Chronic | ICD-10-CM

## 2024-10-09 DIAGNOSIS — F41.1 GENERALIZED ANXIETY DISORDER: Chronic | ICD-10-CM

## 2024-10-09 DIAGNOSIS — Z90.81 HISTORY OF TOTAL SPLENECTOMY: ICD-10-CM

## 2024-10-09 DIAGNOSIS — R80.9 MICROALBUMINURIA: Chronic | ICD-10-CM

## 2024-10-09 DIAGNOSIS — Z86.0100 HISTORY OF COLON POLYPS: Chronic | ICD-10-CM

## 2024-10-09 DIAGNOSIS — E55.9 VITAMIN D DEFICIENCY: Chronic | ICD-10-CM

## 2024-10-09 DIAGNOSIS — I10 BENIGN ESSENTIAL HYPERTENSION: Chronic | ICD-10-CM

## 2024-10-09 DIAGNOSIS — Z86.16 HISTORY OF 2019 NOVEL CORONAVIRUS DISEASE (COVID-19): Chronic | ICD-10-CM

## 2024-10-09 DIAGNOSIS — Z91.09 MULTIPLE ENVIRONMENTAL ALLERGIES: Chronic | ICD-10-CM

## 2024-10-09 DIAGNOSIS — Z80.3 FAMILY HISTORY OF BREAST CANCER: Chronic | ICD-10-CM

## 2024-10-09 DIAGNOSIS — R51.9 NEW ONSET OF HEADACHES AFTER AGE 50: ICD-10-CM

## 2024-10-09 DIAGNOSIS — J30.1 CHRONIC SEASONAL ALLERGIC RHINITIS DUE TO POLLEN: Chronic | ICD-10-CM

## 2024-10-09 PROBLEM — R46.89 NON-COMPLIANT BEHAVIOR: Chronic | Status: RESOLVED | Noted: 2020-09-15 | Resolved: 2024-10-09

## 2024-10-09 PROBLEM — R79.89 HIGH SERUM VITAMIN D: Status: RESOLVED | Noted: 2024-03-27 | Resolved: 2024-10-09

## 2024-10-09 PROCEDURE — 1125F AMNT PAIN NOTED PAIN PRSNT: CPT | Performed by: INTERNAL MEDICINE

## 2024-10-09 PROCEDURE — 1159F MED LIST DOCD IN RCRD: CPT | Performed by: INTERNAL MEDICINE

## 2024-10-09 PROCEDURE — 3075F SYST BP GE 130 - 139MM HG: CPT | Performed by: INTERNAL MEDICINE

## 2024-10-09 PROCEDURE — 3052F HG A1C>EQUAL 8.0%<EQUAL 9.0%: CPT | Performed by: INTERNAL MEDICINE

## 2024-10-09 PROCEDURE — 99214 OFFICE O/P EST MOD 30 MIN: CPT | Performed by: INTERNAL MEDICINE

## 2024-10-09 PROCEDURE — 1160F RVW MEDS BY RX/DR IN RCRD: CPT | Performed by: INTERNAL MEDICINE

## 2024-10-09 PROCEDURE — 3079F DIAST BP 80-89 MM HG: CPT | Performed by: INTERNAL MEDICINE

## 2024-10-09 RX ORDER — ACYCLOVIR 400 MG/1
1 TABLET ORAL
Qty: 9 EACH | Refills: 1 | Status: SHIPPED | OUTPATIENT
Start: 2024-10-09

## 2024-10-09 NOTE — TELEPHONE ENCOUNTER
Call patient  Does she have dexcom g6 or g7?  Is she getting it from her pharmacy or from a supply company since she has medicare?

## 2024-10-09 NOTE — TELEPHONE ENCOUNTER
Caller: Reema Easley    Relationship to patient: Self    Best call back number:     [phone]       Patient is needing: PT IS NEEDING A PRESCRIPTION SENT OVER TO [facility] FOR HER SENSORS. PLEASE ADVISE AND CALL BACK.   [facility] - [place], [place] - [address] - [phone]  - [phone] FX   [address] [address]   Phone: [phone] Fax: [phone]   Hours: Not open 24 hours
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
## 2024-10-31 RX ORDER — NAPROXEN 500 MG/1
500 TABLET ORAL 2 TIMES DAILY WITH MEALS
Qty: 90 TABLET | Refills: 3 | Status: SHIPPED | OUTPATIENT
Start: 2024-10-31

## 2024-11-03 DIAGNOSIS — R80.9 TYPE 2 DIABETES MELLITUS WITH MICROALBUMINURIA, WITHOUT LONG-TERM CURRENT USE OF INSULIN: ICD-10-CM

## 2024-11-03 DIAGNOSIS — E11.29 TYPE 2 DIABETES MELLITUS WITH MICROALBUMINURIA, WITHOUT LONG-TERM CURRENT USE OF INSULIN: ICD-10-CM

## 2024-11-04 ENCOUNTER — PATIENT OUTREACH (OUTPATIENT)
Dept: CASE MANAGEMENT | Facility: OTHER | Age: 74
End: 2024-11-04
Payer: MEDICARE

## 2024-11-04 RX ORDER — GLUCOSAM/CHON-MSM1/C/MANG/BOSW 500-416.6
TABLET ORAL
Qty: 100 EACH | Refills: 3 | Status: SHIPPED | OUTPATIENT
Start: 2024-11-04

## 2024-11-04 NOTE — OUTREACH NOTE
AMBULATORY CASE MANAGEMENT NOTE    Names and Relationships of Patient/Support Persons: Contact: Easley Reema CANO; Relationship: Self -     Patient Outreach    Introduced self, explained ACM RN role and provided contact information. Patient reviewed she has a portable connect device coming to use at home in addition to her PT after her knee replacement. Patient is concerned about insurance coverage. She has not gotten a call to confirm it is covered by insurance. Patient also nervous about the procedure. She recalls the prior knee replacement. Active listening and reviewing the pre-op team and post-op team will cover all the instructions closer to the time of the surgery and after the surgery. Patient verbalized understanding. Follow up scheduled this week. Patient's blood sugar better but still up and down per patient.     Care Coordination    Sent message to Dr. Gonzalez requesting update on the [employer] equipment insurance coverage. Follow up scheduled this week to review.     Education Documentation  No documentation found.        Vanessa HELMS  Ambulatory Case Management    11/4/2024, 16:50 EST

## 2024-11-06 ENCOUNTER — PRE-ADMISSION TESTING (OUTPATIENT)
Dept: PREADMISSION TESTING | Facility: HOSPITAL | Age: 74
End: 2024-11-06
Payer: MEDICARE

## 2024-11-06 VITALS
HEART RATE: 116 BPM | HEIGHT: 64 IN | WEIGHT: 197.2 LBS | DIASTOLIC BLOOD PRESSURE: 98 MMHG | BODY MASS INDEX: 33.67 KG/M2 | TEMPERATURE: 98.5 F | OXYGEN SATURATION: 95 % | SYSTOLIC BLOOD PRESSURE: 149 MMHG | RESPIRATION RATE: 18 BRPM

## 2024-11-06 LAB
ANION GAP SERPL CALCULATED.3IONS-SCNC: 11.4 MMOL/L (ref 5–15)
BUN SERPL-MCNC: 16 MG/DL (ref 8–23)
BUN/CREAT SERPL: 17.6 (ref 7–25)
CALCIUM SPEC-SCNC: 9.6 MG/DL (ref 8.6–10.5)
CHLORIDE SERPL-SCNC: 99 MMOL/L (ref 98–107)
CO2 SERPL-SCNC: 25.6 MMOL/L (ref 22–29)
CREAT SERPL-MCNC: 0.91 MG/DL (ref 0.57–1)
DEPRECATED RDW RBC AUTO: 46.4 FL (ref 37–54)
EGFRCR SERPLBLD CKD-EPI 2021: 67.2 ML/MIN/1.73
ERYTHROCYTE [DISTWIDTH] IN BLOOD BY AUTOMATED COUNT: 13.4 % (ref 12.3–15.4)
GLUCOSE SERPL-MCNC: 244 MG/DL (ref 65–99)
HBA1C MFR BLD: 7.6 % (ref 4.8–5.6)
HCT VFR BLD AUTO: 42 % (ref 34–46.6)
HGB BLD-MCNC: 13.1 G/DL (ref 12–15.9)
MCH RBC QN AUTO: 29.4 PG (ref 26.6–33)
MCHC RBC AUTO-ENTMCNC: 31.2 G/DL (ref 31.5–35.7)
MCV RBC AUTO: 94.4 FL (ref 79–97)
PLATELET # BLD AUTO: 563 10*3/MM3 (ref 140–450)
PMV BLD AUTO: 9.8 FL (ref 6–12)
POTASSIUM SERPL-SCNC: 4 MMOL/L (ref 3.5–5.2)
QT INTERVAL: 354 MS
QTC INTERVAL: 433 MS
RBC # BLD AUTO: 4.45 10*6/MM3 (ref 3.77–5.28)
SODIUM SERPL-SCNC: 136 MMOL/L (ref 136–145)
WBC NRBC COR # BLD AUTO: 17.17 10*3/MM3 (ref 3.4–10.8)

## 2024-11-06 PROCEDURE — 80048 BASIC METABOLIC PNL TOTAL CA: CPT

## 2024-11-06 PROCEDURE — 83036 HEMOGLOBIN GLYCOSYLATED A1C: CPT

## 2024-11-06 PROCEDURE — 93005 ELECTROCARDIOGRAM TRACING: CPT

## 2024-11-06 PROCEDURE — 85027 COMPLETE CBC AUTOMATED: CPT

## 2024-11-06 PROCEDURE — 36415 COLL VENOUS BLD VENIPUNCTURE: CPT

## 2024-11-06 NOTE — DISCHARGE INSTRUCTIONS
Take the following medications the morning of surgery PANTOPRAZOLE AND XANAX      If you are on prescription narcotic pain medication to control your pain you may also take that medication the morning of surgery.      General Instructions:     Do not eat solid food after midnight the night before surgery.  Clear liquids day of surgery are allowed but must be stopped at least two hours before your hospital arrival time.       Allowed clear liquids      Water, sodas, and tea or coffee with no cream or milk added.       12 to 20 ounces of a clear liquid that contains carbohydrates is recommended.  If non-diabetic, have Gatorade or Powerade.  If diabetic, have G2 or Powerade Zero.     Do not have liquids red in color.  Do not consume chicken, beef, pork or vegetable broth or bouillon cubes of any variety as they are not considered clear liquids and are not allowed.      Infants may have breast milk up to four hours before surgery.  Infants drinking formula may drink formula up to six hours before surgery.   Patients who avoid smoking, chewing tobacco and alcohol for 4 weeks prior to surgery have a reduced risk of post-operative complications.  Quit smoking as many days before surgery as you can.  Do not smoke, use chewing tobacco or drink alcohol the day of surgery.   If applicable bring your C-PAP/ BI-PAP machine in with you to preop day of surgery.  Bring any papers given to you in the doctor’s office.  Wear clean comfortable clothes.  Do not wear contact lenses, false eyelashes or make-up.  Bring a case for your glasses.   Bring crutches or walker if applicable.  Remove all piercings.  Leave jewelry and any other valuables at home.  Hair extensions with metal clips must be removed prior to surgery.  The Pre-Admission Testing nurse will instruct you to bring medications if unable to obtain an accurate list in Pre-Admission Testing.        If you were given a blood bank ID arm band remember to bring it with you the day  of surgery.    Preventing a Surgical Site Infection:  For 2 to 3 days before surgery, avoid shaving with a razor because the razor can irritate skin and make it easier to develop an infection.    Any areas of open skin can increase the risk of a post-operative wound infection by allowing bacteria to enter and travel throughout the body.  Notify your surgeon if you have any skin wounds / rashes even if it is not near the expected surgical site.  The area will need assessed to determine if surgery should be delayed until it is healed.  The night prior to surgery shower using a fresh bar of anti-bacterial soap (such as Dial) and clean washcloth.  Sleep in a clean bed with clean clothing.  Do not allow pets to sleep with you.  Shower on the morning of surgery using a fresh bar of anti-bacterial soap (such as Dial) and clean washcloth.  Dry with a clean towel and dress in clean clothing.  Ask your surgeon if you will be receiving antibiotics prior to surgery.  Make sure you, your family, and all healthcare providers clean their hands with soap and water or an alcohol based hand  before caring for you or your wound.  CHLORHEXIDINE CLOTH INSTRUCTIONS  The morning of surgery follow these instructions using the Chlorhexidine cloths you've been given.  These steps reduce bacteria on the body.  Do not use the cloths near your eyes, ears mouth, genitalia or on open wounds.  Throw the cloths away after use but do not try to flush them down a toilet.      Open and remove one cloth at a time from the package.    Leave the cloth unfolded and begin the bathing.  Massage the skin with the cloths using gentle pressure to remove bacteria.  Do not scrub harshly.   Follow the steps below with one 2% CHG cloth per area (6 total cloths).  One cloth for neck, shoulders and chest.  One cloth for both arms, hands, fingers and underarms (do underarms last).  One cloth for the abdomen followed by groin.  One cloth for right leg and  foot including between the toes.  One cloth for left leg and foot including between the toes.  The last cloth is to be used for the back of the neck, back and buttocks.    Allow the CHG to air dry 3 minutes on the skin which will give it time to work and decrease the chance of irritation.  The skin may feel sticky until it is dry.  Do not rinse with water or any other liquid or you will lose the beneficial effects of the CHG.  If mild skin irritation occurs, do rinse the skin to remove the CHG.  Report this to the nurse at time of admission.  Do not apply lotions, creams, ointments, deodorants or perfumes after using the clothes. Dress in clean clothes before coming to the hospital.  Day of surgery:  Your arrival time is approximately two hours before your scheduled surgery time.  Please note if you have an early arrival time the surgery doors do not open before 5:00 AM.  Upon arrival, a Pre-op nurse and Anesthesiologist will review your health history, obtain vital signs, and answer questions you may have.  The only belongings needed at this time will be a list of your home medications and if applicable your C-PAP/BI-PAP machine.  A Pre-op nurse will start an IV and you may receive medication in preparation for surgery, including something to help you relax.     Please be aware that surgery does come with discomfort.  We want to make every effort to control your discomfort so please discuss any uncontrolled symptoms with your nurse.   Your doctor will most likely have prescribed pain medications.      If you are going home after surgery you will receive individualized written care instructions before being discharged.  A responsible adult must drive you to and from the hospital on the day of your surgery and ideally stay with you through the night.   .  Discharge prescriptions can be filled by the hospital pharmacy during regular pharmacy hours.  If you are having surgery late in the day/evening your prescription  may be e-prescribed to your pharmacy.  Please verify your pharmacy hours or chose a 24 hour pharmacy to avoid not having access to your prescription because your pharmacy has closed for the day.    If you are staying overnight following surgery, you will be transported to your hospital room following the recovery period.  [facility] has all private rooms.    If you have any questions please call Pre-Admission Testing at [phone].  Deductibles and co-payments are collected on the day of service. Please be prepared to pay the required co-pay, deductible or deposit on the day of service as defined by your plan.    Call your surgeon immediately if you experience any of the following symptoms:  Sore Throat  Shortness of Breath or difficulty breathing  Cough  Chills  Body soreness or muscle pain  Headache  Fever  New loss of taste or smell  Do not arrive for your surgery ill.  Your procedure will need to be rescheduled to another time.  You will need to call your physician before the day of surgery to avoid any unnecessary exposure to hospital staff as well as other patients.

## 2024-11-06 NOTE — PAT
Rojelio Dubon. Wanted you to note the elevated WBC on Reema Easley who had her PAT today. She did not have any s/s of UTI (denied twice) and no other indications of illness. She is not on steroids acc to med list. 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 last visit w/ PCP about a month ago. WBC has been slightly elev but now significantly so. FYI...thanks.

## 2024-11-07 ENCOUNTER — TELEPHONE (OUTPATIENT)
Dept: ORTHOPEDIC SURGERY | Facility: CLINIC | Age: 74
End: 2024-11-07

## 2024-11-07 ENCOUNTER — TELEPHONE (OUTPATIENT)
Dept: INTERNAL MEDICINE | Facility: CLINIC | Age: 74
End: 2024-11-07

## 2024-11-07 NOTE — TELEPHONE ENCOUNTER
"Spoke to patient and relayed message from JACQUELIN Swanson:  \"patient's white blood cell count is significantly elevated, could you please contact patient let her know she needs to see her PCP ASAP and also send a copy of lab to primary care. Will defer to PCP about repeat labs and she will definitely need medical clearance for surgery \"    Patient expressed understanding and will follow up with PCP immediately  "

## 2024-11-07 NOTE — TELEPHONE ENCOUNTER
Caller: Reema Easley    Relationship to patient: Self    Best call back number: [phone]     Patient is needing: PATIENTS PRE OP WITH SURGEON SHOWED ELEVATED WHITE BLOOD CELLS IN HER LAB WORK.     PATIENTS SURGEON WOULD LIKE TO GET YOUR APPROVAL TO CONTINUE WITH THIS SURGERY  BEFORE SURGERY DATE ON 11/20/24.    IF OFFICE VISIT OR EXTRA LAB WORK IS NEEDED PLEASE CALL PATIENT.

## 2024-11-07 NOTE — TELEPHONE ENCOUNTER
Caller: Reema Easley    Relationship: Self    Best call back number: [phone]    What is the best time to reach you: ANY    Who are you requesting to speak with (clinical staff, provider,  specific staff member): HENOK    Do you know the name of the person who called: HENOK    What was the call regarding: LABS    Is it okay if the provider responds through MyChart: CALL

## 2024-11-08 NOTE — TELEPHONE ENCOUNTER
Spoke to patient she verbally understood. Dr Gaspar office is requesting her to come in to do additional labs before surgery.

## 2024-11-12 ENCOUNTER — TELEPHONE (OUTPATIENT)
Dept: ORTHOPEDIC SURGERY | Facility: HOSPITAL | Age: 74
End: 2024-11-12
Payer: MEDICARE

## 2024-11-12 ENCOUNTER — PATIENT OUTREACH (OUTPATIENT)
Dept: CASE MANAGEMENT | Facility: OTHER | Age: 74
End: 2024-11-12
Payer: MEDICARE

## 2024-11-12 NOTE — TELEPHONE ENCOUNTER
Called and spoke with Mr. Easley as she is listed as going home after her upcoming procedure 11/20. She said the MD told her she was going to stay overnight. She said she takes a while waking up from anesthesia. She said she is limited on what she can do now, and was told she was going to be staying. Pt to be an overnight stay at this time.

## 2024-11-12 NOTE — OUTREACH NOTE
AMBULATORY CASE MANAGEMENT NOTE    Names and Relationships of Patient/Support Persons: Contact: Reema Easley; Relationship: Self -     Patient Outreach    Introduced self, explained ACM RN role and provided contact information. Patient followed up with Dr. Gonzalez's team regarding the MMIT equipment. She has follow up labs on Thursday. No other needs at this time. Patient's grandson adjusted walker to a higher height so patient can control it. Follow up in 2 weeks.     Education Documentation  No documentation found.        Vanessa HELMS  Ambulatory Case Management    11/12/2024, 15:24 EST

## 2024-11-14 ENCOUNTER — OFFICE VISIT (OUTPATIENT)
Dept: ORTHOPEDIC SURGERY | Facility: CLINIC | Age: 74
End: 2024-11-14
Payer: MEDICARE

## 2024-11-14 VITALS
DIASTOLIC BLOOD PRESSURE: 80 MMHG | HEIGHT: 64 IN | TEMPERATURE: 98.4 F | WEIGHT: 203.2 LBS | SYSTOLIC BLOOD PRESSURE: 130 MMHG | BODY MASS INDEX: 34.69 KG/M2

## 2024-11-14 DIAGNOSIS — R52 PAIN: Primary | ICD-10-CM

## 2024-11-14 PROBLEM — Z96.652 TOTAL KNEE REPLACEMENT STATUS, LEFT: Status: ACTIVE | Noted: 2024-08-27
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
## 2024-11-15 DIAGNOSIS — E78.2 MIXED HYPERLIPIDEMIA: Chronic | ICD-10-CM

## 2024-11-15 DIAGNOSIS — I10 BENIGN ESSENTIAL HYPERTENSION: Chronic | ICD-10-CM

## 2024-11-15 RX ORDER — SIMVASTATIN 40 MG
TABLET ORAL
Qty: 90 TABLET | Refills: 3 | Status: SHIPPED | OUTPATIENT
Start: 2024-11-15

## 2024-11-15 RX ORDER — BENAZEPRIL HYDROCHLORIDE 40 MG/1
TABLET ORAL
Qty: 90 TABLET | Refills: 3 | Status: SHIPPED | OUTPATIENT
Start: 2024-11-15

## 2024-11-18 ENCOUNTER — ANESTHESIA EVENT (OUTPATIENT)
Dept: PERIOP | Facility: HOSPITAL | Age: 74
End: 2024-11-18
Payer: MEDICARE

## 2024-11-19 ENCOUNTER — TELEPHONE (OUTPATIENT)
Dept: ORTHOPEDIC SURGERY | Facility: CLINIC | Age: 74
End: 2024-11-19
Payer: MEDICARE

## 2024-11-19 ENCOUNTER — OFFICE VISIT (OUTPATIENT)
Dept: ENDOCRINOLOGY | Age: 74
End: 2024-11-19
Payer: MEDICARE

## 2024-11-19 VITALS
OXYGEN SATURATION: 96 % | DIASTOLIC BLOOD PRESSURE: 80 MMHG | TEMPERATURE: 99.4 F | BODY MASS INDEX: 34.11 KG/M2 | HEIGHT: 64 IN | HEART RATE: 104 BPM | SYSTOLIC BLOOD PRESSURE: 132 MMHG | WEIGHT: 199.8 LBS

## 2024-11-19 DIAGNOSIS — E11.65 TYPE 2 DIABETES MELLITUS WITH HYPERGLYCEMIA, UNSPECIFIED WHETHER LONG TERM INSULIN USE: Primary | ICD-10-CM

## 2024-11-19 DIAGNOSIS — E78.2 MIXED HYPERLIPIDEMIA: ICD-10-CM

## 2024-11-19 DIAGNOSIS — C7A.8 NEUROENDOCRINE CARCINOMA OF PANCREAS: ICD-10-CM

## 2024-11-19 PROCEDURE — 3079F DIAST BP 80-89 MM HG: CPT | Performed by: NURSE PRACTITIONER

## 2024-11-19 PROCEDURE — 95251 CONT GLUC MNTR ANALYSIS I&R: CPT | Performed by: NURSE PRACTITIONER

## 2024-11-19 PROCEDURE — 3051F HG A1C>EQUAL 7.0%<8.0%: CPT | Performed by: NURSE PRACTITIONER

## 2024-11-19 PROCEDURE — 99214 OFFICE O/P EST MOD 30 MIN: CPT | Performed by: NURSE PRACTITIONER

## 2024-11-19 PROCEDURE — 3075F SYST BP GE 130 - 139MM HG: CPT | Performed by: NURSE PRACTITIONER

## 2024-11-19 NOTE — PROGRESS NOTES
"Chief Complaint  Diabetes    Subjective        Reema Easley presents to [facility] ENDOCRINOLOGY  History of Present Illness    Diabetes Type 2 > 25 years   New patient visit 7/2/24  PMH s/f: Pancreatic cancer  4/10/24- portion of pancrease and spleen removed, insulin was started as oral options were not working   7/2/24: Stop glimepiride and januvia given pancreatic involvement     Current DM regimen: metformin 1000mg BID, lantus 14u QD, and novolog 4u after meals      Yearly eye exams- 12/2023  CV: simvastatin 40mg QD  Renal: benazepril 40mg QD    Cgm review 11/6/24-11/19/24  Avg glu 191  GMI 7.9%  15% very high  37% high  48% time in range  <1% low     Has revision knee replacement tomorrow     Objective   Vital Signs:  /80   Pulse 104   Temp 99.4 °F (37.4 °C) (Oral)   Ht 162.6 cm (64.02\")   Wt 90.6 kg (199 lb 12.8 oz)   SpO2 96%   BMI 34.28 kg/m²   Estimated body mass index is 34.28 kg/m² as calculated from the following:    Height as of this encounter: 162.6 cm (64.02\").    Weight as of this encounter: 90.6 kg (199 lb 12.8 oz).          Physical Exam  Vitals reviewed.   Constitutional:       General: She is not in acute distress.  HENT:      Head: Normocephalic and atraumatic.   Cardiovascular:      Rate and Rhythm: Normal rate.   Pulmonary:      Effort: Pulmonary effort is normal. No respiratory distress.   Musculoskeletal:         General: No signs of injury. Normal range of motion.      Cervical back: Normal range of motion and neck supple.   Skin:     General: Skin is warm and dry.   Neurological:      Mental Status: She is alert and oriented to person, place, and time. Mental status is at baseline.   Psychiatric:         Mood and Affect: Mood normal.         Behavior: Behavior normal.         Thought Content: Thought content normal.         Judgment: Judgment normal.          Result Review :  The following data was reviewed by: JACQUELIN Akers on 11/19/2024:  Common " labs          8/26/2024    10:00 10/2/2024    08:10 11/6/2024    10:55   Common Labs   Glucose  126  244    BUN  20  16    Creatinine  0.84  0.91    Sodium  139  136    Potassium  5.1  4.0    Chloride  98  99    Calcium  10.3  9.6    Total Protein  6.8     Albumin  4.3     Total Bilirubin  0.4     Alkaline Phosphatase  88     AST (SGOT)  18     ALT (SGPT)  11     WBC 10.77     12.7  17.17    Hemoglobin 13.5     13.4  13.1    Hematocrit 40.8     42.2  42.0    Platelets 497     557  563    Total Cholesterol  126     Triglycerides  189     Hemoglobin A1C  8.4  7.60    Microalbumin, Urine  24.2     Uric Acid  3.6        Details          This result is from an external source.                       Assessment and Plan   Diagnoses and all orders for this visit:    1. Type 2 diabetes mellitus with hyperglycemia, unspecified whether long term insulin use (Primary)    2. Hyperlipidemia    3. Neuroendocrine carcinoma of pancreas             Follow Up   Return in about 3 months (around 2/19/2025).    Cgm reviewed, not quite at goal   A1c in favorable range   Only take lantus once daily and the same time every day   Take novolog before meals, not after   Reviewed insulin safety, dosing and hypoglycemia prevention and management plan     Patient was given instructions and counseling regarding her condition or for health maintenance advice. Please see specific information pulled into the AVS if appropriate.

## 2024-11-20 ENCOUNTER — HOSPITAL ENCOUNTER (INPATIENT)
Facility: HOSPITAL | Age: 74
LOS: 1 days | Discharge: HOME HEALTH | End: 2024-11-21
Attending: ORTHOPAEDIC SURGERY | Admitting: ORTHOPAEDIC SURGERY
Payer: MEDICARE

## 2024-11-20 ENCOUNTER — APPOINTMENT (OUTPATIENT)
Dept: GENERAL RADIOLOGY | Facility: HOSPITAL | Age: 74
End: 2024-11-20
Payer: MEDICARE

## 2024-11-20 ENCOUNTER — ANESTHESIA (OUTPATIENT)
Dept: PERIOP | Facility: HOSPITAL | Age: 74
End: 2024-11-20
Payer: MEDICARE

## 2024-11-20 DIAGNOSIS — Z96.652 TOTAL KNEE REPLACEMENT STATUS, LEFT: ICD-10-CM

## 2024-11-20 PROBLEM — M25.562 KNEE PAIN, LEFT: Status: ACTIVE | Noted: 2024-11-20

## 2024-11-20 PROBLEM — Z96.659 S/P REVISION OF TOTAL KNEE: Status: ACTIVE | Noted: 2024-11-20

## 2024-11-20 LAB
ABO GROUP BLD: NORMAL
BLD GP AB SCN SERPL QL: NEGATIVE
GLUCOSE BLDC GLUCOMTR-MCNC: 167 MG/DL (ref 70–130)
GLUCOSE BLDC GLUCOMTR-MCNC: 186 MG/DL (ref 70–130)
GLUCOSE BLDC GLUCOMTR-MCNC: 217 MG/DL (ref 70–130)
RH BLD: POSITIVE
T&S EXPIRATION DATE: NORMAL

## 2024-11-20 PROCEDURE — 25010000002 FENTANYL CITRATE (PF) 50 MCG/ML SOLUTION: Performed by: STUDENT IN AN ORGANIZED HEALTH CARE EDUCATION/TRAINING PROGRAM

## 2024-11-20 PROCEDURE — 82948 REAGENT STRIP/BLOOD GLUCOSE: CPT

## 2024-11-20 PROCEDURE — 86850 RBC ANTIBODY SCREEN: CPT | Performed by: ORTHOPAEDIC SURGERY

## 2024-11-20 PROCEDURE — 25010000002 ACETAMINOPHEN 10 MG/ML SOLUTION: Performed by: STUDENT IN AN ORGANIZED HEALTH CARE EDUCATION/TRAINING PROGRAM

## 2024-11-20 PROCEDURE — 25010000002 MIDAZOLAM PER 1 MG: Performed by: ANESTHESIOLOGY

## 2024-11-20 PROCEDURE — 25010000002 LIDOCAINE PF 2% 2 % SOLUTION: Performed by: STUDENT IN AN ORGANIZED HEALTH CARE EDUCATION/TRAINING PROGRAM

## 2024-11-20 PROCEDURE — 25010000002 EPINEPHRINE 1 MG/ML SOLUTION 30 ML VIAL: Performed by: ORTHOPAEDIC SURGERY

## 2024-11-20 PROCEDURE — 25810000003 SODIUM CHLORIDE 0.9 % SOLUTION: Performed by: ORTHOPAEDIC SURGERY

## 2024-11-20 PROCEDURE — 25010000002 PROPOFOL 200 MG/20ML EMULSION: Performed by: STUDENT IN AN ORGANIZED HEALTH CARE EDUCATION/TRAINING PROGRAM

## 2024-11-20 PROCEDURE — 86900 BLOOD TYPING SEROLOGIC ABO: CPT | Performed by: ORTHOPAEDIC SURGERY

## 2024-11-20 PROCEDURE — 25010000002 PROPOFOL 10 MG/ML EMULSION: Performed by: STUDENT IN AN ORGANIZED HEALTH CARE EDUCATION/TRAINING PROGRAM

## 2024-11-20 PROCEDURE — 25010000002 VANCOMYCIN 10 G RECONSTITUTED SOLUTION: Performed by: ORTHOPAEDIC SURGERY

## 2024-11-20 PROCEDURE — 25010000002 ROPIVACAINE PER 1 MG: Performed by: ANESTHESIOLOGY

## 2024-11-20 PROCEDURE — 25010000002 SUGAMMADEX 200 MG/2ML SOLUTION: Performed by: STUDENT IN AN ORGANIZED HEALTH CARE EDUCATION/TRAINING PROGRAM

## 2024-11-20 PROCEDURE — 25010000002 ROPIVACAINE PER 1 MG: Performed by: ORTHOPAEDIC SURGERY

## 2024-11-20 PROCEDURE — 25010000002 DEXAMETHASONE SODIUM PHOSPHATE 20 MG/5ML SOLUTION: Performed by: STUDENT IN AN ORGANIZED HEALTH CARE EDUCATION/TRAINING PROGRAM

## 2024-11-20 PROCEDURE — 27486 REVISE/REPLACE KNEE JOINT: CPT | Performed by: SPECIALIST/TECHNOLOGIST, OTHER

## 2024-11-20 PROCEDURE — 25010000002 KETOROLAC TROMETHAMINE PER 15 MG: Performed by: ORTHOPAEDIC SURGERY

## 2024-11-20 PROCEDURE — 63710000001 ONDANSETRON ODT 4 MG TABLET DISPERSIBLE: Performed by: ORTHOPAEDIC SURGERY

## 2024-11-20 PROCEDURE — 86901 BLOOD TYPING SEROLOGIC RH(D): CPT | Performed by: ORTHOPAEDIC SURGERY

## 2024-11-20 PROCEDURE — 25010000002 MAGNESIUM SULFATE PER 500 MG OF MAGNESIUM: Performed by: STUDENT IN AN ORGANIZED HEALTH CARE EDUCATION/TRAINING PROGRAM

## 2024-11-20 PROCEDURE — 25010000002 MORPHINE PER 10 MG: Performed by: ORTHOPAEDIC SURGERY

## 2024-11-20 PROCEDURE — C1776 JOINT DEVICE (IMPLANTABLE): HCPCS | Performed by: ORTHOPAEDIC SURGERY

## 2024-11-20 PROCEDURE — 25010000002 FENTANYL CITRATE (PF) 50 MCG/ML SOLUTION: Performed by: ANESTHESIOLOGY

## 2024-11-20 PROCEDURE — 63710000001 INSULIN LISPRO (HUMAN) PER 5 UNITS: Performed by: ORTHOPAEDIC SURGERY

## 2024-11-20 PROCEDURE — 25010000002 CEFAZOLIN PER 500 MG: Performed by: ORTHOPAEDIC SURGERY

## 2024-11-20 PROCEDURE — 25810000003 LACTATED RINGERS PER 1000 ML: Performed by: ANESTHESIOLOGY

## 2024-11-20 PROCEDURE — 63710000001 INSULIN GLARGINE PER 5 UNITS: Performed by: ORTHOPAEDIC SURGERY

## 2024-11-20 PROCEDURE — 27486 REVISE/REPLACE KNEE JOINT: CPT | Performed by: ORTHOPAEDIC SURGERY

## 2024-11-20 PROCEDURE — 25010000002 HYDROMORPHONE PER 4 MG: Performed by: STUDENT IN AN ORGANIZED HEALTH CARE EDUCATION/TRAINING PROGRAM

## 2024-11-20 PROCEDURE — 25810000003 LACTATED RINGERS SOLUTION: Performed by: ORTHOPAEDIC SURGERY

## 2024-11-20 PROCEDURE — 25010000002 ONDANSETRON PER 1 MG: Performed by: STUDENT IN AN ORGANIZED HEALTH CARE EDUCATION/TRAINING PROGRAM

## 2024-11-20 PROCEDURE — 0SUW09Z SUPPLEMENT LEFT KNEE JOINT, TIBIAL SURFACE WITH LINER, OPEN APPROACH: ICD-10-PCS | Performed by: ORTHOPAEDIC SURGERY

## 2024-11-20 PROCEDURE — 0SPD09Z REMOVAL OF LINER FROM LEFT KNEE JOINT, OPEN APPROACH: ICD-10-PCS | Performed by: ORTHOPAEDIC SURGERY

## 2024-11-20 PROCEDURE — 73560 X-RAY EXAM OF KNEE 1 OR 2: CPT

## 2024-11-20 DEVICE — DEV CONTRL TISS STRATAFIX SYMM PDS PLUS VIL CT-1 60CM: Type: IMPLANTABLE DEVICE | Site: KNEE | Status: FUNCTIONAL

## 2024-11-20 DEVICE — JOURNEY II INSERT XLPE DEEP DISHED LEFT SIZE 3-4 11MM
Type: IMPLANTABLE DEVICE | Site: KNEE | Status: FUNCTIONAL
Brand: JOURNEY

## 2024-11-20 DEVICE — DEV CONTRL TISS STRATAFIXSPIRALMNCRYL PLSPS2 REV3/0 45CM: Type: IMPLANTABLE DEVICE | Site: KNEE | Status: FUNCTIONAL

## 2024-11-20 RX ORDER — ASPIRIN 81 MG/1
81 TABLET ORAL EVERY 12 HOURS SCHEDULED
Status: DISCONTINUED | OUTPATIENT
Start: 2024-11-21 | End: 2024-11-21 | Stop reason: HOSPADM

## 2024-11-20 RX ORDER — PROMETHAZINE HYDROCHLORIDE 25 MG/1
25 SUPPOSITORY RECTAL ONCE AS NEEDED
Status: DISCONTINUED | OUTPATIENT
Start: 2024-11-20 | End: 2024-11-20 | Stop reason: HOSPADM

## 2024-11-20 RX ORDER — ASPIRIN 81 MG/1
81 TABLET ORAL 2 TIMES DAILY
Status: DISCONTINUED | OUTPATIENT
Start: 2024-11-21 | End: 2024-11-20 | Stop reason: SDUPTHER

## 2024-11-20 RX ORDER — IPRATROPIUM BROMIDE AND ALBUTEROL SULFATE 2.5; .5 MG/3ML; MG/3ML
3 SOLUTION RESPIRATORY (INHALATION) ONCE AS NEEDED
Status: DISCONTINUED | OUTPATIENT
Start: 2024-11-20 | End: 2024-11-20 | Stop reason: HOSPADM

## 2024-11-20 RX ORDER — TRANEXAMIC ACID 100 MG/ML
INJECTION, SOLUTION INTRAVENOUS AS NEEDED
Status: DISCONTINUED | OUTPATIENT
Start: 2024-11-20 | End: 2024-11-20 | Stop reason: SURG

## 2024-11-20 RX ORDER — HYDRALAZINE HYDROCHLORIDE 20 MG/ML
5 INJECTION INTRAMUSCULAR; INTRAVENOUS
Status: DISCONTINUED | OUTPATIENT
Start: 2024-11-20 | End: 2024-11-20 | Stop reason: HOSPADM

## 2024-11-20 RX ORDER — POLYETHYLENE GLYCOL 3350 17 G/17G
17 POWDER, FOR SOLUTION ORAL 2 TIMES DAILY
Qty: 238 G | Refills: 0 | Status: SHIPPED | OUTPATIENT
Start: 2024-11-20 | End: 2024-11-28

## 2024-11-20 RX ORDER — HYDROCODONE BITARTRATE AND ACETAMINOPHEN 7.5; 325 MG/1; MG/1
1-2 TABLET ORAL
Qty: 56 TABLET | Refills: 0 | Status: SHIPPED | OUTPATIENT
Start: 2024-11-20

## 2024-11-20 RX ORDER — ONDANSETRON 2 MG/ML
INJECTION INTRAMUSCULAR; INTRAVENOUS AS NEEDED
Status: DISCONTINUED | OUTPATIENT
Start: 2024-11-20 | End: 2024-11-20 | Stop reason: SURG

## 2024-11-20 RX ORDER — INSULIN LISPRO 100 [IU]/ML
2 INJECTION, SOLUTION INTRAVENOUS; SUBCUTANEOUS
Status: DISCONTINUED | OUTPATIENT
Start: 2024-11-20 | End: 2024-11-21 | Stop reason: HOSPADM

## 2024-11-20 RX ORDER — LIDOCAINE HYDROCHLORIDE 20 MG/ML
INJECTION, SOLUTION EPIDURAL; INFILTRATION; INTRACAUDAL; PERINEURAL AS NEEDED
Status: DISCONTINUED | OUTPATIENT
Start: 2024-11-20 | End: 2024-11-20 | Stop reason: SURG

## 2024-11-20 RX ORDER — PREGABALIN 75 MG/1
150 CAPSULE ORAL ONCE
Status: DISCONTINUED | OUTPATIENT
Start: 2024-11-20 | End: 2024-11-20 | Stop reason: HOSPADM

## 2024-11-20 RX ORDER — ONDANSETRON 4 MG/1
4 TABLET, FILM COATED ORAL EVERY 8 HOURS PRN
Qty: 10 TABLET | Refills: 0 | Status: SHIPPED | OUTPATIENT
Start: 2024-11-20

## 2024-11-20 RX ORDER — HYDROCODONE BITARTRATE AND ACETAMINOPHEN 7.5; 325 MG/1; MG/1
1 TABLET ORAL EVERY 4 HOURS PRN
Status: DISCONTINUED | OUTPATIENT
Start: 2024-11-20 | End: 2024-11-21 | Stop reason: HOSPADM

## 2024-11-20 RX ORDER — HYDROCODONE BITARTRATE AND ACETAMINOPHEN 5; 325 MG/1; MG/1
1 TABLET ORAL ONCE AS NEEDED
Status: COMPLETED | OUTPATIENT
Start: 2024-11-20 | End: 2024-11-20

## 2024-11-20 RX ORDER — SODIUM CHLORIDE 0.9 % (FLUSH) 0.9 %
3 SYRINGE (ML) INJECTION EVERY 12 HOURS SCHEDULED
Status: DISCONTINUED | OUTPATIENT
Start: 2024-11-20 | End: 2024-11-20 | Stop reason: HOSPADM

## 2024-11-20 RX ORDER — MAGNESIUM SULFATE HEPTAHYDRATE 500 MG/ML
INJECTION, SOLUTION INTRAMUSCULAR; INTRAVENOUS AS NEEDED
Status: DISCONTINUED | OUTPATIENT
Start: 2024-11-20 | End: 2024-11-20 | Stop reason: SURG

## 2024-11-20 RX ORDER — ONDANSETRON 4 MG/1
4 TABLET, ORALLY DISINTEGRATING ORAL EVERY 6 HOURS PRN
Status: DISCONTINUED | OUTPATIENT
Start: 2024-11-20 | End: 2024-11-21 | Stop reason: HOSPADM

## 2024-11-20 RX ORDER — PANTOPRAZOLE SODIUM 40 MG/1
40 TABLET, DELAYED RELEASE ORAL DAILY
Qty: 14 TABLET | Refills: 0 | Status: SHIPPED | OUTPATIENT
Start: 2024-11-20 | End: 2024-12-05

## 2024-11-20 RX ORDER — VANCOMYCIN/0.9 % SOD CHLORIDE 1.5G/250ML
15 PLASTIC BAG, INJECTION (ML) INTRAVENOUS ONCE
Status: COMPLETED | OUTPATIENT
Start: 2024-11-20 | End: 2024-11-20

## 2024-11-20 RX ORDER — PROMETHAZINE HYDROCHLORIDE 12.5 MG/1
12.5 TABLET ORAL EVERY 4 HOURS PRN
Status: DISCONTINUED | OUTPATIENT
Start: 2024-11-20 | End: 2024-11-21 | Stop reason: HOSPADM

## 2024-11-20 RX ORDER — FENTANYL CITRATE 50 UG/ML
50 INJECTION, SOLUTION INTRAMUSCULAR; INTRAVENOUS
Status: DISCONTINUED | OUTPATIENT
Start: 2024-11-20 | End: 2024-11-20 | Stop reason: HOSPADM

## 2024-11-20 RX ORDER — ACETAMINOPHEN 325 MG/1
650 TABLET ORAL EVERY 6 HOURS PRN
Status: DISCONTINUED | OUTPATIENT
Start: 2024-11-20 | End: 2024-11-21 | Stop reason: HOSPADM

## 2024-11-20 RX ORDER — DROPERIDOL 2.5 MG/ML
0.62 INJECTION, SOLUTION INTRAMUSCULAR; INTRAVENOUS
Status: DISCONTINUED | OUTPATIENT
Start: 2024-11-20 | End: 2024-11-20 | Stop reason: HOSPADM

## 2024-11-20 RX ORDER — ROCURONIUM BROMIDE 10 MG/ML
INJECTION, SOLUTION INTRAVENOUS AS NEEDED
Status: DISCONTINUED | OUTPATIENT
Start: 2024-11-20 | End: 2024-11-20 | Stop reason: SURG

## 2024-11-20 RX ORDER — FENTANYL CITRATE 50 UG/ML
100 INJECTION, SOLUTION INTRAMUSCULAR; INTRAVENOUS
Status: DISCONTINUED | OUTPATIENT
Start: 2024-11-20 | End: 2024-11-20 | Stop reason: HOSPADM

## 2024-11-20 RX ORDER — ACETAMINOPHEN 10 MG/ML
INJECTION, SOLUTION INTRAVENOUS AS NEEDED
Status: DISCONTINUED | OUTPATIENT
Start: 2024-11-20 | End: 2024-11-20 | Stop reason: SURG

## 2024-11-20 RX ORDER — EPHEDRINE SULFATE 50 MG/ML
5 INJECTION, SOLUTION INTRAVENOUS ONCE AS NEEDED
Status: DISCONTINUED | OUTPATIENT
Start: 2024-11-20 | End: 2024-11-20 | Stop reason: HOSPADM

## 2024-11-20 RX ORDER — FLUMAZENIL 0.1 MG/ML
0.2 INJECTION INTRAVENOUS AS NEEDED
Status: DISCONTINUED | OUTPATIENT
Start: 2024-11-20 | End: 2024-11-20 | Stop reason: HOSPADM

## 2024-11-20 RX ORDER — FENTANYL CITRATE 50 UG/ML
INJECTION, SOLUTION INTRAMUSCULAR; INTRAVENOUS AS NEEDED
Status: DISCONTINUED | OUTPATIENT
Start: 2024-11-20 | End: 2024-11-20 | Stop reason: SURG

## 2024-11-20 RX ORDER — DIPHENHYDRAMINE HYDROCHLORIDE 50 MG/ML
12.5 INJECTION INTRAMUSCULAR; INTRAVENOUS
Status: DISCONTINUED | OUTPATIENT
Start: 2024-11-20 | End: 2024-11-20 | Stop reason: HOSPADM

## 2024-11-20 RX ORDER — OXYCODONE AND ACETAMINOPHEN 7.5; 325 MG/1; MG/1
1 TABLET ORAL EVERY 4 HOURS PRN
Status: DISCONTINUED | OUTPATIENT
Start: 2024-11-20 | End: 2024-11-20 | Stop reason: HOSPADM

## 2024-11-20 RX ORDER — ASPIRIN 81 MG/1
TABLET ORAL
Qty: 60 TABLET | Refills: 0 | Status: SHIPPED | OUTPATIENT
Start: 2024-11-20 | End: 2025-01-04

## 2024-11-20 RX ORDER — MIDAZOLAM HYDROCHLORIDE 1 MG/ML
2 INJECTION, SOLUTION INTRAMUSCULAR; INTRAVENOUS
Status: DISCONTINUED | OUTPATIENT
Start: 2024-11-20 | End: 2024-11-20 | Stop reason: HOSPADM

## 2024-11-20 RX ORDER — PROMETHAZINE HYDROCHLORIDE 25 MG/1
25 TABLET ORAL ONCE AS NEEDED
Status: DISCONTINUED | OUTPATIENT
Start: 2024-11-20 | End: 2024-11-20 | Stop reason: HOSPADM

## 2024-11-20 RX ORDER — HYDROMORPHONE HYDROCHLORIDE 1 MG/ML
0.5 INJECTION, SOLUTION INTRAMUSCULAR; INTRAVENOUS; SUBCUTANEOUS
Status: DISCONTINUED | OUTPATIENT
Start: 2024-11-20 | End: 2024-11-20 | Stop reason: HOSPADM

## 2024-11-20 RX ORDER — MAGNESIUM HYDROXIDE 1200 MG/15ML
LIQUID ORAL AS NEEDED
Status: DISCONTINUED | OUTPATIENT
Start: 2024-11-20 | End: 2024-11-20 | Stop reason: HOSPADM

## 2024-11-20 RX ORDER — SODIUM CHLORIDE, SODIUM LACTATE, POTASSIUM CHLORIDE, CALCIUM CHLORIDE 600; 310; 30; 20 MG/100ML; MG/100ML; MG/100ML; MG/100ML
9 INJECTION, SOLUTION INTRAVENOUS CONTINUOUS
Status: ACTIVE | OUTPATIENT
Start: 2024-11-20 | End: 2024-11-20

## 2024-11-20 RX ORDER — NALOXONE HCL 0.4 MG/ML
0.2 VIAL (ML) INJECTION AS NEEDED
Status: DISCONTINUED | OUTPATIENT
Start: 2024-11-20 | End: 2024-11-20 | Stop reason: HOSPADM

## 2024-11-20 RX ORDER — ALLOPURINOL 300 MG/1
300 TABLET ORAL DAILY
Status: DISCONTINUED | OUTPATIENT
Start: 2024-11-20 | End: 2024-11-21 | Stop reason: HOSPADM

## 2024-11-20 RX ORDER — ONDANSETRON 2 MG/ML
4 INJECTION INTRAMUSCULAR; INTRAVENOUS ONCE AS NEEDED
Status: DISCONTINUED | OUTPATIENT
Start: 2024-11-20 | End: 2024-11-20 | Stop reason: HOSPADM

## 2024-11-20 RX ORDER — PROPOFOL 10 MG/ML
INJECTION, EMULSION INTRAVENOUS AS NEEDED
Status: DISCONTINUED | OUTPATIENT
Start: 2024-11-20 | End: 2024-11-20 | Stop reason: SURG

## 2024-11-20 RX ORDER — SODIUM CHLORIDE 0.9 % (FLUSH) 0.9 %
3-10 SYRINGE (ML) INJECTION AS NEEDED
Status: DISCONTINUED | OUTPATIENT
Start: 2024-11-20 | End: 2024-11-20 | Stop reason: HOSPADM

## 2024-11-20 RX ORDER — ONDANSETRON 2 MG/ML
4 INJECTION INTRAMUSCULAR; INTRAVENOUS ONCE AS NEEDED
Status: DISCONTINUED | OUTPATIENT
Start: 2024-11-20 | End: 2024-11-21 | Stop reason: HOSPADM

## 2024-11-20 RX ORDER — LABETALOL HYDROCHLORIDE 5 MG/ML
5 INJECTION, SOLUTION INTRAVENOUS
Status: DISCONTINUED | OUTPATIENT
Start: 2024-11-20 | End: 2024-11-20 | Stop reason: HOSPADM

## 2024-11-20 RX ORDER — ALPRAZOLAM 0.5 MG
0.5 TABLET ORAL 2 TIMES DAILY
Status: DISCONTINUED | OUTPATIENT
Start: 2024-11-20 | End: 2024-11-21 | Stop reason: HOSPADM

## 2024-11-20 RX ORDER — DEXAMETHASONE SODIUM PHOSPHATE 4 MG/ML
INJECTION, SOLUTION INTRA-ARTICULAR; INTRALESIONAL; INTRAMUSCULAR; INTRAVENOUS; SOFT TISSUE AS NEEDED
Status: DISCONTINUED | OUTPATIENT
Start: 2024-11-20 | End: 2024-11-20 | Stop reason: SURG

## 2024-11-20 RX ORDER — ROPIVACAINE HYDROCHLORIDE 2 MG/ML
INJECTION, SOLUTION EPIDURAL; INFILTRATION; PERINEURAL
Status: COMPLETED | OUTPATIENT
Start: 2024-11-20 | End: 2024-11-20

## 2024-11-20 RX ORDER — CHLORHEXIDINE GLUCONATE 500 MG/1
CLOTH TOPICAL 2 TIMES DAILY
Status: DISCONTINUED | OUTPATIENT
Start: 2024-11-20 | End: 2024-11-21 | Stop reason: HOSPADM

## 2024-11-20 RX ORDER — HYDROCODONE BITARTRATE AND ACETAMINOPHEN 7.5; 325 MG/1; MG/1
2 TABLET ORAL EVERY 4 HOURS PRN
Status: DISCONTINUED | OUTPATIENT
Start: 2024-11-20 | End: 2024-11-21 | Stop reason: HOSPADM

## 2024-11-20 RX ORDER — MELOXICAM 7.5 MG/1
15 TABLET ORAL ONCE
Status: DISCONTINUED | OUTPATIENT
Start: 2024-11-20 | End: 2024-11-20 | Stop reason: HOSPADM

## 2024-11-20 RX ADMIN — PROPOFOL 150 MCG/KG/MIN: 10 INJECTION, EMULSION INTRAVENOUS at 13:46

## 2024-11-20 RX ADMIN — SODIUM CHLORIDE 2 G: 900 INJECTION INTRAVENOUS at 13:34

## 2024-11-20 RX ADMIN — ACETAMINOPHEN 1000 MG: 1000 INJECTION INTRAVENOUS at 13:55

## 2024-11-20 RX ADMIN — ALLOPURINOL 300 MG: 300 TABLET ORAL at 18:10

## 2024-11-20 RX ADMIN — HYDROMORPHONE HYDROCHLORIDE 0.5 MG: 1 INJECTION, SOLUTION INTRAMUSCULAR; INTRAVENOUS; SUBCUTANEOUS at 16:43

## 2024-11-20 RX ADMIN — ROPIVACAINE HYDROCHLORIDE 15 ML: 2 INJECTION, SOLUTION EPIDURAL; INFILTRATION at 12:45

## 2024-11-20 RX ADMIN — MIDAZOLAM 2 MG: 1 INJECTION INTRAMUSCULAR; INTRAVENOUS at 12:32

## 2024-11-20 RX ADMIN — DEXAMETHASONE SODIUM PHOSPHATE 8 MG: 4 INJECTION, SOLUTION INTRAMUSCULAR; INTRAVENOUS at 13:53

## 2024-11-20 RX ADMIN — METFORMIN HYDROCHLORIDE 1000 MG: 1000 TABLET, FILM COATED ORAL at 18:10

## 2024-11-20 RX ADMIN — ROCURONIUM BROMIDE 50 MG: 10 INJECTION, SOLUTION INTRAVENOUS at 13:47

## 2024-11-20 RX ADMIN — TRANEXAMIC ACID 1000 MG: 100 INJECTION, SOLUTION INTRAVENOUS at 14:20

## 2024-11-20 RX ADMIN — FENTANYL CITRATE 50 MCG: 50 INJECTION, SOLUTION INTRAMUSCULAR; INTRAVENOUS at 12:32

## 2024-11-20 RX ADMIN — SUGAMMADEX 200 MG: 100 INJECTION, SOLUTION INTRAVENOUS at 14:39

## 2024-11-20 RX ADMIN — SODIUM CHLORIDE 2000 MG: 900 INJECTION INTRAVENOUS at 21:19

## 2024-11-20 RX ADMIN — SODIUM CHLORIDE, SODIUM LACTATE, POTASSIUM CHLORIDE, CALCIUM CHLORIDE 500 ML: 20; 30; 600; 310 INJECTION, SOLUTION INTRAVENOUS at 12:22

## 2024-11-20 RX ADMIN — INSULIN LISPRO 2 UNITS: 100 INJECTION, SOLUTION INTRAVENOUS; SUBCUTANEOUS at 18:11

## 2024-11-20 RX ADMIN — ONDANSETRON 4 MG: 2 INJECTION INTRAMUSCULAR; INTRAVENOUS at 14:35

## 2024-11-20 RX ADMIN — CHLORHEXIDINE GLUCONATE: 500 CLOTH TOPICAL at 12:22

## 2024-11-20 RX ADMIN — MAGNESIUM SULFATE HEPTAHYDRATE 1 G: 500 INJECTION, SOLUTION INTRAMUSCULAR; INTRAVENOUS at 14:20

## 2024-11-20 RX ADMIN — FENTANYL CITRATE 50 MCG: 50 INJECTION, SOLUTION INTRAMUSCULAR; INTRAVENOUS at 14:11

## 2024-11-20 RX ADMIN — VANCOMYCIN HYDROCHLORIDE 1500 MG: 10 INJECTION, POWDER, LYOPHILIZED, FOR SOLUTION INTRAVENOUS at 12:24

## 2024-11-20 RX ADMIN — ONDANSETRON 4 MG: 4 TABLET, ORALLY DISINTEGRATING ORAL at 21:19

## 2024-11-20 RX ADMIN — INSULIN GLARGINE 14 UNITS: 100 INJECTION, SOLUTION SUBCUTANEOUS at 18:10

## 2024-11-20 RX ADMIN — PROPOFOL 150 MG: 10 INJECTION, EMULSION INTRAVENOUS at 13:46

## 2024-11-20 RX ADMIN — ALPRAZOLAM 0.5 MG: 0.5 TABLET ORAL at 21:19

## 2024-11-20 RX ADMIN — LIDOCAINE HYDROCHLORIDE 100 MG: 20 INJECTION, SOLUTION EPIDURAL; INFILTRATION; INTRACAUDAL; PERINEURAL at 13:46

## 2024-11-20 RX ADMIN — SUGAMMADEX 100 MG: 100 INJECTION, SOLUTION INTRAVENOUS at 14:44

## 2024-11-20 RX ADMIN — SODIUM CHLORIDE, POTASSIUM CHLORIDE, SODIUM LACTATE AND CALCIUM CHLORIDE: 600; 310; 30; 20 INJECTION, SOLUTION INTRAVENOUS at 14:55

## 2024-11-20 RX ADMIN — HYDROCODONE BITARTRATE AND ACETAMINOPHEN 2 TABLET: 7.5; 325 TABLET ORAL at 21:19

## 2024-11-20 RX ADMIN — HYDROCODONE BITARTRATE AND ACETAMINOPHEN 1 TABLET: 5; 325 TABLET ORAL at 16:45

## 2024-11-20 NOTE — OP NOTE
Name: Reema Easley  YOB: 1950    DATE OF SURGERY:11/20/2024    PREOPERATIVE DIAGNOSIS: Left total knee instability    POSTOPERATIVE DIAGNOSIS: Left total knee instability    PROCEDURE PERFORMED: Left total knee polyethylene liner exchange    SURGEON: Tal Gonzalez M.D.    ASSISTANT: ADRIANNE DUMONT    A surgical assistant was integral in ensuring a successful outcome with this procedure.  The assistant was utilized to assist in positioning the patient, draping the patient, was used throughout the case to provide with retraction of tissues, suctioning of blood and body fluids for visualization, positioning of the extremity to allow for proper exposure so that I could perform the procedure.  Without the use of a surgical assistant during this procedure I feel that the outcome may have been compromised or would have been suboptimal or at risk for complications.    IMPLANTS:   Implant Name Type Inv. Item Serial No.  Lot No. LRB No. Used Action   DEV CONTRL TISS STRATAFIX SYMM PDS PLUS JEREMY CT-1 60CM - [id number] Implant DEV CONTRL TISS STRATAFIX SYMM PDS PLUS JEREMY CT-1 60CM  [employer]  DIV [employer] J [id number] Left 1 Implanted   DEV CONTRL TISS STRATAFIXSPIRALMNCRYL PLSPS2 REV3/0 45CM - [id number] Implant DEV CONTRL TISS STRATAFIXSPIRALMNCRYL PLSPS2 REV3/0 45CM  [employer]  DIV OF J AND J [id number] Left 1 Implanted   INSRT ART/KN JOURNEY2 DEEP DISHED SZ3TO4 11MM LT - [id number] Implant INSRT ART/KN JOURNEY2 DEEP DISHED SZ3TO4 11MM LT  [employer] [id number] Left 1 Implanted       Estimated Blood Loss: 100 mL  Specimens : none  Complications: none    DESCRIPTION OF PROCEDURE: The patient was taken to the operating room and placed in the supine position. A sequential compression device was carefully placed on the non-operative leg. Preoperative antibiotics were administered.  The knee was then examined with the patient under anesthesia there is moderate extension instability the knee came to full  extension or possibly a couple degrees of hyperextension.  At 90 degrees of flexion there is considerable posterior instability and with an anterior drawer the knee was noted to translate about a centimeter and a half.  Alignment looked good.  Surgical time out was performed. After adequate induction of anesthesia, the leg was prepped and draped in the usual sterile fashion, exsanguinated with an Esmarch bandage and the tourniquet inflated to 250 mmHg. A midline incision was performed through the previous incision followed by a medial parapatellar arthrotomy. There was a small amount of clear noninfectious appearing fluid within the joint.  The medial retinaculum was then carefully released from the proximal medial tibia with electrocautery staying directly on bone.The patellar tendon scar tissue was gently released from a portion of the proximal tibia down to the level of the patellar tendon insertion to allow for patellar mobilization. The patella was subluxed laterally. The femoral and tibial components appeared to be without damage and appeared well fixed to the bone.    There was no evidence of polyethylene wear or damage.  I then removed the 9 mm cruciate retaining journey 2 polyethylene and placed an 11 mm deep dish journey 2 CR poly.  I carefully examined the knee the knee came to full extension and demonstrated improved stability with varus valgus stress.  At 90 degrees of flexion there was marked improvement in the anterior posterior stability and I felt that this was acceptable stability.  I did not feel any further revision surgery would result in improved stability.  At this point we chose the final 11 mm deep dish CR poly and impacted into the locking mechanism nicely.  The tourniquet was then released.  Again the knee was examined and there was noted to be excellent extension and flexion stability.   The knee was then copiously irrigated and then injected with the anesthetic cocktail solution.  It was  carefully examined.  There was excellent hemostasis.  We closed the knee in multiple layers in standard fashion. Sterile dressings were applied. At the end of the case, the sponge and needle counts were reported as being correct. There were no known complications. The patient was then transported to the recovery room.      Tal Gonzalez M.D.  11/20/2024

## 2024-11-20 NOTE — ANESTHESIA PROCEDURE NOTES
Airway  Urgency: elective    Date/Time: 11/20/2024 1:50 PM  Airway not difficult    General Information and Staff    Patient location during procedure: OR  Anesthesiologist: Torin Paz MD  CRNA/CAA: Dioni Burleson CRNA    Indications and Patient Condition  Indications for airway management: airway protection    Preoxygenated: yes  MILS not maintained throughout  Mask difficulty assessment: 1 - vent by mask    Final Airway Details  Final airway type: endotracheal airway      Successful airway: ETT  Cuffed: yes   Successful intubation technique: direct laryngoscopy  Endotracheal tube insertion site: oral  Blade: Stefanie  Blade size: 3  ETT size (mm): 7.0  Cormack-Lehane Classification: grade IIb - view of arytenoids or posterior of glottis only  Placement verified by: capnometry   Cuff volume (mL): 8  Measured from: lips  ETT/EBT  to lips (cm): 21  Number of attempts at approach: 1  Assessment: lips, teeth, and gum same as pre-op and atraumatic intubation

## 2024-11-20 NOTE — ANESTHESIA PREPROCEDURE EVALUATION
Anesthesia Evaluation     Patient summary reviewed and Nursing notes reviewed   NPO Solid Status: > 6 hours  NPO Liquid Status: > 2 hours           Airway   Mallampati: I  TM distance: >3 FB  Neck ROM: full  Dental - normal exam     Pulmonary    (+) ,sleep apnea  (-) COPD, asthma, not a smoker  Cardiovascular   Exercise tolerance: good (4-7 METS)    ECG reviewed    (+) hypertension  (-) past MI, dysrhythmias, angina      Neuro/Psych  (+) psychiatric history Anxiety and Depression  (-) seizures, CVA  GI/Hepatic/Renal/Endo    (+) obesity, liver disease fatty liver disease, renal disease (L renal atrophy)-, diabetes mellitus type 2  (-) GERD, no thyroid disorder    Musculoskeletal     Abdominal    Substance History      OB/GYN          Other                      Anesthesia Plan    ASA 3     general with block       Anesthetic plan, risks, benefits, and alternatives have been provided, discussed and informed consent has been obtained with: patient.    CODE STATUS:          yes

## 2024-11-20 NOTE — ANESTHESIA PROCEDURE NOTES
Peripheral Block    Pre-sedation assessment completed: 11/20/2024 12:35 PM    Patient reassessed immediately prior to procedure    Patient location during procedure: pre-op  Start time: 11/20/2024 12:35 PM  Stop time: 11/20/2024 12:45 PM  Reason for block: at surgeon's request and post-op pain management  Performed by  Anesthesiologist: Torin Paz MD  Preanesthetic Checklist  Completed: patient identified, IV checked, site marked, risks and benefits discussed, surgical consent, monitors and equipment checked, pre-op evaluation and timeout performed  Prep:  Pt Position: supine  Sterile barriers:cap, gloves, mask and sterile barriers  Prep: ChloraPrep  Patient monitoring: blood pressure monitoring, continuous pulse oximetry and EKG  Procedure    Sedation: yes  Performed under: local infiltration  Guidance:ultrasound guided and U/S used to identify structures for appropriate needle placement and to see local anesthetic disbursement    ULTRASOUND INTERPRETATION.  Using ultrasound guidance a 22 G gauge needle was placed in close proximity to the nerve, at which point, under ultrasound guidance anesthetic was injected in the area of the nerve and spread of the anesthesia was seen on ultrasound in close proximity thereto.  There were no abnormalities seen on ultrasound; a digital image was taken; and the patient tolerated the procedure with no complications. Images:still images obtained, printed/placed on chart (U/S to localize the nerve)    Laterality:left  Block Type:adductor canal block  Injection Technique:single-shot  Needle Type:echogenic  Needle Gauge:22 G  Resistance on Injection: less than 15 psi    Medications Used: ropivacaine (NAROPIN) 0.2 % injection - Injection   15 mL - 11/20/2024 12:45:00 PM      Post Assessment  Injection Assessment: negative aspiration for heme, no paresthesia on injection and incremental injection  Patient Tolerance:comfortable throughout block  Complications:no  Performed by:  Torin Paz MD

## 2024-11-21 ENCOUNTER — READMISSION MANAGEMENT (OUTPATIENT)
Dept: CALL CENTER | Facility: HOSPITAL | Age: 74
End: 2024-11-21
Payer: MEDICARE

## 2024-11-21 VITALS
HEART RATE: 72 BPM | DIASTOLIC BLOOD PRESSURE: 79 MMHG | WEIGHT: 205.03 LBS | HEIGHT: 64 IN | RESPIRATION RATE: 16 BRPM | BODY MASS INDEX: 35 KG/M2 | TEMPERATURE: 97.7 F | SYSTOLIC BLOOD PRESSURE: 143 MMHG | OXYGEN SATURATION: 98 %

## 2024-11-21 LAB
HCT VFR BLD AUTO: 39.1 % (ref 34–46.6)
HGB BLD-MCNC: 12 G/DL (ref 12–15.9)

## 2024-11-21 PROCEDURE — 63710000001 INSULIN LISPRO (HUMAN) PER 5 UNITS: Performed by: ORTHOPAEDIC SURGERY

## 2024-11-21 PROCEDURE — 85014 HEMATOCRIT: CPT | Performed by: ORTHOPAEDIC SURGERY

## 2024-11-21 PROCEDURE — 63710000001 INSULIN GLARGINE PER 5 UNITS: Performed by: ORTHOPAEDIC SURGERY

## 2024-11-21 PROCEDURE — 25010000002 CEFAZOLIN PER 500 MG: Performed by: ORTHOPAEDIC SURGERY

## 2024-11-21 PROCEDURE — 97161 PT EVAL LOW COMPLEX 20 MIN: CPT

## 2024-11-21 PROCEDURE — 97110 THERAPEUTIC EXERCISES: CPT

## 2024-11-21 PROCEDURE — 85018 HEMOGLOBIN: CPT | Performed by: ORTHOPAEDIC SURGERY

## 2024-11-21 RX ADMIN — METFORMIN HYDROCHLORIDE 1000 MG: 1000 TABLET, FILM COATED ORAL at 09:45

## 2024-11-21 RX ADMIN — ALLOPURINOL 300 MG: 300 TABLET ORAL at 09:45

## 2024-11-21 RX ADMIN — HYDROCODONE BITARTRATE AND ACETAMINOPHEN 2 TABLET: 7.5; 325 TABLET ORAL at 09:44

## 2024-11-21 RX ADMIN — ALPRAZOLAM 0.5 MG: 0.5 TABLET ORAL at 09:45

## 2024-11-21 RX ADMIN — SODIUM CHLORIDE 2000 MG: 900 INJECTION INTRAVENOUS at 05:52

## 2024-11-21 RX ADMIN — ASPIRIN 81 MG: 81 TABLET, COATED ORAL at 09:45

## 2024-11-21 RX ADMIN — HYDROCODONE BITARTRATE AND ACETAMINOPHEN 2 TABLET: 7.5; 325 TABLET ORAL at 01:32

## 2024-11-21 RX ADMIN — INSULIN GLARGINE 14 UNITS: 100 INJECTION, SOLUTION SUBCUTANEOUS at 09:48

## 2024-11-21 RX ADMIN — INSULIN LISPRO 2 UNITS: 100 INJECTION, SOLUTION INTRAVENOUS; SUBCUTANEOUS at 09:49

## 2024-11-21 NOTE — DISCHARGE SUMMARY
Patient Name: Reema Easley  Patient YOB: 1950    Date of Admission:  11/20/2024  Date of Discharge:  11/21/2024  Discharge Diagnosis: TOTAL KNEE ARTHROPLASTY REVISION  Presenting Problem/History of Present Illness: Total knee replacement status, left [Z96.652]  Knee pain, left [M25.562]  S/P revision of total knee [Z96.659]  Admitting Physician: Dr Tal Gonzalez  Consults:   Consults       No orders found for last 30 day(s).            DETAILS OF HOSPITAL STAY:  Patient is a 73 y.o. female was admitted to the floor following the above procedure and underwent an uncomplicated hospital stay.  Patient did well with physical therapy and was ambulating well without problems.  On the day of discharge the wound was clean, dry and intact and calf was soft and nontender and Homans sign was negative.  Patient was tolerating  without problems.  Patient will be discharged home.    Condition on Discharge:  Stable    Vital Signs  Temp:  [97.5 °F (36.4 °C)-98 °F (36.7 °C)] 98 °F (36.7 °C)  Heart Rate:  [] 82  Resp:  [14-18] 16  BP: (152-177)/(78-95) 152/87    LABS:      Admission on 11/20/2024   Component Date Value Ref Range Status    ABO Type 11/20/2024 A   Final    RH type 11/20/2024 Positive   Final    Antibody Screen 11/20/2024 Negative   Final    T&S Expiration Date 11/20/2024 11/23/2024 11:59:59 PM   Final    Glucose 11/20/2024 186 (H)  70 - 130 mg/dL Final    Glucose 11/20/2024 167 (H)  70 - 130 mg/dL Final    Glucose 11/20/2024 217 (H)  70 - 130 mg/dL Final    Hemoglobin 11/21/2024 12.0  12.0 - 15.9 g/dL Final    Hematocrit 11/21/2024 39.1  34.0 - 46.6 % Final       No results found.    Discharge Medications     Discharge Medications        New Medications        Instructions Start Date   aspirin 81 MG EC tablet   Take 1 tablet by mouth 2 (Two) Times a Day for 14 days, THEN 1 tablet Daily for 30 days.   Start Date: November 20, 2024     HYDROcodone-acetaminophen 7.5-325 MG per tablet  Commonly known  as: NORCO   Take 1-2 tablets by mouth Every 4 (Four) to 6 (Six) Hours As Needed for pain. May take 2 ONLY for severe pain.      ondansetron 4 MG tablet  Commonly known as: Zofran   4 mg, Oral, Every 8 Hours PRN      polyethylene glycol 17 GM/SCOOP powder  Commonly known as: MIRALAX   17 g, Oral, 2 Times Daily             Changes to Medications        Instructions Start Date   Lantus SoloStar 100 UNIT/ML injection pen  Generic drug: Insulin Glargine  What changed: how to take this   INJECT 14 UNITS DAILY, ADJUST WEEKLY WITH PROVIDER TO MAX DOSE OF 50 UNITS DAILY (DISCARD PEN 28 DAYS AFTER OPENING)      metFORMIN 1000 MG tablet  Commonly known as: GLUCOPHAGE  What changed:   how much to take  how to take this  when to take this   Take 1 p.o. twice daily at meals for diabetes      ondansetron ODT 4 MG disintegrating tablet  Commonly known as: ZOFRAN-ODT  What changed:   how much to take  how to take this  when to take this  reasons to take this   Take 1 tablet p.o. every 6 hours as needed nausea      pantoprazole 40 MG EC tablet  Commonly known as: PROTONIX  What changed:   how much to take  how to take this  when to take this   Take 1 p.o. daily before the largest meal on an empty stomach for gastric ulcer      pantoprazole 40 MG EC tablet  Commonly known as: PROTONIX  What changed: You were already taking a medication with the same name, and this prescription was added. Make sure you understand how and when to take each.   40 mg, Oral, Daily             Continue These Medications        Instructions Start Date   allopurinol 300 MG tablet  Commonly known as: ZYLOPRIM   300 mg, Oral, Daily      ALPRAZolam 0.5 MG tablet  Commonly known as: XANAX   0.5 mg, Oral, 2 Times Daily      benazepril 40 MG tablet  Commonly known as: LOTENSIN   TAKE 1 TABLET EVERY DAY      Dexcom G7 Sensor misc   1 each, Not Applicable, Every 10 Days      glucose monitor monitoring kit   1 each, Not Applicable, As Needed      NovoLOG FlexPen 100  UNIT/ML solution pen-injector sc pen  Generic drug: insulin aspart   Inject 2 Units under the skin into the appropriate area as directed Every Morning Before Breakfast AND 3 Units Daily Before Lunch AND 3 Units Daily Before Supper. Take injection and start eating within 5 minutes. Do not go > 15 minutes after shot without eating. May increase dose with provider up to 20u daily      Pen Needles 31G X 5 MM misc   1 each, Not Applicable, Daily, E11.65      simvastatin 40 MG tablet  Commonly known as: ZOCOR   TAKE 1 TABLET EVERY NIGHT      True Metrix Blood Glucose Test test strip  Generic drug: glucose blood   USE AS DIRECTED      TRUEplus Lancets 30G misc   TEST BLOOD SUGAR EVERY DAY AS DIRECTED             Stop These Medications      naproxen 500 MG tablet  Commonly known as: Naprosyn              Discharge Instructions: Patient is to continue with physical therapy exercises daily and continue working with the physical therapist as ordered. Patient may weight bear as tolerated. Apply ice regularly. Patient may ice for long periods of time as long as ice is not directly on the skin. Patient instructed on frequent calf pumping exercises.  Patient also instructed on incentive spirometer during hospitalization and encouraged to continue to use at home regularly.    Dressing: The dressing is designed to be left in place until you return to the office in 2 weeks.  The suction unit should stop functioning at 7 days and the green light will switch to yellow.  At that point the suction unit and tubing can be disconnected at the port closest to the dressing.  The suction unit and tubing may be discarded.  You may shower immediately upon return home, you will need to turn the pump off by depressing the orange button once and then you may disconnect the pump and tubing at the connection port.  After showering, shake off the excess water and reattach the tubing.  Restart the pump by depressing the orange button one time and you  will notice the green light flashing again.  If the dressing becomes dislodged or saturated it should be changed. Please refer to the LA information sheet if you have any questions about the dressing.  If you have a home health nurse or therapist they can be contacted to assist with dressing change or repair. You may also call the LA dressing hotline for questions related to the dressing (1-365.455.9792). If there are still other problems or questions related to the dressing despite these measures then you can contact Roya at our office [phone].  If for some reason the LA dressing is removed, after 7 days the wound can be gently cleaned with antibacterial soap then allowed to dry and covered with a dry sterile dressing. The wound should be covered at all times except while showering.  Patient may change dressings daily and prn using sterile 4x4 and paper tape, and should call if any unusual drainage, redness or swelling.*  Follow up appointment in 2 weeks - patient to call the office at [phone] to schedule.  Patient will be discharged on aspirin 81mg BID x 2 weeks, then daily x 4 weeks    Discharge Diagnosis:    Total knee replacement status, left    Knee pain, left    S/P revision of total knee      Follow-up Appointments  Future Appointments   Date Time Provider Department Center   12/5/2024 10:40 AM Tal Gonzalez MD [facility]   12/30/2024  3:45 PM [facility] 2 [facility] MRI [facility]   1/17/2025 10:40 AM Joe Roman APRN [facility]   2/19/2025 10:15 AM Daniela Salmeron APRN [facility]   4/9/2025  7:30 AM [facility] [place] [facility]   4/16/2025  7:30 AM Marc Rasheed MD [facility]          Tal Gonzalez MD  11/21/24  07:53 EST

## 2024-11-21 NOTE — PROGRESS NOTES
Continued Stay Note  [facility]     Patient Name: Reema Easley  MRN: [id number]  Today's Date: 11/21/2024    Admit Date: 11/20/2024    Plan:  TBD   Discharge Plan       Row Name 11/21/24 1721       Plan    Plan  TBD    Plan Comments Patient requested to d/c home with [facility], referral sent to [facility] who states they can not accept any patient that are not pre registered at this time. Referral also sent to [facility]  and [facility] , awaiting acceptance. [facility] to f/u.                   Discharge Codes    No documentation.                 Expected Discharge Date and Time       Expected Discharge Date Expected Discharge Time    Nov 21, 2024               Amparo Farrar RN

## 2024-11-21 NOTE — PLAN OF CARE
Goal Outcome Evaluation: POD1 Left total knee revision with LA dressing flashing green, VSS, afebrile, pain controlled with po pain medication. Worked well with therapy, voiding well, tolerating po diet, IV DC'd, M2B delivered, DC instructions reviewed with pt, all questions answered. Pt taken via WC with all belongings to DC exit.

## 2024-11-21 NOTE — DISCHARGE PLACEMENT REQUEST
"Reema Easley (73 y.o. Female)       Date of Birth   1950    Social Security Number       Address   [address] [id number]    Home Phone   [phone]    MRN   [id number]       [facility]    Marital Status                               Admission Date   11/20/24    Admission Type   Elective    Admitting Provider   Tal Gonzalez MD    Attending Provider   Tal Gonzalez MD    Department, Room/Bed   [address], P790/1       Discharge Date       Discharge Disposition   [place] [facility]    [place]charge Destination                                 Attending Provider: Tal Gonzalez MD    Allergies: No Known Allergies    Isolation: None   Infection: None   Code Status: Prior    Ht: 162.6 cm (64.02\")   Wt: 93 kg (205 lb 0.4 oz)    Admission Cmt: None   Principal Problem: Total knee replacement status, left [Z96.652]                   Active Insurance as of 11/20/2024       Primary Coverage       Payor Plan Insurance Group Employer/Plan Group    [employer] MEDICARE REPLACEMENT [employer] MED ADV GROUP [id number]       Payor Plan Address Payor Plan Phone Number Payor Plan Fax Number Effective Dates    [address] [address] [phone]  1/1/2018 - None Entered    [facility] [place] [address]         Subscriber Name Subscriber Birth Date Member ID       REEMA EASLEY 1950 [id number]                     Emergency Contacts        (Rel.) Home Phone Work Phone Mobile Phone    EASLEYRAÚL LOTT (Daughter) [phone] -- [phone]    Jamia Christiansen (Daughter) [phone] -- --            "

## 2024-11-21 NOTE — PROGRESS NOTES
Continued Stay Note  [facility]     Patient Name: Reema Easley  MRN: [id number]  Today's Date: 11/21/2024    Admit Date: 11/20/2024    Plan:  TBD   Discharge Plan       Row Name 11/21/24 1725       Plan    Plan Comments Referral also sent to [facility], [facility] to f/u 11/22am.      Row Name 11/21/24 1721       Plan    Plan  TBD    Plan Comments [facility], [facility] states they can not accept any patient that are not pre registered at this time. Referral also sent to [facility]  and [facility] , awaiting acceptance. [facility] to f/u.                   Discharge Codes    No documentation.                 Expected Discharge Date and Time       Expected Discharge Date Expected Discharge Time    Nov 21, 2024               Amparo Farrar RN

## 2024-11-21 NOTE — OUTREACH NOTE
Prep Survey      Flowsheet Row Responses   [facility] patient discharged from? [facility]   Is LACE score < 7 ? No   Eligibility [facility]   Date of Admission 11/20/24   Date of Discharge 11/21/24   Discharge Disposition [place] [facility]   Discharge diagnosis Left total knee instability,    TOTAL KNEE ARTHROPLASTY REVISION   Does the patient have one of the following disease processes/diagnoses(primary or secondary)? Total Joint Replacement   Does the patient have Home health ordered? Yes   What is the Home health agency?  HH referrals pending   Is there a DME ordered? Yes   What DME was ordered? walker   Prep survey completed? Yes            Daphne CHU - Registered Nurse
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
## 2024-11-21 NOTE — PLAN OF CARE
Problem: Adult Inpatient Plan of Care  Goal: Absence of Hospital-Acquired Illness or Injury  Intervention: Identify and Manage Fall Risk  Recent Flowsheet Documentation  Taken 11/21/2024 0454 by Thelma Fernando RN  Safety Promotion/Fall Prevention:   assistive device/personal items within reach   safety round/check completed  Taken 11/21/2024 0202 by Thelma Fernando RN  Safety Promotion/Fall Prevention:   assistive device/personal items within reach   safety round/check completed  Taken 11/21/2024 0027 by Thelma Fernando RN  Safety Promotion/Fall Prevention:   assistive device/personal items within reach   safety round/check completed  Taken 11/20/2024 2225 by Thelma Fernando RN  Safety Promotion/Fall Prevention:   assistive device/personal items within reach   safety round/check completed  Taken 11/20/2024 2119 by Thelma Fernando RN  Safety Promotion/Fall Prevention:   activity supervised   clutter free environment maintained   fall prevention program maintained   gait belt   lighting adjusted   mobility aid in reach   nonskid shoes/slippers when out of bed   safety round/check completed  Taken 11/20/2024 2000 by Thelma Fernando RN  Safety Promotion/Fall Prevention:   assistive device/personal items within reach   safety round/check completed  Intervention: Prevent Skin Injury  Recent Flowsheet Documentation  Taken 11/21/2024 0454 by Thelma Fernando RN  Body Position: supine  Taken 11/21/2024 0202 by Thelma Fernando RN  Body Position: supine  Taken 11/21/2024 0027 by Thelma Fernando RN  Body Position: supine  Taken 11/20/2024 2225 by Thelma Fernando RN  Body Position:   lower extremity elevated   supine  Taken 11/20/2024 2119 by Thelma Fernando RN  Body Position:   neutral body alignment   supine   lower extremity elevated  Skin Protection:   incontinence pads utilized   protective footwear used  Taken 11/20/2024 2000 by Themla Fernando RN  Body Position: supine  Intervention: Prevent and Manage VTE (Venous Thromboembolism) Risk  Recent Flowsheet  Documentation  Taken 11/20/2024 2119 by Thelma Fernando RN  VTE Prevention/Management:   bilateral   SCDs (sequential compression devices) on  Goal: Optimal Comfort and Wellbeing  Intervention: Monitor Pain and Promote Comfort  Recent Flowsheet Documentation  Taken 11/21/2024 0132 by Thelma Fernando RN  Pain Management Interventions: pain medication given  Taken 11/20/2024 2119 by Thelma Fernando RN  Pain Management Interventions:   cold applied   pain medication given  Intervention: Provide Person-Centered Care  Recent Flowsheet Documentation  Taken 11/20/2024 2119 by Thelma Fernando RN  Trust Relationship/Rapport:   care explained   thoughts/feelings acknowledged     Problem: Knee Arthroplasty  Goal: Optimal Coping  Intervention: Support Psychosocial Response to Surgery and Mobility Changes  Recent Flowsheet Documentation  Taken 11/20/2024 2119 by Thelma Fernando RN  Supportive Measures:   active listening utilized   self-care encouraged  Goal: Absence of Bleeding  Intervention: Monitor and Manage Bleeding  Recent Flowsheet Documentation  Taken 11/20/2024 2119 by Thelma Fernando RN  Bleeding Management: dressing monitored  Goal: Effective Bowel Elimination  Intervention: Enhance Bowel Motility and Elimination  Recent Flowsheet Documentation  Taken 11/20/2024 2119 by Thelma Fernando RN  Bowel Motility Enhancement: ambulation promoted  Goal: Optimal Functional Ability  Intervention: Promote Optimal Functional Status  Recent Flowsheet Documentation  Taken 11/20/2024 2119 by Thelma Fernando RN  Activity Management:   dorsiflexion/plantar flexion performed   activity encouraged  Assistive Device Utilized:   gait belt   walker  Self-Care Promotion:   independence encouraged   BADL personal objects within reach  Goal: Intact Neurovascular Status  Intervention: Prevent or Manage Neurovascular Compromise  Recent Flowsheet Documentation  Taken 11/20/2024 2119 by Thelma Fernando RN  Compartment Syndrome Management: active flexion/extension  encouraged  Goal: Anesthesia/Sedation Recovery  Intervention: Optimize Anesthesia Recovery  Recent Flowsheet Documentation  Taken 11/21/2024 0454 by Thelma Fernando RN  Safety Promotion/Fall Prevention:   assistive device/personal items within reach   safety round/check completed  Taken 11/21/2024 0202 by Thelma Fernando RN  Safety Promotion/Fall Prevention:   assistive device/personal items within reach   safety round/check completed  Taken 11/21/2024 0027 by Thelma Fernando RN  Safety Promotion/Fall Prevention:   assistive device/personal items within reach   safety round/check completed  Taken 11/20/2024 2225 by Thelma Fernando RN  Safety Promotion/Fall Prevention:   assistive device/personal items within reach   safety round/check completed  Taken 11/20/2024 2119 by Thlema Fernando RN  Patient Tolerance (IS): good  Safety Promotion/Fall Prevention:   activity supervised   clutter free environment maintained   fall prevention program maintained   gait belt   lighting adjusted   mobility aid in reach   nonskid shoes/slippers when out of bed   safety round/check completed  Administration (IS):   self-administered   proper technique demonstrated  Reorientation Measures: glasses use encouraged  Level Incentive Spirometer (mL): 2000  Incentive Spirometer Predicted Level (mL): 2000  Number of Repetitions (IS): 5  Taken 11/20/2024 2000 by Thelma Fernando RN  Safety Promotion/Fall Prevention:   assistive device/personal items within reach   safety round/check completed  Goal: Optimal Pain Control and Function  Intervention: Prevent or Manage Pain  Recent Flowsheet Documentation  Taken 11/21/2024 0132 by Thelma Fernando RN  Pain Management Interventions: pain medication given  Taken 11/20/2024 2119 by Thelma Fernando RN  Pain Management Interventions:   cold applied   pain medication given  Diversional Activities:   television   smartphone  Goal: Effective Oxygenation and Ventilation  Intervention: Optimize Oxygenation and Ventilation  Recent  Flowsheet Documentation  Taken 11/21/2024 0454 by Thelma Fernando RN  Head of Bed (HOB) Positioning: HOB at 20-30 degrees  Taken 11/21/2024 0202 by Thelma Fernando RN  Head of Bed (HOB) Positioning: HOB elevated  Taken 11/21/2024 0027 by Thelma Fernando RN  Head of Bed (HOB) Positioning: HOB lowered  Taken 11/20/2024 2225 by Thelma Fernando RN  Head of Bed (HOB) Positioning: HOB lowered  Taken 11/20/2024 2119 by Thelma Fernando RN  Activity Management:   dorsiflexion/plantar flexion performed   activity encouraged  Head of Bed (HOB) Positioning: HOB elevated  Taken 11/20/2024 2000 by Thelma Fernando RN  Head of Bed (HOB) Positioning: HOB elevated   Goal Outcome Evaluation:

## 2024-11-21 NOTE — PLAN OF CARE
Goal Outcome Evaluation:   Patient is POD 0. Aox4. VSS on RA. Ambulating assist x1 with a walker. Has voided since surgery. Pain has been adequately controlled. Plan is to discharge home with family tomorrow. All needs currently met, will continue to monitor.

## 2024-11-22 ENCOUNTER — TRANSITIONAL CARE MANAGEMENT TELEPHONE ENCOUNTER (OUTPATIENT)
Dept: CALL CENTER | Facility: HOSPITAL | Age: 74
End: 2024-11-22
Payer: MEDICARE

## 2024-11-22 NOTE — PROGRESS NOTES
Continued Stay Note  [facility]     Patient Name: Reema Easley  MRN: [id number]  Today's Date: 11/22/2024    Admit Date: 11/20/2024    Plan: LINDA TBD   Discharge Plan       Row Name 11/22/24 0859       Plan    Plan Comments [facility] able to accept, spoke with Fabiola Hester who states they will going out to see patient today 11/22. Home with [facility] and family support.    Final Discharge Disposition Code 06 - home with home health care    Final Note [facility]                    Discharge Codes    No documentation.                 Expected Discharge Date and Time       Expected Discharge Date Expected Discharge Time    Nov 21, 2024               Amparo Farrar RN

## 2024-11-22 NOTE — OUTREACH NOTE
Call Center TCM Note      Flowsheet Row Responses   [facility] patient discharged from? [place]   Does the patient have one of the following disease processes/diagnoses(primary or secondary)? Total Joint Replacement   Joint surgery performed? Knee   TCM attempt successful? Yes   Call start time 1426   Call end time 1434   Discharge diagnosis Left total knee instability,    TOTAL KNEE ARTHROPLASTY REVISION   Person spoke with today (if not patient) and relationship patient   Does the patient have all medications related to this admission filled (includes all antibiotics, pain medications, etc.) Yes   Is the patient taking all medications as directed (includes completed medication regime)? Yes   Is the patient able to teach back alternate methods of pain control? Ice, Knee-elevation/no pillow under knee   Comments Patient has a followup scheduled on 12/5/2024 with surgeon.   Does the patient have an appointment with their PCP within 7-14 days of discharge? Other   Nursing Interventions Patient desires to follow up with specialty only   What DME was ordered? walker   Has all DME been delivered? Yes   Psychosocial issues? No   Has the patient fallen since discharge? No   Did the patient receive a copy of their discharge instructions? Yes   Nursing interventions Reviewed instructions with patient   What is the patient's perception of their functional status since discharge? Same  [Patient is having a moderate amount of pain .]   Did the patient implement home safety suggestions from pre-surgery classes if attended? N/A   Is the patient/caregiver able to teach back the hierarchy of who to call/visit for symptoms/problems? PCP, Specialist, Home health nurse, Urgent Care, ED, 911 Yes   TCM call completed? Yes   Wrap up additional comments Quick call. Patient is having issues with the ice pump . She will call the  to obtain asssitance resetting pump.   Call end time 1434   Would this patient benefit from  a Referral to [facility] Social Work? No   Is the patient interested in additional calls from an ambulatory ? No            Dashawn Tariq RN    11/22/2024, 14:34 EST

## 2024-11-23 DIAGNOSIS — R80.9 TYPE 2 DIABETES MELLITUS WITH MICROALBUMINURIA, WITHOUT LONG-TERM CURRENT USE OF INSULIN: Chronic | ICD-10-CM

## 2024-11-23 DIAGNOSIS — E11.29 TYPE 2 DIABETES MELLITUS WITH MICROALBUMINURIA, WITHOUT LONG-TERM CURRENT USE OF INSULIN: Chronic | ICD-10-CM

## 2024-11-25 ENCOUNTER — TELEPHONE (OUTPATIENT)
Dept: ORTHOPEDIC SURGERY | Facility: CLINIC | Age: 74
End: 2024-11-25
Payer: MEDICARE

## 2024-11-25 RX ORDER — INSULIN GLARGINE 100 [IU]/ML
INJECTION, SOLUTION SUBCUTANEOUS
Qty: 30 ML | Refills: 0 | Status: SHIPPED | OUTPATIENT
Start: 2024-11-25

## 2024-11-25 NOTE — TELEPHONE ENCOUNTER
Caller: CARLOS SALVADOR/ DEVIN    Best call back number: [phone]    What form or medical record are you requesting: HOME HEALTH ORDER FOR PHYSICAL THERAPY FOR 3 WKS, TWICE A WEEK    Who is requesting this form or medical record from you: HOME HEALTH    How would you like to receive the form or medical records (pick-up, mail, fax): FAX  If fax, what is the fax number: [phone]    Timeframe paperwork needed: ASAP

## 2024-11-25 NOTE — TELEPHONE ENCOUNTER
Rx Refill Note  Requested Prescriptions     Pending Prescriptions Disp Refills    Lantus SoloStar 100 UNIT/ML injection pen [Pharmacy Med Name: Lantus SoloStar Subcutaneous Solution Pen-injector 100 UNIT/ML] 30 mL 5     Sig: INJECT 14 UNITS DAILY, ADJUST WEEKLY WITH PROVIDER TO MAX DOSE OF 50 UNITS DAILY (DISCARD PEN 28 DAYS AFTER OPENING)      Last office visit with prescribing clinician: 11/19/2024   Last telemedicine visit with prescribing clinician: Visit date not found   Next office visit with prescribing clinician: 2/19/2025                         Would you like a call back once the refill request has been completed: [] Yes [] No    If the office needs to give you a call back, can they leave a voicemail: [] Yes [] No    Bren Cruz MA  11/25/24, 08:23 EST

## 2024-11-29 ENCOUNTER — READMISSION MANAGEMENT (OUTPATIENT)
Dept: CALL CENTER | Facility: HOSPITAL | Age: 74
End: 2024-11-29
Payer: MEDICARE

## 2024-11-29 NOTE — OUTREACH NOTE
Total Joint Week 2 Survey      Flowsheet Row Responses   [facility] facility patient discharged from? [place]   Does the patient have one of the following disease processes/diagnoses(primary or secondary)? Total Joint Replacement   Joint surgery performed? Knee   Week 2 attempt successful? No   Unsuccessful attempts Attempt 1            DONNIE West Registered Nurse

## 2024-12-03 ENCOUNTER — READMISSION MANAGEMENT (OUTPATIENT)
Dept: CALL CENTER | Facility: HOSPITAL | Age: 74
End: 2024-12-03
Payer: MEDICARE

## 2024-12-03 NOTE — OUTREACH NOTE
Total Joint Week 2 Survey      Flowsheet Row Responses   [facility] patient discharged from? [place]   Does the patient have one of the following disease processes/diagnoses(primary or secondary)? Total Joint Replacement   Joint surgery performed? Knee   Week 2 attempt successful? Yes   Call start time 1030   Call end time 1039   Has the patient been back in either the hospital or Emergency Department since discharge? No   Does the patient have all medications related to this admission filled (includes all antibiotics, pain medications, etc.) Yes   Is the patient taking all medications as directed (includes completed medication regime)? Yes   Is the patient able to teach back alternate methods of pain control? Ice, Knee-elevation/no pillow under knee, Reposition, Correct alignment, Short, frequent activity   Comments regarding appointments Surgeon appt 12/05/24.   Does the patient have a follow up appointment with their surgeon? Yes   Has the patient kept scheduled appointments due by today? N/A   Has home health visited the patient within 72 hours of discharge? Yes   Has all DME been delivered? Yes   DME comments States has graduated to a cane.   Psychosocial issues? No   Has the patient began therapy sessions (either in the home or as an out patient)? Yes   Does the patient have a wound vac in place? N/A   Has the patient fallen since discharge? No   Did the patient receive a copy of their discharge instructions? Yes   Nursing interventions Reviewed instructions with patient   What is the patient's perception of their functional status since discharge? Improving   Is the patient able to teach back signs and symptoms of infection? Temp >100.4 for 24h or longer, Incisional drainage, Blisters around incision, Increased swelling or redness around incision (not associated with surgical edema), Severe discomfort or pain, Changes in mobility, Shortness of breath or chest pain   Is the patient able to teach back  how to prevent infection? Check incision daily, Wash hands before and after touching incision, Keep incision covered if drainage, Keep incision covered if pets in house, Shower only as directed by surgeon, Eat well-balanced diet, No tub baths, hot tub or swimming, No lotion or creams   Is the patient able to teach back signs and symptoms of DVT? Redness in calf, Area hot to touch, Shortness of breath or chest pain, Swelling in calf, Severe pain in calf   Is the patient able to teach back home safety measures? Ability to shower   Did the patient implement home safety suggestions from pre-surgery classes if attended? N/A   If the patient is a current smoker, are they able to teach back resources for cessation? Not a smoker   Is the patient/caregiver able to teach back the hierarchy of who to call/visit for symptoms/problems? PCP, Specialist, Home health nurse, Urgent Care, ED, 911 Yes   Week 2 call completed? Yes   Is the patient interested in additional calls from an ambulatory ? No   Would this patient benefit from a Referral to [facility] Social Work? No   Wrap up additional comments Patient states is improving. Denies any s/s of infection. States dressing to knee in place with small amount of drainage noted. States HH PT will visit again today. States now using a cane. Denies any needs or concerns today.   Call end time 1039            Yaneth ADAMS - Registered Nurse

## 2024-12-04 ENCOUNTER — PATIENT OUTREACH (OUTPATIENT)
Dept: CASE MANAGEMENT | Facility: OTHER | Age: 74
End: 2024-12-04
Payer: MEDICARE

## 2024-12-04 NOTE — OUTREACH NOTE
AMBULATORY CASE MANAGEMENT NOTE    Names and Relationships of Patient/Support Persons: Contact: Reema Easley; Relationship: Self -     Patient Outreach    Introduced self, explained ACM RN role and provided contact information. Patient reports she is progressing well. She is active with the call center. Follow up scheduled to outreach next month.      Education Documentation  No documentation found.        Vanessa HELMS  Ambulatory Case Management    12/4/2024, 16:33 EST

## 2024-12-05 ENCOUNTER — OFFICE VISIT (OUTPATIENT)
Dept: ORTHOPEDIC SURGERY | Facility: CLINIC | Age: 74
End: 2024-12-05
Payer: MEDICARE

## 2024-12-05 VITALS — HEIGHT: 64 IN | TEMPERATURE: 97.7 F | BODY MASS INDEX: 34.35 KG/M2 | WEIGHT: 201.2 LBS

## 2024-12-05 DIAGNOSIS — Z96.652 TOTAL KNEE REPLACEMENT STATUS, LEFT: Primary | ICD-10-CM

## 2024-12-05 PROCEDURE — 99024 POSTOP FOLLOW-UP VISIT: CPT | Performed by: ORTHOPAEDIC SURGERY

## 2024-12-05 NOTE — PROGRESS NOTES
Reema Easley : 1950 MRN: [id number] DATE: 2024    DIAGNOSIS: 2 week follow up left total knee  poly change for instability    SUBJECTIVE:Patient returns today for 2 week follow up of left total knee replacement poly change. Patient reports doing well with no unusual complaints. Appears to be progressing appropriately.    OBJECTIVE:   Exam:. The incision is healing appropriately. No sign of infection. Range of motion is progressing as expected. The calf is soft and nontender with a negative Homans sign.    ASSESSMENT: 2 week status post left knee replacement poly change for instability.    PLAN: 1) Staples removed and steri strips applied   2) Order given for PT - OP   3) Discontinue VINAY hose   4) Continue ice PRN   5) aspirin 81 mg orally every day for 1 month   6) Follow up in 6 weeks with repeat Xrays of left knee (3views)    Tal Gonzalez MD  2024

## 2024-12-12 ENCOUNTER — TELEPHONE (OUTPATIENT)
Dept: ENDOCRINOLOGY | Age: 74
End: 2024-12-12
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
## 2024-12-14 DIAGNOSIS — F41.1 GENERALIZED ANXIETY DISORDER: Chronic | ICD-10-CM

## 2024-12-16 RX ORDER — ALPRAZOLAM 0.5 MG
0.5 TABLET ORAL 2 TIMES DAILY
Qty: 60 TABLET | Refills: 3 | Status: ON HOLD | OUTPATIENT
Start: 2024-12-16

## 2024-12-17 ENCOUNTER — READMISSION MANAGEMENT (OUTPATIENT)
Dept: CALL CENTER | Facility: HOSPITAL | Age: 74
End: 2024-12-17
Payer: MEDICARE

## 2024-12-17 NOTE — OUTREACH NOTE
Total Joint Month 1 Survey      Flowsheet Row Responses   [facility] patient discharged from? [place]   Does the patient have one of the following disease processes/diagnoses(primary or secondary)? Total Joint Replacement   Joint surgery performed? Knee   Month 1 attempt successful? Yes   Call start time 1329   Call end time 1336   Has the patient been back in either the hospital or Emergency Department since discharge? No   Person spoke with today (if not patient) and relationship Dtr   Is the patient taking all medications as directed (includes completed medication regime)? Yes   Comments regarding appointments Surgeon appt 12/05/24.   Has the patient kept scheduled appointments due by today? Yes   Is the patient still receiving Home Health Services? N/A   DME comments Pt only needs cane occasionally   Is the patient still attending therapy sessions(either in the home or as an outpatient)? Yes   Has the patient fallen since discharge? No   Comments PT starts this thursday outpt [facility]   What is the patient's perception of their functional status since discharge? Improving   If the patient is a current smoker, are they able to teach back resources for cessation? Not a smoker   Is the patient/caregiver able to teach back the hierarchy of who to call/visit for symptoms/problems? PCP, Specialist, Home health nurse, Urgent Care, ED, 911 Yes   Month 1 call completed? Yes   Graduated Yes   Is the patient interested in additional calls from an ambulatory ? No   Would this patient benefit from a Referral to Amb Social Work? No   Wrap up additional comments Pt is doing very well.  She reports no concerns at this time.   Call end time 1336            DONNIE AMIN - Registered Nurse

## 2024-12-20 ENCOUNTER — TELEPHONE (OUTPATIENT)
Dept: INTERNAL MEDICINE | Facility: CLINIC | Age: 74
End: 2024-12-20

## 2024-12-20 NOTE — TELEPHONE ENCOUNTER
PATIENT CALLED FOR MEDICATION REQUEST OF ANTIBIOTIC FOR CONGESTION AND HEADACHE. ITS THE SAME AS SHE HAD EARLIER THIS YEAR. SHE DOES NOT KNOW THE MEDICATION NAME.    OFFERED SAME DAY, DENIED    CALL BACK NUMBER [phone]    [facility] [id number] - [facility] [address] [place] [facility] [address] [facility] - [phone] [facility] [phone]  [phone]

## 2024-12-20 NOTE — TELEPHONE ENCOUNTER
Patient informed, patient refused ICC and Telehealth with Aminata. States she will treat with OTC

## 2024-12-22 ENCOUNTER — HOSPITAL ENCOUNTER (INPATIENT)
Facility: HOSPITAL | Age: 74
LOS: 1 days | Discharge: HOME | End: 2024-12-24
Attending: STUDENT IN AN ORGANIZED HEALTH CARE EDUCATION/TRAINING PROGRAM | Admitting: INTERNAL MEDICINE
Payer: MEDICARE

## 2024-12-22 ENCOUNTER — APPOINTMENT (OUTPATIENT)
Dept: GENERAL RADIOLOGY | Facility: HOSPITAL | Age: 74
End: 2024-12-22
Payer: MEDICARE

## 2024-12-22 ENCOUNTER — APPOINTMENT (OUTPATIENT)
Dept: CT IMAGING | Facility: HOSPITAL | Age: 74
End: 2024-12-22
Payer: MEDICARE

## 2024-12-22 DIAGNOSIS — R07.9 CHEST PAIN, UNSPECIFIED TYPE: ICD-10-CM

## 2024-12-22 DIAGNOSIS — E11.65 TYPE 2 DIABETES MELLITUS WITH HYPERGLYCEMIA, UNSPECIFIED WHETHER LONG TERM INSULIN USE: ICD-10-CM

## 2024-12-22 DIAGNOSIS — Z95.5 S/P PRIMARY ANGIOPLASTY WITH CORONARY STENT: ICD-10-CM

## 2024-12-22 DIAGNOSIS — I21.4 NSTEMI, INITIAL EPISODE OF CARE: Primary | ICD-10-CM

## 2024-12-22 LAB
ALBUMIN SERPL-MCNC: 4 G/DL (ref 3.5–5.2)
ALBUMIN/GLOB SERPL: 1.4 G/DL
ALP SERPL-CCNC: 83 U/L (ref 39–117)
ALT SERPL W P-5'-P-CCNC: 10 U/L (ref 1–33)
ANION GAP SERPL CALCULATED.3IONS-SCNC: 12 MMOL/L (ref 5–15)
AST SERPL-CCNC: 15 U/L (ref 1–32)
BASOPHILS # BLD AUTO: 0.12 10*3/MM3 (ref 0–0.2)
BASOPHILS NFR BLD AUTO: 0.9 % (ref 0–1.5)
BILIRUB SERPL-MCNC: 0.4 MG/DL (ref 0–1.2)
BUN SERPL-MCNC: 19 MG/DL (ref 8–23)
BUN/CREAT SERPL: 24.7 (ref 7–25)
CALCIUM SPEC-SCNC: 9.6 MG/DL (ref 8.6–10.5)
CHLORIDE SERPL-SCNC: 103 MMOL/L (ref 98–107)
CHOLEST SERPL-MCNC: 128 MG/DL (ref 0–200)
CO2 SERPL-SCNC: 23 MMOL/L (ref 22–29)
CREAT SERPL-MCNC: 0.77 MG/DL (ref 0.57–1)
D DIMER PPP FEU-MCNC: 0.77 MCGFEU/ML (ref 0–0.72)
DEPRECATED RDW RBC AUTO: 41.9 FL (ref 37–54)
EGFRCR SERPLBLD CKD-EPI 2021: 82.1 ML/MIN/1.73
EOSINOPHIL # BLD AUTO: 1.75 10*3/MM3 (ref 0–0.4)
EOSINOPHIL NFR BLD AUTO: 13 % (ref 0.3–6.2)
ERYTHROCYTE [DISTWIDTH] IN BLOOD BY AUTOMATED COUNT: 12.3 % (ref 12.3–15.4)
GEN 5 1HR TROPONIN T REFLEX: 16 NG/L
GLOBULIN UR ELPH-MCNC: 2.8 GM/DL
GLUCOSE BLDC GLUCOMTR-MCNC: 200 MG/DL (ref 70–130)
GLUCOSE SERPL-MCNC: 216 MG/DL (ref 65–99)
HBA1C MFR BLD: 7.7 % (ref 4.8–5.6)
HCT VFR BLD AUTO: 41.5 % (ref 34–46.6)
HDLC SERPL-MCNC: 52 MG/DL (ref 40–60)
HGB BLD-MCNC: 13.2 G/DL (ref 12–15.9)
HOLD SPECIMEN: NORMAL
HOLD SPECIMEN: NORMAL
IMM GRANULOCYTES # BLD AUTO: 0.02 10*3/MM3 (ref 0–0.05)
IMM GRANULOCYTES NFR BLD AUTO: 0.1 % (ref 0–0.5)
LDLC SERPL CALC-MCNC: 51 MG/DL (ref 0–100)
LDLC/HDLC SERPL: 0.91 {RATIO}
LYMPHOCYTES # BLD AUTO: 4.04 10*3/MM3 (ref 0.7–3.1)
LYMPHOCYTES NFR BLD AUTO: 30 % (ref 19.6–45.3)
MCH RBC QN AUTO: 29.5 PG (ref 26.6–33)
MCHC RBC AUTO-ENTMCNC: 31.8 G/DL (ref 31.5–35.7)
MCV RBC AUTO: 92.6 FL (ref 79–97)
MONOCYTES # BLD AUTO: 0.9 10*3/MM3 (ref 0.1–0.9)
MONOCYTES NFR BLD AUTO: 6.7 % (ref 5–12)
NEUTROPHILS NFR BLD AUTO: 49.3 % (ref 42.7–76)
NEUTROPHILS NFR BLD AUTO: 6.62 10*3/MM3 (ref 1.7–7)
NRBC BLD AUTO-RTO: 0 /100 WBC (ref 0–0.2)
PLATELET # BLD AUTO: 533 10*3/MM3 (ref 140–450)
PMV BLD AUTO: 9.5 FL (ref 6–12)
POTASSIUM SERPL-SCNC: 4.6 MMOL/L (ref 3.5–5.2)
PROT SERPL-MCNC: 6.8 G/DL (ref 6–8.5)
QT INTERVAL: 342 MS
QTC INTERVAL: 395 MS
RBC # BLD AUTO: 4.48 10*6/MM3 (ref 3.77–5.28)
SODIUM SERPL-SCNC: 138 MMOL/L (ref 136–145)
TRIGL SERPL-MCNC: 144 MG/DL (ref 0–150)
TROPONIN T % DELTA: -6 %
TROPONIN T NUMERIC DELTA: -1 NG/L
TROPONIN T SERPL HS-MCNC: 17 NG/L
TROPONIN T SERPL HS-MCNC: 30 NG/L
VLDLC SERPL-MCNC: 25 MG/DL (ref 5–40)
WBC NRBC COR # BLD AUTO: 13.45 10*3/MM3 (ref 3.4–10.8)
WHOLE BLOOD HOLD COAG: NORMAL
WHOLE BLOOD HOLD SPECIMEN: NORMAL

## 2024-12-22 PROCEDURE — 84484 ASSAY OF TROPONIN QUANT: CPT | Performed by: STUDENT IN AN ORGANIZED HEALTH CARE EDUCATION/TRAINING PROGRAM

## 2024-12-22 PROCEDURE — 80061 LIPID PANEL: CPT | Performed by: NURSE PRACTITIONER

## 2024-12-22 PROCEDURE — 71045 X-RAY EXAM CHEST 1 VIEW: CPT

## 2024-12-22 PROCEDURE — 93005 ELECTROCARDIOGRAM TRACING: CPT

## 2024-12-22 PROCEDURE — 25510000001 IOPAMIDOL PER 1 ML: Performed by: STUDENT IN AN ORGANIZED HEALTH CARE EDUCATION/TRAINING PROGRAM

## 2024-12-22 PROCEDURE — G0378 HOSPITAL OBSERVATION PER HR: HCPCS

## 2024-12-22 PROCEDURE — 93010 ELECTROCARDIOGRAM REPORT: CPT | Performed by: INTERNAL MEDICINE

## 2024-12-22 PROCEDURE — 63710000001 INSULIN LISPRO (HUMAN) PER 5 UNITS: Performed by: NURSE PRACTITIONER

## 2024-12-22 PROCEDURE — 93005 ELECTROCARDIOGRAM TRACING: CPT | Performed by: STUDENT IN AN ORGANIZED HEALTH CARE EDUCATION/TRAINING PROGRAM

## 2024-12-22 PROCEDURE — 84484 ASSAY OF TROPONIN QUANT: CPT

## 2024-12-22 PROCEDURE — 71275 CT ANGIOGRAPHY CHEST: CPT

## 2024-12-22 PROCEDURE — 84484 ASSAY OF TROPONIN QUANT: CPT | Performed by: NURSE PRACTITIONER

## 2024-12-22 PROCEDURE — 36415 COLL VENOUS BLD VENIPUNCTURE: CPT

## 2024-12-22 PROCEDURE — 82948 REAGENT STRIP/BLOOD GLUCOSE: CPT

## 2024-12-22 PROCEDURE — 93005 ELECTROCARDIOGRAM TRACING: CPT | Performed by: NURSE PRACTITIONER

## 2024-12-22 PROCEDURE — 99285 EMERGENCY DEPT VISIT HI MDM: CPT

## 2024-12-22 PROCEDURE — 85379 FIBRIN DEGRADATION QUANT: CPT | Performed by: PHYSICIAN ASSISTANT

## 2024-12-22 PROCEDURE — 63710000001 INSULIN GLARGINE PER 5 UNITS: Performed by: NURSE PRACTITIONER

## 2024-12-22 PROCEDURE — 83036 HEMOGLOBIN GLYCOSYLATED A1C: CPT | Performed by: NURSE PRACTITIONER

## 2024-12-22 PROCEDURE — 85025 COMPLETE CBC W/AUTO DIFF WBC: CPT

## 2024-12-22 PROCEDURE — 80053 COMPREHEN METABOLIC PANEL: CPT

## 2024-12-22 RX ORDER — ALLOPURINOL 300 MG/1
300 TABLET ORAL DAILY
Status: DISCONTINUED | OUTPATIENT
Start: 2024-12-22 | End: 2024-12-24 | Stop reason: HOSPADM

## 2024-12-22 RX ORDER — BISACODYL 10 MG
10 SUPPOSITORY, RECTAL RECTAL DAILY PRN
Status: DISCONTINUED | OUTPATIENT
Start: 2024-12-22 | End: 2024-12-24 | Stop reason: HOSPADM

## 2024-12-22 RX ORDER — HYDROCODONE BITARTRATE AND ACETAMINOPHEN 7.5; 325 MG/1; MG/1
1 TABLET ORAL EVERY 4 HOURS PRN
Status: DISCONTINUED | OUTPATIENT
Start: 2024-12-22 | End: 2024-12-24 | Stop reason: HOSPADM

## 2024-12-22 RX ORDER — SODIUM CHLORIDE 9 MG/ML
40 INJECTION, SOLUTION INTRAVENOUS AS NEEDED
Status: DISCONTINUED | OUTPATIENT
Start: 2024-12-22 | End: 2024-12-24 | Stop reason: HOSPADM

## 2024-12-22 RX ORDER — PANTOPRAZOLE SODIUM 40 MG/1
40 TABLET, DELAYED RELEASE ORAL DAILY
Status: DISCONTINUED | OUTPATIENT
Start: 2024-12-23 | End: 2024-12-24 | Stop reason: HOSPADM

## 2024-12-22 RX ORDER — POLYETHYLENE GLYCOL 3350 17 G/17G
17 POWDER, FOR SOLUTION ORAL DAILY PRN
Status: DISCONTINUED | OUTPATIENT
Start: 2024-12-22 | End: 2024-12-24 | Stop reason: HOSPADM

## 2024-12-22 RX ORDER — SODIUM CHLORIDE 0.9 % (FLUSH) 0.9 %
10 SYRINGE (ML) INJECTION AS NEEDED
Status: DISCONTINUED | OUTPATIENT
Start: 2024-12-22 | End: 2024-12-24 | Stop reason: HOSPADM

## 2024-12-22 RX ORDER — INSULIN LISPRO 100 [IU]/ML
2 INJECTION, SOLUTION INTRAVENOUS; SUBCUTANEOUS
Status: DISCONTINUED | OUTPATIENT
Start: 2024-12-22 | End: 2024-12-22

## 2024-12-22 RX ORDER — DEXTROSE MONOHYDRATE 25 G/50ML
25 INJECTION, SOLUTION INTRAVENOUS
Status: DISCONTINUED | OUTPATIENT
Start: 2024-12-22 | End: 2024-12-24 | Stop reason: HOSPADM

## 2024-12-22 RX ORDER — ASPIRIN 81 MG/1
81 TABLET ORAL DAILY
Status: DISCONTINUED | OUTPATIENT
Start: 2024-12-23 | End: 2024-12-22

## 2024-12-22 RX ORDER — NITROGLYCERIN 0.4 MG/1
0.4 TABLET SUBLINGUAL
Status: DISCONTINUED | OUTPATIENT
Start: 2024-12-22 | End: 2024-12-23 | Stop reason: SDUPTHER

## 2024-12-22 RX ORDER — ONDANSETRON 4 MG/1
4 TABLET, ORALLY DISINTEGRATING ORAL EVERY 8 HOURS PRN
Status: DISCONTINUED | OUTPATIENT
Start: 2024-12-22 | End: 2024-12-24 | Stop reason: HOSPADM

## 2024-12-22 RX ORDER — INSULIN LISPRO 100 [IU]/ML
3-14 INJECTION, SOLUTION INTRAVENOUS; SUBCUTANEOUS
Status: DISCONTINUED | OUTPATIENT
Start: 2024-12-22 | End: 2024-12-24 | Stop reason: HOSPADM

## 2024-12-22 RX ORDER — SODIUM CHLORIDE 0.9 % (FLUSH) 0.9 %
10 SYRINGE (ML) INJECTION EVERY 12 HOURS SCHEDULED
Status: DISCONTINUED | OUTPATIENT
Start: 2024-12-22 | End: 2024-12-24 | Stop reason: HOSPADM

## 2024-12-22 RX ORDER — NICOTINE POLACRILEX 4 MG
15 LOZENGE BUCCAL
Status: DISCONTINUED | OUTPATIENT
Start: 2024-12-22 | End: 2024-12-24 | Stop reason: HOSPADM

## 2024-12-22 RX ORDER — IBUPROFEN 600 MG/1
1 TABLET ORAL
Status: DISCONTINUED | OUTPATIENT
Start: 2024-12-22 | End: 2024-12-24 | Stop reason: HOSPADM

## 2024-12-22 RX ORDER — ASPIRIN 81 MG/1
324 TABLET, CHEWABLE ORAL ONCE
Status: DISCONTINUED | OUTPATIENT
Start: 2024-12-22 | End: 2024-12-22

## 2024-12-22 RX ORDER — ATORVASTATIN CALCIUM 20 MG/1
20 TABLET, FILM COATED ORAL DAILY
Status: DISCONTINUED | OUTPATIENT
Start: 2024-12-22 | End: 2024-12-23

## 2024-12-22 RX ORDER — ALPRAZOLAM 0.5 MG
0.5 TABLET ORAL 2 TIMES DAILY
Status: DISCONTINUED | OUTPATIENT
Start: 2024-12-22 | End: 2024-12-24 | Stop reason: HOSPADM

## 2024-12-22 RX ORDER — LISINOPRIL 10 MG/1
10 TABLET ORAL
Status: DISCONTINUED | OUTPATIENT
Start: 2024-12-22 | End: 2024-12-24 | Stop reason: HOSPADM

## 2024-12-22 RX ORDER — BISACODYL 5 MG/1
5 TABLET, DELAYED RELEASE ORAL DAILY PRN
Status: DISCONTINUED | OUTPATIENT
Start: 2024-12-22 | End: 2024-12-24 | Stop reason: HOSPADM

## 2024-12-22 RX ORDER — IOPAMIDOL 755 MG/ML
100 INJECTION, SOLUTION INTRAVASCULAR
Status: COMPLETED | OUTPATIENT
Start: 2024-12-22 | End: 2024-12-22

## 2024-12-22 RX ORDER — AMOXICILLIN 250 MG
2 CAPSULE ORAL 2 TIMES DAILY
Status: DISCONTINUED | OUTPATIENT
Start: 2024-12-22 | End: 2024-12-24 | Stop reason: HOSPADM

## 2024-12-22 RX ORDER — ASPIRIN 325 MG
325 TABLET ORAL ONCE
Status: COMPLETED | OUTPATIENT
Start: 2024-12-22 | End: 2024-12-22

## 2024-12-22 RX ADMIN — SENNOSIDES AND DOCUSATE SODIUM 2 TABLET: 50; 8.6 TABLET ORAL at 15:10

## 2024-12-22 RX ADMIN — ASPIRIN 325 MG ORAL TABLET 325 MG: 325 PILL ORAL at 09:53

## 2024-12-22 RX ADMIN — HYDROCODONE BITARTRATE AND ACETAMINOPHEN 1 TABLET: 7.5; 325 TABLET ORAL at 21:17

## 2024-12-22 RX ADMIN — LISINOPRIL 10 MG: 10 TABLET ORAL at 15:10

## 2024-12-22 RX ADMIN — Medication 10 ML: at 20:39

## 2024-12-22 RX ADMIN — INSULIN GLARGINE 10 UNITS: 100 INJECTION, SOLUTION SUBCUTANEOUS at 15:10

## 2024-12-22 RX ADMIN — ALPRAZOLAM 0.5 MG: 0.5 TABLET ORAL at 20:39

## 2024-12-22 RX ADMIN — IOPAMIDOL 95 ML: 755 INJECTION, SOLUTION INTRAVENOUS at 12:00

## 2024-12-22 RX ADMIN — ALLOPURINOL 300 MG: 300 TABLET ORAL at 15:10

## 2024-12-22 RX ADMIN — HYDROCODONE BITARTRATE AND ACETAMINOPHEN 1 TABLET: 7.5; 325 TABLET ORAL at 15:10

## 2024-12-22 RX ADMIN — ATORVASTATIN CALCIUM 20 MG: 20 TABLET, FILM COATED ORAL at 15:10

## 2024-12-22 RX ADMIN — INSULIN LISPRO 5 UNITS: 100 INJECTION, SOLUTION INTRAVENOUS; SUBCUTANEOUS at 17:34
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
## 2024-12-22 NOTE — ED PROVIDER NOTES
EMERGENCY DEPARTMENT MD ATTESTATION NOTE    Room Number:  24/24  PCP: Marc Rasheed MD  Independent Historians: Patient    HPI:    Context: Reema Easley is a 73 y.o. female with a medical history of HTN, HLD, diabetes who presents to the ED c/o acute chest pain.  Symptoms began earlier this morning around 8 AM and radiated to her left upper extremity.  Symptoms are improved at this time but at its peak was 7 out of 10.  Patient has not had a cardiac workup for a long time.        PHYSICAL EXAM    I have reviewed the triage vital signs and nursing notes.    ED Triage Vitals   Temp Heart Rate Resp BP SpO2   12/22/24 0923 12/22/24 0923 12/22/24 0923 12/22/24 0948 12/22/24 0923   97.7 °F (36.5 °C) 100 16 (!) 189/98 99 %      Temp src Heart Rate Source Patient Position BP Location FiO2 (%)   -- -- 12/22/24 0948 12/22/24 0948 --     Lying Right arm        Physical Exam  GENERAL: alert, no acute distress  SKIN: Warm, dry  HENT: Normocephalic, atraumatic  EYES: no scleral icterus  CV: regular rhythm, regular rate  RESPIRATORY: normal effort, lungs clear  ABDOMEN: soft, nontender, nondistended  MUSCULOSKELETAL: no deformity  NEURO: alert, moves all extremities, follows commands            MEDICATIONS GIVEN IN ER  Medications   sodium chloride 0.9 % flush 10 mL (has no administration in time range)   aspirin tablet 325 mg (325 mg Oral Given 12/22/24 0953)   iopamidol (ISOVUE-370) 76 % injection 100 mL (95 mL Intravenous Given by Other 12/22/24 1200)         ORDERS PLACED DURING THIS VISIT:  Orders Placed This Encounter   Procedures    XR Chest 1 View    CT Angiogram Chest Pulmonary Embolism    [place] Draw    Comprehensive Metabolic Panel    High Sensitivity Troponin T    CBC Auto Differential    High Sensitivity Troponin T 1Hr    D-dimer, Quantitative    NPO Diet NPO Type: Strict NPO    Undress & Gown    Continuous Pulse Oximetry    Oxygen Therapy- Nasal Cannula; Titrate 1-6 LPM Per SpO2; 90 - 95%    ECG 12 Lead ED  Triage Standing Order; Chest Pain    ECG 12 Lead ED Triage Standing Order; Chest Pain    Insert Peripheral IV    Initiate ED Observation Status    CBC & Differential    Green Top (Gel)    Lavender Top    Gold Top - SST    Light Blue Top         PROCEDURES  Procedures            PROGRESS, DATA ANALYSIS, CONSULTS, AND MEDICAL DECISION MAKING  All labs have been independently interpreted by me.  All radiology studies have been reviewed by me. All EKG's have been independently viewed and interpreted by me.  Discussion below represents my analysis of pertinent findings related to patient's condition, differential diagnosis, treatment plan and final disposition.    Differential diagnosis includes but is not limited to ACS, MSK causes, PE, pneumonia.    Clinical Scores:                                     ED Course as of 12/22/24 1302   Sun Dec 22, 2024   1013 WBC(!): 13.45 [KA]   1013 Hemoglobin: 13.2 [KA]   1014 My independent interpretation of the EKG performed at 9:26 AM is sinus rhythm rate 80 normal axis normal intervals diffuse T wave abnormalities, nonspecific.  In comparison to EKG from November 6, 2024, diffuse T wave abnormalities appear more prominent, otherwise no significant change [KA]   1015 My independent interpretation of the chest x-ray is no cardiomegaly, no acute infiltrate, median sternotomy wires noted [KA]   1034 HS Troponin T(!): 17 [KA]   1110 D-Dimer, Quant(!): 0.77  Limited D-dimer, recent surgery, atypical chest pain.  CTA chest ordered to further evaluate for PE. [KA]   1228 HS Troponin T(!): 16 [KA]   1228 Troponin T Numeric Delta: -1 [KA]   1300 CTA chest shows no pulmonary embolism. [KA]   1300 Due to patient's history, presentation, risk factors and no recent cardiac workup, recommend admission for further cardiac evaluation due to chest pain.  She is agreeable.  I discussed the patient with JACQUELIN Lobo for the observation unit including history presentation workup and she agrees to  admit on behalf of Dr. Rasheed. [KA]      ED Course User Index  [KA] Belle Carbajal PA-C       MDM: 73-year-old female presenting for evaluation of left-sided chest pain.  EKG is reassuring, troponin noted to be elevated at 17 with a repeat of 16.  D-dimer elevated with a negative CT PE.  Will admit patient for further evaluation and management      COMPLEXITY OF CARE  The patient requires admission.    Please note that portions of this document were completed with a voice recognition program.    Note Disclaimer: At [facility], we believe that sharing information builds trust and better relationships. You are receiving this note because you recently visited [facility]. It is possible you will see health information before a provider has talked with you about it. This kind of information can be easy to misunderstand. To help you fully understand what it means for your health, we urge you to discuss this note with your provider.         Obed Maya MD  12/22/24 [id number]

## 2024-12-22 NOTE — PLAN OF CARE
Goal Outcome Evaluation:      Patient is NSR on the monitor and on room air.  Alert and oriented times four.  Up ad-derek.  See MAR and orders for pain medication.  Echo and cardiology consult ordered. Bed in lowest position, phone and call light in reach.
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
## 2024-12-22 NOTE — ED NOTES
"Nursing report ED to floor  Reema Easley  73 y.o.  female    HPI :  HPI  Stated Reason for Visit: chest pain    Chief Complaint  Chief Complaint   Patient presents with    Chest Pain       Admitting doctor:   Evan Rasheed II, MD    Admitting diagnosis:   Chest Pain    Code status:   Current Code Status       Date Active Code Status Order ID Comments User Context       Prior            Allergies:   Patient has no known allergies.    Isolation:   No active isolations    Intake and Output  No intake or output data in the 24 hours ending 12/22/24 1302    Weight:       12/22/24  0950   Weight: 91.7 kg (202 lb 2.6 oz)       Most recent vitals:   Vitals:    12/22/24 0923 12/22/24 0948 12/22/24 0950   BP:  (!) 189/98    BP Location:  Right arm    Patient Position:  Lying    Pulse: 100     Resp: 16     Temp: 97.7 °F (36.5 °C)     SpO2: 99%     Weight:   91.7 kg (202 lb 2.6 oz)   Height:   162.6 cm (64\")       Active LDAs/IV Access:   Lines, Drains & Airways       Active LDAs       Name Placement date Placement time Site Days    Peripheral IV 12/22/24 0956 Anterior;Left Forearm 12/22/24  0956  Forearm  less than 1                    Labs (abnormal labs have a star):   Labs Reviewed   COMPREHENSIVE METABOLIC PANEL - Abnormal; Notable for the following components:       Result Value    Glucose 216 (*)     All other components within normal limits    Narrative:     GFR Categories in Chronic Kidney Disease (CKD)      GFR Category          GFR (mL/min/1.73)    Interpretation  G1                     90 or greater         Normal or high (1)  G2                      60-89                Mild decrease (1)  G3a                   45-59                Mild to moderate decrease  G3b                   30-44                Moderate to severe decrease  G4                    15-29                Severe decrease  G5                    14 or less           Kidney failure          (1)In the absence of evidence of kidney disease, " neither GFR category G1 or G2 fulfill the criteria for CKD.    eGFR calculation 2021 CKD-EPI creatinine equation, which does not include race as a factor   TROPONIN - Abnormal; Notable for the following components:    HS Troponin T 17 (*)     All other components within normal limits    Narrative:     High Sensitive Troponin T Reference Range:  <14.0 ng/L- Negative Female for AMI  <22.0 ng/L- Negative Male for AMI  >=14 - Abnormal Female indicating possible myocardial injury.  >=22 - Abnormal Male indicating possible myocardial injury.   Clinicians would have to utilize clinical acumen, EKG, Troponin, and serial changes to determine if it is an Acute Myocardial Infarction or myocardial injury due to an underlying chronic condition.        CBC WITH AUTO DIFFERENTIAL - Abnormal; Notable for the following components:    WBC 13.45 (*)     Platelets 533 (*)     Eosinophil % 13.0 (*)     Lymphocytes, Absolute 4.04 (*)     Eosinophils, Absolute 1.75 (*)     All other components within normal limits   HIGH SENSITIVITIY TROPONIN T 1HR - Abnormal; Notable for the following components:    HS Troponin T 16 (*)     All other components within normal limits    Narrative:     High Sensitive Troponin T Reference Range:  <14.0 ng/L- Negative Female for AMI  <22.0 ng/L- Negative Male for AMI  >=14 - Abnormal Female indicating possible myocardial injury.  >=22 - Abnormal Male indicating possible myocardial injury.   Clinicians would have to utilize clinical acumen, EKG, Troponin, and serial changes to determine if it is an Acute Myocardial Infarction or myocardial injury due to an underlying chronic condition.        D-DIMER, QUANTITATIVE - Abnormal; Notable for the following components:    D-Dimer, Quantitative 0.77 (*)     All other components within normal limits    Narrative:     According to the assay 's published package insert, a normal (<0.50 MCGFEU/mL) D-dimer result in conjunction with a non-high clinical  "probability assessment, excludes deep vein thrombosis (DVT) and pulmonary embolism (PE) with high sensitivity.    D-dimer values increase with age and this can make VTE exclusion of an older population difficult. To address this, the [employer], based on best available evidence and recent guidelines, recommends that clinicians use age-adjusted D-dimer thresholds in patients greater than 50 years of age with: a) a low probability of PE who do not meet all Pulmonary Embolism Rule Out Criteria, or b) in those with intermediate probability of PE.   The formula for an age-adjusted D-dimer cut-off is \"age/100\".  For example, a 60 year old patient would have an age-adjusted cut-off of 0.60 MCGFEU/mL and an 80 year old 0.80 MCGFEU/mL.   RAINBOW DRAW    Narrative:     The following orders were created for panel order [place] Draw.  Procedure                               Abnormality         Status                     ---------                               -----------         ------                     Green Top (Gel)[[id number]]                                  Final result               Lavender Top[[id number]]                                     Final result               Gold Top - SST[[id number]]                                   Final result               Light Blue Top[[id number]]                                   Final result                 Please view results for these tests on the individual orders.   CBC AND DIFFERENTIAL    Narrative:     The following orders were created for panel order CBC & Differential.  Procedure                               Abnormality         Status                     ---------                               -----------         ------                     CBC Auto Differential[[id number]]        Abnormal            Final result                 Please view results for these tests on the individual orders.   GREEN TOP   LAVENDER TOP   GOLD TOP - SST   LIGHT BLUE TOP       EKG: "   ECG 12 Lead ED Triage Standing Order; Chest Pain   Preliminary Result   HEART RATE=80  bpm   RR Mkkebozb=310  ms   WY Dkkrajvh=905  ms   P Horizontal Axis=46  deg   P Front Axis=69  deg   QRSD Interval=97  ms   QT Glsilarh=647  ms   RCiK=988  ms   QRS Axis=52  deg   T Wave Axis=177  deg   - ABNORMAL ECG -   Sinus rhythm   Abnormal R-wave progression, early transition   Nonspecific T abnormalities, diffuse leads   Date and Time of Study:2024-12-22 09:26:44          Meds given in ED:   Medications   sodium chloride 0.9 % flush 10 mL (has no administration in time range)   aspirin tablet 325 mg (325 mg Oral Given 12/22/24 0953)   iopamidol (ISOVUE-370) 76 % injection 100 mL (95 mL Intravenous Given by Other 12/22/24 1200)       Imaging results:  CT Angiogram Chest Pulmonary Embolism    Result Date: 12/22/2024  No evidence of pulmonary thromboembolism.   This report was finalized on 12/22/2024 12:53 PM by Dr. Obed Randolph M.D on Workstation: [id number]      XR Chest 1 View    Result Date: 12/22/2024  No acute cardiopulmonary process  This report was finalized on 12/22/2024 9:51 AM by Dr. Marc Washington M.D on Workstation: [id number]       Ambulatory status:   - standby    Social issues:   Social History     Socioeconomic History    Marital status:     Number of children: 2    Highest education level: Bachelor's degree (e.g., BA, AB, BS)   Tobacco Use    Smoking status: Never     Passive exposure: Never    Smokeless tobacco: Never   Vaping Use    Vaping status: Never Used   Substance and Sexual Activity    Alcohol use: No    Drug use: No    Sexual activity: Not Currently     Partners: Male     Birth control/protection: Tubal ligation       Peripheral Neurovascular  Peripheral Neurovascular (Adult)  Peripheral Neurovascular WDL: WDL    Neuro Cognitive  Neuro Cognitive (Adult)  Cognitive/Neuro/Behavioral WDL: WDL    Learning  Learning Assessment  Learning Readiness and Ability: no barriers  identified    Respiratory  Respiratory WDL  Respiratory WDL: WDL    Abdominal Pain       Pain Assessments  Pain (Adult)  (0-10) Pain Rating: Rest: 2  (0-10) Pain Rating: Activity: 2  Pain Location: chest  Pain Side/Orientation: medial  Pain Description: intermittent    NIH Stroke Scale       Raj Freeman RN  12/22/24 13:02 EST

## 2024-12-22 NOTE — Clinical Note
Hemostasis started on the right radial artery. R-Band was used in achieving hemostasis. Radial compression device applied to vessel. Hemostasis achieved successfully. Closure device additional comment: Vascband 12 cc air

## 2024-12-23 ENCOUNTER — APPOINTMENT (OUTPATIENT)
Dept: CARDIOLOGY | Facility: HOSPITAL | Age: 74
End: 2024-12-23
Payer: MEDICARE

## 2024-12-23 PROBLEM — I21.4 NSTEMI, INITIAL EPISODE OF CARE: Status: ACTIVE | Noted: 2024-12-22

## 2024-12-23 PROBLEM — I21.4 NSTEMI (NON-ST ELEVATED MYOCARDIAL INFARCTION): Status: ACTIVE | Noted: 2024-12-23

## 2024-12-23 LAB
ACT BLD: 366 SECONDS (ref 82–152)
ANION GAP SERPL CALCULATED.3IONS-SCNC: 7.9 MMOL/L (ref 5–15)
AORTIC DIMENSIONLESS INDEX: 0.75 (DI)
APTT PPP: 25.7 SECONDS (ref 22.7–35.4)
AV MEAN PRESS GRAD SYS DOP V1V2: 2.8 MMHG
AV VMAX SYS DOP: 118.3 CM/SEC
BH CV ECHO MEAS - AO MAX PG: 5.6 MMHG
BH CV ECHO MEAS - AO V2 VTI: 25 CM
BH CV ECHO MEAS - AVA(I,D): 2.46 CM2
BH CV ECHO MEAS - EDV(MOD-SP2): 81 ML
BH CV ECHO MEAS - EDV(MOD-SP4): 91 ML
BH CV ECHO MEAS - EF(MOD-SP2): 60.5 %
BH CV ECHO MEAS - EF(MOD-SP4): 68.1 %
BH CV ECHO MEAS - ESV(MOD-SP2): 32 ML
BH CV ECHO MEAS - ESV(MOD-SP4): 29 ML
BH CV ECHO MEAS - LAT PEAK E' VEL: 5.4 CM/SEC
BH CV ECHO MEAS - LV DIASTOLIC VOL/BSA (35-75): 46.3 CM2
BH CV ECHO MEAS - LV MAX PG: 3.7 MMHG
BH CV ECHO MEAS - LV MEAN PG: 1.68 MMHG
BH CV ECHO MEAS - LV SYSTOLIC VOL/BSA (12-30): 14.8 CM2
BH CV ECHO MEAS - LV V1 MAX: 95.5 CM/SEC
BH CV ECHO MEAS - LV V1 VTI: 19.2 CM
BH CV ECHO MEAS - LVOT AREA: 3.2 CM2
BH CV ECHO MEAS - LVOT DIAM: 2.02 CM
BH CV ECHO MEAS - MED PEAK E' VEL: 4.2 CM/SEC
BH CV ECHO MEAS - MV A DUR: 0.12 SEC
BH CV ECHO MEAS - MV A MAX VEL: 93.8 CM/SEC
BH CV ECHO MEAS - MV DEC SLOPE: 411.2 CM/SEC2
BH CV ECHO MEAS - MV DEC TIME: 0.21 SEC
BH CV ECHO MEAS - MV E MAX VEL: 76.3 CM/SEC
BH CV ECHO MEAS - MV E/A: 0.81
BH CV ECHO MEAS - MV MAX PG: 3.5 MMHG
BH CV ECHO MEAS - MV MEAN PG: 1.38 MMHG
BH CV ECHO MEAS - MV P1/2T: 48.5 MSEC
BH CV ECHO MEAS - MV V2 VTI: 18.6 CM
BH CV ECHO MEAS - MVA(P1/2T): 4.5 CM2
BH CV ECHO MEAS - MVA(VTI): 3.3 CM2
BH CV ECHO MEAS - SV(LVOT): 61.5 ML
BH CV ECHO MEAS - SV(MOD-SP2): 49 ML
BH CV ECHO MEAS - SV(MOD-SP4): 62 ML
BH CV ECHO MEAS - SVI(LVOT): 31.3 ML/M2
BH CV ECHO MEAS - SVI(MOD-SP2): 24.9 ML/M2
BH CV ECHO MEAS - SVI(MOD-SP4): 31.6 ML/M2
BH CV ECHO MEAS - TAPSE (>1.6): 2.4 CM
BH CV ECHO MEASUREMENTS AVERAGE E/E' RATIO: 15.9
BH CV XLRA - TDI S': 7.8 CM/SEC
BUN SERPL-MCNC: 18 MG/DL (ref 8–23)
BUN/CREAT SERPL: 22.8 (ref 7–25)
CALCIUM SPEC-SCNC: 9.2 MG/DL (ref 8.6–10.5)
CHLORIDE SERPL-SCNC: 102 MMOL/L (ref 98–107)
CO2 SERPL-SCNC: 24.1 MMOL/L (ref 22–29)
CREAT SERPL-MCNC: 0.79 MG/DL (ref 0.57–1)
DEPRECATED RDW RBC AUTO: 43.3 FL (ref 37–54)
EGFRCR SERPLBLD CKD-EPI 2021: 79.6 ML/MIN/1.73
ERYTHROCYTE [DISTWIDTH] IN BLOOD BY AUTOMATED COUNT: 12.4 % (ref 12.3–15.4)
GLUCOSE BLDC GLUCOMTR-MCNC: 178 MG/DL (ref 70–130)
GLUCOSE BLDC GLUCOMTR-MCNC: 182 MG/DL (ref 70–130)
GLUCOSE BLDC GLUCOMTR-MCNC: 204 MG/DL (ref 70–130)
GLUCOSE BLDC GLUCOMTR-MCNC: 295 MG/DL (ref 70–130)
GLUCOSE SERPL-MCNC: 137 MG/DL (ref 65–99)
HCT VFR BLD AUTO: 37.9 % (ref 34–46.6)
HGB BLD-MCNC: 12.1 G/DL (ref 12–15.9)
INR PPP: 1.05 (ref 0.9–1.1)
LV EF BIPLANE MOD: 65.7 %
MAGNESIUM SERPL-MCNC: 1.7 MG/DL (ref 1.6–2.4)
MCH RBC QN AUTO: 30.1 PG (ref 26.6–33)
MCHC RBC AUTO-ENTMCNC: 31.9 G/DL (ref 31.5–35.7)
MCV RBC AUTO: 94.3 FL (ref 79–97)
PLATELET # BLD AUTO: 463 10*3/MM3 (ref 140–450)
PMV BLD AUTO: 9.6 FL (ref 6–12)
POTASSIUM SERPL-SCNC: 4.6 MMOL/L (ref 3.5–5.2)
PROTHROMBIN TIME: 13.9 SECONDS (ref 11.7–14.2)
QT INTERVAL: 397 MS
QT INTERVAL: 429 MS
QTC INTERVAL: 450 MS
QTC INTERVAL: 453 MS
RBC # BLD AUTO: 4.02 10*6/MM3 (ref 3.77–5.28)
SODIUM SERPL-SCNC: 134 MMOL/L (ref 136–145)
STJ: 3 CM
TROPONIN T SERPL HS-MCNC: 205 NG/L
WBC NRBC COR # BLD AUTO: 12.87 10*3/MM3 (ref 3.4–10.8)
WHOLE BLOOD HOLD COAG: NORMAL

## 2024-12-23 PROCEDURE — 82948 REAGENT STRIP/BLOOD GLUCOSE: CPT

## 2024-12-23 PROCEDURE — 85730 THROMBOPLASTIN TIME PARTIAL: CPT

## 2024-12-23 PROCEDURE — 85347 COAGULATION TIME ACTIVATED: CPT

## 2024-12-23 PROCEDURE — 25010000002 NICARDIPINE 2.5 MG/ML SOLUTION: Performed by: STUDENT IN AN ORGANIZED HEALTH CARE EDUCATION/TRAINING PROGRAM

## 2024-12-23 PROCEDURE — C9600 PERC DRUG-EL COR STENT SING: HCPCS | Performed by: STUDENT IN AN ORGANIZED HEALTH CARE EDUCATION/TRAINING PROGRAM

## 2024-12-23 PROCEDURE — 25010000002 ONDANSETRON PER 1 MG: Performed by: STUDENT IN AN ORGANIZED HEALTH CARE EDUCATION/TRAINING PROGRAM

## 2024-12-23 PROCEDURE — 25510000001 PERFLUTREN 6.52 MG/ML SUSPENSION 2 ML VIAL: Performed by: NURSE PRACTITIONER

## 2024-12-23 PROCEDURE — 25010000002 HEPARIN (PORCINE) 25000-0.45 UT/250ML-% SOLUTION

## 2024-12-23 PROCEDURE — 93306 TTE W/DOPPLER COMPLETE: CPT | Performed by: INTERNAL MEDICINE

## 2024-12-23 PROCEDURE — C1725 CATH, TRANSLUMIN NON-LASER: HCPCS | Performed by: STUDENT IN AN ORGANIZED HEALTH CARE EDUCATION/TRAINING PROGRAM

## 2024-12-23 PROCEDURE — C1769 GUIDE WIRE: HCPCS | Performed by: STUDENT IN AN ORGANIZED HEALTH CARE EDUCATION/TRAINING PROGRAM

## 2024-12-23 PROCEDURE — 85027 COMPLETE CBC AUTOMATED: CPT | Performed by: NURSE PRACTITIONER

## 2024-12-23 PROCEDURE — 93458 L HRT ARTERY/VENTRICLE ANGIO: CPT | Performed by: STUDENT IN AN ORGANIZED HEALTH CARE EDUCATION/TRAINING PROGRAM

## 2024-12-23 PROCEDURE — 25010000002 ONDANSETRON PER 1 MG: Performed by: NURSE PRACTITIONER

## 2024-12-23 PROCEDURE — C1874 STENT, COATED/COV W/DEL SYS: HCPCS | Performed by: STUDENT IN AN ORGANIZED HEALTH CARE EDUCATION/TRAINING PROGRAM

## 2024-12-23 PROCEDURE — 80048 BASIC METABOLIC PNL TOTAL CA: CPT | Performed by: NURSE PRACTITIONER

## 2024-12-23 PROCEDURE — 83735 ASSAY OF MAGNESIUM: CPT | Performed by: NURSE PRACTITIONER

## 2024-12-23 PROCEDURE — 84484 ASSAY OF TROPONIN QUANT: CPT | Performed by: NURSE PRACTITIONER

## 2024-12-23 PROCEDURE — 4A023N7 MEASUREMENT OF CARDIAC SAMPLING AND PRESSURE, LEFT HEART, PERCUTANEOUS APPROACH: ICD-10-PCS | Performed by: STUDENT IN AN ORGANIZED HEALTH CARE EDUCATION/TRAINING PROGRAM

## 2024-12-23 PROCEDURE — 25010000002 MIDAZOLAM PER 1 MG: Performed by: STUDENT IN AN ORGANIZED HEALTH CARE EDUCATION/TRAINING PROGRAM

## 2024-12-23 PROCEDURE — 25510000001 IOPAMIDOL PER 1 ML: Performed by: STUDENT IN AN ORGANIZED HEALTH CARE EDUCATION/TRAINING PROGRAM

## 2024-12-23 PROCEDURE — B2111ZZ FLUOROSCOPY OF MULTIPLE CORONARY ARTERIES USING LOW OSMOLAR CONTRAST: ICD-10-PCS | Performed by: STUDENT IN AN ORGANIZED HEALTH CARE EDUCATION/TRAINING PROGRAM

## 2024-12-23 PROCEDURE — 92928 PRQ TCAT PLMT NTRAC ST 1 LES: CPT | Performed by: STUDENT IN AN ORGANIZED HEALTH CARE EDUCATION/TRAINING PROGRAM

## 2024-12-23 PROCEDURE — 25010000002 HEPARIN (PORCINE) PER 1000 UNITS: Performed by: STUDENT IN AN ORGANIZED HEALTH CARE EDUCATION/TRAINING PROGRAM

## 2024-12-23 PROCEDURE — 25010000002 HEPARIN (PORCINE) PER 1000 UNITS

## 2024-12-23 PROCEDURE — 25010000002 FENTANYL CITRATE (PF) 50 MCG/ML SOLUTION: Performed by: STUDENT IN AN ORGANIZED HEALTH CARE EDUCATION/TRAINING PROGRAM

## 2024-12-23 PROCEDURE — 93306 TTE W/DOPPLER COMPLETE: CPT

## 2024-12-23 PROCEDURE — 25810000003 SODIUM CHLORIDE 0.9 % SOLUTION: Performed by: INTERNAL MEDICINE

## 2024-12-23 PROCEDURE — 63710000001 INSULIN LISPRO (HUMAN) PER 5 UNITS: Performed by: STUDENT IN AN ORGANIZED HEALTH CARE EDUCATION/TRAINING PROGRAM

## 2024-12-23 PROCEDURE — 25010000002 LIDOCAINE 2% SOLUTION: Performed by: STUDENT IN AN ORGANIZED HEALTH CARE EDUCATION/TRAINING PROGRAM

## 2024-12-23 PROCEDURE — 027034Z DILATION OF CORONARY ARTERY, ONE ARTERY WITH DRUG-ELUTING INTRALUMINAL DEVICE, PERCUTANEOUS APPROACH: ICD-10-PCS | Performed by: STUDENT IN AN ORGANIZED HEALTH CARE EDUCATION/TRAINING PROGRAM

## 2024-12-23 PROCEDURE — C1887 CATHETER, GUIDING: HCPCS | Performed by: STUDENT IN AN ORGANIZED HEALTH CARE EDUCATION/TRAINING PROGRAM

## 2024-12-23 PROCEDURE — C1894 INTRO/SHEATH, NON-LASER: HCPCS | Performed by: STUDENT IN AN ORGANIZED HEALTH CARE EDUCATION/TRAINING PROGRAM

## 2024-12-23 PROCEDURE — 63710000001 INSULIN LISPRO (HUMAN) PER 5 UNITS: Performed by: NURSE PRACTITIONER

## 2024-12-23 PROCEDURE — 93005 ELECTROCARDIOGRAM TRACING: CPT

## 2024-12-23 PROCEDURE — 85610 PROTHROMBIN TIME: CPT

## 2024-12-23 PROCEDURE — 99222 1ST HOSP IP/OBS MODERATE 55: CPT | Performed by: INTERNAL MEDICINE

## 2024-12-23 DEVICE — XIENCE SKYPOINT™ EVEROLIMUS ELUTING CORONARY STENT SYSTEM 2.50 MM X 18 MM / RAPID-EXCHANGE
Type: IMPLANTABLE DEVICE | Site: CORONARY | Status: FUNCTIONAL
Brand: XIENCE SKYPOINT™

## 2024-12-23 RX ORDER — LIDOCAINE HYDROCHLORIDE 20 MG/ML
INJECTION, SOLUTION INFILTRATION; PERINEURAL
Status: DISCONTINUED | OUTPATIENT
Start: 2024-12-23 | End: 2024-12-23 | Stop reason: HOSPADM

## 2024-12-23 RX ORDER — ACETAMINOPHEN 325 MG/1
650 TABLET ORAL EVERY 4 HOURS PRN
Status: DISCONTINUED | OUTPATIENT
Start: 2024-12-23 | End: 2024-12-24 | Stop reason: HOSPADM

## 2024-12-23 RX ORDER — SODIUM CHLORIDE 9 MG/ML
INJECTION, SOLUTION INTRAVENOUS
Status: COMPLETED | OUTPATIENT
Start: 2024-12-23 | End: 2024-12-23

## 2024-12-23 RX ORDER — ASPIRIN 81 MG/1
81 TABLET ORAL DAILY
Status: DISCONTINUED | OUTPATIENT
Start: 2024-12-23 | End: 2024-12-23 | Stop reason: SDUPTHER

## 2024-12-23 RX ORDER — SODIUM CHLORIDE 9 MG/ML
100 INJECTION, SOLUTION INTRAVENOUS CONTINUOUS
Status: ACTIVE | OUTPATIENT
Start: 2024-12-23 | End: 2024-12-23

## 2024-12-23 RX ORDER — METOPROLOL TARTRATE 25 MG/1
25 TABLET, FILM COATED ORAL EVERY 12 HOURS SCHEDULED
Status: DISCONTINUED | OUTPATIENT
Start: 2024-12-23 | End: 2024-12-24 | Stop reason: HOSPADM

## 2024-12-23 RX ORDER — NICARDIPINE HYDROCHLORIDE 2.5 MG/ML
INJECTION INTRAVENOUS
Status: DISCONTINUED | OUTPATIENT
Start: 2024-12-23 | End: 2024-12-23 | Stop reason: HOSPADM

## 2024-12-23 RX ORDER — ASPIRIN 325 MG
TABLET ORAL
Status: DISCONTINUED | OUTPATIENT
Start: 2024-12-23 | End: 2024-12-23 | Stop reason: HOSPADM

## 2024-12-23 RX ORDER — IOPAMIDOL 755 MG/ML
INJECTION, SOLUTION INTRAVASCULAR
Status: DISCONTINUED | OUTPATIENT
Start: 2024-12-23 | End: 2024-12-23 | Stop reason: HOSPADM

## 2024-12-23 RX ORDER — HEPARIN SODIUM 10000 [USP'U]/100ML
10.9 INJECTION, SOLUTION INTRAVENOUS
Status: DISCONTINUED | OUTPATIENT
Start: 2024-12-23 | End: 2024-12-23

## 2024-12-23 RX ORDER — HEPARIN SODIUM 5000 [USP'U]/ML
43.6 INJECTION, SOLUTION INTRAVENOUS; SUBCUTANEOUS ONCE
Status: COMPLETED | OUTPATIENT
Start: 2024-12-23 | End: 2024-12-23

## 2024-12-23 RX ORDER — ONDANSETRON 2 MG/ML
4 INJECTION INTRAMUSCULAR; INTRAVENOUS EVERY 6 HOURS PRN
Status: DISCONTINUED | OUTPATIENT
Start: 2024-12-23 | End: 2024-12-24 | Stop reason: HOSPADM

## 2024-12-23 RX ORDER — CLOPIDOGREL BISULFATE 75 MG/1
TABLET ORAL
Status: DISCONTINUED | OUTPATIENT
Start: 2024-12-23 | End: 2024-12-23 | Stop reason: HOSPADM

## 2024-12-23 RX ORDER — HEPARIN SODIUM 5000 [USP'U]/ML
30-43.6 INJECTION, SOLUTION INTRAVENOUS; SUBCUTANEOUS EVERY 6 HOURS PRN
Status: DISCONTINUED | OUTPATIENT
Start: 2024-12-23 | End: 2024-12-24 | Stop reason: HOSPADM

## 2024-12-23 RX ORDER — MIDAZOLAM HYDROCHLORIDE 1 MG/ML
INJECTION, SOLUTION INTRAMUSCULAR; INTRAVENOUS
Status: DISCONTINUED | OUTPATIENT
Start: 2024-12-23 | End: 2024-12-23 | Stop reason: HOSPADM

## 2024-12-23 RX ORDER — ATORVASTATIN CALCIUM 20 MG/1
40 TABLET, FILM COATED ORAL DAILY
Status: DISCONTINUED | OUTPATIENT
Start: 2024-12-23 | End: 2024-12-24 | Stop reason: HOSPADM

## 2024-12-23 RX ORDER — ASPIRIN 81 MG/1
81 TABLET ORAL DAILY
Status: DISCONTINUED | OUTPATIENT
Start: 2024-12-23 | End: 2024-12-24 | Stop reason: HOSPADM

## 2024-12-23 RX ORDER — HEPARIN SODIUM 1000 [USP'U]/ML
INJECTION, SOLUTION INTRAVENOUS; SUBCUTANEOUS
Status: DISCONTINUED | OUTPATIENT
Start: 2024-12-23 | End: 2024-12-23 | Stop reason: HOSPADM

## 2024-12-23 RX ORDER — NITROGLYCERIN 0.4 MG/1
0.4 TABLET SUBLINGUAL
Status: DISCONTINUED | OUTPATIENT
Start: 2024-12-23 | End: 2024-12-24 | Stop reason: HOSPADM

## 2024-12-23 RX ORDER — CLOPIDOGREL BISULFATE 75 MG/1
75 TABLET ORAL DAILY
Status: DISCONTINUED | OUTPATIENT
Start: 2024-12-24 | End: 2024-12-24 | Stop reason: HOSPADM

## 2024-12-23 RX ORDER — VERAPAMIL HYDROCHLORIDE 2.5 MG/ML
INJECTION, SOLUTION INTRAVENOUS
Status: DISCONTINUED | OUTPATIENT
Start: 2024-12-23 | End: 2024-12-23 | Stop reason: HOSPADM

## 2024-12-23 RX ORDER — FENTANYL CITRATE 50 UG/ML
INJECTION, SOLUTION INTRAMUSCULAR; INTRAVENOUS
Status: DISCONTINUED | OUTPATIENT
Start: 2024-12-23 | End: 2024-12-23 | Stop reason: HOSPADM

## 2024-12-23 RX ADMIN — INSULIN LISPRO 3 UNITS: 100 INJECTION, SOLUTION INTRAVENOUS; SUBCUTANEOUS at 16:57

## 2024-12-23 RX ADMIN — SODIUM CHLORIDE 100 ML/HR: 9 INJECTION, SOLUTION INTRAVENOUS at 08:55

## 2024-12-23 RX ADMIN — HEPARIN SODIUM 10.9 UNITS/KG/HR: 10000 INJECTION, SOLUTION INTRAVENOUS at 06:52

## 2024-12-23 RX ADMIN — METOPROLOL TARTRATE 25 MG: 25 TABLET, FILM COATED ORAL at 09:00

## 2024-12-23 RX ADMIN — INSULIN LISPRO 5 UNITS: 100 INJECTION, SOLUTION INTRAVENOUS; SUBCUTANEOUS at 20:17

## 2024-12-23 RX ADMIN — ONDANSETRON 4 MG: 2 INJECTION INTRAMUSCULAR; INTRAVENOUS at 18:26

## 2024-12-23 RX ADMIN — ALPRAZOLAM 0.5 MG: 0.5 TABLET ORAL at 20:10

## 2024-12-23 RX ADMIN — HYDROCODONE BITARTRATE AND ACETAMINOPHEN 1 TABLET: 7.5; 325 TABLET ORAL at 18:26

## 2024-12-23 RX ADMIN — HYDROCODONE BITARTRATE AND ACETAMINOPHEN 1 TABLET: 7.5; 325 TABLET ORAL at 03:43

## 2024-12-23 RX ADMIN — Medication 10 ML: at 20:10

## 2024-12-23 RX ADMIN — ALPRAZOLAM 0.5 MG: 0.5 TABLET ORAL at 09:00

## 2024-12-23 RX ADMIN — PERFLUTREN 3 ML: 6.52 INJECTION, SUSPENSION INTRAVENOUS at 08:19

## 2024-12-23 RX ADMIN — INSULIN LISPRO 8 UNITS: 100 INJECTION, SOLUTION INTRAVENOUS; SUBCUTANEOUS at 13:05

## 2024-12-23 RX ADMIN — Medication 10 ML: at 09:01

## 2024-12-23 RX ADMIN — INSULIN LISPRO 3 UNITS: 100 INJECTION, SOLUTION INTRAVENOUS; SUBCUTANEOUS at 00:19

## 2024-12-23 RX ADMIN — HEPARIN SODIUM 4000 UNITS: 5000 INJECTION INTRAVENOUS; SUBCUTANEOUS at 06:51

## 2024-12-23 RX ADMIN — METOPROLOL TARTRATE 25 MG: 25 TABLET, FILM COATED ORAL at 20:10

## 2024-12-23 RX ADMIN — LISINOPRIL 10 MG: 10 TABLET ORAL at 09:00

## 2024-12-23 RX ADMIN — SENNOSIDES AND DOCUSATE SODIUM 2 TABLET: 50; 8.6 TABLET ORAL at 20:09

## 2024-12-23 RX ADMIN — ONDANSETRON 4 MG: 2 INJECTION INTRAMUSCULAR; INTRAVENOUS at 08:57

## 2024-12-23 RX ADMIN — ATORVASTATIN CALCIUM 40 MG: 20 TABLET, FILM COATED ORAL at 09:00

## 2024-12-23 RX ADMIN — ASPIRIN 81 MG: 81 TABLET, COATED ORAL at 09:00

## 2024-12-23 NOTE — PLAN OF CARE
Goal Outcome Evaluation:         Troponin 205 this morning. Cardiology consulted. Heparin drip initiated. No complaints of chest pain.
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
## 2024-12-24 ENCOUNTER — APPOINTMENT (OUTPATIENT)
Dept: CARDIOLOGY | Facility: HOSPITAL | Age: 74
End: 2024-12-24
Payer: MEDICARE

## 2024-12-24 ENCOUNTER — READMISSION MANAGEMENT (OUTPATIENT)
Dept: CALL CENTER | Facility: HOSPITAL | Age: 74
End: 2024-12-24
Payer: MEDICARE

## 2024-12-24 VITALS
SYSTOLIC BLOOD PRESSURE: 150 MMHG | HEART RATE: 73 BPM | BODY MASS INDEX: 34.49 KG/M2 | HEIGHT: 64 IN | OXYGEN SATURATION: 99 % | RESPIRATION RATE: 16 BRPM | TEMPERATURE: 97.8 F | DIASTOLIC BLOOD PRESSURE: 86 MMHG | WEIGHT: 202 LBS

## 2024-12-24 LAB
ANION GAP SERPL CALCULATED.3IONS-SCNC: 6 MMOL/L (ref 5–15)
APTT PPP: 26 SECONDS (ref 22.7–35.4)
BH CV GROIN HEMATOMA RIGHT TRANS LENGTH: 1.1 CM
BH CV GROIN HEMATOMA RIGHT TRANS WIDTH: 4.4 CM
BH CV RIGHT GROIN PSA PROCEDURE SCRIPTING LRR: 1
BH CV VAS RIGHT PSA RADIAL SYS: 57.2 CM/SEC
BH CV VAS RIGHT PSA ULNAR SYS: 60.9 CM/SEC
BUN SERPL-MCNC: 15 MG/DL (ref 8–23)
BUN/CREAT SERPL: 17.9 (ref 7–25)
CALCIUM SPEC-SCNC: 9.4 MG/DL (ref 8.6–10.5)
CHLORIDE SERPL-SCNC: 100 MMOL/L (ref 98–107)
CO2 SERPL-SCNC: 29 MMOL/L (ref 22–29)
CREAT SERPL-MCNC: 0.84 MG/DL (ref 0.57–1)
DEPRECATED RDW RBC AUTO: 43.2 FL (ref 37–54)
EGFRCR SERPLBLD CKD-EPI 2021: 73.5 ML/MIN/1.73
ERYTHROCYTE [DISTWIDTH] IN BLOOD BY AUTOMATED COUNT: 12.6 % (ref 12.3–15.4)
GLUCOSE BLDC GLUCOMTR-MCNC: 169 MG/DL (ref 70–130)
GLUCOSE SERPL-MCNC: 129 MG/DL (ref 65–99)
HCT VFR BLD AUTO: 39.4 % (ref 34–46.6)
HGB BLD-MCNC: 12.6 G/DL (ref 12–15.9)
MCH RBC QN AUTO: 29.9 PG (ref 26.6–33)
MCHC RBC AUTO-ENTMCNC: 32 G/DL (ref 31.5–35.7)
MCV RBC AUTO: 93.4 FL (ref 79–97)
PLATELET # BLD AUTO: 456 10*3/MM3 (ref 140–450)
PMV BLD AUTO: 9.7 FL (ref 6–12)
POTASSIUM SERPL-SCNC: 4.4 MMOL/L (ref 3.5–5.2)
QT INTERVAL: 491 MS
QTC INTERVAL: 503 MS
RBC # BLD AUTO: 4.22 10*6/MM3 (ref 3.77–5.28)
SODIUM SERPL-SCNC: 135 MMOL/L (ref 136–145)
WBC NRBC COR # BLD AUTO: 11.32 10*3/MM3 (ref 3.4–10.8)

## 2024-12-24 PROCEDURE — 80048 BASIC METABOLIC PNL TOTAL CA: CPT | Performed by: STUDENT IN AN ORGANIZED HEALTH CARE EDUCATION/TRAINING PROGRAM

## 2024-12-24 PROCEDURE — 93931 UPPER EXTREMITY STUDY: CPT | Performed by: STUDENT IN AN ORGANIZED HEALTH CARE EDUCATION/TRAINING PROGRAM

## 2024-12-24 PROCEDURE — 63710000001 INSULIN GLARGINE PER 5 UNITS: Performed by: STUDENT IN AN ORGANIZED HEALTH CARE EDUCATION/TRAINING PROGRAM

## 2024-12-24 PROCEDURE — 85730 THROMBOPLASTIN TIME PARTIAL: CPT | Performed by: STUDENT IN AN ORGANIZED HEALTH CARE EDUCATION/TRAINING PROGRAM

## 2024-12-24 PROCEDURE — 93010 ELECTROCARDIOGRAM REPORT: CPT | Performed by: INTERNAL MEDICINE

## 2024-12-24 PROCEDURE — 93931 UPPER EXTREMITY STUDY: CPT

## 2024-12-24 PROCEDURE — 82948 REAGENT STRIP/BLOOD GLUCOSE: CPT

## 2024-12-24 PROCEDURE — 99239 HOSP IP/OBS DSCHRG MGMT >30: CPT | Performed by: INTERNAL MEDICINE

## 2024-12-24 PROCEDURE — 85027 COMPLETE CBC AUTOMATED: CPT | Performed by: STUDENT IN AN ORGANIZED HEALTH CARE EDUCATION/TRAINING PROGRAM

## 2024-12-24 PROCEDURE — 63710000001 INSULIN LISPRO (HUMAN) PER 5 UNITS: Performed by: STUDENT IN AN ORGANIZED HEALTH CARE EDUCATION/TRAINING PROGRAM

## 2024-12-24 PROCEDURE — 93005 ELECTROCARDIOGRAM TRACING: CPT | Performed by: STUDENT IN AN ORGANIZED HEALTH CARE EDUCATION/TRAINING PROGRAM

## 2024-12-24 RX ORDER — NITROGLYCERIN 0.4 MG/1
0.4 TABLET SUBLINGUAL
Qty: 25 TABLET | Refills: 1 | Status: SHIPPED | OUTPATIENT
Start: 2024-12-24

## 2024-12-24 RX ORDER — METOPROLOL TARTRATE 25 MG/1
25 TABLET, FILM COATED ORAL EVERY 12 HOURS SCHEDULED
Qty: 180 TABLET | Refills: 1 | Status: SHIPPED | OUTPATIENT
Start: 2024-12-24

## 2024-12-24 RX ORDER — ATORVASTATIN CALCIUM 40 MG/1
40 TABLET, FILM COATED ORAL DAILY
Qty: 90 TABLET | Refills: 1 | Status: SHIPPED | OUTPATIENT
Start: 2024-12-24

## 2024-12-24 RX ORDER — CLOPIDOGREL BISULFATE 75 MG/1
75 TABLET ORAL DAILY
Qty: 90 TABLET | Refills: 1 | Status: SHIPPED | OUTPATIENT
Start: 2024-12-25

## 2024-12-24 RX ADMIN — PANTOPRAZOLE SODIUM 40 MG: 40 TABLET, DELAYED RELEASE ORAL at 08:06

## 2024-12-24 RX ADMIN — CLOPIDOGREL BISULFATE 75 MG: 75 TABLET ORAL at 08:05

## 2024-12-24 RX ADMIN — HYDROCODONE BITARTRATE AND ACETAMINOPHEN 1 TABLET: 7.5; 325 TABLET ORAL at 08:11

## 2024-12-24 RX ADMIN — ASPIRIN 81 MG: 81 TABLET, COATED ORAL at 08:05

## 2024-12-24 RX ADMIN — INSULIN LISPRO 3 UNITS: 100 INJECTION, SOLUTION INTRAVENOUS; SUBCUTANEOUS at 06:56

## 2024-12-24 RX ADMIN — INSULIN GLARGINE 10 UNITS: 100 INJECTION, SOLUTION SUBCUTANEOUS at 08:06

## 2024-12-24 RX ADMIN — METOPROLOL TARTRATE 25 MG: 25 TABLET, FILM COATED ORAL at 08:06

## 2024-12-24 RX ADMIN — LISINOPRIL 10 MG: 10 TABLET ORAL at 08:06

## 2024-12-24 RX ADMIN — ALLOPURINOL 300 MG: 300 TABLET ORAL at 08:06

## 2024-12-24 RX ADMIN — ALPRAZOLAM 0.5 MG: 0.5 TABLET ORAL at 08:05

## 2024-12-24 RX ADMIN — Medication 10 ML: at 08:06
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
## 2024-12-24 NOTE — OUTREACH NOTE
Prep Survey      Flowsheet Row Responses   [facility] patient discharged from? [place]   Is LACE score < 7 ? No   Eligibility [facility]   Date of Admission 12/22/24   Date of Discharge 12/24/24   Discharge diagnosis NSTEMI, initial episode of care   Does the patient have one of the following disease processes/diagnoses(primary or secondary)? Acute MI (STEMI,NSTEMI)   Prep survey completed? Yes            Cely ADAMS - Registered Nurse

## 2024-12-24 NOTE — PLAN OF CARE
Goal Outcome Evaluation:         Patient denies chest pain nor discomfort, vital signs stable, pt on room air, SR on the monitor, right wrist puncture soft dry and intact,, educate to call for assist, call light intact, cont to monitor.                                    Orthopedic Surgery  Viv Shaw  01/26/2024     Admit Date:  1/23/2024    POD: 2 Days Post-Op   Procedure(s):  Right hip bipolar hemiarthroplasty    Baseline Parkinson's dementia.  Patient sitting upright in bed eating breakfast.  and RN at bedside.  Denies pain to the right hip.   Denies numbness and tingling to the RLE.  Denies muscle spasms.  Patient was able to stand briefly at bedside yesterday, but was unable to ambulate due to weakness.  Tolerating oral intake.    Denies nausea or vomiting.  Denies chest pain or shortness of breath.  No acute events overnight.    Temp:  [97.7  F (36.5  C)-98  F (36.7  C)] 97.9  F (36.6  C)  Pulse:  [58-65] 64  Resp:  [16-18] 16  BP: (132-175)/(53-67) 175/66  SpO2:  [92 %-97 %] 97 %    Alert and oriented. NAD.   Right hip Aquacel dressing is clean, dry, and intact.   Minimal erythema of the surrounding skin.   Mild swelling to the right lateral and anterior hip and thigh.  Thigh is soft and compressible.   Bilateral calves are soft, non-tender.  Right lower extremity is NVI.  Patient able to resist ankle dorsiflexion and plantar flexion bilaterally.  Able to flex and extend toes.  DP pulse palpable.  Sensation intact bilateral lower extremities.    Labs/Imaging:  Recent Labs   Lab Test 01/26/24  0648 01/25/24  0611 01/23/24  1138   WBC 7.4 8.2 12.4*   HGB 10.9* 10.4* 12.8    176 221     Recent Labs   Lab Test 02/02/22  0940   INR 1.00     A/P    1. S/p right hip hemiarthroplasty for a femoral neck fracture (DOS: 1/24/24)  -Continue ASA 81 mg BID x 6 weeks for DVT prophylaxis. Hgb up trending.  -Periop Ancef completed.  -Mobilize with PT/OT.   -WBAT RLE with walker.  -No active right hip abduction.   -Continue current pain regimen.  -Dressings: Keep intact. Okay for RN to change if >60% saturated or peeling/falling off.   -Follow-up: 2 weeks post-op with Dr. Pino Rob    2. Disposition  -Anticipate d/c to TCU when medically cleared and progressing in  PT. Ortho stable.    ALVIN LundC  [facility]

## 2024-12-25 NOTE — CASE MANAGEMENT/SOCIAL WORK
Case Management Discharge Note      Final Note: Home, family to transport         Selected Continued Care - Discharged on 12/24/2024 Admission date: 12/22/2024 - Discharge disposition: Home or Self Care      Destination    No services have been selected for the patient.                Durable Medical Equipment    No services have been selected for the patient.                Dialysis/Infusion    No services have been selected for the patient.                Home Medical Care    No services have been selected for the patient.                Therapy    No services have been selected for the patient.                Community Resources    No services have been selected for the patient.                Community & DME    No services have been selected for the patient.                    Selected Continued Care - Episodes Includes continued care and service providers with selected services from the active episodes listed below      High Risk Care Management Episode start date: 9/19/2024   There are no active outsourced providers for this episode.                 Selected Continued Care - Prior Encounters Includes continued care and service providers with selected services from prior encounters from 9/23/2024 to 12/24/2024      Discharged on 11/21/2024 Admission date: 11/20/2024 - Discharge disposition: Home-Health Care [facility] [place]      Home Medical Care       Service Provider Services Address Phone Fax Patient Preferred    [facility] [address] [place] [id number] [phone] --                          Transportation Services  Private: Car    Final Discharge Disposition Code: 01 - home or self-care

## 2024-12-26 ENCOUNTER — TRANSITIONAL CARE MANAGEMENT TELEPHONE ENCOUNTER (OUTPATIENT)
Dept: CALL CENTER | Facility: HOSPITAL | Age: 74
End: 2024-12-26
Payer: MEDICARE

## 2024-12-26 ENCOUNTER — TELEPHONE (OUTPATIENT)
Dept: CARDIOLOGY | Facility: CLINIC | Age: 74
End: 2024-12-26
Payer: MEDICARE

## 2024-12-26 NOTE — OUTREACH NOTE
Call Center TCM Note      Flowsheet Row Responses   [facility] patient discharged from? [place]   Does the patient have one of the following disease processes/diagnoses(primary or secondary)? Acute MI (STEMI,NSTEMI)   TCM attempt successful? No   Unsuccessful attempts Attempt 1   Call Status Left message            Aminta Lau RN    12/26/2024, 11:52 EST

## 2024-12-26 NOTE — TELEPHONE ENCOUNTER
PT CALLED IN AND WANTED TO KNOW WHAT TO DO TO GET A READER, I GAVE HER THE DEXCOM NUMBER TO CALL AND GET A REPLACEMENT. PT HAD NO OTHER QUESTIONS AT THIS TIME.

## 2024-12-26 NOTE — OUTREACH NOTE
Call Center TCM Note      Flowsheet Row Responses   [facility] patient discharged from? [place]   Does the patient have one of the following disease processes/diagnoses(primary or secondary)? Acute MI (STEMI,NSTEMI)   TCM attempt successful? Yes   Call start time 1531   Call end time 1550   Discharge diagnosis NSTEMI, initial episode of care   Comments Hospital d/c f/u appt on 12/27/24 @9:30am   Does the patient have an appointment with their PCP within 7-14 days of discharge? Yes   Has home health visited the patient within 72 hours of discharge? N/A   Psychosocial issues? No   Comments Pt washed her dexcom meter, will download the dexcom [place]. Is able to receive a new dexcom in the new year per Ins   Did the patient receive a copy of their discharge instructions? Yes   Nursing interventions Reviewed instructions with patient   What is the patient's perception of their health status since discharge? Improving  [some tenderness at cath site, some discoloration in rt hand, has improved since d/c]   Nursing interventions Nurse provided patient education   Is the patient/caregiver able to teach back signs and symptoms of when to call for help immediately: Sudden chest discomfort, Sudden discomfort in arms, back, neck or jaw, Shortness of breath at any time, Dizziness or lightheadedness, Irregular or rapid heart rate   Nursing interventions Nurse provided patient education   Is the pateint /caregiver able to teach back the importance of cardiac rehab? Yes   Nursing interventions Referral made to cardiac rehab   Is the patient/caregiver able to teach back lifestyle changes to help prevent MIs Heart healthy diet, Managing diabetes   Is the patient/caregiver able to teach back ways to prevent a second heart attack: Take medications, Follow up with MD, Participate in Cardiac Rehab   Is the patient/caregiver able to teach back the hierarchy of who to call/visit for symptoms/problems? PCP, Specialist, Home health  nurse, Urgent Care, ED, 911 Yes   TCM call completed? Yes   Call end time 1550   Would this patient benefit from a Referral to [facility] Social Work? No   Is the patient interested in additional calls from an ambulatory ? No            Aminta Lau RN    12/26/2024, 15:50 EST

## 2024-12-26 NOTE — TELEPHONE ENCOUNTER
Caller: Reema Easley    Relationship to patient: Self    Best call back number: [phone]    Patient is needing: PATIENT REQUESTING A CALL BACK TO GO OVER MEDICATIONS AND WHAT SHE SHOULD BE EXPECTING SINCE HAVING HER STENTS PLACED ON MONDAY.

## 2024-12-27 ENCOUNTER — OFFICE VISIT (OUTPATIENT)
Dept: INTERNAL MEDICINE | Facility: CLINIC | Age: 74
End: 2024-12-27
Payer: MEDICARE

## 2024-12-27 VITALS
RESPIRATION RATE: 16 BRPM | HEART RATE: 97 BPM | WEIGHT: 198 LBS | BODY MASS INDEX: 33.8 KG/M2 | HEIGHT: 64 IN | DIASTOLIC BLOOD PRESSURE: 84 MMHG | OXYGEN SATURATION: 98 % | SYSTOLIC BLOOD PRESSURE: 136 MMHG | TEMPERATURE: 98.2 F

## 2024-12-27 DIAGNOSIS — Z09 HOSPITAL DISCHARGE FOLLOW-UP: Primary | ICD-10-CM

## 2024-12-27 DIAGNOSIS — R80.9 TYPE 2 DIABETES MELLITUS WITH DIABETIC MICROALBUMINURIA, WITH LONG-TERM CURRENT USE OF INSULIN: Chronic | ICD-10-CM

## 2024-12-27 DIAGNOSIS — C7A.8 NEUROENDOCRINE CARCINOMA OF PANCREAS: Chronic | ICD-10-CM

## 2024-12-27 DIAGNOSIS — R60.0 BILATERAL LOWER EXTREMITY EDEMA: Chronic | ICD-10-CM

## 2024-12-27 DIAGNOSIS — Z79.4 TYPE 2 DIABETES MELLITUS WITH DIABETIC MICROALBUMINURIA, WITH LONG-TERM CURRENT USE OF INSULIN: Chronic | ICD-10-CM

## 2024-12-27 DIAGNOSIS — G47.33 OBSTRUCTIVE SLEEP APNEA: Chronic | ICD-10-CM

## 2024-12-27 DIAGNOSIS — K25.7 CHRONIC GASTRIC ULCER WITHOUT HEMORRHAGE AND WITHOUT PERFORATION: Chronic | ICD-10-CM

## 2024-12-27 DIAGNOSIS — E78.2 MIXED HYPERLIPIDEMIA: Chronic | ICD-10-CM

## 2024-12-27 DIAGNOSIS — R90.89 ABNORMAL CT OF BRAIN: ICD-10-CM

## 2024-12-27 DIAGNOSIS — R80.9 MICROALBUMINURIA: Chronic | ICD-10-CM

## 2024-12-27 DIAGNOSIS — I10 BENIGN ESSENTIAL HYPERTENSION: Chronic | ICD-10-CM

## 2024-12-27 DIAGNOSIS — I21.4 NSTEMI (NON-ST ELEVATED MYOCARDIAL INFARCTION): ICD-10-CM

## 2024-12-27 DIAGNOSIS — I20.0 UNSTABLE ANGINA PECTORIS: ICD-10-CM

## 2024-12-27 DIAGNOSIS — E11.29 TYPE 2 DIABETES MELLITUS WITH DIABETIC MICROALBUMINURIA, WITH LONG-TERM CURRENT USE OF INSULIN: Chronic | ICD-10-CM

## 2024-12-27 PROBLEM — Z96.652 TOTAL KNEE REPLACEMENT STATUS, LEFT: Status: RESOLVED | Noted: 2024-08-27 | Resolved: 2024-12-27

## 2024-12-27 PROBLEM — M25.562 KNEE PAIN, LEFT: Status: RESOLVED | Noted: 2024-11-20 | Resolved: 2024-12-27

## 2024-12-27 PROCEDURE — 1160F RVW MEDS BY RX/DR IN RCRD: CPT | Performed by: INTERNAL MEDICINE

## 2024-12-27 PROCEDURE — 3051F HG A1C>EQUAL 7.0%<8.0%: CPT | Performed by: INTERNAL MEDICINE

## 2024-12-27 PROCEDURE — 3075F SYST BP GE 130 - 139MM HG: CPT | Performed by: INTERNAL MEDICINE

## 2024-12-27 PROCEDURE — 1159F MED LIST DOCD IN RCRD: CPT | Performed by: INTERNAL MEDICINE

## 2024-12-27 PROCEDURE — 1125F AMNT PAIN NOTED PAIN PRSNT: CPT | Performed by: INTERNAL MEDICINE

## 2024-12-27 PROCEDURE — 99495 TRANSJ CARE MGMT MOD F2F 14D: CPT | Performed by: INTERNAL MEDICINE

## 2024-12-27 PROCEDURE — 3079F DIAST BP 80-89 MM HG: CPT | Performed by: INTERNAL MEDICINE

## 2024-12-27 PROCEDURE — 1111F DSCHRG MED/CURRENT MED MERGE: CPT | Performed by: INTERNAL MEDICINE

## 2024-12-27 RX ORDER — PANTOPRAZOLE SODIUM 40 MG/1
TABLET, DELAYED RELEASE ORAL
Start: 2024-12-27
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
## 2024-12-30 ENCOUNTER — HOSPITAL ENCOUNTER (OUTPATIENT)
Dept: MRI IMAGING | Facility: HOSPITAL | Age: 74
Discharge: HOME | End: 2024-12-30
Payer: MEDICARE

## 2024-12-30 ENCOUNTER — HOSPITAL ENCOUNTER (OUTPATIENT)
Dept: GENERAL RADIOLOGY | Facility: HOSPITAL | Age: 74
Discharge: HOME | End: 2024-12-30
Payer: MEDICARE

## 2024-12-30 DIAGNOSIS — R90.89 ABNORMAL CT OF BRAIN: ICD-10-CM

## 2024-12-30 DIAGNOSIS — R51.9 NEW ONSET OF HEADACHES AFTER AGE 50: ICD-10-CM

## 2024-12-30 DIAGNOSIS — M79.5 FOREIGN BODY (FB) IN SOFT TISSUE: ICD-10-CM

## 2024-12-30 PROCEDURE — 70553 MRI BRAIN STEM W/O & W/DYE: CPT

## 2024-12-30 PROCEDURE — 25510000002 GADOBENATE DIMEGLUMINE 529 MG/ML SOLUTION: Performed by: INTERNAL MEDICINE

## 2024-12-30 PROCEDURE — A9577 INJ MULTIHANCE: HCPCS | Performed by: INTERNAL MEDICINE

## 2024-12-30 RX ADMIN — GADOBENATE DIMEGLUMINE 20 ML: 529 INJECTION, SOLUTION INTRAVENOUS at 16:28

## 2024-12-31 ENCOUNTER — OFFICE VISIT (OUTPATIENT)
Dept: CARDIOLOGY | Facility: CLINIC | Age: 74
End: 2024-12-31
Payer: MEDICARE

## 2024-12-31 VITALS
HEART RATE: 81 BPM | DIASTOLIC BLOOD PRESSURE: 76 MMHG | HEIGHT: 64 IN | SYSTOLIC BLOOD PRESSURE: 130 MMHG | BODY MASS INDEX: 33.63 KG/M2 | WEIGHT: 197 LBS

## 2024-12-31 DIAGNOSIS — I21.4 NSTEMI (NON-ST ELEVATED MYOCARDIAL INFARCTION): ICD-10-CM

## 2024-12-31 DIAGNOSIS — E78.2 MIXED HYPERLIPIDEMIA: Primary | Chronic | ICD-10-CM

## 2024-12-31 DIAGNOSIS — I10 BENIGN ESSENTIAL HYPERTENSION: Chronic | ICD-10-CM
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
## 2025-01-02 ENCOUNTER — OFFICE VISIT (OUTPATIENT)
Dept: CARDIAC REHAB | Facility: HOSPITAL | Age: 75
End: 2025-01-02
Payer: MEDICARE

## 2025-01-02 DIAGNOSIS — Z95.5 S/P DRUG ELUTING CORONARY STENT PLACEMENT: ICD-10-CM

## 2025-01-02 DIAGNOSIS — I21.4 NON-STEMI (NON-ST ELEVATED MYOCARDIAL INFARCTION): Primary | ICD-10-CM

## 2025-01-02 PROCEDURE — 93798 PHYS/QHP OP CAR RHAB W/ECG: CPT

## 2025-01-02 PROCEDURE — 93797 PHYS/QHP OP CAR RHAB WO ECG: CPT

## 2025-01-03 ENCOUNTER — PATIENT OUTREACH (OUTPATIENT)
Dept: CASE MANAGEMENT | Facility: OTHER | Age: 75
End: 2025-01-03
Payer: MEDICARE

## 2025-01-03 NOTE — OUTREACH NOTE
AMBULATORY CASE MANAGEMENT NOTE    Names and Relationships of Patient/Support Persons:  -     Patient Outreach    Patient states she is active with cardiac rehab. No needs at this time. Follow up scheduled in 2 weeks.     Education Documentation  No documentation found.        Vanessa HELMS  Ambulatory Case Management    1/3/2025, 17:44 EST

## 2025-01-08 ENCOUNTER — APPOINTMENT (OUTPATIENT)
Dept: CARDIAC REHAB | Facility: HOSPITAL | Age: 75
End: 2025-01-08
Payer: MEDICARE

## 2025-01-08 LAB — CREAT BLDA-MCNC: 0.9 MG/DL (ref 0.6–1.3)

## 2025-01-10 ENCOUNTER — APPOINTMENT (OUTPATIENT)
Dept: CARDIAC REHAB | Facility: HOSPITAL | Age: 75
End: 2025-01-10
Payer: MEDICARE

## 2025-01-13 ENCOUNTER — TREATMENT (OUTPATIENT)
Dept: CARDIAC REHAB | Facility: HOSPITAL | Age: 75
End: 2025-01-13
Payer: MEDICARE

## 2025-01-13 DIAGNOSIS — Z87.39 HISTORY OF GOUT: Chronic | ICD-10-CM

## 2025-01-13 DIAGNOSIS — I21.4 NON-STEMI (NON-ST ELEVATED MYOCARDIAL INFARCTION): Primary | ICD-10-CM

## 2025-01-13 PROCEDURE — 93798 PHYS/QHP OP CAR RHAB W/ECG: CPT

## 2025-01-13 RX ORDER — ALLOPURINOL 300 MG/1
300 TABLET ORAL DAILY
Qty: 90 TABLET | Refills: 3 | Status: SHIPPED | OUTPATIENT
Start: 2025-01-13

## 2025-01-14 ENCOUNTER — TELEPHONE (OUTPATIENT)
Dept: ENDOCRINOLOGY | Age: 75
End: 2025-01-14
Payer: MEDICARE

## 2025-01-14 RX ORDER — INSULIN ASPART 100 [IU]/ML
INJECTION, SOLUTION INTRAVENOUS; SUBCUTANEOUS
Qty: 15 ML | Refills: 0 | Status: SHIPPED | OUTPATIENT
Start: 2025-01-14

## 2025-01-14 NOTE — TELEPHONE ENCOUNTER
Rx Refill Note  Requested Prescriptions     Pending Prescriptions Disp Refills    insulin aspart (NovoLOG FlexPen) 100 UNIT/ML solution pen-injector sc pen 93 mL 1     Sig: Inject 2 Units under the skin into the appropriate area as directed Every Morning Before Breakfast AND 3 Units Daily Before Lunch AND 3 Units Daily Before Supper. Take injection and start eating within 5 minutes. Do not go > 15 minutes after shot without eating. May increase dose with provider up to 20u daily      Last office visit with prescribing clinician: 11/19/2024   Last telemedicine visit with prescribing clinician: Visit date not found   Next office visit with prescribing clinician: 2/19/2025                         Would you like a call back once the refill request has been completed: [] Yes [] No    If the office needs to give you a call back, can they leave a voicemail: [] Yes [] No    Bren Cruz MA  01/14/25, 14:28 EST

## 2025-01-14 NOTE — TELEPHONE ENCOUNTER
Caller: Reema Easley    Relationship: Self    Best call back number: [phone]     Requested Prescriptions: insulin aspart (NovoLOG FlexPen) 100 UNIT/ML solution pen-injector sc pen [[id number]] (Order [id number])   Requested Prescriptions      No prescriptions requested or ordered in this encounter        Pharmacy where request should be sent: [facility] [id number] - [place], [place] - [address] [facility] [phone] [facility] [phone]   [address] [place] [address], [place] [id number]  Phone: [phone]  Fax: [phone]      Last office visit with prescribing clinician: 11/19/2024   Last telemedicine visit with prescribing clinician: Visit date not found   Next office visit with prescribing clinician: 2/19/2025     Additional details provided by patient:     Does the patient have less than a 3 day supply:  [] Yes  [] No    Would you like a call back once the refill request has been completed: [] Yes [] No    If the office needs to give you a call back, can they leave a voicemail: [] Yes [] No    Jazmine Packer Rep   01/14/25 14:01 EST

## 2025-01-15 ENCOUNTER — APPOINTMENT (OUTPATIENT)
Dept: CARDIAC REHAB | Facility: HOSPITAL | Age: 75
End: 2025-01-15
Payer: MEDICARE

## 2025-01-17 ENCOUNTER — TREATMENT (OUTPATIENT)
Dept: CARDIAC REHAB | Facility: HOSPITAL | Age: 75
End: 2025-01-17
Payer: MEDICARE

## 2025-01-17 ENCOUNTER — OFFICE VISIT (OUTPATIENT)
Dept: ORTHOPEDIC SURGERY | Facility: CLINIC | Age: 75
End: 2025-01-17
Payer: MEDICARE

## 2025-01-17 VITALS — TEMPERATURE: 97.8 F | HEIGHT: 65 IN | WEIGHT: 201.8 LBS | BODY MASS INDEX: 33.62 KG/M2

## 2025-01-17 DIAGNOSIS — Z96.652 S/P REVISION OF TOTAL KNEE, LEFT: ICD-10-CM

## 2025-01-17 DIAGNOSIS — I21.4 NON-STEMI (NON-ST ELEVATED MYOCARDIAL INFARCTION): Primary | ICD-10-CM

## 2025-01-17 DIAGNOSIS — R52 PAIN: Primary | ICD-10-CM

## 2025-01-17 PROCEDURE — 99024 POSTOP FOLLOW-UP VISIT: CPT | Performed by: NURSE PRACTITIONER

## 2025-01-17 PROCEDURE — 73562 X-RAY EXAM OF KNEE 3: CPT | Performed by: NURSE PRACTITIONER

## 2025-01-17 PROCEDURE — 93798 PHYS/QHP OP CAR RHAB W/ECG: CPT

## 2025-01-17 NOTE — PROGRESS NOTES
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 Patient reports doing well with no unusual complaints, just complaints of having tightness and swelling.  Still reports having a moderate amount of pain and rates it as a 6 on a scale of 10.  She is still going to outpatient physical therapy at [facility] as well as doing home exercises.  Appears to be progressing appropriately she is ambulating full weightbearing walks unassisted in clinic today.  She denies any instability or buckling issues.  Denies any sign or symptoms of infection, and is without any other significant complaints today.    OBJECTIVE:   Exam:. The incision is well healed. No sign of infection. Range of motion is measured at 0 to 125. The calf is soft and nontender with a negative Homans sign. Strength is progressing and the patient is ambulating appropriately.    DIAGNOSTIC STUDIES  Xrays: 3 views of the left knee (AP, lateral, and sunrise) were ordered and reviewed for evaluation of recent knee replacement. They demonstrate a well positioned, well aligned knee replacement without complicating factors noted. In comparison with previous films there has been no change.    ASSESSMENT: 8 week status post left knee replacement polyethylene liner exchange    PLAN: 1) Continue with PT exercises as prescribed   2) Follow up in 10 months for annual visit    Patient reports that she has had some recent onset of right shoulder discomfort and wanted to be referred to a shoulder specialist.  Will refer her to Dr. Ismael Toledo for further evaluation and treatment options.    Joe Roman, APRN  2025

## 2025-01-20 ENCOUNTER — PATIENT OUTREACH (OUTPATIENT)
Dept: CASE MANAGEMENT | Facility: OTHER | Age: 75
End: 2025-01-20
Payer: MEDICARE

## 2025-01-20 ENCOUNTER — APPOINTMENT (OUTPATIENT)
Dept: CARDIAC REHAB | Facility: HOSPITAL | Age: 75
End: 2025-01-20
Payer: MEDICARE

## 2025-01-21 ENCOUNTER — TELEPHONE (OUTPATIENT)
Dept: ENDOCRINOLOGY | Age: 75
End: 2025-01-21

## 2025-01-21 RX ORDER — INSULIN ASPART 100 [IU]/ML
4 INJECTION, SOLUTION INTRAVENOUS; SUBCUTANEOUS
Qty: 15 ML | Refills: 0 | Status: SHIPPED | OUTPATIENT
Start: 2025-01-21

## 2025-01-21 NOTE — TELEPHONE ENCOUNTER
PT CALLED AND ASKED FOR A REFILL OF THE BELOW MED.      insulin aspart (NovoLOG FlexPen) 100 UNIT/ML solution pen-injector sc pen [[id number]]         GOING TO:    [facility] - [facility] [address]  - [phone]  - [phone] FX

## 2025-01-21 NOTE — OUTREACH NOTE
AMBULATORY CASE MANAGEMENT NOTE    Names and Relationships of Patient/Support Persons: Contact: Reema Easley; Relationship: Self -     Patient Outreach    Introduced self, explained ACM RN role and provided contact information. Patient states she is tired, but is getting to cardiac rehab. She is staying in with the cold weather, but plans to attend rehab on Wednesday. Follow up scheduled in 2 weeks.     Education Documentation  No documentation found.        Vanessa HELMS  Ambulatory Case Management    1/20/2025, 19:11 EST

## 2025-01-21 NOTE — TELEPHONE ENCOUNTER
Rx Refill Note  Requested Prescriptions     Pending Prescriptions Disp Refills    insulin aspart (NovoLOG FlexPen) 100 UNIT/ML solution pen-injector sc pen 15 mL 0     Sig: Inject 2 Units under the skin into the appropriate area as directed Every Morning Before Breakfast AND 3 Units Daily Before Lunch AND 3 Units Daily Before Supper. Take injection and start eating within 5 minutes. Do not go > 15 minutes after shot without eating. May increase dose with provider up to 20u daily      Last office visit with prescribing clinician: 11/19/2024   Last telemedicine visit with prescribing clinician: Visit date not found   Next office visit with prescribing clinician: 2/19/2025                         Would you like a call back once the refill request has been completed: [] Yes [] No    If the office needs to give you a call back, can they leave a voicemail: [] Yes [] No    Bren Cruz MA  01/21/25, 13:45 EST

## 2025-01-22 ENCOUNTER — TREATMENT (OUTPATIENT)
Dept: CARDIAC REHAB | Facility: HOSPITAL | Age: 75
End: 2025-01-22
Payer: MEDICARE

## 2025-01-22 DIAGNOSIS — Z95.5 S/P DRUG ELUTING CORONARY STENT PLACEMENT: Primary | ICD-10-CM

## 2025-01-22 PROCEDURE — 93798 PHYS/QHP OP CAR RHAB W/ECG: CPT

## 2025-01-22 RX ORDER — ATORVASTATIN CALCIUM 40 MG/1
40 TABLET, FILM COATED ORAL DAILY
Qty: 90 TABLET | Refills: 1 | Status: SHIPPED | OUTPATIENT
Start: 2025-01-22

## 2025-01-22 RX ORDER — CLOPIDOGREL BISULFATE 75 MG/1
75 TABLET ORAL DAILY
Qty: 90 TABLET | Refills: 1 | Status: SHIPPED | OUTPATIENT
Start: 2025-01-22

## 2025-01-24 ENCOUNTER — APPOINTMENT (OUTPATIENT)
Dept: CARDIAC REHAB | Facility: HOSPITAL | Age: 75
End: 2025-01-24
Payer: MEDICARE

## 2025-01-27 ENCOUNTER — TREATMENT (OUTPATIENT)
Dept: CARDIAC REHAB | Facility: HOSPITAL | Age: 75
End: 2025-01-27
Payer: MEDICARE

## 2025-01-27 DIAGNOSIS — I21.4 NON-STEMI (NON-ST ELEVATED MYOCARDIAL INFARCTION): Primary | ICD-10-CM

## 2025-01-27 PROCEDURE — 93797 PHYS/QHP OP CAR RHAB WO ECG: CPT

## 2025-01-27 PROCEDURE — 93798 PHYS/QHP OP CAR RHAB W/ECG: CPT

## 2025-01-29 ENCOUNTER — APPOINTMENT (OUTPATIENT)
Dept: CARDIAC REHAB | Facility: HOSPITAL | Age: 75
End: 2025-01-29
Payer: MEDICARE

## 2025-01-31 ENCOUNTER — TREATMENT (OUTPATIENT)
Dept: CARDIAC REHAB | Facility: HOSPITAL | Age: 75
End: 2025-01-31
Payer: MEDICARE

## 2025-01-31 DIAGNOSIS — I21.4 NON-STEMI (NON-ST ELEVATED MYOCARDIAL INFARCTION): Primary | ICD-10-CM

## 2025-01-31 PROCEDURE — 93798 PHYS/QHP OP CAR RHAB W/ECG: CPT

## 2025-02-03 ENCOUNTER — TELEPHONE (OUTPATIENT)
Dept: ORTHOPEDIC SURGERY | Facility: CLINIC | Age: 75
End: 2025-02-03

## 2025-02-03 ENCOUNTER — TREATMENT (OUTPATIENT)
Dept: CARDIAC REHAB | Facility: HOSPITAL | Age: 75
End: 2025-02-03
Payer: MEDICARE

## 2025-02-03 DIAGNOSIS — I21.4 NON-STEMI (NON-ST ELEVATED MYOCARDIAL INFARCTION): Primary | ICD-10-CM

## 2025-02-03 PROCEDURE — 93798 PHYS/QHP OP CAR RHAB W/ECG: CPT

## 2025-02-03 NOTE — TELEPHONE ENCOUNTER
Caller: Reema Easley    Relationship: Self    Best call back number:     What is the best time to reach you: ANY     Who are you requesting to speak with (clinical staff, provider,  specific staff member): CLINICAL    What was the call regarding: PATIENT IS CONCERNED BECAUSE HAVING TINGLING BURNING AND THROBBING PAIN IN BOTH KNEES ALMOST 24/7 RIGHT ABOVE AND RIGHT BELOW KNEE.  PATIENT MENTIONED SHE IS DIABETIC AND HAS HAD A HEART ATTACK.  ALSO TAKING A BUNCH OF NEW MEDICINE     Is it okay if the provider responds through [place]: PLEASE CALL

## 2025-02-05 ENCOUNTER — PATIENT OUTREACH (OUTPATIENT)
Dept: CASE MANAGEMENT | Facility: OTHER | Age: 75
End: 2025-02-05
Payer: MEDICARE

## 2025-02-05 ENCOUNTER — TELEPHONE (OUTPATIENT)
Dept: INTERNAL MEDICINE | Facility: CLINIC | Age: 75
End: 2025-02-05
Payer: MEDICARE

## 2025-02-05 ENCOUNTER — HOSPITAL ENCOUNTER (OUTPATIENT)
Facility: HOSPITAL | Age: 75
Setting detail: OBSERVATION
Discharge: HOME | End: 2025-02-07
Attending: EMERGENCY MEDICINE | Admitting: EMERGENCY MEDICINE
Payer: MEDICARE

## 2025-02-05 ENCOUNTER — APPOINTMENT (OUTPATIENT)
Dept: GENERAL RADIOLOGY | Facility: HOSPITAL | Age: 75
End: 2025-02-05
Payer: MEDICARE

## 2025-02-05 ENCOUNTER — APPOINTMENT (OUTPATIENT)
Dept: CT IMAGING | Facility: HOSPITAL | Age: 75
End: 2025-02-05
Payer: MEDICARE

## 2025-02-05 ENCOUNTER — APPOINTMENT (OUTPATIENT)
Dept: CARDIAC REHAB | Facility: HOSPITAL | Age: 75
End: 2025-02-05
Payer: MEDICARE

## 2025-02-05 DIAGNOSIS — M79.604 BILATERAL LOWER EXTREMITY PAIN: ICD-10-CM

## 2025-02-05 DIAGNOSIS — R60.0 BILATERAL LOWER EXTREMITY EDEMA: ICD-10-CM

## 2025-02-05 DIAGNOSIS — M79.605 BILATERAL LOWER EXTREMITY PAIN: ICD-10-CM

## 2025-02-05 DIAGNOSIS — G47.30 SLEEP APNEA, UNSPECIFIED TYPE: Primary | ICD-10-CM

## 2025-02-05 DIAGNOSIS — R06.02 SHORTNESS OF BREATH: ICD-10-CM

## 2025-02-05 PROBLEM — R06.00 DYSPNEA: Status: ACTIVE | Noted: 2025-02-05

## 2025-02-05 LAB
ALBUMIN SERPL-MCNC: 4.1 G/DL (ref 3.5–5.2)
ALBUMIN/GLOB SERPL: 1.5 G/DL
ALP SERPL-CCNC: 90 U/L (ref 39–117)
ALT SERPL W P-5'-P-CCNC: 10 U/L (ref 1–33)
ANION GAP SERPL CALCULATED.3IONS-SCNC: 12.1 MMOL/L (ref 5–15)
AST SERPL-CCNC: 15 U/L (ref 1–32)
B PARAPERT DNA SPEC QL NAA+PROBE: NOT DETECTED
B PERT DNA SPEC QL NAA+PROBE: NOT DETECTED
BASOPHILS # BLD AUTO: 0.1 10*3/MM3 (ref 0–0.2)
BASOPHILS NFR BLD AUTO: 0.8 % (ref 0–1.5)
BILIRUB SERPL-MCNC: <0.2 MG/DL (ref 0–1.2)
BUN SERPL-MCNC: 24 MG/DL (ref 8–23)
BUN/CREAT SERPL: 32.9 (ref 7–25)
C PNEUM DNA NPH QL NAA+NON-PROBE: NOT DETECTED
CALCIUM SPEC-SCNC: 9.3 MG/DL (ref 8.6–10.5)
CHLORIDE SERPL-SCNC: 101 MMOL/L (ref 98–107)
CO2 SERPL-SCNC: 22.9 MMOL/L (ref 22–29)
CREAT SERPL-MCNC: 0.73 MG/DL (ref 0.57–1)
D DIMER PPP FEU-MCNC: 0.5 MCGFEU/ML (ref 0–0.73)
DEPRECATED RDW RBC AUTO: 43.1 FL (ref 37–54)
EGFRCR SERPLBLD CKD-EPI 2021: 87 ML/MIN/1.73
EOSINOPHIL # BLD AUTO: 1.25 10*3/MM3 (ref 0–0.4)
EOSINOPHIL NFR BLD AUTO: 9.7 % (ref 0.3–6.2)
ERYTHROCYTE [DISTWIDTH] IN BLOOD BY AUTOMATED COUNT: 12.8 % (ref 12.3–15.4)
FLUAV SUBTYP SPEC NAA+PROBE: NOT DETECTED
FLUBV RNA ISLT QL NAA+PROBE: NOT DETECTED
GEN 5 1HR TROPONIN T REFLEX: 9 NG/L
GLOBULIN UR ELPH-MCNC: 2.7 GM/DL
GLUCOSE SERPL-MCNC: 307 MG/DL (ref 65–99)
HADV DNA SPEC NAA+PROBE: NOT DETECTED
HCOV 229E RNA SPEC QL NAA+PROBE: NOT DETECTED
HCOV HKU1 RNA SPEC QL NAA+PROBE: NOT DETECTED
HCOV NL63 RNA SPEC QL NAA+PROBE: NOT DETECTED
HCOV OC43 RNA SPEC QL NAA+PROBE: NOT DETECTED
HCT VFR BLD AUTO: 40.2 % (ref 34–46.6)
HGB BLD-MCNC: 12.3 G/DL (ref 12–15.9)
HMPV RNA NPH QL NAA+NON-PROBE: NOT DETECTED
HOLD SPECIMEN: NORMAL
HOLD SPECIMEN: NORMAL
HPIV1 RNA ISLT QL NAA+PROBE: NOT DETECTED
HPIV2 RNA SPEC QL NAA+PROBE: NOT DETECTED
HPIV3 RNA NPH QL NAA+PROBE: NOT DETECTED
HPIV4 P GENE NPH QL NAA+PROBE: NOT DETECTED
IMM GRANULOCYTES # BLD AUTO: 0.03 10*3/MM3 (ref 0–0.05)
IMM GRANULOCYTES NFR BLD AUTO: 0.2 % (ref 0–0.5)
LYMPHOCYTES # BLD AUTO: 4.38 10*3/MM3 (ref 0.7–3.1)
LYMPHOCYTES NFR BLD AUTO: 34 % (ref 19.6–45.3)
M PNEUMO IGG SER IA-ACNC: NOT DETECTED
MCH RBC QN AUTO: 28.7 PG (ref 26.6–33)
MCHC RBC AUTO-ENTMCNC: 30.6 G/DL (ref 31.5–35.7)
MCV RBC AUTO: 93.7 FL (ref 79–97)
MONOCYTES # BLD AUTO: 0.91 10*3/MM3 (ref 0.1–0.9)
MONOCYTES NFR BLD AUTO: 7.1 % (ref 5–12)
NEUTROPHILS NFR BLD AUTO: 48.2 % (ref 42.7–76)
NEUTROPHILS NFR BLD AUTO: 6.22 10*3/MM3 (ref 1.7–7)
NRBC BLD AUTO-RTO: 0 /100 WBC (ref 0–0.2)
NT-PROBNP SERPL-MCNC: 107 PG/ML (ref 0–900)
PLATELET # BLD AUTO: 526 10*3/MM3 (ref 140–450)
PMV BLD AUTO: 9.8 FL (ref 6–12)
POTASSIUM SERPL-SCNC: 4.6 MMOL/L (ref 3.5–5.2)
PROT SERPL-MCNC: 6.8 G/DL (ref 6–8.5)
RBC # BLD AUTO: 4.29 10*6/MM3 (ref 3.77–5.28)
RHINOVIRUS RNA SPEC NAA+PROBE: NOT DETECTED
RSV RNA NPH QL NAA+NON-PROBE: NOT DETECTED
SARS-COV-2 RNA NPH QL NAA+NON-PROBE: NOT DETECTED
SODIUM SERPL-SCNC: 136 MMOL/L (ref 136–145)
TROPONIN T NUMERIC DELTA: 0 NG/L
TROPONIN T SERPL HS-MCNC: 9 NG/L
WBC NRBC COR # BLD AUTO: 12.89 10*3/MM3 (ref 3.4–10.8)
WHOLE BLOOD HOLD COAG: NORMAL
WHOLE BLOOD HOLD SPECIMEN: NORMAL

## 2025-02-05 PROCEDURE — 36415 COLL VENOUS BLD VENIPUNCTURE: CPT

## 2025-02-05 PROCEDURE — 84484 ASSAY OF TROPONIN QUANT: CPT

## 2025-02-05 PROCEDURE — 85379 FIBRIN DEGRADATION QUANT: CPT | Performed by: EMERGENCY MEDICINE

## 2025-02-05 PROCEDURE — 93010 ELECTROCARDIOGRAM REPORT: CPT | Performed by: INTERNAL MEDICINE

## 2025-02-05 PROCEDURE — 85025 COMPLETE CBC W/AUTO DIFF WBC: CPT

## 2025-02-05 PROCEDURE — 99285 EMERGENCY DEPT VISIT HI MDM: CPT

## 2025-02-05 PROCEDURE — 83880 ASSAY OF NATRIURETIC PEPTIDE: CPT

## 2025-02-05 PROCEDURE — 0202U NFCT DS 22 TRGT SARS-COV-2: CPT | Performed by: PHYSICIAN ASSISTANT

## 2025-02-05 PROCEDURE — G0378 HOSPITAL OBSERVATION PER HR: HCPCS

## 2025-02-05 PROCEDURE — 93005 ELECTROCARDIOGRAM TRACING: CPT | Performed by: EMERGENCY MEDICINE

## 2025-02-05 PROCEDURE — 25510000001 IOPAMIDOL PER 1 ML: Performed by: EMERGENCY MEDICINE

## 2025-02-05 PROCEDURE — 71045 X-RAY EXAM CHEST 1 VIEW: CPT

## 2025-02-05 PROCEDURE — 80053 COMPREHEN METABOLIC PANEL: CPT

## 2025-02-05 PROCEDURE — 84484 ASSAY OF TROPONIN QUANT: CPT | Performed by: EMERGENCY MEDICINE

## 2025-02-05 PROCEDURE — 71275 CT ANGIOGRAPHY CHEST: CPT

## 2025-02-05 PROCEDURE — 93005 ELECTROCARDIOGRAM TRACING: CPT

## 2025-02-05 RX ORDER — NITROGLYCERIN 0.4 MG/1
0.4 TABLET SUBLINGUAL
Status: DISCONTINUED | OUTPATIENT
Start: 2025-02-05 | End: 2025-02-07 | Stop reason: HOSPADM

## 2025-02-05 RX ORDER — ACETAMINOPHEN 160 MG/5ML
650 SOLUTION ORAL EVERY 4 HOURS PRN
Status: DISCONTINUED | OUTPATIENT
Start: 2025-02-05 | End: 2025-02-07 | Stop reason: HOSPADM

## 2025-02-05 RX ORDER — SODIUM CHLORIDE 9 MG/ML
40 INJECTION, SOLUTION INTRAVENOUS AS NEEDED
Status: DISCONTINUED | OUTPATIENT
Start: 2025-02-05 | End: 2025-02-07 | Stop reason: HOSPADM

## 2025-02-05 RX ORDER — ACETAMINOPHEN 650 MG/1
650 SUPPOSITORY RECTAL EVERY 4 HOURS PRN
Status: DISCONTINUED | OUTPATIENT
Start: 2025-02-05 | End: 2025-02-07 | Stop reason: HOSPADM

## 2025-02-05 RX ORDER — ONDANSETRON 4 MG/1
4 TABLET, ORALLY DISINTEGRATING ORAL EVERY 6 HOURS PRN
Status: DISCONTINUED | OUTPATIENT
Start: 2025-02-05 | End: 2025-02-07 | Stop reason: HOSPADM

## 2025-02-05 RX ORDER — SODIUM CHLORIDE 0.9 % (FLUSH) 0.9 %
10 SYRINGE (ML) INJECTION AS NEEDED
Status: DISCONTINUED | OUTPATIENT
Start: 2025-02-05 | End: 2025-02-07 | Stop reason: HOSPADM

## 2025-02-05 RX ORDER — SODIUM CHLORIDE 0.9 % (FLUSH) 0.9 %
10 SYRINGE (ML) INJECTION EVERY 12 HOURS SCHEDULED
Status: DISCONTINUED | OUTPATIENT
Start: 2025-02-05 | End: 2025-02-07 | Stop reason: HOSPADM

## 2025-02-05 RX ORDER — ONDANSETRON 2 MG/ML
4 INJECTION INTRAMUSCULAR; INTRAVENOUS EVERY 6 HOURS PRN
Status: DISCONTINUED | OUTPATIENT
Start: 2025-02-05 | End: 2025-02-07 | Stop reason: HOSPADM

## 2025-02-05 RX ORDER — ACETAMINOPHEN 325 MG/1
650 TABLET ORAL EVERY 4 HOURS PRN
Status: DISCONTINUED | OUTPATIENT
Start: 2025-02-05 | End: 2025-02-07 | Stop reason: HOSPADM

## 2025-02-05 RX ORDER — IOPAMIDOL 755 MG/ML
100 INJECTION, SOLUTION INTRAVASCULAR
Status: COMPLETED | OUTPATIENT
Start: 2025-02-05 | End: 2025-02-05

## 2025-02-05 RX ADMIN — IOPAMIDOL 95 ML: 755 INJECTION, SOLUTION INTRAVENOUS at 21:01

## 2025-02-05 NOTE — OUTREACH NOTE
AMBULATORY CASE MANAGEMENT NOTE    Names and Relationships of Patient/Support Persons: Contact: Reema Easley; Relationship: Self -     Patient Outreach    Introduced self, explained ACM RN role and provided contact information. Spoke with patient regarding health and wellness. Patient in transit to ED with swollen leg and shortness of breath. Follow up scheduled next week.     Education Documentation  No documentation found.        Vanessa HELMS  Ambulatory Case Management    2/5/2025, 17:29 EST

## 2025-02-05 NOTE — TELEPHONE ENCOUNTER
Spoke with pt she said her left leg is red swollen and warm to the touch. She said the pain is severe she is also having SOB advised her to go to ER to get evaluated. She said she does have hx of heart attack.

## 2025-02-06 ENCOUNTER — APPOINTMENT (OUTPATIENT)
Dept: CARDIOLOGY | Facility: HOSPITAL | Age: 75
End: 2025-02-06
Payer: MEDICARE

## 2025-02-06 LAB
ANION GAP SERPL CALCULATED.3IONS-SCNC: 8 MMOL/L (ref 5–15)
AORTIC ARCH: 2.8 CM
AORTIC DIMENSIONLESS INDEX: 0.82 (DI)
AV MEAN PRESS GRAD SYS DOP V1V2: 3.8 MMHG
AV VMAX SYS DOP: 139.8 CM/SEC
BH CV ECHO MEAS - AO MAX PG: 7.8 MMHG
BH CV ECHO MEAS - AO ROOT AREA (BSA CORRECTED): 1.6 CM2
BH CV ECHO MEAS - AO ROOT DIAM: 3.1 CM
BH CV ECHO MEAS - AO V2 VTI: 26.5 CM
BH CV ECHO MEAS - AVA(I,D): 1.86 CM2
BH CV ECHO MEAS - EDV(MOD-SP2): 98 ML
BH CV ECHO MEAS - EDV(MOD-SP4): 108 ML
BH CV ECHO MEAS - EF(MOD-SP2): 69.4 %
BH CV ECHO MEAS - EF(MOD-SP4): 77.8 %
BH CV ECHO MEAS - ESV(MOD-SP2): 30 ML
BH CV ECHO MEAS - ESV(MOD-SP4): 24 ML
BH CV ECHO MEAS - LAT PEAK E' VEL: 7.3 CM/SEC
BH CV ECHO MEAS - LV DIASTOLIC VOL/BSA (35-75): 55.6 CM2
BH CV ECHO MEAS - LV MAX PG: 5 MMHG
BH CV ECHO MEAS - LV MEAN PG: 2.48 MMHG
BH CV ECHO MEAS - LV SYSTOLIC VOL/BSA (12-30): 12.4 CM2
BH CV ECHO MEAS - LV V1 MAX: 111.9 CM/SEC
BH CV ECHO MEAS - LV V1 VTI: 21.4 CM
BH CV ECHO MEAS - LVOT AREA: 2.3 CM2
BH CV ECHO MEAS - LVOT DIAM: 1.71 CM
BH CV ECHO MEAS - MED PEAK E' VEL: 5.7 CM/SEC
BH CV ECHO MEAS - MV A DUR: 0.09 SEC
BH CV ECHO MEAS - MV A MAX VEL: 96.1 CM/SEC
BH CV ECHO MEAS - MV DEC SLOPE: 374.7 CM/SEC2
BH CV ECHO MEAS - MV DEC TIME: 0.18 SEC
BH CV ECHO MEAS - MV E MAX VEL: 87.4 CM/SEC
BH CV ECHO MEAS - MV E/A: 0.91
BH CV ECHO MEAS - MV MAX PG: 3.2 MMHG
BH CV ECHO MEAS - MV MEAN PG: 1.96 MMHG
BH CV ECHO MEAS - MV P1/2T: 69 MSEC
BH CV ECHO MEAS - MV V2 VTI: 24.2 CM
BH CV ECHO MEAS - MVA(P1/2T): 3.2 CM2
BH CV ECHO MEAS - MVA(VTI): 2.04 CM2
BH CV ECHO MEAS - PA ACC TIME: 0.14 SEC
BH CV ECHO MEAS - PA V2 MAX: 94.1 CM/SEC
BH CV ECHO MEAS - RV MAX PG: 1.48 MMHG
BH CV ECHO MEAS - RV V1 MAX: 60.8 CM/SEC
BH CV ECHO MEAS - RV V1 VTI: 13.7 CM
BH CV ECHO MEAS - SV(LVOT): 49.4 ML
BH CV ECHO MEAS - SV(MOD-SP2): 68 ML
BH CV ECHO MEAS - SV(MOD-SP4): 84 ML
BH CV ECHO MEAS - SVI(LVOT): 25.4 ML/M2
BH CV ECHO MEAS - SVI(MOD-SP2): 35 ML/M2
BH CV ECHO MEAS - SVI(MOD-SP4): 43.3 ML/M2
BH CV ECHO MEAS - TAPSE (>1.6): 1.78 CM
BH CV ECHO MEASUREMENTS AVERAGE E/E' RATIO: 13.45
BH CV XLRA - TDI S': 10.2 CM/SEC
BUN SERPL-MCNC: 18 MG/DL (ref 8–23)
BUN/CREAT SERPL: 27.3 (ref 7–25)
CALCIUM SPEC-SCNC: 9.3 MG/DL (ref 8.6–10.5)
CHLORIDE SERPL-SCNC: 106 MMOL/L (ref 98–107)
CO2 SERPL-SCNC: 28 MMOL/L (ref 22–29)
CREAT SERPL-MCNC: 0.66 MG/DL (ref 0.57–1)
DEPRECATED RDW RBC AUTO: 41.8 FL (ref 37–54)
EGFRCR SERPLBLD CKD-EPI 2021: 92.8 ML/MIN/1.73
ERYTHROCYTE [DISTWIDTH] IN BLOOD BY AUTOMATED COUNT: 12.8 % (ref 12.3–15.4)
GLUCOSE BLDC GLUCOMTR-MCNC: 154 MG/DL (ref 70–130)
GLUCOSE BLDC GLUCOMTR-MCNC: 173 MG/DL (ref 70–130)
GLUCOSE BLDC GLUCOMTR-MCNC: 207 MG/DL (ref 70–130)
GLUCOSE BLDC GLUCOMTR-MCNC: 279 MG/DL (ref 70–130)
GLUCOSE BLDC GLUCOMTR-MCNC: 296 MG/DL (ref 70–130)
GLUCOSE SERPL-MCNC: 160 MG/DL (ref 65–99)
HCT VFR BLD AUTO: 35.9 % (ref 34–46.6)
HGB BLD-MCNC: 11.8 G/DL (ref 12–15.9)
LEFT ATRIUM VOLUME INDEX: 17.4 ML/M2
LV EF BIPLANE MOD: 74 %
MCH RBC QN AUTO: 29.6 PG (ref 26.6–33)
MCHC RBC AUTO-ENTMCNC: 32.9 G/DL (ref 31.5–35.7)
MCV RBC AUTO: 90 FL (ref 79–97)
PLATELET # BLD AUTO: 491 10*3/MM3 (ref 140–450)
PMV BLD AUTO: 9.3 FL (ref 6–12)
POTASSIUM SERPL-SCNC: 3.9 MMOL/L (ref 3.5–5.2)
QT INTERVAL: 331 MS
QTC INTERVAL: 397 MS
RBC # BLD AUTO: 3.99 10*6/MM3 (ref 3.77–5.28)
SINUS: 3.2 CM
SODIUM SERPL-SCNC: 142 MMOL/L (ref 136–145)
STJ: 2.9 CM
TROPONIN T SERPL HS-MCNC: 16 NG/L
WBC NRBC COR # BLD AUTO: 12.12 10*3/MM3 (ref 3.4–10.8)

## 2025-02-06 PROCEDURE — 84484 ASSAY OF TROPONIN QUANT: CPT | Performed by: STUDENT IN AN ORGANIZED HEALTH CARE EDUCATION/TRAINING PROGRAM

## 2025-02-06 PROCEDURE — 99214 OFFICE O/P EST MOD 30 MIN: CPT

## 2025-02-06 PROCEDURE — 85027 COMPLETE CBC AUTOMATED: CPT | Performed by: STUDENT IN AN ORGANIZED HEALTH CARE EDUCATION/TRAINING PROGRAM

## 2025-02-06 PROCEDURE — 82948 REAGENT STRIP/BLOOD GLUCOSE: CPT

## 2025-02-06 PROCEDURE — 63710000001 INSULIN LISPRO (HUMAN) PER 5 UNITS: Performed by: STUDENT IN AN ORGANIZED HEALTH CARE EDUCATION/TRAINING PROGRAM

## 2025-02-06 PROCEDURE — 93306 TTE W/DOPPLER COMPLETE: CPT | Performed by: INTERNAL MEDICINE

## 2025-02-06 PROCEDURE — 93306 TTE W/DOPPLER COMPLETE: CPT

## 2025-02-06 PROCEDURE — G0378 HOSPITAL OBSERVATION PER HR: HCPCS

## 2025-02-06 PROCEDURE — 80048 BASIC METABOLIC PNL TOTAL CA: CPT | Performed by: STUDENT IN AN ORGANIZED HEALTH CARE EDUCATION/TRAINING PROGRAM

## 2025-02-06 PROCEDURE — 97161 PT EVAL LOW COMPLEX 20 MIN: CPT

## 2025-02-06 PROCEDURE — 25510000001 PERFLUTREN 6.52 MG/ML SUSPENSION 2 ML VIAL

## 2025-02-06 PROCEDURE — 63710000001 INSULIN GLARGINE PER 5 UNITS: Performed by: STUDENT IN AN ORGANIZED HEALTH CARE EDUCATION/TRAINING PROGRAM

## 2025-02-06 RX ORDER — ALPRAZOLAM 0.5 MG
0.5 TABLET ORAL 2 TIMES DAILY
Status: DISCONTINUED | OUTPATIENT
Start: 2025-02-06 | End: 2025-02-07 | Stop reason: HOSPADM

## 2025-02-06 RX ORDER — GABAPENTIN 100 MG/1
100 CAPSULE ORAL 3 TIMES DAILY
Qty: 45 CAPSULE | Refills: 0 | Status: SHIPPED | OUTPATIENT
Start: 2025-02-06 | End: 2025-02-12 | Stop reason: SDUPTHER

## 2025-02-06 RX ORDER — ASPIRIN 81 MG/1
81 TABLET ORAL DAILY
Status: DISCONTINUED | OUTPATIENT
Start: 2025-02-06 | End: 2025-02-07 | Stop reason: HOSPADM

## 2025-02-06 RX ORDER — IBUPROFEN 600 MG/1
1 TABLET ORAL
Status: DISCONTINUED | OUTPATIENT
Start: 2025-02-06 | End: 2025-02-07 | Stop reason: HOSPADM

## 2025-02-06 RX ORDER — ASPIRIN 81 MG/1
81 TABLET ORAL DAILY
COMMUNITY

## 2025-02-06 RX ORDER — METOPROLOL TARTRATE 25 MG/1
25 TABLET, FILM COATED ORAL EVERY 12 HOURS SCHEDULED
Status: DISCONTINUED | OUTPATIENT
Start: 2025-02-06 | End: 2025-02-07 | Stop reason: HOSPADM

## 2025-02-06 RX ORDER — CLOPIDOGREL BISULFATE 75 MG/1
75 TABLET ORAL DAILY
Status: DISCONTINUED | OUTPATIENT
Start: 2025-02-06 | End: 2025-02-07 | Stop reason: HOSPADM

## 2025-02-06 RX ORDER — ALLOPURINOL 300 MG/1
300 TABLET ORAL DAILY
Status: DISCONTINUED | OUTPATIENT
Start: 2025-02-06 | End: 2025-02-07 | Stop reason: HOSPADM

## 2025-02-06 RX ORDER — DEXTROSE MONOHYDRATE 25 G/50ML
25 INJECTION, SOLUTION INTRAVENOUS
Status: DISCONTINUED | OUTPATIENT
Start: 2025-02-06 | End: 2025-02-07 | Stop reason: HOSPADM

## 2025-02-06 RX ORDER — INSULIN LISPRO 100 [IU]/ML
2-9 INJECTION, SOLUTION INTRAVENOUS; SUBCUTANEOUS
Status: DISCONTINUED | OUTPATIENT
Start: 2025-02-06 | End: 2025-02-07

## 2025-02-06 RX ORDER — PANTOPRAZOLE SODIUM 40 MG/1
40 TABLET, DELAYED RELEASE ORAL
Status: DISCONTINUED | OUTPATIENT
Start: 2025-02-06 | End: 2025-02-07 | Stop reason: HOSPADM

## 2025-02-06 RX ORDER — ATORVASTATIN CALCIUM 20 MG/1
40 TABLET, FILM COATED ORAL DAILY
Status: DISCONTINUED | OUTPATIENT
Start: 2025-02-06 | End: 2025-02-07 | Stop reason: HOSPADM

## 2025-02-06 RX ORDER — GABAPENTIN 100 MG/1
100 CAPSULE ORAL 3 TIMES DAILY
Status: DISCONTINUED | OUTPATIENT
Start: 2025-02-06 | End: 2025-02-07 | Stop reason: HOSPADM

## 2025-02-06 RX ORDER — NICOTINE POLACRILEX 4 MG
15 LOZENGE BUCCAL
Status: DISCONTINUED | OUTPATIENT
Start: 2025-02-06 | End: 2025-02-07 | Stop reason: HOSPADM

## 2025-02-06 RX ORDER — LISINOPRIL 20 MG/1
40 TABLET ORAL
Status: DISCONTINUED | OUTPATIENT
Start: 2025-02-06 | End: 2025-02-07 | Stop reason: HOSPADM

## 2025-02-06 RX ADMIN — INSULIN LISPRO 6 UNITS: 100 INJECTION, SOLUTION INTRAVENOUS; SUBCUTANEOUS at 20:51

## 2025-02-06 RX ADMIN — ALLOPURINOL 300 MG: 300 TABLET ORAL at 08:52

## 2025-02-06 RX ADMIN — INSULIN LISPRO 2 UNITS: 100 INJECTION, SOLUTION INTRAVENOUS; SUBCUTANEOUS at 01:31

## 2025-02-06 RX ADMIN — Medication 10 ML: at 08:52

## 2025-02-06 RX ADMIN — Medication 10 ML: at 20:39

## 2025-02-06 RX ADMIN — GABAPENTIN 100 MG: 100 CAPSULE ORAL at 16:37

## 2025-02-06 RX ADMIN — INSULIN LISPRO 4 UNITS: 100 INJECTION, SOLUTION INTRAVENOUS; SUBCUTANEOUS at 08:51

## 2025-02-06 RX ADMIN — METOPROLOL TARTRATE 25 MG: 25 TABLET, FILM COATED ORAL at 20:37

## 2025-02-06 RX ADMIN — GABAPENTIN 100 MG: 100 CAPSULE ORAL at 20:38

## 2025-02-06 RX ADMIN — INSULIN LISPRO 6 UNITS: 100 INJECTION, SOLUTION INTRAVENOUS; SUBCUTANEOUS at 16:37

## 2025-02-06 RX ADMIN — INSULIN GLARGINE 11 UNITS: 100 INJECTION, SOLUTION SUBCUTANEOUS at 08:51

## 2025-02-06 RX ADMIN — PANTOPRAZOLE SODIUM 40 MG: 40 TABLET, DELAYED RELEASE ORAL at 05:01

## 2025-02-06 RX ADMIN — GABAPENTIN 100 MG: 100 CAPSULE ORAL at 01:30

## 2025-02-06 RX ADMIN — ACETAMINOPHEN 650 MG: 325 TABLET, FILM COATED ORAL at 08:52

## 2025-02-06 RX ADMIN — LISINOPRIL 40 MG: 20 TABLET ORAL at 08:52

## 2025-02-06 RX ADMIN — METOPROLOL TARTRATE 25 MG: 25 TABLET, FILM COATED ORAL at 01:26

## 2025-02-06 RX ADMIN — ALPRAZOLAM 0.5 MG: 0.5 TABLET ORAL at 08:52

## 2025-02-06 RX ADMIN — Medication 10 ML: at 00:02

## 2025-02-06 RX ADMIN — GABAPENTIN 100 MG: 100 CAPSULE ORAL at 08:52

## 2025-02-06 RX ADMIN — ATORVASTATIN CALCIUM 40 MG: 20 TABLET, FILM COATED ORAL at 08:52

## 2025-02-06 RX ADMIN — ALPRAZOLAM 0.5 MG: 0.5 TABLET ORAL at 20:38

## 2025-02-06 RX ADMIN — ALPRAZOLAM 0.5 MG: 0.5 TABLET ORAL at 01:26

## 2025-02-06 RX ADMIN — ASPIRIN 81 MG: 81 TABLET, COATED ORAL at 08:52

## 2025-02-06 RX ADMIN — PERFLUTREN 2 ML: 6.52 INJECTION, SUSPENSION INTRAVENOUS at 16:30

## 2025-02-06 RX ADMIN — CLOPIDOGREL 75 MG: 75 TABLET, FILM COATED ORAL at 08:56

## 2025-02-06 NOTE — PLAN OF CARE
Goal Outcome Evaluation:      Pt is a 75 yo female admitted to the obs unit for shortness of breath. She says she has very slight shortness of breath but mostly her legs hurt. No swelling in the legs and dorsalis pedis pulses intact. A&O x4. RA. NSR. Standby assist. 2 units insulin given per order guidelines. Diabetes educator and Pt consults. NPO.
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
## 2025-02-06 NOTE — PROGRESS NOTES
MD ATTESTATION NOTE    The ANA MARÍA and I have discussed this patient's history, physical exam, and treatment plan.  I have reviewed the documentation and personally had a face to face interaction with the patient. I affirm the documentation and agree with the treatment and plan.  The attached note describes my personal findings.      I provided a substantive portion of the care of the patient.  I personally performed the physical exam in its entirety, and below are my findings.       Brief HPI: Patient admitted with dyspnea.  Patient with recent NSTEMI in 2024.  Also reports some chronic burning leg pain.    PHYSICAL EXAM  ED Triage Vitals   Temp Heart Rate Resp BP SpO2   02/05/25 1749 02/05/25 1749 02/05/25 1749 02/05/25 1755 02/05/25 1749   98.7 °F (37.1 °C) 93 16 168/98 94 %      Temp src Heart Rate Source Patient Position BP Location FiO2 (%)   02/05/25 1749 02/06/25 0000 02/06/25 0000 02/06/25 0000 --   Tympanic Monitor Lying Right arm          GENERAL: no acute distress  HENT: nares patent  EYES: no scleral icterus  CV: regular rhythm, normal rate  RESPIRATORY: normal effort  ABDOMEN: soft  MUSCULOSKELETAL: no deformity  NEURO: alert, moves all extremities, follows commands  PSYCH:  calm, cooperative  SKIN: warm, dry    Vital signs and nursing notes reviewed.        Plan: Cardiology consult.  Will treat chronic pain with Neurontin.

## 2025-02-06 NOTE — PLAN OF CARE
"Goal Outcome Evaluation:  Plan of Care Reviewed With: patient           Outcome Evaluation: Pt is a 73 yo female who presents from home with SOA, hx of NSTEMI in Dec 2024, TKA in Nov 2024, and exlap for pancreatic tumor in April 2024. Prior to admission, pt was living at home with daughter and independent with all mobility. Pt states she has completed PT for recent TKA and is mobilizing independently without AD. Pt is current with cardiac rehab, which she attends 3x week. Pt reports ongoing B LE pain that limits overall endurance and she describes as \"burning and stabbing\". Upon exam, pt demos functional strength, gait, and balance. Pt performed bed mobility and transfers independently. Pt ambulated 125' with supervision and demos no LOB or safety concerns with mobility. Pt demos no further need for acute PT. Encouraged pt to continue with cardiac rehab at DC for continued activity and exercise. No problems anticipated with DC home.    Anticipated Discharge Disposition (PT): home                        "

## 2025-02-06 NOTE — ED PROVIDER NOTES
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 uncertain etiology but she did recently have an NSTEMI that required stenting and has not had an echo since that procedure was performed.  She may be having some heart failure although her BNP is normal.  Given recent Cardiologic procedure and worsening symptoms we will plan to admit for observation for further evaluation.  Patient is stable at time of admission.      COMPLEXITY OF CARE  The patient requires admission.        DIAGNOSIS  Final diagnoses:   Shortness of breath   Bilateral lower extremity pain   Bilateral lower extremity edema         DISPOSITION  ED Disposition       ED Disposition   Decision to Admit    Condition   --    Comment   --                      Please note that portions of this document were completed with a voice recognition program.    Note Disclaimer: At [facility], we believe that sharing information builds trust and better relationships. You are receiving this note because you recently visited [facility]. It is possible you will see health information before a provider has talked with you about it. This kind of information can be easy to misunderstand. To help you fully understand what it  means for your health, we urge you to discuss this note with your provider.     Dali No PA-C  02/05/25 [id number]
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
## 2025-02-06 NOTE — PLAN OF CARE
Goal Outcome Evaluation:              Outcome Evaluation: pt is aox4, vss, pt still complains of leg pain this shift but has reported improvement with gabapentin, cardiology is consulted, pt has no further questions at this time, pt is resting at this time

## 2025-02-06 NOTE — CONSULTS
"Diabetes Education  Assessment/Teaching    Patient Name:  Reema Easley  YOB: 1950  MRN: [id number]  Admit Date:  2/5/2025      Assessment Date:  2/6/2025  Flowsheet Row Most Recent Value   General Information     Referral From: Other (comment)  [RN consult BG]   Height 160 cm (63\")   Height Method Stated   Weight 91.8 kg (202 lb 4.8 oz)   Weight Method Standing scale   Diabetes History    What type of diabetes do you have? Type 2   Current DM knowledge fair   Have you had diabetes education/teaching in the past? yes   When and where was your diabetes education? most recent July 2024 OP education at [facility]   Do you test your blood sugar at home? yes   Frequency of checks Dexcom & backup meter   Have you had low blood sugar? (<70mg/dl) yes   How often do you have low blood sugar? occasionally   What makes it difficult for you to take care of your diabetes or yourself? does not have meals, eats a lot of snacks   Do you have any diabetes complications? neuropathy   Education Preferences    What areas of diabetes would you like to learn about? avoiding high blood sugar, testing my blood sugar at home   Barriers to Learning other (comment)  [none noted]   Assessment Topics    Healthy Eating - Assessment Needs education  [snacks throughout the day]   Being Active - Assessment Needs education   Taking Medication - Assessment Needs education  [does not take insulin as prescribed \"fadi nilly\"]   Problem Solving - Assessment Needs education   Reducing Risk - Assessment Needs education  [A1c 7.7]   Healthy Coping - Assessment Needs education   Monitoring - Assessment Needs education   DM Goals    Healthy Eating - Goal 0-7 days from discharge   Being Active - Goal 0-7 days from discharge   Taking Medication - Goal 0-7 days from discharge   Problem Solving - Goal 0-7 days from discharge   Reducing Risk - Goal 0-30 days from discharge   Healthy Coping - Goal 0-30 days from discharge   Monitoring - Goal 0-7 days " from discharge   Contact Plan Follow-up medical care, 0-30 days from discharge       Flowsheet Row Most Recent Value   DM Education Needs    Meter Has own   Medication Insulin, Pen   Problem Solving Hypoglycemia, Signs, Symptoms, Treatment   Reducing Risks A1C testing   Physical Activity Frequency Rarely   Healthy Coping Appropriate   Discharge Plan Home, Follow-up with endocrinolgoist  [Daniela Salmeron]   Motivation Not interested   Teaching Method Explanation, Discussion   Patient Response Verbalized understanding       Other Comments:  Met with pt at bedside. Reports having some low BGs. Discussed s/s/tx of hypoglycemia, insulin administration timing, and importance of eating 3 meals w/ 2-3 snacks/day. Has all supplies at home, declined additional needs at this time. Encouraged to follow up with endocrinologist.         Electronically signed by:  Scotty Gonzalez RN  02/06/25 15:24 EST
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
## 2025-02-07 ENCOUNTER — READMISSION MANAGEMENT (OUTPATIENT)
Dept: CALL CENTER | Facility: HOSPITAL | Age: 75
End: 2025-02-07
Payer: MEDICARE

## 2025-02-07 ENCOUNTER — APPOINTMENT (OUTPATIENT)
Dept: CARDIAC REHAB | Facility: HOSPITAL | Age: 75
End: 2025-02-07
Payer: MEDICARE

## 2025-02-07 VITALS
OXYGEN SATURATION: 96 % | SYSTOLIC BLOOD PRESSURE: 148 MMHG | WEIGHT: 202 LBS | HEART RATE: 74 BPM | DIASTOLIC BLOOD PRESSURE: 87 MMHG | HEIGHT: 63 IN | BODY MASS INDEX: 35.79 KG/M2 | TEMPERATURE: 97.6 F | RESPIRATION RATE: 18 BRPM

## 2025-02-07 LAB
ANION GAP SERPL CALCULATED.3IONS-SCNC: 10 MMOL/L (ref 5–15)
BUN SERPL-MCNC: 17 MG/DL (ref 8–23)
BUN/CREAT SERPL: 26.2 (ref 7–25)
CALCIUM SPEC-SCNC: 9.5 MG/DL (ref 8.6–10.5)
CHLORIDE SERPL-SCNC: 105 MMOL/L (ref 98–107)
CO2 SERPL-SCNC: 26 MMOL/L (ref 22–29)
CREAT SERPL-MCNC: 0.65 MG/DL (ref 0.57–1)
DEPRECATED RDW RBC AUTO: 41.2 FL (ref 37–54)
EGFRCR SERPLBLD CKD-EPI 2021: 93.1 ML/MIN/1.73
ERYTHROCYTE [DISTWIDTH] IN BLOOD BY AUTOMATED COUNT: 12.7 % (ref 12.3–15.4)
GLUCOSE BLDC GLUCOMTR-MCNC: 187 MG/DL (ref 70–130)
GLUCOSE SERPL-MCNC: 187 MG/DL (ref 65–99)
HCT VFR BLD AUTO: 38.7 % (ref 34–46.6)
HGB BLD-MCNC: 12.7 G/DL (ref 12–15.9)
MCH RBC QN AUTO: 29.4 PG (ref 26.6–33)
MCHC RBC AUTO-ENTMCNC: 32.8 G/DL (ref 31.5–35.7)
MCV RBC AUTO: 89.6 FL (ref 79–97)
PLATELET # BLD AUTO: 543 10*3/MM3 (ref 140–450)
PMV BLD AUTO: 9.5 FL (ref 6–12)
POTASSIUM SERPL-SCNC: 4.1 MMOL/L (ref 3.5–5.2)
RBC # BLD AUTO: 4.32 10*6/MM3 (ref 3.77–5.28)
SODIUM SERPL-SCNC: 141 MMOL/L (ref 136–145)
TROPONIN T SERPL HS-MCNC: 12 NG/L
WBC NRBC COR # BLD AUTO: 12.47 10*3/MM3 (ref 3.4–10.8)

## 2025-02-07 PROCEDURE — 99214 OFFICE O/P EST MOD 30 MIN: CPT | Performed by: STUDENT IN AN ORGANIZED HEALTH CARE EDUCATION/TRAINING PROGRAM

## 2025-02-07 PROCEDURE — 63710000001 INSULIN GLARGINE PER 5 UNITS: Performed by: STUDENT IN AN ORGANIZED HEALTH CARE EDUCATION/TRAINING PROGRAM

## 2025-02-07 PROCEDURE — 80048 BASIC METABOLIC PNL TOTAL CA: CPT | Performed by: PHYSICIAN ASSISTANT

## 2025-02-07 PROCEDURE — 84484 ASSAY OF TROPONIN QUANT: CPT

## 2025-02-07 PROCEDURE — 85027 COMPLETE CBC AUTOMATED: CPT | Performed by: PHYSICIAN ASSISTANT

## 2025-02-07 PROCEDURE — 82948 REAGENT STRIP/BLOOD GLUCOSE: CPT

## 2025-02-07 PROCEDURE — 63710000001 INSULIN LISPRO (HUMAN) PER 5 UNITS

## 2025-02-07 PROCEDURE — G0378 HOSPITAL OBSERVATION PER HR: HCPCS

## 2025-02-07 RX ORDER — INSULIN LISPRO 100 [IU]/ML
3-14 INJECTION, SOLUTION INTRAVENOUS; SUBCUTANEOUS
Status: DISCONTINUED | OUTPATIENT
Start: 2025-02-07 | End: 2025-02-07 | Stop reason: HOSPADM

## 2025-02-07 RX ADMIN — ALPRAZOLAM 0.5 MG: 0.5 TABLET ORAL at 08:29

## 2025-02-07 RX ADMIN — INSULIN GLARGINE 11 UNITS: 100 INJECTION, SOLUTION SUBCUTANEOUS at 08:27

## 2025-02-07 RX ADMIN — LISINOPRIL 40 MG: 20 TABLET ORAL at 08:29

## 2025-02-07 RX ADMIN — INSULIN LISPRO 3 UNITS: 100 INJECTION, SOLUTION INTRAVENOUS; SUBCUTANEOUS at 08:27

## 2025-02-07 RX ADMIN — CLOPIDOGREL 75 MG: 75 TABLET, FILM COATED ORAL at 08:29

## 2025-02-07 RX ADMIN — ATORVASTATIN CALCIUM 40 MG: 20 TABLET, FILM COATED ORAL at 08:29

## 2025-02-07 RX ADMIN — ALLOPURINOL 300 MG: 300 TABLET ORAL at 08:29

## 2025-02-07 RX ADMIN — PANTOPRAZOLE SODIUM 40 MG: 40 TABLET, DELAYED RELEASE ORAL at 06:40

## 2025-02-07 RX ADMIN — GABAPENTIN 100 MG: 100 CAPSULE ORAL at 08:29

## 2025-02-07 RX ADMIN — Medication 10 ML: at 08:48

## 2025-02-07 RX ADMIN — METOPROLOL TARTRATE 25 MG: 25 TABLET, FILM COATED ORAL at 08:29

## 2025-02-07 RX ADMIN — GABAPENTIN 100 MG: 100 CAPSULE ORAL at 15:11

## 2025-02-07 RX ADMIN — ASPIRIN 81 MG: 81 TABLET, COATED ORAL at 08:29

## 2025-02-07 NOTE — DISCHARGE SUMMARY
ED OBSERVATION PROGRESS/DISCHARGE SUMMARY    Date of Admission: 2/5/2025   LOS: 0 days   PCP: Marc Rasheed MD    Final Diagnosis dyspnea      Subjective     Hospital Outcome:     Reema Easley is a 74-year-old female admitted to the observation unit for further evaluation of shortness of breath. High-sensitivity troponins 9, 9, 16  D-dimer was normal.  proBNP normal.  An EKG shows sinus rhythm with no acute ischemia. CTA of the chest reveals no acute intrathoracic findings and respiratory viral panel was negative.  Lab work does note a white blood cell count 12.8 and a glucose at 307 without evidence of acidosis.      Patient recently had NSTEMI December 2024, an echocardiogram was done 12/23/2024 that showed a EF at 60 to 65% and underwent left heart catheterization that found a 90% lesion in the inferior branch of the large ramus intermedius undergoing placement of a HAILEY.  Patient was sent home on aspirin, Plavix, metoprolol, and Lipitor.  Patient currently undergoing cardiac rehab.     2/6/2025: Patient was seen by cardiology, JACQUELIN Alex.  They have ordered an echocardiogram which has been performed, read pending.  We will observe patient an additional night, cardiology to follow.  Patient expresses understanding and is in agreement with plan.    02/07/25  Repeat TTE show EF = 74%, LV systolic function is hyperdynamic, there is LVH and a sigmoid shaped septum noted as well as grade 1 LV diastolic dysfunction.  No chest pain or SOA overnight.  She reports increased anxiety over her own recent heart attack. Patient reports improvement from her admission and overall feels better.  Patient does report having headaches every morning when she wakes up and had previously been many decades ago been diagnosed with sleep apnea but could not tolerate a machine, discussed referral for sleep medicine on discharge to be reevaluated and to try other options and patient is agreeable for this plan.  Cardiology saw  and evaluated the patient this morning and no further inpatient testing recommended at this time.  Patient is okay for discharge home from cardiology standpoint.  All labs and imaging findings discussed with patient as well as specialist recommendations and patient is agreeable for discharge home at this time.    Review of Systems:   Constitutional:  No weight changes, fever, or chills. No night sweats, no fatigue, no malaise.    Cardiovascular:  No chest pain, no palpitations, no edema.      Respiratory:  No cough, no smoke exposure, no dyspnea, no orthopnea.   Gastrointestinal:  No nausea, vomiting, or diarrhea. No constipation, or GI discomfort. No reflux pain, no anorexia, no dysphagia. No hematochezia or melena.    Neuro:  No weakness, no numbness, no paresthesias, no loss of consciousness, no syncope, no dizziness, no headache.     Objective   Physical Exam:   Constitutional: Awake, alert. Well developed for age. Nontoxic appearing.   Eyes: PERRL, sclerae anicteric, no conjunctival injection.  EOMI  HENT: NCAT, mucous membranes moist, normal hearing  Neck: Supple, nontender, trachea midline  Respiratory: Clear to auscultation bilaterally, nonlabored respirations on room air  Cardiovascular: RRR, no murmurs, palpable pedal pulses bilaterally.  2+ dependent edema bilaterally  Gastrointestinal: Positive bowel sounds, soft, nontender, not distended.   Musculoskeletal: No bilateral ankle edema, no clubbing or cyanosis to extremities. No obvious deformities.   Psychiatric: Appropriate affect, cooperative. Converses appropriately for age.   Neurologic: Oriented x 3, strength symmetric in all extremities. Cranial nerves grossly intact to confrontation, speech clear  Skin: No rashes, skin intact.     Results Review:    I have reviewed the labs, radiology results and diagnostic studies.    Results from last 7 days   Lab Units 02/06/25  0500   WBC 10*3/mm3 12.12*   HEMOGLOBIN g/dL 11.8*   HEMATOCRIT % 35.9   PLATELETS  10*3/mm3 491*     Results from last 7 days   Lab Units 02/06/25  0500 02/05/25  1813   SODIUM mmol/L 142 136   POTASSIUM mmol/L 3.9 4.6   CHLORIDE mmol/L 106 101   CO2 mmol/L 28.0 22.9   BUN mg/dL 18 24*   CREATININE mg/dL 0.66 0.73   CALCIUM mg/dL 9.3 9.3   BILIRUBIN mg/dL  --  <0.2   ALK PHOS U/L  --  90   ALT (SGPT) U/L  --  10   AST (SGOT) U/L  --  15   GLUCOSE mg/dL 160* 307*     Imaging Results (Last 24 Hours)       ** No results found for the last 24 hours. **            I have reviewed the medications.     Discharge Medications        New Medications        Instructions Start Date   gabapentin 100 MG capsule  Commonly known as: Neurontin   100 mg, Oral, 3 Times Daily             Continue These Medications        Instructions Start Date   Dexcom G7 Sensor misc   1 each, Not Applicable, Every 10 Days      glucose monitor monitoring kit   1 each, Not Applicable, As Needed      Pen Needles 31G X 5 MM misc   1 each, Not Applicable, Daily, E11.65      TRUEplus Lancets 30G misc   TEST BLOOD SUGAR EVERY DAY AS DIRECTED             ASK your doctor about these medications        Instructions Start Date   allopurinol 300 MG tablet  Commonly known as: ZYLOPRIM   300 mg, Oral, Daily      ALPRAZolam 0.5 MG tablet  Commonly known as: XANAX   0.5 mg, Oral, 2 Times Daily      aspirin 81 MG EC tablet   81 mg, Daily      atorvastatin 40 MG tablet  Commonly known as: LIPITOR   40 mg, Oral, Daily      benazepril 40 MG tablet  Commonly known as: LOTENSIN   TAKE 1 TABLET EVERY DAY      clopidogrel 75 MG tablet  Commonly known as: PLAVIX   75 mg, Oral, Daily      Lantus SoloStar 100 UNIT/ML injection pen  Generic drug: Insulin Glargine   INJECT 14 UNITS DAILY, ADJUST WEEKLY WITH PROVIDER TO MAX DOSE OF 50 UNITS DAILY (DISCARD PEN 28 DAYS AFTER OPENING)      metFORMIN 1000 MG tablet  Commonly known as: GLUCOPHAGE   Take 1 p.o. twice daily with food for diabetes      metoprolol tartrate 25 MG tablet  Commonly known as: LOPRESSOR   25  mg, Oral, Every 12 Hours Scheduled      nitroglycerin 0.4 MG SL tablet  Commonly known as: NITROSTAT   0.4 mg, Sublingual, Every 5 Minutes PRN, Take no more than 3 doses in 15 minutes.      NovoLOG FlexPen 100 UNIT/ML solution pen-injector sc pen  Generic drug: insulin aspart   4 Units, Subcutaneous, 3 Times Daily With Meals      ondansetron ODT 4 MG disintegrating tablet  Commonly known as: ZOFRAN-ODT   Take 1 tablet p.o. every 6 hours as needed nausea      pantoprazole 40 MG EC tablet  Commonly known as: PROTONIX   Take 1 p.o. daily before the largest meal on an empty stomach for gastric ulcer      True Metrix Blood Glucose Test test strip  Generic drug: glucose blood   USE AS DIRECTED              ---------------------------------------------------------------------------------------------  Assessment & Plan   Assessment/Problem List    Dyspnea      Plan:  Dyspnea  Coronary artery disease  NSTEMI in December 2024  -High-sensitivity troponin 9, 9, 16  -EKG no acute ischemia  -CTA chest negative acute  -Respiratory viral panel negative  -Continue aspirin and Plavix  -Continue statin  -Telemetry monitoring  -Repeat echo show hyperdynamic LV and LVH.  EF = 74%.  -Cardiology saw and reevaluated the patient this morning, no further inpatient testing recommended.  Patient okay for discharge from their standpoint.  Cardiology will coordinate outpatient follow-up with patient.     Bilateral leg pain  -Patient does note some minimal swelling to the bilateral lower extremities  -Pulses 2+ bilaterally  -Patient reports symptoms have been ongoing over 1 year, did have knee replacement with Dr. Gonzalez last year however symptoms persisted  -Suspect pain likely neuropathic in nature  -Start low-dose gabapentin 3 times daily  -Patient will need further outpatient follow-up with PCP     Diabetes  -Continue home basal insulin  -Moderate sliding scale insulin Accu-Cheks with meals and at bedtime     Hypertension  -Continue  lisinopril  -Monitor vital signs every 4 hours     Sleep apnea  -nocturnal O2 as needed  -Was planning to refer patient to sleep medicine, but appears patient already has an appointment on 3/7/2025 and patient was encouraged to keep this appointment.    Disposition: Discharge to home    Follow-up after Discharge: PCP in 1 to 2 weeks, cardiology in 2 to 3 weeks    This note will serve as a discharge summary    Juliette Pineda PA-C 02/07/25 07:22 EST    I have worn appropriate PPE during this patient encounter, sanitized my hands both with entering and exiting patient's room.      38 minutes has been spent by [facility] providers in the care of this patient while under observation status

## 2025-02-07 NOTE — PROGRESS NOTES
MD ATTESTATION NOTE - Observation progress    The ANA MARÍA and I have discussed this patient's history, physical exam, and treatment plan.  I have reviewed the documentation and personally had a face to face interaction with the patient. I affirm the documentation and agree with the treatment and plan.  The attached note describes my personal findings.        SHARED APC FACE TO FACE: I performed a substantive part of the MDM during the patient's E/M visit. I personally evaluated and examined the patient. I personally made or approved the documented management plan and acknowledge its risk of complications.      Brief HPI: Patient admitted the observation unit for dyspnea.  Patient states she had a good night.  Patient states she has been able to ambulate without difficulty.  Patient has had no chest pain pressure tightness.  Patient states neuropathy in her legs has significantly improved with gabapentin.  Patient had echo yesterday.            GENERAL: no acute distress  HENT: nares patent  EYES: no scleral icterus  CV: regular rhythm, normal rate  RESPIRATORY: normal effort.  Lungs clear bilaterally  ABDOMEN: soft  MUSCULOSKELETAL: no deformity  NEURO: alert, moves all extremities, follows commands  PSYCH:  calm, cooperative  SKIN: warm, dry    Vital signs and nursing notes reviewed.        Plan: Cardiology following.  Will continue gabapentin at home

## 2025-02-07 NOTE — OUTREACH NOTE
Prep Survey      Flowsheet Row Responses   [facility] patient discharged from? [place]   Is LACE score < 7 ? No   Eligibility [facility]   Date of Admission 02/05/25   Date of Discharge 02/07/25   Discharge Disposition Home or Self Care   Discharge diagnosis Dyspnea Sleep apnea   Does the patient have one of the following disease processes/diagnoses(primary or secondary)? Other   Does the patient have Home health ordered? No   Is there a DME ordered? No   Prep survey completed? Yes            SG CANAS - Registered Nurse
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
## 2025-02-07 NOTE — PLAN OF CARE
Goal Outcome Evaluation:              Outcome Evaluation: Pt is a 73 y/o female,  A&Ox4, on room air, VSS. She reports improvement of leg pain with gabapentin and is happy she is being discharged. She states that she has already called her [facility] physician for a followup appointement. IV removed. All belongings sent home with patient. Discharge summary explained and all questions answered. Pt has no further questions at this time.

## 2025-02-10 ENCOUNTER — TREATMENT (OUTPATIENT)
Dept: CARDIAC REHAB | Facility: HOSPITAL | Age: 75
End: 2025-02-10
Payer: MEDICARE

## 2025-02-10 ENCOUNTER — TRANSITIONAL CARE MANAGEMENT TELEPHONE ENCOUNTER (OUTPATIENT)
Dept: CALL CENTER | Facility: HOSPITAL | Age: 75
End: 2025-02-10
Payer: MEDICARE

## 2025-02-10 DIAGNOSIS — I21.4 NON-STEMI (NON-ST ELEVATED MYOCARDIAL INFARCTION): Primary | ICD-10-CM

## 2025-02-10 PROCEDURE — 93798 PHYS/QHP OP CAR RHAB W/ECG: CPT

## 2025-02-10 NOTE — OUTREACH NOTE
Call Center TCM Note      Flowsheet Row Responses   [facility] patient discharged from? [place]   Does the patient have one of the following disease processes/diagnoses(primary or secondary)? Other   TCM attempt successful? Yes   Call start time [id number]   Call end time 1009   Discharge diagnosis Dyspnea Sleep apnea   Meds reviewed with patient/caregiver? Yes   Is the patient having any side effects they believe may be caused by any medication additions or changes? No   Does the patient have all medications ordered at discharge? Yes   Is the patient taking all medications as directed (includes completed medication regime)? Yes   Comments Hospital d/c f/u appt on 2/12/25 @12pm   Does the patient have an appointment with their PCP within 7-14 days of discharge? Yes   Has home health visited the patient within 72 hours of discharge? N/A   Psychosocial issues? No   Comments Pt stated her BS was 240's this am   Did the patient receive a copy of their discharge instructions? Yes   Nursing interventions Reviewed instructions with patient   What is the patient's perception of their health status since discharge? Improving   Is the patient/caregiver able to teach back the hierarchy of who to call/visit for symptoms/problems? PCP, Specialist, Home health nurse, Urgent Care, ED, 911 Yes   TCM call completed? Yes   Call end time 1009   Would this patient benefit from a Referral to Amb Social Work? No   Is the patient interested in additional calls from an ambulatory ? No            Aminta Lau RN    2/10/2025, 10:09 EST

## 2025-02-12 ENCOUNTER — OFFICE VISIT (OUTPATIENT)
Dept: INTERNAL MEDICINE | Facility: CLINIC | Age: 75
End: 2025-02-12
Payer: MEDICARE

## 2025-02-12 VITALS
HEART RATE: 97 BPM | TEMPERATURE: 98 F | DIASTOLIC BLOOD PRESSURE: 86 MMHG | OXYGEN SATURATION: 96 % | WEIGHT: 204 LBS | SYSTOLIC BLOOD PRESSURE: 148 MMHG | RESPIRATION RATE: 16 BRPM | BODY MASS INDEX: 36.14 KG/M2 | HEIGHT: 63 IN

## 2025-02-12 DIAGNOSIS — R06.02 SHORTNESS OF BREATH: ICD-10-CM

## 2025-02-12 DIAGNOSIS — R80.9 TYPE 2 DIABETES MELLITUS WITH DIABETIC MICROALBUMINURIA, WITH LONG-TERM CURRENT USE OF INSULIN: Chronic | ICD-10-CM

## 2025-02-12 DIAGNOSIS — E55.9 VITAMIN D DEFICIENCY: Chronic | ICD-10-CM

## 2025-02-12 DIAGNOSIS — M79.604 BILATERAL LOWER EXTREMITY PAIN: ICD-10-CM

## 2025-02-12 DIAGNOSIS — I10 BENIGN ESSENTIAL HYPERTENSION: Chronic | ICD-10-CM

## 2025-02-12 DIAGNOSIS — Z79.4 TYPE 2 DIABETES MELLITUS WITH DIABETIC MICROALBUMINURIA, WITH LONG-TERM CURRENT USE OF INSULIN: Chronic | ICD-10-CM

## 2025-02-12 DIAGNOSIS — E66.9 NON MORBID OBESITY: Chronic | ICD-10-CM

## 2025-02-12 DIAGNOSIS — Z90.81 HISTORY OF TOTAL SPLENECTOMY: Chronic | ICD-10-CM

## 2025-02-12 DIAGNOSIS — R80.9 MICROALBUMINURIA: Chronic | ICD-10-CM

## 2025-02-12 DIAGNOSIS — M79.605 BILATERAL LOWER EXTREMITY PAIN: ICD-10-CM

## 2025-02-12 DIAGNOSIS — R60.0 BILATERAL LOWER EXTREMITY EDEMA: Chronic | ICD-10-CM

## 2025-02-12 DIAGNOSIS — Z87.39 HISTORY OF GOUT: Chronic | ICD-10-CM

## 2025-02-12 DIAGNOSIS — Z86.16 HISTORY OF 2019 NOVEL CORONAVIRUS DISEASE (COVID-19): Chronic | ICD-10-CM

## 2025-02-12 DIAGNOSIS — E78.2 MIXED HYPERLIPIDEMIA: Chronic | ICD-10-CM

## 2025-02-12 DIAGNOSIS — K25.7 CHRONIC GASTRIC ULCER WITHOUT HEMORRHAGE AND WITHOUT PERFORATION: Chronic | ICD-10-CM

## 2025-02-12 DIAGNOSIS — Z09 HOSPITAL DISCHARGE FOLLOW-UP: Primary | ICD-10-CM

## 2025-02-12 DIAGNOSIS — K75.81 NASH (NONALCOHOLIC STEATOHEPATITIS): Chronic | ICD-10-CM

## 2025-02-12 DIAGNOSIS — I25.10 OCCLUSIVE CORONARY ARTERY DISEASE: ICD-10-CM

## 2025-02-12 DIAGNOSIS — G47.33 OBSTRUCTIVE SLEEP APNEA: Chronic | ICD-10-CM

## 2025-02-12 DIAGNOSIS — Z51.81 THERAPEUTIC DRUG MONITORING: ICD-10-CM

## 2025-02-12 DIAGNOSIS — M85.89 OSTEOPENIA OF MULTIPLE SITES: Chronic | ICD-10-CM

## 2025-02-12 DIAGNOSIS — E11.42 DIABETIC PERIPHERAL NEUROPATHY: ICD-10-CM

## 2025-02-12 DIAGNOSIS — Z91.09 MULTIPLE ENVIRONMENTAL ALLERGIES: Chronic | ICD-10-CM

## 2025-02-12 DIAGNOSIS — Z86.0100 HISTORY OF COLON POLYPS: Chronic | ICD-10-CM

## 2025-02-12 DIAGNOSIS — E11.29 TYPE 2 DIABETES MELLITUS WITH DIABETIC MICROALBUMINURIA, WITH LONG-TERM CURRENT USE OF INSULIN: Chronic | ICD-10-CM

## 2025-02-12 DIAGNOSIS — I25.2 HISTORY OF NON-ST ELEVATION MYOCARDIAL INFARCTION (NSTEMI): Chronic | ICD-10-CM

## 2025-02-12 DIAGNOSIS — C7A.8 NEUROENDOCRINE CARCINOMA OF PANCREAS: Chronic | ICD-10-CM

## 2025-02-12 PROBLEM — R07.9 CHEST PAIN: Status: RESOLVED | Noted: 2024-12-22 | Resolved: 2025-02-12

## 2025-02-12 PROCEDURE — 3077F SYST BP >= 140 MM HG: CPT | Performed by: INTERNAL MEDICINE

## 2025-02-12 PROCEDURE — 3079F DIAST BP 80-89 MM HG: CPT | Performed by: INTERNAL MEDICINE

## 2025-02-12 PROCEDURE — 1111F DSCHRG MED/CURRENT MED MERGE: CPT | Performed by: INTERNAL MEDICINE

## 2025-02-12 PROCEDURE — 99495 TRANSJ CARE MGMT MOD F2F 14D: CPT | Performed by: INTERNAL MEDICINE

## 2025-02-12 PROCEDURE — 1125F AMNT PAIN NOTED PAIN PRSNT: CPT | Performed by: INTERNAL MEDICINE

## 2025-02-12 RX ORDER — GABAPENTIN 100 MG/1
CAPSULE ORAL
Qty: 90 CAPSULE | Refills: 1 | Status: SHIPPED | OUTPATIENT
Start: 2025-02-12

## 2025-02-12 RX ORDER — METOPROLOL TARTRATE 50 MG
TABLET ORAL
Qty: 60 TABLET | Refills: 5 | Status: SHIPPED | OUTPATIENT
Start: 2025-02-12
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
## 2025-02-14 ENCOUNTER — TREATMENT (OUTPATIENT)
Dept: CARDIAC REHAB | Facility: HOSPITAL | Age: 75
End: 2025-02-14
Payer: MEDICARE

## 2025-02-14 DIAGNOSIS — I21.4 NON-STEMI (NON-ST ELEVATED MYOCARDIAL INFARCTION): Primary | ICD-10-CM

## 2025-02-14 PROCEDURE — 93798 PHYS/QHP OP CAR RHAB W/ECG: CPT

## 2025-02-17 ENCOUNTER — APPOINTMENT (OUTPATIENT)
Dept: CARDIAC REHAB | Facility: HOSPITAL | Age: 75
End: 2025-02-17
Payer: MEDICARE

## 2025-02-18 ENCOUNTER — READMISSION MANAGEMENT (OUTPATIENT)
Dept: CALL CENTER | Facility: HOSPITAL | Age: 75
End: 2025-02-18
Payer: MEDICARE

## 2025-02-18 NOTE — OUTREACH NOTE
Medical Week 2 Survey      Flowsheet Row Responses   [facility] patient discharged from? [place]   Does the patient have one of the following disease processes/diagnoses(primary or secondary)? Other   Week 2 attempt successful? Yes   Call start time 1450   Discharge diagnosis Dyspnea Sleep apnea   Call end time 1458   Person spoke with today (if not patient) and relationship pt   Meds reviewed with patient/caregiver? Yes   Is the patient having any side effects they believe may be caused by any medication additions or changes? No   Does the patient have all medications ordered at discharge? Yes   Is the patient taking all medications as directed (includes completed medication regime)? Yes   Does the patient have a primary care provider?  Yes   Does the patient have an appointment with their PCP within 7 days of discharge? Yes   Has the patient kept scheduled appointments due by today? Yes   Psychosocial issues? No   What is the patient's perception of their health status since discharge? Improving   Is the patient/caregiver able to teach back signs and symptoms related to disease process for when to call PCP? Yes   Is the patient/caregiver able to teach back signs and symptoms related to disease process for when to call 911? Yes   Is the patient/caregiver able to teach back the hierarchy of who to call/visit for symptoms/problems? PCP, Specialist, Home health nurse, Urgent Care, ED, 911 Yes   Week 2 Call Completed? Yes   Is the patient interested in additional calls from an ambulatory ? No   Would this patient benefit from a Referral to Amb Social Work? No   Wrap up additional comments Pt states she is doing better, and had PCP fu appt. Pt reports PCP changes metoprolol dose to 50 mg 2x daily. Pt advised to check BP 2X daily with changes that were made with metoprolol and educated pt on BP levels, and when to report BP to PCP. Pt had PCP fu appt.   Call end time 1458            Daly West  Registered Nurse

## 2025-02-19 ENCOUNTER — OFFICE VISIT (OUTPATIENT)
Dept: ENDOCRINOLOGY | Age: 75
End: 2025-02-19
Payer: MEDICARE

## 2025-02-19 VITALS
BODY MASS INDEX: 35.93 KG/M2 | SYSTOLIC BLOOD PRESSURE: 144 MMHG | OXYGEN SATURATION: 98 % | HEIGHT: 63 IN | HEART RATE: 67 BPM | WEIGHT: 202.8 LBS | DIASTOLIC BLOOD PRESSURE: 82 MMHG

## 2025-02-19 DIAGNOSIS — E78.2 MIXED HYPERLIPIDEMIA: ICD-10-CM

## 2025-02-19 DIAGNOSIS — E11.65 TYPE 2 DIABETES MELLITUS WITH HYPERGLYCEMIA, WITH LONG-TERM CURRENT USE OF INSULIN: Primary | ICD-10-CM

## 2025-02-19 DIAGNOSIS — R80.9 MICROALBUMINURIA: ICD-10-CM

## 2025-02-19 DIAGNOSIS — C7A.8 NEUROENDOCRINE CARCINOMA OF PANCREAS: ICD-10-CM

## 2025-02-19 DIAGNOSIS — Z79.4 TYPE 2 DIABETES MELLITUS WITH HYPERGLYCEMIA, WITH LONG-TERM CURRENT USE OF INSULIN: Primary | ICD-10-CM

## 2025-02-19 PROCEDURE — 1159F MED LIST DOCD IN RCRD: CPT | Performed by: NURSE PRACTITIONER

## 2025-02-19 PROCEDURE — 3077F SYST BP >= 140 MM HG: CPT | Performed by: NURSE PRACTITIONER

## 2025-02-19 PROCEDURE — 1160F RVW MEDS BY RX/DR IN RCRD: CPT | Performed by: NURSE PRACTITIONER

## 2025-02-19 PROCEDURE — 95251 CONT GLUC MNTR ANALYSIS I&R: CPT | Performed by: NURSE PRACTITIONER

## 2025-02-19 PROCEDURE — 99214 OFFICE O/P EST MOD 30 MIN: CPT | Performed by: NURSE PRACTITIONER

## 2025-02-19 PROCEDURE — 3079F DIAST BP 80-89 MM HG: CPT | Performed by: NURSE PRACTITIONER

## 2025-02-19 NOTE — PROGRESS NOTES
"Chief Complaint  Type 2 diabetes mellitus with hyperglycemia, unspecified wh    Subjective        Reemapavan Easley presents to [facility] ENDOCRINOLOGY  History of Present Illness    Had MI since last visit, PCP has been working to improve her blood pressure control  Reports she snacks more so than eating meals, so hasn't been taking much fast acting insulin    Does not drink alcohol    Diabetes Type 2 > 25 years   New patient visit with me 7/2/24  PMH s/f: Pancreatic cancer  4/10/24- portion of pancrease and spleen removed, insulin was started as oral options were not working   7/2/24: Stop glimepiride and januvia given pancreatic involvement     Current DM regimen: metformin 1000mg BID, lantus 14u QD, and novolog 4u after meals- has not been taking novolog much, got off track with the use of this      CV: simvastatin 40mg QD  Renal: benazepril 40mg QD     Cgm review 2/6/25-2/19/25  Avg glu 234  GMI 8.9%  35% very high  45% high  20% time in range  <1% low        Objective   Vital Signs:  /82 (BP Location: Left arm)   Pulse 67   Ht 160 cm (62.99\")   Wt 92 kg (202 lb 12.8 oz)   SpO2 98%   BMI 35.93 kg/m²   Estimated body mass index is 35.93 kg/m² as calculated from the following:    Height as of this encounter: 160 cm (62.99\").    Weight as of this encounter: 92 kg (202 lb 12.8 oz).        Physical Exam  Vitals reviewed.   Constitutional:       General: She is not in acute distress.  HENT:      Head: Normocephalic and atraumatic.   Cardiovascular:      Rate and Rhythm: Normal rate.   Pulmonary:      Effort: Pulmonary effort is normal. No respiratory distress.   Musculoskeletal:         General: No signs of injury. Normal range of motion.      Cervical back: Normal range of motion and neck supple.   Skin:     General: Skin is warm and dry.   Neurological:      Mental Status: She is alert and oriented to person, place, and time. Mental status is at baseline.      Motor: Weakness present.    "   Gait: Gait abnormal.   Psychiatric:         Mood and Affect: Mood normal.         Behavior: Behavior normal.         Thought Content: Thought content normal.         Judgment: Judgment normal.        Result Review :  The following data was reviewed by: JACQUELIN Akers on 02/19/2025:  Common labs          2/5/2025    18:13 2/6/2025    05:00 2/7/2025    06:39   Common Labs   Glucose 307  160  187    BUN 24  18  17    Creatinine 0.73  0.66  0.65    Sodium 136  142  141    Potassium 4.6  3.9  4.1    Chloride 101  106  105    Calcium 9.3  9.3  9.5    Albumin 4.1      Total Bilirubin <0.2      Alkaline Phosphatase 90      AST (SGOT) 15      ALT (SGPT) 10      WBC 12.89  12.12  12.47    Hemoglobin 12.3  11.8  12.7    Hematocrit 40.2  35.9  38.7    Platelets 526  491  543                Assessment and Plan   Diagnoses and all orders for this visit:    1. Type 2 diabetes mellitus with hyperglycemia, with long-term current use of insulin (Primary)    2. Neuroendocrine carcinoma of pancreas    3. Hyperlipidemia    4. Microalbuminuria             Follow Up   Return in about 3 months (around 5/19/2025).    A1c worse, not at goal  Cgm reviewed, not at goal  Reviewed insulin use, dose and need before food intake  Change novolog to 2u with small meals/ snacks, 4u with normal sized meals/snacks and 6u for large meals/snacks  Continue lantus at current dose, 14u  Will review cgm in 2 weeks and discuss findings with patient     Patient was given instructions and counseling regarding her condition or for health maintenance advice. Please see specific information pulled into the AVS if appropriate.     JACQUELIN Akers

## 2025-02-19 NOTE — PROGRESS NOTES
Had MI since last visit   Neuropathy: gabapentin started by PCP     Reports she snacks more so than eating meals, so hasn't been taking much fast acting insulin      Does not drink alcohol

## 2025-02-21 ENCOUNTER — APPOINTMENT (OUTPATIENT)
Dept: CARDIAC REHAB | Facility: HOSPITAL | Age: 75
End: 2025-02-21
Payer: MEDICARE

## 2025-02-24 ENCOUNTER — TREATMENT (OUTPATIENT)
Dept: CARDIAC REHAB | Facility: HOSPITAL | Age: 75
End: 2025-02-24
Payer: MEDICARE

## 2025-02-24 DIAGNOSIS — I21.4 NON-STEMI (NON-ST ELEVATED MYOCARDIAL INFARCTION): Primary | ICD-10-CM

## 2025-02-24 PROCEDURE — 93798 PHYS/QHP OP CAR RHAB W/ECG: CPT

## 2025-02-26 ENCOUNTER — APPOINTMENT (OUTPATIENT)
Dept: CARDIAC REHAB | Facility: HOSPITAL | Age: 75
End: 2025-02-26
Payer: MEDICARE

## 2025-02-27 ENCOUNTER — READMISSION MANAGEMENT (OUTPATIENT)
Dept: CALL CENTER | Facility: HOSPITAL | Age: 75
End: 2025-02-27
Payer: MEDICARE

## 2025-02-27 NOTE — OUTREACH NOTE
Medical Week 3 Survey      Flowsheet Row Responses   [facility] patient discharged from? [place]   Does the patient have one of the following disease processes/diagnoses(primary or secondary)? Other   Week 3 attempt successful? Yes   Call start time 1745   Call end time 1755   Discharge diagnosis Dyspnea Sleep apnea   Person spoke with today (if not patient) and relationship Patient   Medication alerts for this patient Gabapentin   Meds reviewed with patient/caregiver? Yes   Is the patient having any side effects they believe may be caused by any medication additions or changes? No   Does the patient have all medications ordered at discharge? Yes   Prescription comments No questions or concerns with medications.   Is the patient taking all medications as directed (includes completed medication regime)? Yes   Comments regarding appointments Saw Endocrine on 2/19/25. Oncology appt tomorrow, 2/28/25. Sleep Medicine appt on 3/7/25.   Does the patient have a primary care provider?  Yes   Comments regarding PCP PCP--Dr. Marc Rasheed--seen on 2/12/25 and has another appt on 3/5/25.   Does the patient have an appointment with their PCP within 7 days of discharge? Yes   Has the patient kept scheduled appointments due by today? Yes   Has home health visited the patient within 72 hours of discharge? N/A   Psychosocial issues? No   Comments Patient states she still has bilateral knee pain, using Voltaren gel and taking Gabapentin orally as well. She states she is checking her b/p at home.  Patient had a CT scan today with contrast as f/u for her cancer care.   Did the patient receive a copy of their discharge instructions? Yes   Nursing interventions Reviewed instructions with patient   What is the patient's perception of their health status since discharge? Improving   Is the patient/caregiver able to teach back signs and symptoms related to disease process for when to call PCP? Yes   Is the patient/caregiver able to  teach back signs and symptoms related to disease process for when to call 911? Yes   Is the patient/caregiver able to teach back the hierarchy of who to call/visit for symptoms/problems? PCP, Specialist, Home health nurse, Urgent Care, ED, 911 Yes   If the patient is a current smoker, are they able to teach back resources for cessation? Not a smoker   Week 3 Call Completed? Yes   Graduated Yes   Is the patient interested in additional calls from an ambulatory ? No   Would this patient benefit from a Referral to [facility] Social Work? No   Graduated/Revoked comments Patient denies any needs or concerns. All f/u appts scheduled.   Call end time 0455            Chana HELMS - Registered Nurse      Chana HELMS - Registered Nurse

## 2025-02-28 ENCOUNTER — OFFICE VISIT (OUTPATIENT)
Dept: ORTHOPEDIC SURGERY | Facility: CLINIC | Age: 75
End: 2025-02-28
Payer: MEDICARE

## 2025-02-28 ENCOUNTER — APPOINTMENT (OUTPATIENT)
Dept: CARDIAC REHAB | Facility: HOSPITAL | Age: 75
End: 2025-02-28
Payer: MEDICARE

## 2025-02-28 VITALS — HEIGHT: 64 IN | BODY MASS INDEX: 34.83 KG/M2 | TEMPERATURE: 97.7 F | WEIGHT: 204 LBS

## 2025-02-28 DIAGNOSIS — G89.29 CHRONIC RIGHT SHOULDER PAIN: ICD-10-CM

## 2025-02-28 DIAGNOSIS — M25.511 CHRONIC RIGHT SHOULDER PAIN: ICD-10-CM

## 2025-02-28 DIAGNOSIS — R52 PAIN: Primary | ICD-10-CM

## 2025-02-28 RX ORDER — METRONIDAZOLE 7.5 MG/G
GEL VAGINAL
COMMUNITY
Start: 2025-02-21

## 2025-02-28 RX ORDER — NYSTATIN AND TRIAMCINOLONE ACETONIDE 100000; 1 [USP'U]/G; MG/G
CREAM TOPICAL
COMMUNITY
Start: 2025-02-21

## 2025-02-28 RX ORDER — LIDOCAINE HYDROCHLORIDE 10 MG/ML
2 INJECTION, SOLUTION EPIDURAL; INFILTRATION; INTRACAUDAL; PERINEURAL
Status: COMPLETED | OUTPATIENT
Start: 2025-02-28 | End: 2025-02-28

## 2025-02-28 RX ORDER — METHYLPREDNISOLONE ACETATE 80 MG/ML
80 INJECTION, SUSPENSION INTRA-ARTICULAR; INTRALESIONAL; INTRAMUSCULAR; SOFT TISSUE
Status: COMPLETED | OUTPATIENT
Start: 2025-02-28 | End: 2025-02-28

## 2025-02-28 RX ADMIN — LIDOCAINE HYDROCHLORIDE 2 ML: 10 INJECTION, SOLUTION EPIDURAL; INFILTRATION; INTRACAUDAL; PERINEURAL at 15:27

## 2025-02-28 RX ADMIN — METHYLPREDNISOLONE ACETATE 80 MG: 80 INJECTION, SUSPENSION INTRA-ARTICULAR; INTRALESIONAL; INTRAMUSCULAR; SOFT TISSUE at 15:27
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
## 2025-03-03 ENCOUNTER — TREATMENT (OUTPATIENT)
Dept: CARDIAC REHAB | Facility: HOSPITAL | Age: 75
End: 2025-03-03
Payer: MEDICARE

## 2025-03-03 DIAGNOSIS — I21.4 NON-STEMI (NON-ST ELEVATED MYOCARDIAL INFARCTION): Primary | ICD-10-CM

## 2025-03-03 PROCEDURE — 93798 PHYS/QHP OP CAR RHAB W/ECG: CPT

## 2025-03-05 ENCOUNTER — APPOINTMENT (OUTPATIENT)
Dept: CARDIAC REHAB | Facility: HOSPITAL | Age: 75
End: 2025-03-05
Payer: MEDICARE

## 2025-03-05 ENCOUNTER — TREATMENT (OUTPATIENT)
Dept: ENDOCRINOLOGY | Age: 75
End: 2025-03-05
Payer: MEDICARE

## 2025-03-05 DIAGNOSIS — Z79.4 TYPE 2 DIABETES MELLITUS WITH HYPERGLYCEMIA, WITH LONG-TERM CURRENT USE OF INSULIN: Primary | ICD-10-CM

## 2025-03-05 DIAGNOSIS — E11.65 TYPE 2 DIABETES MELLITUS WITH HYPERGLYCEMIA, WITH LONG-TERM CURRENT USE OF INSULIN: Primary | ICD-10-CM

## 2025-03-05 PROCEDURE — 95251 CONT GLUC MNTR ANALYSIS I&R: CPT | Performed by: NURSE PRACTITIONER

## 2025-03-05 NOTE — PROGRESS NOTES
Cgm review 2/20/25-3/5/25  Avg glu 240  GMI 9.1%  44% very high  39% high  17% time in range  0% low     Has she been doing better with the novolog dose with her meals?  BS are still very high which I assume means she still isnt using the novolog much  I think she would benefit from increasing the lantus from 14u QD to 18u QD  I do not want to adjust the novolog dose if she has not been taking it consistently before meals

## 2025-03-06 ENCOUNTER — OFFICE VISIT (OUTPATIENT)
Dept: INTERNAL MEDICINE | Facility: CLINIC | Age: 75
End: 2025-03-06
Payer: MEDICARE

## 2025-03-06 VITALS
RESPIRATION RATE: 16 BRPM | SYSTOLIC BLOOD PRESSURE: 122 MMHG | DIASTOLIC BLOOD PRESSURE: 70 MMHG | OXYGEN SATURATION: 96 % | WEIGHT: 198 LBS | HEART RATE: 86 BPM | TEMPERATURE: 98.7 F | BODY MASS INDEX: 33.8 KG/M2 | HEIGHT: 64 IN

## 2025-03-06 DIAGNOSIS — C7A.8 NEUROENDOCRINE CARCINOMA OF PANCREAS: Chronic | ICD-10-CM

## 2025-03-06 DIAGNOSIS — Z78.0 POSTMENOPAUSAL STATE: Chronic | ICD-10-CM

## 2025-03-06 DIAGNOSIS — E11.29 TYPE 2 DIABETES MELLITUS WITH DIABETIC MICROALBUMINURIA, WITH LONG-TERM CURRENT USE OF INSULIN: Chronic | ICD-10-CM

## 2025-03-06 DIAGNOSIS — I25.10 OCCLUSIVE CORONARY ARTERY DISEASE: Chronic | ICD-10-CM

## 2025-03-06 DIAGNOSIS — Z51.81 THERAPEUTIC DRUG MONITORING: ICD-10-CM

## 2025-03-06 DIAGNOSIS — R90.89 ABNORMAL CT OF BRAIN: ICD-10-CM

## 2025-03-06 DIAGNOSIS — I10 BENIGN ESSENTIAL HYPERTENSION: Primary | Chronic | ICD-10-CM

## 2025-03-06 DIAGNOSIS — F41.1 GENERALIZED ANXIETY DISORDER: Chronic | ICD-10-CM

## 2025-03-06 DIAGNOSIS — Z79.4 TYPE 2 DIABETES MELLITUS WITH DIABETIC MICROALBUMINURIA, WITH LONG-TERM CURRENT USE OF INSULIN: Chronic | ICD-10-CM

## 2025-03-06 DIAGNOSIS — I25.2 HISTORY OF NON-ST ELEVATION MYOCARDIAL INFARCTION (NSTEMI): Chronic | ICD-10-CM

## 2025-03-06 DIAGNOSIS — R80.9 TYPE 2 DIABETES MELLITUS WITH DIABETIC MICROALBUMINURIA, WITH LONG-TERM CURRENT USE OF INSULIN: Chronic | ICD-10-CM

## 2025-03-06 DIAGNOSIS — M17.0 PRIMARY OSTEOARTHRITIS OF BOTH KNEES: Chronic | ICD-10-CM

## 2025-03-06 DIAGNOSIS — E11.42 DIABETIC PERIPHERAL NEUROPATHY: Chronic | ICD-10-CM

## 2025-03-06 DIAGNOSIS — M85.89 OSTEOPENIA OF MULTIPLE SITES: Chronic | ICD-10-CM

## 2025-03-06 PROBLEM — Z09 HOSPITAL DISCHARGE FOLLOW-UP: Status: RESOLVED | Noted: 2025-02-12 | Resolved: 2025-03-06

## 2025-03-06 PROBLEM — R06.00 DYSPNEA: Status: RESOLVED | Noted: 2025-02-05 | Resolved: 2025-03-06

## 2025-03-06 RX ORDER — METOPROLOL TARTRATE 50 MG
TABLET ORAL
Qty: 180 TABLET | Refills: 3 | Status: SHIPPED | OUTPATIENT
Start: 2025-03-06
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
## 2025-03-07 ENCOUNTER — APPOINTMENT (OUTPATIENT)
Dept: CARDIAC REHAB | Facility: HOSPITAL | Age: 75
End: 2025-03-07
Payer: MEDICARE

## 2025-03-10 ENCOUNTER — TREATMENT (OUTPATIENT)
Dept: CARDIAC REHAB | Facility: HOSPITAL | Age: 75
End: 2025-03-10
Payer: MEDICARE

## 2025-03-10 DIAGNOSIS — I21.4 NON-STEMI (NON-ST ELEVATED MYOCARDIAL INFARCTION): Primary | ICD-10-CM

## 2025-03-10 PROCEDURE — 93798 PHYS/QHP OP CAR RHAB W/ECG: CPT

## 2025-03-11 ENCOUNTER — PATIENT OUTREACH (OUTPATIENT)
Dept: CASE MANAGEMENT | Facility: OTHER | Age: 75
End: 2025-03-11
Payer: MEDICARE

## 2025-03-11 NOTE — OUTREACH NOTE
AMBULATORY CASE MANAGEMENT NOTE    Names and Relationships of Patient/Support Persons: Contact: Reema Easley; Relationship: Self -     Patient Outreach    Introduced self, explained ACM RN role and provided contact information. Reviewed health and wellness with patient. Patient is active with cardiac rehab three days per week. A blood pressure medication adjustment has her blood pressure stable. Patient is trying to focus on diabetic health as well. She has a diabetic cook book and is working on breakfast. ACM recommended protein cereal as an alternative to cereal. ACM reviewed increasing green vegetables and decreasing carbohydrates. Patient plans to increase movement outside with the nice weather. Diabetic education sent via [employer]. Follow up scheduled in 3 weeks to review progress with diabetic diet.     Education Documentation  No documentation found.        Vanessa HELMS  Ambulatory Case Management    3/11/2025, 16:36 EDT

## 2025-03-12 ENCOUNTER — OFFICE VISIT (OUTPATIENT)
Dept: CARDIOLOGY | Facility: CLINIC | Age: 75
End: 2025-03-12
Payer: MEDICARE

## 2025-03-12 ENCOUNTER — APPOINTMENT (OUTPATIENT)
Dept: CARDIAC REHAB | Facility: HOSPITAL | Age: 75
End: 2025-03-12
Payer: MEDICARE

## 2025-03-12 VITALS
OXYGEN SATURATION: 97 % | BODY MASS INDEX: 33.97 KG/M2 | HEIGHT: 64 IN | HEART RATE: 71 BPM | WEIGHT: 199 LBS | DIASTOLIC BLOOD PRESSURE: 72 MMHG | SYSTOLIC BLOOD PRESSURE: 116 MMHG

## 2025-03-12 DIAGNOSIS — I10 BENIGN ESSENTIAL HYPERTENSION: Chronic | ICD-10-CM

## 2025-03-12 DIAGNOSIS — Z98.61 CAD S/P PERCUTANEOUS CORONARY ANGIOPLASTY: Primary | ICD-10-CM

## 2025-03-12 DIAGNOSIS — I25.10 CAD S/P PERCUTANEOUS CORONARY ANGIOPLASTY: Primary | ICD-10-CM
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
## 2025-03-14 ENCOUNTER — APPOINTMENT (OUTPATIENT)
Dept: CARDIAC REHAB | Facility: HOSPITAL | Age: 75
End: 2025-03-14
Payer: MEDICARE

## 2025-03-21 ENCOUNTER — APPOINTMENT (OUTPATIENT)
Dept: CARDIAC REHAB | Facility: HOSPITAL | Age: 75
End: 2025-03-21
Payer: MEDICARE

## 2025-03-24 ENCOUNTER — APPOINTMENT (OUTPATIENT)
Dept: CARDIAC REHAB | Facility: HOSPITAL | Age: 75
End: 2025-03-24
Payer: MEDICARE

## 2025-03-24 DIAGNOSIS — E11.42 DIABETIC PERIPHERAL NEUROPATHY: ICD-10-CM

## 2025-03-24 RX ORDER — GABAPENTIN 100 MG/1
CAPSULE ORAL
Qty: 90 CAPSULE | Refills: 1 | Status: SHIPPED | OUTPATIENT
Start: 2025-03-24

## 2025-03-24 NOTE — TELEPHONE ENCOUNTER
Caller: [facility] - [place], [place] - [address] [facility] - [phone] [facility] [phone] FX    Relationship: Pharmacy    Best call back number: [phone]     Requested Prescriptions:   Requested Prescriptions     Pending Prescriptions Disp Refills    gabapentin (Neurontin) 100 MG capsule 90 capsule 1     Sig: Take 1 capsule 3 times daily for suspected peripheral neuropathy.        Pharmacy where request should be sent: [facility] - [place], [place] - [address] [facility] - [phone] [facility] [phone] FX     Last office visit with prescribing clinician: 3/6/2025   Last telemedicine visit with prescribing clinician: Visit date not found   Next office visit with prescribing clinician: 4/16/2025     Additional details provided by patient: PHARMACY CALLED TO FOLLOW UP ON A REQUEST FOR THIS PRESCRIPTION.    Jazmine Major Rep   03/24/25 13:20 EDT

## 2025-03-26 ENCOUNTER — APPOINTMENT (OUTPATIENT)
Dept: CARDIAC REHAB | Facility: HOSPITAL | Age: 75
End: 2025-03-26
Payer: MEDICARE

## 2025-03-28 ENCOUNTER — APPOINTMENT (OUTPATIENT)
Dept: CARDIAC REHAB | Facility: HOSPITAL | Age: 75
End: 2025-03-28
Payer: MEDICARE

## 2025-03-31 ENCOUNTER — APPOINTMENT (OUTPATIENT)
Dept: CARDIAC REHAB | Facility: HOSPITAL | Age: 75
End: 2025-03-31
Payer: MEDICARE

## 2025-04-08 RX ORDER — NAPROXEN 500 MG/1
500 TABLET ORAL 2 TIMES DAILY WITH MEALS
Qty: 180 TABLET | Refills: 3 | Status: SHIPPED | OUTPATIENT
Start: 2025-04-08

## 2025-04-08 RX ORDER — INSULIN ASPART 100 [IU]/ML
4 INJECTION, SOLUTION INTRAVENOUS; SUBCUTANEOUS
Qty: 15 ML | Refills: 0 | Status: SHIPPED | OUTPATIENT
Start: 2025-04-08

## 2025-04-08 NOTE — TELEPHONE ENCOUNTER
Rx Refill Note  Requested Prescriptions     Pending Prescriptions Disp Refills    NovoLOG FlexPen 100 UNIT/ML solution pen-injector sc pen [Pharmacy Med Name: NovoLOG FlexPen Subcutaneous Solution Pen-injector 100 UNIT/ML] 15 mL 3     Sig: INJECT 4 UNITS UNDER THE SKIN INTO THE APPROPRIATE AREA AS DIRECTED 3 (THREE) TIMES A DAY WITH MEALS.      Last office visit with prescribing clinician: Visit date not found   Last telemedicine visit with prescribing clinician: Visit date not found   Next office visit with prescribing clinician: Visit date not found                         Would you like a call back once the refill request has been completed: [] Yes [] No    If the office needs to give you a call back, can they leave a voicemail: [] Yes [] No    Yanira Quesada  04/08/25, 07:58 EDT

## 2025-04-12 DIAGNOSIS — F41.1 GENERALIZED ANXIETY DISORDER: Chronic | ICD-10-CM

## 2025-04-14 RX ORDER — ALPRAZOLAM 0.5 MG
0.5 TABLET ORAL 2 TIMES DAILY
Qty: 60 TABLET | Refills: 2 | Status: SHIPPED | OUTPATIENT
Start: 2025-04-14

## 2025-04-16 ENCOUNTER — OFFICE VISIT (OUTPATIENT)
Dept: INTERNAL MEDICINE | Facility: CLINIC | Age: 75
End: 2025-04-16
Payer: MEDICARE

## 2025-04-16 VITALS
RESPIRATION RATE: 17 BRPM | OXYGEN SATURATION: 97 % | SYSTOLIC BLOOD PRESSURE: 130 MMHG | HEIGHT: 64 IN | TEMPERATURE: 97.8 F | BODY MASS INDEX: 35.34 KG/M2 | DIASTOLIC BLOOD PRESSURE: 86 MMHG | WEIGHT: 207 LBS | HEART RATE: 77 BPM

## 2025-04-16 DIAGNOSIS — Z80.41 FAMILY HISTORY OF OVARIAN CANCER: Chronic | ICD-10-CM

## 2025-04-16 DIAGNOSIS — G47.33 OBSTRUCTIVE SLEEP APNEA: Chronic | ICD-10-CM

## 2025-04-16 DIAGNOSIS — Z79.4 TYPE 2 DIABETES MELLITUS WITH DIABETIC MICROALBUMINURIA, WITH LONG-TERM CURRENT USE OF INSULIN: Primary | Chronic | ICD-10-CM

## 2025-04-16 DIAGNOSIS — K25.7 CHRONIC GASTRIC ULCER WITHOUT HEMORRHAGE AND WITHOUT PERFORATION: Chronic | ICD-10-CM

## 2025-04-16 DIAGNOSIS — E55.9 VITAMIN D DEFICIENCY: Chronic | ICD-10-CM

## 2025-04-16 DIAGNOSIS — C7A.8 NEUROENDOCRINE CARCINOMA OF PANCREAS: Chronic | ICD-10-CM

## 2025-04-16 DIAGNOSIS — I25.2 HISTORY OF NON-ST ELEVATION MYOCARDIAL INFARCTION (NSTEMI): Chronic | ICD-10-CM

## 2025-04-16 DIAGNOSIS — I10 BENIGN ESSENTIAL HYPERTENSION: Chronic | ICD-10-CM

## 2025-04-16 DIAGNOSIS — F41.8 DEPRESSION WITH ANXIETY: Chronic | ICD-10-CM

## 2025-04-16 DIAGNOSIS — E66.9 NON MORBID OBESITY: Chronic | ICD-10-CM

## 2025-04-16 DIAGNOSIS — E78.2 MIXED HYPERLIPIDEMIA: Chronic | ICD-10-CM

## 2025-04-16 DIAGNOSIS — R80.9 TYPE 2 DIABETES MELLITUS WITH DIABETIC MICROALBUMINURIA, WITH LONG-TERM CURRENT USE OF INSULIN: Primary | Chronic | ICD-10-CM

## 2025-04-16 DIAGNOSIS — I25.10 CAD S/P PERCUTANEOUS CORONARY ANGIOPLASTY: Chronic | ICD-10-CM

## 2025-04-16 DIAGNOSIS — Z91.09 MULTIPLE ENVIRONMENTAL ALLERGIES: Chronic | ICD-10-CM

## 2025-04-16 DIAGNOSIS — R90.89 ABNORMAL CT OF BRAIN: Chronic | ICD-10-CM

## 2025-04-16 DIAGNOSIS — F41.1 GENERALIZED ANXIETY DISORDER: Chronic | ICD-10-CM

## 2025-04-16 DIAGNOSIS — Z80.3 FAMILY HISTORY OF BREAST CANCER: Chronic | ICD-10-CM

## 2025-04-16 DIAGNOSIS — N26.1 RENAL ATROPHY, LEFT: Chronic | ICD-10-CM

## 2025-04-16 DIAGNOSIS — Z78.0 POSTMENOPAUSAL STATE: Chronic | ICD-10-CM

## 2025-04-16 DIAGNOSIS — M17.0 PRIMARY OSTEOARTHRITIS OF BOTH KNEES: Chronic | ICD-10-CM

## 2025-04-16 DIAGNOSIS — Z90.81 HISTORY OF TOTAL SPLENECTOMY: Chronic | ICD-10-CM

## 2025-04-16 DIAGNOSIS — Z86.16 HISTORY OF 2019 NOVEL CORONAVIRUS DISEASE (COVID-19): Chronic | ICD-10-CM

## 2025-04-16 DIAGNOSIS — Z51.81 THERAPEUTIC DRUG MONITORING: ICD-10-CM

## 2025-04-16 DIAGNOSIS — M85.89 OSTEOPENIA OF MULTIPLE SITES: Chronic | ICD-10-CM

## 2025-04-16 DIAGNOSIS — R80.9 MICROALBUMINURIA: Chronic | ICD-10-CM

## 2025-04-16 DIAGNOSIS — E11.29 TYPE 2 DIABETES MELLITUS WITH DIABETIC MICROALBUMINURIA, WITH LONG-TERM CURRENT USE OF INSULIN: Primary | Chronic | ICD-10-CM

## 2025-04-16 DIAGNOSIS — Z87.39 HISTORY OF GOUT: Chronic | ICD-10-CM

## 2025-04-16 DIAGNOSIS — K75.81 NASH (NONALCOHOLIC STEATOHEPATITIS): Chronic | ICD-10-CM

## 2025-04-16 DIAGNOSIS — Z86.0100 HISTORY OF COLON POLYPS: Chronic | ICD-10-CM

## 2025-04-16 DIAGNOSIS — R60.0 BILATERAL LOWER EXTREMITY EDEMA: Chronic | ICD-10-CM

## 2025-04-16 DIAGNOSIS — E11.42 DIABETIC PERIPHERAL NEUROPATHY: Chronic | ICD-10-CM

## 2025-04-16 DIAGNOSIS — F51.04 CHRONIC INSOMNIA: Chronic | ICD-10-CM

## 2025-04-16 DIAGNOSIS — Z98.61 CAD S/P PERCUTANEOUS CORONARY ANGIOPLASTY: Chronic | ICD-10-CM
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
## 2025-04-23 ENCOUNTER — PATIENT OUTREACH (OUTPATIENT)
Dept: CASE MANAGEMENT | Facility: OTHER | Age: 75
End: 2025-04-23
Payer: MEDICARE

## 2025-04-23 NOTE — OUTREACH NOTE
AMBULATORY CASE MANAGEMENT NOTE    Names and Relationships of Patient/Support Persons: Contact: Ragini Easleyorapavan CANO; Relationship: Self -     Patient Outreach    Introduced self, explained ACM RN role and provided contact information. Spoke with patient regarding health and wellness. Patient states her blood sugar is up and down. She plans to work on it. HgbA1C is up to 9.4. Patient reports she plans to schedule an appointment with orthopedic surgery for knee pain. She has trouble walking and standing long periods. Gabapentin helps but not enough. Patient would like a scooter. ACM to follow up in 3 weeks to assist with scooter order if needed.     Education Documentation  No documentation found.        Vanessa HELMS  Ambulatory Case Management    4/23/2025, 15:44 EDT

## 2025-04-28 ENCOUNTER — TELEPHONE (OUTPATIENT)
Dept: ORTHOPEDIC SURGERY | Facility: CLINIC | Age: 75
End: 2025-04-28
Payer: MEDICARE

## 2025-04-28 NOTE — TELEPHONE ENCOUNTER
Caller: Reema Easley    Relationship: Self    Best call back number:     Requested Prescriptions:   Requested Prescriptions      No prescriptions requested or ordered in this encounter        Pharmacy where request should be sent:      Last office visit with prescribing clinician: 12/5/2024   Last telemedicine visit with prescribing clinician: Visit date not found   Next office visit with prescribing clinician: Visit date not found     Additional details provided by patient: PATIENT STILL HAVING KNEE PAIN AND WANTS TO SEE ABOUT GETTING A SCOOTER THAT SHE CAN USE.  PLEASE CALL     Does the patient have less than a 3 day supply:  [x] Yes  [] No    Would you like a call back once the refill request has been completed: [] Yes [] No    If the office needs to give you a call back, can they leave a voicemail: [] Yes [] No    Jazmine Whitfield Rep   04/28/25 14:15 EDT

## 2025-04-28 NOTE — TELEPHONE ENCOUNTER
Patient states she cannot walk the distance to the store and knees feel like they will give out. Patient is interested in a scooter, should she get one from dr. Gonzalez or should she get one through her PCP.

## 2025-05-06 ENCOUNTER — TELEPHONE (OUTPATIENT)
Dept: ORTHOPEDIC SURGERY | Facility: CLINIC | Age: 75
End: 2025-05-06

## 2025-05-06 NOTE — TELEPHONE ENCOUNTER
Spoke with patient and let her know that since we have not seen her in months getting the order from her pcp would be better. Insurance will want an updated office visit note

## 2025-05-06 NOTE — TELEPHONE ENCOUNTER
Caller: Reema Easley    Relationship to patient: Self    Best call back number: [phone]    Patient is needing: PT IS CALLING TO CHECK THE STATUS OF THE REQUEST FOR A SCOOTER.. PT WOULD LIKE A CALL BACK IF POSSIBLE.. PLEASE ADVISE..

## 2025-05-22 ENCOUNTER — OFFICE VISIT (OUTPATIENT)
Dept: INTERNAL MEDICINE | Facility: CLINIC | Age: 75
End: 2025-05-22
Payer: MEDICARE

## 2025-05-22 ENCOUNTER — TELEPHONE (OUTPATIENT)
Dept: INTERNAL MEDICINE | Facility: CLINIC | Age: 75
End: 2025-05-22

## 2025-05-22 ENCOUNTER — OFFICE VISIT (OUTPATIENT)
Dept: ENDOCRINOLOGY | Age: 75
End: 2025-05-22
Payer: MEDICARE

## 2025-05-22 VITALS
WEIGHT: 216 LBS | TEMPERATURE: 98.1 F | BODY MASS INDEX: 36.88 KG/M2 | SYSTOLIC BLOOD PRESSURE: 124 MMHG | DIASTOLIC BLOOD PRESSURE: 70 MMHG | OXYGEN SATURATION: 97 % | HEART RATE: 115 BPM | HEIGHT: 64 IN

## 2025-05-22 VITALS
TEMPERATURE: 98.6 F | BODY MASS INDEX: 36.88 KG/M2 | RESPIRATION RATE: 16 BRPM | WEIGHT: 216 LBS | OXYGEN SATURATION: 97 % | SYSTOLIC BLOOD PRESSURE: 124 MMHG | HEIGHT: 64 IN | DIASTOLIC BLOOD PRESSURE: 70 MMHG | HEART RATE: 97 BPM

## 2025-05-22 DIAGNOSIS — Z79.4 TYPE 2 DIABETES MELLITUS WITH DIABETIC MICROALBUMINURIA, WITH LONG-TERM CURRENT USE OF INSULIN: Chronic | ICD-10-CM

## 2025-05-22 DIAGNOSIS — E11.65 TYPE 2 DIABETES MELLITUS WITH HYPERGLYCEMIA, WITH LONG-TERM CURRENT USE OF INSULIN: Primary | ICD-10-CM

## 2025-05-22 DIAGNOSIS — F41.1 GENERALIZED ANXIETY DISORDER: Chronic | ICD-10-CM

## 2025-05-22 DIAGNOSIS — E66.9 NON MORBID OBESITY: Chronic | ICD-10-CM

## 2025-05-22 DIAGNOSIS — E78.2 MIXED HYPERLIPIDEMIA: ICD-10-CM

## 2025-05-22 DIAGNOSIS — I10 BENIGN ESSENTIAL HYPERTENSION: Chronic | ICD-10-CM

## 2025-05-22 DIAGNOSIS — M25.561 CHRONIC PAIN OF BOTH KNEES: Chronic | ICD-10-CM

## 2025-05-22 DIAGNOSIS — I25.10 CAD S/P PERCUTANEOUS CORONARY ANGIOPLASTY: Chronic | ICD-10-CM

## 2025-05-22 DIAGNOSIS — E11.42 DIABETIC PERIPHERAL NEUROPATHY ASSOCIATED WITH TYPE 2 DIABETES MELLITUS: ICD-10-CM

## 2025-05-22 DIAGNOSIS — G89.29 CHRONIC PAIN OF BOTH KNEES: Chronic | ICD-10-CM

## 2025-05-22 DIAGNOSIS — Z51.81 THERAPEUTIC DRUG MONITORING: ICD-10-CM

## 2025-05-22 DIAGNOSIS — Z98.61 CAD S/P PERCUTANEOUS CORONARY ANGIOPLASTY: Chronic | ICD-10-CM

## 2025-05-22 DIAGNOSIS — E11.42 DIABETIC PERIPHERAL NEUROPATHY: Primary | Chronic | ICD-10-CM

## 2025-05-22 DIAGNOSIS — Z79.4 TYPE 2 DIABETES MELLITUS WITH HYPERGLYCEMIA, WITH LONG-TERM CURRENT USE OF INSULIN: Primary | ICD-10-CM

## 2025-05-22 DIAGNOSIS — F41.8 DEPRESSION WITH ANXIETY: Chronic | ICD-10-CM

## 2025-05-22 DIAGNOSIS — C7A.8 NEUROENDOCRINE CARCINOMA OF PANCREAS: ICD-10-CM

## 2025-05-22 DIAGNOSIS — M25.562 CHRONIC PAIN OF BOTH KNEES: Chronic | ICD-10-CM

## 2025-05-22 DIAGNOSIS — E11.29 TYPE 2 DIABETES MELLITUS WITH DIABETIC MICROALBUMINURIA, WITH LONG-TERM CURRENT USE OF INSULIN: Chronic | ICD-10-CM

## 2025-05-22 DIAGNOSIS — C7A.8 NEUROENDOCRINE CARCINOMA OF PANCREAS: Chronic | ICD-10-CM

## 2025-05-22 DIAGNOSIS — Z90.81 HISTORY OF TOTAL SPLENECTOMY: Chronic | ICD-10-CM

## 2025-05-22 DIAGNOSIS — R80.9 TYPE 2 DIABETES MELLITUS WITH DIABETIC MICROALBUMINURIA, WITH LONG-TERM CURRENT USE OF INSULIN: Chronic | ICD-10-CM

## 2025-05-22 PROCEDURE — 3046F HEMOGLOBIN A1C LEVEL >9.0%: CPT | Performed by: NURSE PRACTITIONER

## 2025-05-22 PROCEDURE — 3078F DIAST BP <80 MM HG: CPT | Performed by: NURSE PRACTITIONER

## 2025-05-22 PROCEDURE — 95251 CONT GLUC MNTR ANALYSIS I&R: CPT | Performed by: NURSE PRACTITIONER

## 2025-05-22 PROCEDURE — 3074F SYST BP LT 130 MM HG: CPT | Performed by: NURSE PRACTITIONER

## 2025-05-22 PROCEDURE — 99214 OFFICE O/P EST MOD 30 MIN: CPT | Performed by: NURSE PRACTITIONER

## 2025-05-22 RX ORDER — GABAPENTIN 300 MG/1
CAPSULE ORAL
Qty: 270 CAPSULE | Refills: 3 | Status: SHIPPED | OUTPATIENT
Start: 2025-05-22

## 2025-05-22 RX ORDER — NAPROXEN 500 MG/1
TABLET ORAL
Start: 2025-05-22

## 2025-05-22 NOTE — PATIENT INSTRUCTIONS
Change novolog to 2u with small meals/ snacks, 4u with normal sized meals/snacks and 6u for large meals/snacks

## 2025-05-22 NOTE — TELEPHONE ENCOUNTER
"Caller: Reema Easley    Relationship to patient: Self    Patient is needing: PATIENT STATES THIS IS KIND OF COMPLICATED.   SHE STATES SHE HAS TYPE 2 DIABETES.  SHE STATES SHE DISCOVERED THAT SHE HAS PERIPHERAL NEUROPATHY.   SHE STATES SHE WENT TO AN \"ORTHOGONIST\", DR. JEROME KHALIL.   SHE STATES IT IS A NEW PROCEDURE AFTER SHE SAW HIS COMMERCIAL TO REVERSE NEUROPATHY.    SHE STATES DR. KHALIL DID AN INFRARED THING OF HER FEET AND IT SHOWED SHE BARELY HAS BLOOD FLOW GOING TO FEET.   SHE STATES IT IS ALMOST BEYOND REPAIR.    PATIENT STATES DR. KHALIL HAS A PROCEDURE THAT OVER A PERIOD OF UP TO 18 MONTHS CAN REVERSE THIS.  PATIENT WANTS TO KNOW FROM DR. TEJEDA IF THIS CAN BE REVERSED.   SHE STATES IT IS $10K AND NOT COVERED BY INSURANCE SO SHE WOULD HAVE TO TAKE OUT A LOAN TO COVER THE PROCEDURE.     SHE STATES YOU PUT YOUR FEET IN SOME WATER AND THEY PUT IN ELECTRODES IN THE WATER.    PATIENT ASKED HUB TO SEND DR. TEJEDA THIS MESSAGE SO HE WILL BE AWARE BEFORE SHE ARRIVES FOR AN APPT SHE SCHEDULED FOR TODAY AT 1:00 PM.          "

## 2025-05-22 NOTE — PROGRESS NOTES
"Chief Complaint  Diabetes    Subjective        Reema Easley presents to [facility] ENDOCRINOLOGY  History of Present Illness    Really struggling with pain in her lower extremeties  Went to a outside facility that does not take insurance and told her she has severe neuropathy and offered her treatment that was $10,000    Diabetes Type 2 > 25 years   New to me 7/2/24  PMH s/f: Pancreatic cancer  4/10/24- portion of pancrease and spleen removed, insulin was started as oral options were not working   7/2/24: Stop glimepiride and januvia given pancreatic involvement     Current DM regimen: metformin 1000mg BID, lantus 14u QPM  Last visit changes novolog to 2/4/6u depending on meal size  She has not been consistent with meal timing, still grazing/snacking most of the day and not consistent with novolog  Reports taking 8u of novolog but a poor historian with food recall and insulin use/timing/ dose recall     Daughter joined via phone call today during visit        CV: simvastatin 40mg QD  Renal: benazepril 40mg QD     Cgm review 5/9/25-5/22/25  Avg glu 204  GMI 8.2%  21% very high  45% high  34% time in range  <1% low     Objective   Vital Signs:  /70   Pulse 115   Temp 98.1 °F (36.7 °C) (Oral)   Ht 162.6 cm (64.02\")   Wt 98 kg (216 lb)   SpO2 97%   BMI 37.06 kg/m²   Estimated body mass index is 37.06 kg/m² as calculated from the following:    Height as of this encounter: 162.6 cm (64.02\").    Weight as of this encounter: 98 kg (216 lb).            Physical Exam  Vitals reviewed.   Constitutional:       General: She is not in acute distress.  HENT:      Head: Normocephalic and atraumatic.   Cardiovascular:      Rate and Rhythm: Normal rate.   Pulmonary:      Effort: Pulmonary effort is normal. No respiratory distress.   Musculoskeletal:         General: No signs of injury. Normal range of motion.      Cervical back: Normal range of motion and neck supple.   Skin:     General: Skin is warm " and dry.   Neurological:      Mental Status: She is alert and oriented to person, place, and time. Mental status is at baseline.   Psychiatric:         Mood and Affect: Mood normal.         Behavior: Behavior normal.         Thought Content: Thought content normal.         Judgment: Judgment normal.        Result Review :  The following data was reviewed by: JACQUELIN Akers on 05/22/2025:  Common labs          2/6/2025    05:00 2/7/2025    06:39 4/9/2025    07:42   Common Labs   Glucose 160  187  116    BUN 18  17  17    Creatinine 0.66  0.65  0.84    Sodium 142  141  141    Potassium 3.9  4.1  5.4    Chloride 106  105  102    Calcium 9.3  9.5  9.9    Albumin   3.9    Total Bilirubin   0.6    Alkaline Phosphatase   111    AST (SGOT)   16    ALT (SGPT)   10    WBC 12.12  12.47  11.1    Hemoglobin 11.8  12.7  12.6    Hematocrit 35.9  38.7  39.5    Platelets 491  543  551    Total Cholesterol   137    Triglycerides   135    Hemoglobin A1C   9.4    Microalbumin, Urine   24.1    Uric Acid   4.1                Assessment and Plan   Diagnoses and all orders for this visit:    1. Type 2 diabetes mellitus with hyperglycemia, with long-term current use of insulin (Primary)    2. Diabetic peripheral neuropathy associated with type 2 diabetes mellitus  -     Ambulatory Referral to Pain Management    3. Neuroendocrine carcinoma of pancreas    4. Hyperlipidemia             Follow Up   Return in about 2 weeks (around 6/5/2025).    Routine eating times  Mediterranean/ whole food eating habits   Record all food intake, time and insulin dose to review in 2 weeks   Cgm reviewed, improving, not at goal with worsening ?neuropathy pain  A1c worse, not at goal   Additional insulin changes to come in 2 weeks with journal review along with cgm review     Patient was given instructions and counseling regarding her condition or for health maintenance advice. Please see specific information pulled into the AVS if appropriate.     Daniela  Jaron, APRN
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
## 2025-05-27 ENCOUNTER — PATIENT OUTREACH (OUTPATIENT)
Dept: CASE MANAGEMENT | Facility: OTHER | Age: 75
End: 2025-05-27
Payer: MEDICARE

## 2025-05-27 NOTE — OUTREACH NOTE
AMBULATORY CASE MANAGEMENT NOTE    Names and Relationships of Patient/Support Persons:  -     Patient Outreach    Introduced self, explained ACM RN role and provided contact information. Spoke with patient regarding health and wellness. Patient states she called [employer] and received some companies that cover the motorized wheelchair. Patient plans to chose and chair and request an order soon. ACM recommended completing the task and obtaining the order quickly as the process is not quick. Patient verbalized understanding. Follow up scheduled next month.     Education Documentation  No documentation found.        Vanessa HELMS  Ambulatory Case Management    5/27/2025, 15:47 EDT

## 2025-06-05 ENCOUNTER — OFFICE VISIT (OUTPATIENT)
Dept: ENDOCRINOLOGY | Age: 75
End: 2025-06-05
Payer: MEDICARE

## 2025-06-05 VITALS
SYSTOLIC BLOOD PRESSURE: 130 MMHG | HEIGHT: 64 IN | HEART RATE: 63 BPM | WEIGHT: 214 LBS | OXYGEN SATURATION: 97 % | TEMPERATURE: 98.2 F | DIASTOLIC BLOOD PRESSURE: 76 MMHG | BODY MASS INDEX: 36.54 KG/M2

## 2025-06-05 DIAGNOSIS — Z79.4 TYPE 2 DIABETES MELLITUS WITH HYPERGLYCEMIA, WITH LONG-TERM CURRENT USE OF INSULIN: Primary | ICD-10-CM

## 2025-06-05 DIAGNOSIS — E11.65 TYPE 2 DIABETES MELLITUS WITH HYPERGLYCEMIA, WITH LONG-TERM CURRENT USE OF INSULIN: Primary | ICD-10-CM

## 2025-06-05 PROCEDURE — 3046F HEMOGLOBIN A1C LEVEL >9.0%: CPT | Performed by: NURSE PRACTITIONER

## 2025-06-05 PROCEDURE — 95251 CONT GLUC MNTR ANALYSIS I&R: CPT | Performed by: NURSE PRACTITIONER

## 2025-06-05 PROCEDURE — 99213 OFFICE O/P EST LOW 20 MIN: CPT | Performed by: NURSE PRACTITIONER

## 2025-06-05 PROCEDURE — 3075F SYST BP GE 130 - 139MM HG: CPT | Performed by: NURSE PRACTITIONER

## 2025-06-05 PROCEDURE — 3078F DIAST BP <80 MM HG: CPT | Performed by: NURSE PRACTITIONER

## 2025-06-05 NOTE — PROGRESS NOTES
"Chief Complaint  Diabetes    Subjective        Reema Easley presents to [facility]  History of Present Illness    2 week f/u      Cgm review 5/23/25-6/5/25  Avg glu 211  GMI 8.4%  20% very high  53% high  27% time in range  0% low     Daughter has bought her prepared meals so she is not snacking/grazing all day long  Going to put a Note on the fridge reminding her to take her fast acting insulin before eating her meal  Brought Food log, some information available but has not been consistent with taking the fast acting insulin and recording it on her journal up to 10u before eating   Takes her long acting insulin at inconsistent times- 14u     Objective   Vital Signs:  /76   Pulse 63   Temp 98.2 °F (36.8 °C) (Oral)   Ht 162.6 cm (64.02\")   Wt 97.1 kg (214 lb)   SpO2 97%   BMI 36.71 kg/m²   Estimated body mass index is 36.71 kg/m² as calculated from the following:    Height as of this encounter: 162.6 cm (64.02\").    Weight as of this encounter: 97.1 kg (214 lb).        Physical Exam  Vitals reviewed.   Constitutional:       General: She is not in acute distress.  HENT:      Head: Normocephalic and atraumatic.   Cardiovascular:      Rate and Rhythm: Normal rate.   Pulmonary:      Effort: Pulmonary effort is normal. No respiratory distress.   Musculoskeletal:         General: No signs of injury. Normal range of motion.      Cervical back: Normal range of motion and neck supple.   Skin:     General: Skin is warm and dry.   Neurological:      Mental Status: She is alert and oriented to person, place, and time. Mental status is at baseline.   Psychiatric:         Mood and Affect: Mood normal.         Behavior: Behavior normal.         Thought Content: Thought content normal.         Judgment: Judgment normal.        Result Review :  The following data was reviewed by: JACQUELIN Akers on 06/05/2025:  Common labs          2/6/2025    05:00 2/7/2025    06:39 4/9/2025    " 07:42   Common Labs   Glucose 160  187  116    BUN 18  17  17    Creatinine 0.66  0.65  0.84    Sodium 142  141  141    Potassium 3.9  4.1  5.4    Chloride 106  105  102    Calcium 9.3  9.5  9.9    Albumin   3.9    Total Bilirubin   0.6    Alkaline Phosphatase   111    AST (SGOT)   16    ALT (SGPT)   10    WBC 12.12  12.47  11.1    Hemoglobin 11.8  12.7  12.6    Hematocrit 35.9  38.7  39.5    Platelets 491  543  551    Total Cholesterol   137    Triglycerides   135    Hemoglobin A1C   9.4    Microalbumin, Urine   24.1    Uric Acid   4.1                Assessment and Plan   Diagnoses and all orders for this visit:    1. Type 2 diabetes mellitus with hyperglycemia, with long-term current use of insulin (Primary)             Follow Up   Return in about 4 weeks (around 7/3/2025).    Highlighted and made notes in her journal of what to keep track of  We set an alarm on her phone to take lantus 14u every night at 8p  Continue humalog before meals up to 10u, going home now to put a note on her fridge to remind her to take with every meal  Reviewed insulin doses, safety with injections, responding to Cgm alerts and alarms and hypoglycemia prevention and treatment   RTC in 4 weeks again with journal and increased information as discussed during visit today     Patient was given instructions and counseling regarding her condition or for health maintenance advice. Please see specific information pulled into the AVS if appropriate.     JACQUELIN Akers

## 2025-06-23 RX ORDER — INSULIN ASPART 100 [IU]/ML
4 INJECTION, SOLUTION INTRAVENOUS; SUBCUTANEOUS
Qty: 15 ML | Refills: 0 | Status: SHIPPED | OUTPATIENT
Start: 2025-06-23

## 2025-06-23 NOTE — TELEPHONE ENCOUNTER
Rx Refill Note  Requested Prescriptions     Pending Prescriptions Disp Refills    insulin aspart (NovoLOG FlexPen) 100 UNIT/ML solution pen-injector sc pen 15 mL 0     Sig: Inject 4 Units under the skin into the appropriate area as directed 3 (Three) Times a Day With Meals.      Last office visit with prescribing clinician: 6/5/2025   Last telemedicine visit with prescribing clinician: Visit date not found   Next office visit with prescribing clinician: 7/9/2025                         Would you like a call back once the refill request has been completed: [] Yes [] No    If the office needs to give you a call back, can they leave a voicemail: [] Yes [] No    Bren Cruz MA  06/23/25, 11:59 EDT

## 2025-06-25 ENCOUNTER — PATIENT OUTREACH (OUTPATIENT)
Dept: CASE MANAGEMENT | Facility: OTHER | Age: 75
End: 2025-06-25
Payer: MEDICARE

## 2025-06-25 NOTE — OUTREACH NOTE
AMBULATORY CASE MANAGEMENT NOTE    Names and Relationships of Patient/Support Persons: Contact: Reema Easley; Relationship: Self -     Patient Outreach    Introduced self, explained ACM RN role and provided contact information. Spoke with patient regarding wheelchair. Patient still has not chosen a chair. Patient recently lost a close friend. Patient has plans to visit family. Also plans to go to pool for movement and exercise. Follow up scheduled in 2 weeks.     Education Documentation  No documentation found.        Vanessa HELMS  Ambulatory Case Management    6/25/2025, 18:13 EDT

## 2025-07-13 DIAGNOSIS — F41.1 GENERALIZED ANXIETY DISORDER: Chronic | ICD-10-CM

## 2025-07-14 RX ORDER — ALPRAZOLAM 0.5 MG
0.5 TABLET ORAL 2 TIMES DAILY
Qty: 60 TABLET | Refills: 3 | Status: SHIPPED | OUTPATIENT
Start: 2025-07-14

## 2025-07-21 ENCOUNTER — TELEPHONE (OUTPATIENT)
Dept: INTERNAL MEDICINE | Facility: CLINIC | Age: 75
End: 2025-07-21

## 2025-07-24 ENCOUNTER — PATIENT OUTREACH (OUTPATIENT)
Dept: CASE MANAGEMENT | Facility: OTHER | Age: 75
End: 2025-07-24
Payer: MEDICARE

## 2025-07-24 NOTE — OUTREACH NOTE
AMBULATORY CASE MANAGEMENT NOTE    Names and Relationships of Patient/Support Persons: Contact: Reema Easley; Relationship: Self -     Patient Outreach    Introduced self, explained ACM RN role and provided contact information. Spoke with patient regarding health and wellness. Patient states she has gained weight and has not made positive diet choices this year. She is interested in Ozempic or Wegovy. Message sent to PCP with inquiry. Follow up scheduled next week.     Education Documentation  No documentation found.        Vanessa HELMS  Ambulatory Case Management    7/24/2025, 16:57 EDT

## 2025-07-29 ENCOUNTER — TELEPHONE (OUTPATIENT)
Dept: INTERNAL MEDICINE | Facility: CLINIC | Age: 75
End: 2025-07-29
Payer: MEDICARE

## 2025-07-29 NOTE — TELEPHONE ENCOUNTER
Called PT to set up appointment to discuss Wegovy/Ozempic.  PT said she will just wait until her appointment in October.

## 2025-08-05 ENCOUNTER — OFFICE VISIT (OUTPATIENT)
Dept: INTERNAL MEDICINE | Facility: CLINIC | Age: 75
End: 2025-08-05
Payer: MEDICARE

## 2025-08-05 VITALS
BODY MASS INDEX: 37.52 KG/M2 | TEMPERATURE: 98.3 F | SYSTOLIC BLOOD PRESSURE: 130 MMHG | HEART RATE: 72 BPM | OXYGEN SATURATION: 98 % | DIASTOLIC BLOOD PRESSURE: 80 MMHG | HEIGHT: 64 IN | WEIGHT: 219.8 LBS

## 2025-08-05 DIAGNOSIS — E78.2 MIXED HYPERLIPIDEMIA: Chronic | ICD-10-CM

## 2025-08-05 DIAGNOSIS — C7A.8 NEUROENDOCRINE CARCINOMA OF PANCREAS: Chronic | ICD-10-CM

## 2025-08-05 DIAGNOSIS — R60.0 BILATERAL LOWER EXTREMITY EDEMA: Chronic | ICD-10-CM

## 2025-08-05 DIAGNOSIS — E11.29 TYPE 2 DIABETES MELLITUS WITH DIABETIC MICROALBUMINURIA, WITH LONG-TERM CURRENT USE OF INSULIN: Chronic | ICD-10-CM

## 2025-08-05 DIAGNOSIS — E11.42 DIABETIC PERIPHERAL NEUROPATHY: Chronic | ICD-10-CM

## 2025-08-05 DIAGNOSIS — F41.8 DEPRESSION WITH ANXIETY: Chronic | ICD-10-CM

## 2025-08-05 DIAGNOSIS — E66.9 NON MORBID OBESITY: Primary | Chronic | ICD-10-CM

## 2025-08-05 DIAGNOSIS — K75.81 NASH (NONALCOHOLIC STEATOHEPATITIS): Chronic | ICD-10-CM

## 2025-08-05 DIAGNOSIS — Z78.0 POSTMENOPAUSAL STATE: Chronic | ICD-10-CM

## 2025-08-05 DIAGNOSIS — Z98.61 CAD S/P PERCUTANEOUS CORONARY ANGIOPLASTY: Chronic | ICD-10-CM

## 2025-08-05 DIAGNOSIS — K25.7 CHRONIC GASTRIC ULCER WITHOUT HEMORRHAGE AND WITHOUT PERFORATION: Chronic | ICD-10-CM

## 2025-08-05 DIAGNOSIS — M85.89 OSTEOPENIA OF MULTIPLE SITES: Chronic | ICD-10-CM

## 2025-08-05 DIAGNOSIS — Z51.81 THERAPEUTIC DRUG MONITORING: ICD-10-CM

## 2025-08-05 DIAGNOSIS — G47.33 OBSTRUCTIVE SLEEP APNEA: Chronic | ICD-10-CM

## 2025-08-05 DIAGNOSIS — Z90.81 HISTORY OF TOTAL SPLENECTOMY: Chronic | ICD-10-CM

## 2025-08-05 DIAGNOSIS — I25.10 CAD S/P PERCUTANEOUS CORONARY ANGIOPLASTY: Chronic | ICD-10-CM

## 2025-08-05 DIAGNOSIS — R80.9 TYPE 2 DIABETES MELLITUS WITH DIABETIC MICROALBUMINURIA, WITH LONG-TERM CURRENT USE OF INSULIN: Chronic | ICD-10-CM

## 2025-08-05 DIAGNOSIS — Z79.4 TYPE 2 DIABETES MELLITUS WITH DIABETIC MICROALBUMINURIA, WITH LONG-TERM CURRENT USE OF INSULIN: Chronic | ICD-10-CM

## 2025-08-05 DIAGNOSIS — R80.9 MICROALBUMINURIA: Chronic | ICD-10-CM

## 2025-08-05 PROBLEM — Z86.16 HISTORY OF 2019 NOVEL CORONAVIRUS DISEASE (COVID-19): Chronic | Status: RESOLVED | Noted: 2022-07-11 | Resolved: 2025-08-05

## 2025-08-05 RX ORDER — CLOPIDOGREL BISULFATE 75 MG/1
75 TABLET ORAL DAILY
Qty: 90 TABLET | Refills: 3 | Status: SHIPPED | OUTPATIENT
Start: 2025-08-05

## 2025-08-05 RX ORDER — ATORVASTATIN CALCIUM 40 MG/1
40 TABLET, FILM COATED ORAL DAILY
Qty: 90 TABLET | Refills: 3 | Status: SHIPPED | OUTPATIENT
Start: 2025-08-05

## 2025-08-05 RX ORDER — BUPROPION HYDROCHLORIDE 150 MG/1
TABLET ORAL
Qty: 90 TABLET | Refills: 1 | Status: SHIPPED | OUTPATIENT
Start: 2025-08-05

## 2025-08-08 ENCOUNTER — OFFICE VISIT (OUTPATIENT)
Dept: ENDOCRINOLOGY | Age: 75
End: 2025-08-08
Payer: MEDICARE

## 2025-08-08 VITALS
BODY MASS INDEX: 37.39 KG/M2 | OXYGEN SATURATION: 95 % | WEIGHT: 219 LBS | HEIGHT: 64 IN | TEMPERATURE: 98.4 F | SYSTOLIC BLOOD PRESSURE: 142 MMHG | HEART RATE: 76 BPM | DIASTOLIC BLOOD PRESSURE: 78 MMHG

## 2025-08-08 DIAGNOSIS — E11.65 TYPE 2 DIABETES MELLITUS WITH HYPERGLYCEMIA, WITH LONG-TERM CURRENT USE OF INSULIN: Primary | ICD-10-CM

## 2025-08-08 DIAGNOSIS — E11.42 DIABETIC PERIPHERAL NEUROPATHY ASSOCIATED WITH TYPE 2 DIABETES MELLITUS: ICD-10-CM

## 2025-08-08 DIAGNOSIS — R80.9 TYPE 2 DIABETES MELLITUS WITH MICROALBUMINURIA, WITHOUT LONG-TERM CURRENT USE OF INSULIN: Chronic | ICD-10-CM

## 2025-08-08 DIAGNOSIS — C7A.8 NEUROENDOCRINE CARCINOMA OF PANCREAS: ICD-10-CM

## 2025-08-08 DIAGNOSIS — E11.29 TYPE 2 DIABETES MELLITUS WITH MICROALBUMINURIA, WITHOUT LONG-TERM CURRENT USE OF INSULIN: Chronic | ICD-10-CM

## 2025-08-08 DIAGNOSIS — R69 CHRONIC DISEASE: ICD-10-CM

## 2025-08-08 DIAGNOSIS — Z79.4 TYPE 2 DIABETES MELLITUS WITH HYPERGLYCEMIA, WITH LONG-TERM CURRENT USE OF INSULIN: Primary | ICD-10-CM

## 2025-08-08 DIAGNOSIS — E78.2 MIXED HYPERLIPIDEMIA: ICD-10-CM

## 2025-08-08 PROCEDURE — 3078F DIAST BP <80 MM HG: CPT | Performed by: NURSE PRACTITIONER

## 2025-08-08 PROCEDURE — 3077F SYST BP >= 140 MM HG: CPT | Performed by: NURSE PRACTITIONER

## 2025-08-08 PROCEDURE — 95251 CONT GLUC MNTR ANALYSIS I&R: CPT | Performed by: NURSE PRACTITIONER

## 2025-08-08 PROCEDURE — 3046F HEMOGLOBIN A1C LEVEL >9.0%: CPT | Performed by: NURSE PRACTITIONER

## 2025-08-08 PROCEDURE — 99214 OFFICE O/P EST MOD 30 MIN: CPT | Performed by: NURSE PRACTITIONER

## 2025-08-08 RX ORDER — INSULIN GLARGINE 100 [IU]/ML
INJECTION, SOLUTION SUBCUTANEOUS
Qty: 30 ML | Refills: 0 | Status: SHIPPED | OUTPATIENT
Start: 2025-08-08

## 2025-08-08 RX ORDER — INSULIN ASPART 100 [IU]/ML
10 INJECTION, SOLUTION INTRAVENOUS; SUBCUTANEOUS
Qty: 45 ML | Refills: 0 | Status: SHIPPED | OUTPATIENT
Start: 2025-08-08

## 2025-08-09 LAB
BUN SERPL-MCNC: 22 MG/DL (ref 8–23)
BUN/CREAT SERPL: 28.9 (ref 7–25)
CALCIUM SERPL-MCNC: 10 MG/DL (ref 8.6–10.5)
CHLORIDE SERPL-SCNC: 103 MMOL/L (ref 98–107)
CO2 SERPL-SCNC: 23.6 MMOL/L (ref 22–29)
CREAT SERPL-MCNC: 0.76 MG/DL (ref 0.57–1)
EGFRCR SERPLBLD CKD-EPI 2021: 82.9 ML/MIN/1.73
GLUCOSE SERPL-MCNC: 174 MG/DL (ref 65–99)
HBA1C MFR BLD: 8.9 % (ref 4.8–5.6)
POTASSIUM SERPL-SCNC: 4.9 MMOL/L (ref 3.5–5.2)
SODIUM SERPL-SCNC: 140 MMOL/L (ref 136–145)

## 2025-08-13 ENCOUNTER — APPOINTMENT (OUTPATIENT)
Dept: GENERAL RADIOLOGY | Facility: HOSPITAL | Age: 75
End: 2025-08-13
Payer: MEDICARE

## 2025-08-13 ENCOUNTER — HOSPITAL ENCOUNTER (EMERGENCY)
Facility: HOSPITAL | Age: 75
Discharge: HOME | End: 2025-08-13
Attending: EMERGENCY MEDICINE
Payer: MEDICARE

## 2025-08-13 VITALS
DIASTOLIC BLOOD PRESSURE: 94 MMHG | HEART RATE: 66 BPM | BODY MASS INDEX: 37.37 KG/M2 | WEIGHT: 218.92 LBS | TEMPERATURE: 97.7 F | SYSTOLIC BLOOD PRESSURE: 158 MMHG | OXYGEN SATURATION: 97 % | HEIGHT: 64 IN | RESPIRATION RATE: 18 BRPM

## 2025-08-13 DIAGNOSIS — R51.9 ACUTE NONINTRACTABLE HEADACHE, UNSPECIFIED HEADACHE TYPE: Primary | ICD-10-CM

## 2025-08-13 DIAGNOSIS — I10 HYPERTENSION NOT AT GOAL: ICD-10-CM

## 2025-08-13 DIAGNOSIS — R06.00 DYSPNEA, UNSPECIFIED TYPE: ICD-10-CM

## 2025-08-13 LAB
ALBUMIN SERPL-MCNC: 4 G/DL (ref 3.5–5.2)
ALBUMIN/GLOB SERPL: 1.4 G/DL
ALP SERPL-CCNC: 106 U/L (ref 39–117)
ALT SERPL W P-5'-P-CCNC: 14 U/L (ref 1–33)
ANION GAP SERPL CALCULATED.3IONS-SCNC: 11.6 MMOL/L (ref 5–15)
AST SERPL-CCNC: 17 U/L (ref 1–32)
B PARAPERT DNA SPEC QL NAA+PROBE: NOT DETECTED
B PERT DNA SPEC QL NAA+PROBE: NOT DETECTED
BASOPHILS # BLD AUTO: 0.06 10*3/MM3 (ref 0–0.2)
BASOPHILS NFR BLD AUTO: 0.5 % (ref 0–1.5)
BILIRUB SERPL-MCNC: 0.3 MG/DL (ref 0–1.2)
BUN SERPL-MCNC: 19 MG/DL (ref 8–23)
BUN/CREAT SERPL: 24.7 (ref 7–25)
C PNEUM DNA NPH QL NAA+NON-PROBE: NOT DETECTED
CALCIUM SPEC-SCNC: 9.5 MG/DL (ref 8.6–10.5)
CHLORIDE SERPL-SCNC: 101 MMOL/L (ref 98–107)
CO2 SERPL-SCNC: 26.4 MMOL/L (ref 22–29)
CREAT SERPL-MCNC: 0.77 MG/DL (ref 0.57–1)
DEPRECATED RDW RBC AUTO: 47.5 FL (ref 37–54)
EGFRCR SERPLBLD CKD-EPI 2021: 81.6 ML/MIN/1.73
EOSINOPHIL # BLD AUTO: 1.44 10*3/MM3 (ref 0–0.4)
EOSINOPHIL NFR BLD AUTO: 11 % (ref 0.3–6.2)
ERYTHROCYTE [DISTWIDTH] IN BLOOD BY AUTOMATED COUNT: 14.1 % (ref 12.3–15.4)
FLUAV SUBTYP SPEC NAA+PROBE: NOT DETECTED
FLUBV RNA NPH QL NAA+NON-PROBE: NOT DETECTED
GEN 5 1HR TROPONIN T REFLEX: 8 NG/L
GLOBULIN UR ELPH-MCNC: 2.8 GM/DL
GLUCOSE SERPL-MCNC: 193 MG/DL (ref 65–99)
HADV DNA SPEC NAA+PROBE: NOT DETECTED
HCOV 229E RNA SPEC QL NAA+PROBE: NOT DETECTED
HCOV HKU1 RNA SPEC QL NAA+PROBE: NOT DETECTED
HCOV NL63 RNA SPEC QL NAA+PROBE: NOT DETECTED
HCOV OC43 RNA SPEC QL NAA+PROBE: NOT DETECTED
HCT VFR BLD AUTO: 39.7 % (ref 34–46.6)
HGB BLD-MCNC: 12.6 G/DL (ref 12–15.9)
HMPV RNA NPH QL NAA+NON-PROBE: NOT DETECTED
HOLD SPECIMEN: NORMAL
HOLD SPECIMEN: NORMAL
HPIV1 RNA ISLT QL NAA+PROBE: NOT DETECTED
HPIV2 RNA SPEC QL NAA+PROBE: NOT DETECTED
HPIV3 RNA NPH QL NAA+PROBE: NOT DETECTED
HPIV4 P GENE NPH QL NAA+PROBE: NOT DETECTED
IMM GRANULOCYTES # BLD AUTO: 0.02 10*3/MM3 (ref 0–0.05)
IMM GRANULOCYTES NFR BLD AUTO: 0.2 % (ref 0–0.5)
LYMPHOCYTES # BLD AUTO: 3.18 10*3/MM3 (ref 0.7–3.1)
LYMPHOCYTES NFR BLD AUTO: 24.3 % (ref 19.6–45.3)
M PNEUMO IGG SER IA-ACNC: NOT DETECTED
MCH RBC QN AUTO: 29.6 PG (ref 26.6–33)
MCHC RBC AUTO-ENTMCNC: 31.7 G/DL (ref 31.5–35.7)
MCV RBC AUTO: 93.4 FL (ref 79–97)
MONOCYTES # BLD AUTO: 0.65 10*3/MM3 (ref 0.1–0.9)
MONOCYTES NFR BLD AUTO: 5 % (ref 5–12)
NEUTROPHILS NFR BLD AUTO: 59 % (ref 42.7–76)
NEUTROPHILS NFR BLD AUTO: 7.71 10*3/MM3 (ref 1.7–7)
NRBC BLD AUTO-RTO: 0 /100 WBC (ref 0–0.2)
NT-PROBNP SERPL-MCNC: 110 PG/ML (ref 0–900)
PLATELET # BLD AUTO: 479 10*3/MM3 (ref 140–450)
PMV BLD AUTO: 9.5 FL (ref 6–12)
POTASSIUM SERPL-SCNC: 4.7 MMOL/L (ref 3.5–5.2)
PROT SERPL-MCNC: 6.8 G/DL (ref 6–8.5)
QT INTERVAL: 381 MS
QTC INTERVAL: 436 MS
RBC # BLD AUTO: 4.25 10*6/MM3 (ref 3.77–5.28)
RHINOVIRUS RNA SPEC NAA+PROBE: NOT DETECTED
RSV RNA NPH QL NAA+NON-PROBE: NOT DETECTED
SARS-COV-2 RNA NPH QL NAA+NON-PROBE: NOT DETECTED
SODIUM SERPL-SCNC: 139 MMOL/L (ref 136–145)
TROPONIN T NUMERIC DELTA: -1 NG/L
TROPONIN T SERPL HS-MCNC: 9 NG/L
WBC NRBC COR # BLD AUTO: 13.06 10*3/MM3 (ref 3.4–10.8)
WHOLE BLOOD HOLD COAG: NORMAL
WHOLE BLOOD HOLD SPECIMEN: NORMAL

## 2025-08-13 PROCEDURE — 36415 COLL VENOUS BLD VENIPUNCTURE: CPT

## 2025-08-13 PROCEDURE — 0202U NFCT DS 22 TRGT SARS-COV-2: CPT | Performed by: EMERGENCY MEDICINE

## 2025-08-13 PROCEDURE — 93010 ELECTROCARDIOGRAM REPORT: CPT | Performed by: STUDENT IN AN ORGANIZED HEALTH CARE EDUCATION/TRAINING PROGRAM

## 2025-08-13 PROCEDURE — 99284 EMERGENCY DEPT VISIT MOD MDM: CPT

## 2025-08-13 PROCEDURE — 84484 ASSAY OF TROPONIN QUANT: CPT | Performed by: EMERGENCY MEDICINE

## 2025-08-13 PROCEDURE — 71045 X-RAY EXAM CHEST 1 VIEW: CPT

## 2025-08-13 PROCEDURE — 80053 COMPREHEN METABOLIC PANEL: CPT

## 2025-08-13 PROCEDURE — 93005 ELECTROCARDIOGRAM TRACING: CPT | Performed by: EMERGENCY MEDICINE

## 2025-08-13 PROCEDURE — 84484 ASSAY OF TROPONIN QUANT: CPT

## 2025-08-13 PROCEDURE — 93005 ELECTROCARDIOGRAM TRACING: CPT

## 2025-08-13 PROCEDURE — 83880 ASSAY OF NATRIURETIC PEPTIDE: CPT

## 2025-08-13 PROCEDURE — 85025 COMPLETE CBC W/AUTO DIFF WBC: CPT

## 2025-08-13 RX ORDER — SODIUM CHLORIDE 0.9 % (FLUSH) 0.9 %
10 SYRINGE (ML) INJECTION AS NEEDED
Status: DISCONTINUED | OUTPATIENT
Start: 2025-08-13 | End: 2025-08-13 | Stop reason: HOSPADM

## 2025-08-23 DIAGNOSIS — R80.9 TYPE 2 DIABETES MELLITUS WITH MICROALBUMINURIA, WITHOUT LONG-TERM CURRENT USE OF INSULIN: ICD-10-CM

## 2025-08-23 DIAGNOSIS — E11.29 TYPE 2 DIABETES MELLITUS WITH MICROALBUMINURIA, WITHOUT LONG-TERM CURRENT USE OF INSULIN: ICD-10-CM

## 2025-08-25 RX ORDER — BISMUTH SUBSALICYLATE 262MG/15ML
SUSPENSION, ORAL (FINAL DOSE FORM) ORAL
Qty: 100 EACH | Refills: 3 | Status: SHIPPED | OUTPATIENT
Start: 2025-08-25

## (undated) DEVICE — 2108 SERIES SAGITTAL BLADE, NO OFFSET  (12.4 X 1.19 X 82.1MM)

## (undated) DEVICE — ANTIBACTERIAL UNDYED BRAIDED (POLYGLACTIN 910), SYNTHETIC ABSORBABLE SUTURE: Brand: COATED VICRYL

## (undated) DEVICE — TBG 02 CRUSH RESIST LF CLR 7FT

## (undated) DEVICE — PATIENT RETURN ELECTRODE, SINGLE-USE, CONTACT QUALITY MONITORING, ADULT, WITH 9FT CORD, FOR PATIENTS WEIGING OVER 33LBS. (15KG): Brand: MEGADYNE

## (undated) DEVICE — MSK ENDO PORT O2 POM ELITE CURAPLEX A/

## (undated) DEVICE — UNDERGLV SURG BIOGEL INDICATOR LF PF 7.5

## (undated) DEVICE — ENDOPATH XCEL BLADELESS TROCARS WITH STABILITY SLEEVES: Brand: ENDOPATH XCEL

## (undated) DEVICE — GLV SURG SENSICARE W/ALOE PF LF 7.5 STRL

## (undated) DEVICE — DGW .035 FC J3MM 260CM TEF: Brand: EMERALD

## (undated) DEVICE — 3M™ IOBAN™ 2 ANTIMICROBIAL INCISE DRAPE 6640EZ: Brand: IOBAN™ 2

## (undated) DEVICE — CATH DIAG DXTERITY ULTRA TRANSRADIAL 4.0 5F 100CM 0/SH

## (undated) DEVICE — APPL DURAPREP IODOPHOR APL 26ML

## (undated) DEVICE — PK CATH CARD 40

## (undated) DEVICE — SOL NACL 0.9PCT 1000ML

## (undated) DEVICE — TRAP FLD MINIVAC MEGADYNE 100ML

## (undated) DEVICE — ENDOPATH XCEL UNIVERSAL TROCAR STABLILITY SLEEVES: Brand: ENDOPATH XCEL

## (undated) DEVICE — VISUALIZATION SYSTEM: Brand: CLEARIFY

## (undated) DEVICE — GLIDESHEATH SLENDER STAINLESS STEEL KIT: Brand: GLIDESHEATH SLENDER

## (undated) DEVICE — BNDG,ELSTC,MATRIX,STRL,6"X5YD,LF,HOOK&LP: Brand: MEDLINE

## (undated) DEVICE — SOL ISO/ALC RUB 70PCT 4OZ

## (undated) DEVICE — Device

## (undated) DEVICE — DUAL CUT SAGITTAL BLADE

## (undated) DEVICE — SYRINGE, LUER SLIP, STERILE, 60ML: Brand: MEDLINE

## (undated) DEVICE — THE VASC BAND HEMOSTAT IS A COMPRESSION DEVICE TO ASSIST HEMOSTASIS OF ARTERIAL, VENOUS AND HEMODIALYSIS PERCUTANEOUS ACCESS SITES.: Brand: VASC BAND™ HEMOSTAT

## (undated) DEVICE — GLV SURG SENSICARE PI PF LF 7 GRN STRL

## (undated) DEVICE — CANN O2 ETCO2 FITS ALL CONN CO2 SMPL A/ 7IN DISP LF

## (undated) DEVICE — TREK CORONARY DILATATION CATHETER 2.50 MM X 12 MM / RAPID-EXCHANGE: Brand: TREK

## (undated) DEVICE — BITEBLOCK OMNI BLOC

## (undated) DEVICE — FLEX ADVANTAGE 1500CC: Brand: FLEX ADVANTAGE

## (undated) DEVICE — SUT VIC 1 CT1 36IN J947H

## (undated) DEVICE — PICO 7 10CM X 30CM: Brand: PICO™ 7

## (undated) DEVICE — ADAPT CLN SCPE ENDO PORPOISE BX/50 DISP

## (undated) DEVICE — Device: Brand: DEFENDO AIR/WATER/SUCTION AND BIOPSY VALVE

## (undated) DEVICE — FRCP BX RADJAW4 NDL 2.8 240CM LG OG BX40

## (undated) DEVICE — GLV SURG PREMIERPRO ORTHO LTX PF SZ8.5 BRN

## (undated) DEVICE — DRP C/ARM 41X74IN

## (undated) DEVICE — GOWN ISOL W/THUMB UNIV BLU BX/15

## (undated) DEVICE — DRSNG BURN ACTICOAT FLEX 7 1X24IN

## (undated) DEVICE — LOU LAP CHOLE: Brand: MEDLINE INDUSTRIES, INC.

## (undated) DEVICE — ENDOCUT SCISSOR TIP, DISPOSABLE: Brand: RENEW

## (undated) DEVICE — GLV SURG SENSICARE PI MIC PF SZ7 LF STRL

## (undated) DEVICE — APPL CHLORAPREP HI/LITE 26ML ORNG

## (undated) DEVICE — FEMORAL ENTRY ANGIOGRAPHY SHIELD-YELLOW: Brand: RADPAD

## (undated) DEVICE — KT DRN EVAC WND PVC PCH WTROC RND 10F400

## (undated) DEVICE — GLV SURG SENSICARE W/ALOE PF LF 8 STRL

## (undated) DEVICE — NC TREK NEO™ CORONARY DILATATION CATHETER 2.75 MM X 12 MM / RAPID-EXCHANGE: Brand: NC TREK NEO™

## (undated) DEVICE — PENCL E/S ULTRAVAC TELESCP NOSE HOLSTR 10FT

## (undated) DEVICE — STPCK 3WY D201 DISCOFIX

## (undated) DEVICE — PENCL SMOKE/EVAC MEGADYNE TELESCP 10FT

## (undated) DEVICE — SUT VIC 0/0 UR6 27IN DYED J603H

## (undated) DEVICE — DECANTER BAG 9": Brand: MEDLINE INDUSTRIES, INC.

## (undated) DEVICE — EXTENSION SET, MALE LUER LOCK ADAPTER WITH RETRACTABLE COLLAR

## (undated) DEVICE — KT MANIFLD CARDIAC

## (undated) DEVICE — VIAL FORMLN CAP 10PCT 20ML

## (undated) DEVICE — LAB CORP CLOTEST UREASE

## (undated) DEVICE — SUT MNCRYL PLS ANTIB UD 4/0 PS2 18IN

## (undated) DEVICE — CANN NASL CO2 TRULINK W/O2 A/

## (undated) DEVICE — PREP SOL POVIDONE/IODINE BT 4OZ

## (undated) DEVICE — DRAPE,REIN 53X77,STERILE: Brand: MEDLINE

## (undated) DEVICE — STERILE PATIENT PROTECTIVE PAD FOR IMP® KNEE POSITIONERS & COHESIVE WRAP (10 / CASE): Brand: DE MAYO KNEE POSITIONER®

## (undated) DEVICE — NEEDLE, QUINCKE 22GX3.5": Brand: MEDLINE INDUSTRIES, INC.

## (undated) DEVICE — 3M™ IOBAN™ 2 ANTIMICROBIAL INCISE DRAPE 6650EZ: Brand: IOBAN™ 2

## (undated) DEVICE — YANKAUER,BULB TIP,W/O VENT,RIGID,STERILE: Brand: MEDLINE

## (undated) DEVICE — NEEDLE, QUINCKE, 20GX3.5": Brand: MEDLINE

## (undated) DEVICE — 450 ML BOTTLE OF 0.05% CHLORHEXIDINE GLUCONATE IN 99.95% STERILE WATER FOR IRRIGATION, USP AND APPLICATOR.: Brand: IRRISEPT ANTIMICROBIAL WOUND LAVAGE

## (undated) DEVICE — STPLR SKIN VISISTAT WD 35CT

## (undated) DEVICE — SYS CLS SKIN PREMIERPRO EXOFINFUSION 22CM

## (undated) DEVICE — 6F .070 XB 3.5 ECO PK: Brand: VISTA BRITE TIP

## (undated) DEVICE — PK KN TOTL 40

## (undated) DEVICE — GLV SURG SIGNATURE ESSENTIAL PF LTX SZ8.5

## (undated) DEVICE — KT ORCA ORCAPOD DISP STRL

## (undated) DEVICE — TUBING, SUCTION, 1/4" X 10', STRAIGHT: Brand: MEDLINE

## (undated) DEVICE — INTENDED TO SUPPORT AND MAINTAIN THE POSITION OF AN ANESTHETIZED PATIENT DURING SURGERY: Brand: HERMANTOR XL PINK KNEE POSITIONING PAD

## (undated) DEVICE — GLV SURG BIOGEL LTX PF 7 1/2

## (undated) DEVICE — 7 DAY SILVER-COATED ANTIMICROBIAL BARRIER DRESSING: Brand: ACTICOAT 7  4" X 5"

## (undated) DEVICE — CATH IV INSYTE AUTOGARD 14G 1 1/2IN ORNG

## (undated) DEVICE — APPL CHLORAPREP W/TINT 26ML ORNG

## (undated) DEVICE — ADHS SKIN DERMABOND

## (undated) DEVICE — CATH CHOLANG 4.5F18IN BRGNDY

## (undated) DEVICE — SOL IRRIG SOD CHL 0.9PCT 3000ML

## (undated) DEVICE — UNDERCAST PADDING: Brand: DEROYAL

## (undated) DEVICE — SKIN PREP TRAY 4 COMPARTM TRAY: Brand: MEDLINE INDUSTRIES, INC.

## (undated) DEVICE — GLV SURG BIOGEL LTX PF 7

## (undated) DEVICE — ENDOPOUCH RETRIEVER SPECIMEN RETRIEVAL BAGS: Brand: ENDOPOUCH RETRIEVER

## (undated) DEVICE — GOWN ,SIRUS,NONREINFORCED 3XL: Brand: MEDLINE

## (undated) DEVICE — Device: Brand: ASAHI SION BLUE

## (undated) DEVICE — SINGLE-USE BIOPSY FORCEPS: Brand: RADIAL JAW 4